# Patient Record
Sex: FEMALE | Race: BLACK OR AFRICAN AMERICAN | Employment: UNEMPLOYED | URBAN - METROPOLITAN AREA
[De-identification: names, ages, dates, MRNs, and addresses within clinical notes are randomized per-mention and may not be internally consistent; named-entity substitution may affect disease eponyms.]

---

## 2017-03-23 ENCOUNTER — OP HISTORICAL/CONVERTED ENCOUNTER (OUTPATIENT)
Dept: OTHER | Age: 67
End: 2017-03-23

## 2017-04-17 ENCOUNTER — OP HISTORICAL/CONVERTED ENCOUNTER (OUTPATIENT)
Dept: OTHER | Age: 67
End: 2017-04-17

## 2017-09-15 ENCOUNTER — ED HISTORICAL/CONVERTED ENCOUNTER (OUTPATIENT)
Dept: OTHER | Age: 67
End: 2017-09-15

## 2017-10-13 ENCOUNTER — OP HISTORICAL/CONVERTED ENCOUNTER (OUTPATIENT)
Dept: OTHER | Age: 67
End: 2017-10-13

## 2017-11-02 ENCOUNTER — OP HISTORICAL/CONVERTED ENCOUNTER (OUTPATIENT)
Dept: OTHER | Age: 67
End: 2017-11-02

## 2018-11-22 ENCOUNTER — ED HISTORICAL/CONVERTED ENCOUNTER (OUTPATIENT)
Dept: OTHER | Age: 68
End: 2018-11-22

## 2019-02-23 ENCOUNTER — ED HISTORICAL/CONVERTED ENCOUNTER (OUTPATIENT)
Dept: OTHER | Age: 69
End: 2019-02-23

## 2019-04-08 ENCOUNTER — OP HISTORICAL/CONVERTED ENCOUNTER (OUTPATIENT)
Dept: OTHER | Age: 69
End: 2019-04-08

## 2019-04-19 ENCOUNTER — OP HISTORICAL/CONVERTED ENCOUNTER (OUTPATIENT)
Dept: OTHER | Age: 69
End: 2019-04-19

## 2019-07-12 ENCOUNTER — ED HISTORICAL/CONVERTED ENCOUNTER (OUTPATIENT)
Dept: OTHER | Age: 69
End: 2019-07-12

## 2019-08-06 ENCOUNTER — OP HISTORICAL/CONVERTED ENCOUNTER (OUTPATIENT)
Dept: OTHER | Age: 69
End: 2019-08-06

## 2019-09-30 ENCOUNTER — ED HISTORICAL/CONVERTED ENCOUNTER (OUTPATIENT)
Dept: OTHER | Age: 69
End: 2019-09-30

## 2019-10-11 ENCOUNTER — IP HISTORICAL/CONVERTED ENCOUNTER (OUTPATIENT)
Dept: OTHER | Age: 69
End: 2019-10-11

## 2019-10-31 ENCOUNTER — OP HISTORICAL/CONVERTED ENCOUNTER (OUTPATIENT)
Dept: OTHER | Age: 69
End: 2019-10-31

## 2019-11-15 ENCOUNTER — OP HISTORICAL/CONVERTED ENCOUNTER (OUTPATIENT)
Dept: OTHER | Age: 69
End: 2019-11-15

## 2020-01-23 ENCOUNTER — OP HISTORICAL/CONVERTED ENCOUNTER (OUTPATIENT)
Dept: OTHER | Age: 70
End: 2020-01-23

## 2020-02-05 ENCOUNTER — OP HISTORICAL/CONVERTED ENCOUNTER (OUTPATIENT)
Dept: OTHER | Age: 70
End: 2020-02-05

## 2020-02-27 ENCOUNTER — OP HISTORICAL/CONVERTED ENCOUNTER (OUTPATIENT)
Dept: OTHER | Age: 70
End: 2020-02-27

## 2020-03-11 ENCOUNTER — OP HISTORICAL/CONVERTED ENCOUNTER (OUTPATIENT)
Dept: OTHER | Age: 70
End: 2020-03-11

## 2020-07-07 VITALS
HEIGHT: 66 IN | BODY MASS INDEX: 46.45 KG/M2 | DIASTOLIC BLOOD PRESSURE: 69 MMHG | TEMPERATURE: 98.2 F | WEIGHT: 289 LBS | SYSTOLIC BLOOD PRESSURE: 121 MMHG

## 2020-07-07 PROBLEM — M19.90 ARTHRITIS: Status: ACTIVE | Noted: 2020-07-07

## 2020-07-07 PROBLEM — R31.9 HEMATURIA: Status: ACTIVE | Noted: 2020-07-07

## 2020-07-07 PROBLEM — Z78.0 POSTMENOPAUSAL STATE: Status: ACTIVE | Noted: 2020-07-07

## 2020-07-07 PROBLEM — R32 URINARY INCONTINENCE: Status: ACTIVE | Noted: 2020-07-07

## 2020-07-07 PROBLEM — E66.9 OBESITY: Status: ACTIVE | Noted: 2020-07-07

## 2020-07-07 PROBLEM — I10 HYPERTENSION: Status: ACTIVE | Noted: 2020-07-07

## 2020-07-07 PROBLEM — Z78.0 MENOPAUSE PRESENT: Status: ACTIVE | Noted: 2020-07-07

## 2020-07-07 PROBLEM — R52 ACUTE PAIN: Status: ACTIVE | Noted: 2020-07-07

## 2020-07-07 PROBLEM — J45.909 ASTHMA: Status: ACTIVE | Noted: 2020-07-07

## 2020-07-07 PROBLEM — N20.0 KIDNEY STONE: Status: ACTIVE | Noted: 2020-07-07

## 2020-07-07 PROBLEM — Z91.81 AT RISK FOR FALLS: Status: ACTIVE | Noted: 2020-07-07

## 2020-07-07 PROBLEM — R60.9 EDEMA: Status: ACTIVE | Noted: 2020-07-07

## 2020-07-07 RX ORDER — OXYBUTYNIN CHLORIDE 5 MG/1
5 TABLET ORAL 2 TIMES DAILY
COMMUNITY

## 2020-07-07 RX ORDER — ONDANSETRON 8 MG/1
8 TABLET, ORALLY DISINTEGRATING ORAL
COMMUNITY
End: 2022-03-10

## 2020-07-07 RX ORDER — ALBUTEROL SULFATE 90 UG/1
2 AEROSOL, METERED RESPIRATORY (INHALATION)
COMMUNITY

## 2020-07-07 RX ORDER — MELOXICAM 15 MG/1
15 TABLET ORAL DAILY
COMMUNITY
End: 2022-03-10

## 2020-07-07 RX ORDER — METFORMIN HYDROCHLORIDE 500 MG/5ML
SOLUTION ORAL
Status: ON HOLD | COMMUNITY
End: 2022-04-03 | Stop reason: DRUGHIGH

## 2020-07-07 RX ORDER — TAMSULOSIN HYDROCHLORIDE 0.4 MG/1
0.4 CAPSULE ORAL DAILY
COMMUNITY

## 2020-07-07 RX ORDER — NAPROXEN 375 MG/1
375 TABLET ORAL 2 TIMES DAILY WITH MEALS
COMMUNITY
End: 2022-03-10

## 2020-07-07 RX ORDER — CARVEDILOL 6.25 MG/1
TABLET ORAL 2 TIMES DAILY WITH MEALS
Status: ON HOLD | COMMUNITY
End: 2022-03-08 | Stop reason: SDUPTHER

## 2020-07-07 RX ORDER — DIAZEPAM 5 MG/1
5 TABLET ORAL 2 TIMES DAILY
COMMUNITY
End: 2022-03-10

## 2020-07-07 RX ORDER — LANOLIN ALCOHOL/MO/W.PET/CERES
CREAM (GRAM) TOPICAL
COMMUNITY
End: 2022-03-10

## 2020-09-02 DIAGNOSIS — N20.0 CALCULUS OF KIDNEY: Primary | ICD-10-CM

## 2020-09-04 ENCOUNTER — OFFICE VISIT (OUTPATIENT)
Dept: UROLOGY | Age: 70
End: 2020-09-04
Payer: MEDICARE

## 2020-09-04 VITALS
WEIGHT: 278 LBS | TEMPERATURE: 98.8 F | BODY MASS INDEX: 44.68 KG/M2 | HEIGHT: 66 IN | SYSTOLIC BLOOD PRESSURE: 123 MMHG | DIASTOLIC BLOOD PRESSURE: 68 MMHG

## 2020-09-04 DIAGNOSIS — E66.01 OBESITY, MORBID (HCC): ICD-10-CM

## 2020-09-04 PROCEDURE — G8427 DOCREV CUR MEDS BY ELIG CLIN: HCPCS | Performed by: UROLOGY

## 2020-09-04 PROCEDURE — 99213 OFFICE O/P EST LOW 20 MIN: CPT | Performed by: UROLOGY

## 2020-09-04 RX ORDER — CARVEDILOL 25 MG/1
25 TABLET ORAL 2 TIMES DAILY WITH MEALS
COMMUNITY
End: 2022-03-10

## 2020-09-04 RX ORDER — ALISKIREN 300 MG/1
TABLET, FILM COATED ORAL
COMMUNITY
Start: 2020-09-01 | End: 2022-03-10

## 2020-09-04 RX ORDER — GABAPENTIN 300 MG/1
300 CAPSULE ORAL 2 TIMES DAILY
COMMUNITY
Start: 2020-06-30

## 2020-09-04 RX ORDER — ATORVASTATIN CALCIUM 10 MG/1
10 TABLET, FILM COATED ORAL DAILY
COMMUNITY
Start: 2020-08-27

## 2020-09-04 NOTE — LETTER
9/4/20 Patient: Mike Giles YOB: 1950 Date of Visit: 9/4/2020 Ochoa Cardoza MD 
[address] [address] [place] [address] VIA Facsimile: [phone] Dear Ochoa Cardoza MD, Thank you for referring Ms. [address] to [address] [place] [address] [place] for evaluation. My notes for this consultation are attached. If you have questions, please do not hesitate to call me. I look forward to following your patient along with you. Sincerely, Hemant Clemens MD

## 2020-09-04 NOTE — PROGRESS NOTES
HPI ROS PE NOTE          History of Present Illness   Chief complaint: Bilateral kidney stones  John Pearson is a 81 y.o. female who presents with patient whom Dr. Priti Millard is treated in the past for kidney stones returns for follow-up visit today. He previously treated an upper left renal calculus for corporal shockwave lithotripsy by 10 stone left upper pole calyx that did not appear to change significantly after an. She has a number of calyceal stones that are currently apparently not symptomatic. She has however had a previous obstructing ureteral calculus underwent utero lithotripsy and stent placement by Dr. Priti Millard severall years earlier. Follow-up [facility] in March of this year persistence of the treated stone and stability of the other stones in place. It was elected to continue observation and not treat this more aggressively  Past Medical History:   Diagnosis Date    Arthritis 7/7/2020    Asthma 7/7/2020    Hypertension 7/7/2020      Past Surgical History:   Procedure Laterality Date    HX UROLOGICAL      Lithotripsy     Family History   Family history unknown: Yes      Social History     Tobacco Use    Smoking status: Never Smoker    Smokeless tobacco: Never Used   Substance Use Topics    Alcohol use: Never     Frequency: Never       Prior to Admission medications    Medication Sig Start Date End Date Taking? Authorizing Provider   aliskiren (TEKTURNA) 300 mg tablet  9/1/20  Yes Provider, Historical   atorvastatin (LIPITOR) 10 mg tablet  8/27/20  Yes Provider, Historical   gabapentin (NEURONTIN) 300 mg capsule TK 1 C PO HS 6/30/20  Yes Provider, Historical   carvediloL (COREG) 25 mg tablet Take 25 mg by mouth two (2) times daily (with meals). Yes Provider, Historical   ferrous sulfate 325 mg (65 mg iron) tablet Take  by mouth Daily (before breakfast). Yes Provider, Historical   albuterol (PROVENTIL HFA, VENTOLIN HFA, PROAIR HFA) 90 mcg/actuation inhaler Take  by inhalation.     Provider, Historical   carvediloL (COREG) 6.25 mg tablet Take  by mouth two (2) times daily (with meals). Provider, Historical   diazePAM (VALIUM) 5 mg tablet Take 5 mg by mouth two (2) times a day. Provider, Historical   meloxicam (MOBIC) 15 mg tablet Take 15 mg by mouth daily. Provider, Historical   metFORMIN 500 mg/5 mL soln Take  by mouth. Provider, Historical   naproxen (NAPROSYN) 375 mg tablet Take 375 mg by mouth two (2) times daily (with meals). Provider, Historical   ondansetron (ZOFRAN ODT) 8 mg disintegrating tablet Take 8 mg by mouth every eight (8) hours as needed for Nausea or Vomiting. Provider, Historical   oxybutynin (DITROPAN) 5 mg tablet Take 5 mg by mouth two (2) times a day. Provider, Historical   tamsulosin (FLOMAX) 0.4 mg capsule Take 0.4 mg by mouth daily. Provider, Historical     Allergies   Allergen Reactions    Latex Unknown (comments)    Aspirin Unknown (comments)    Losartan Unknown (comments)    Penicillins Unknown (comments)        Review of Systems:  Constitutional: positive for Morbid obesity  Respiratory: negative  Cardiovascular: negative  Gastrointestinal: positive for constipation  Genitourinary:positive for frequency and nocturia  Musculoskeletal:negative     Physical Exam     Physical Exam:   Visit Vitals  /68 (BP 1 Location: Left arm, BP Patient Position: Sitting)   Temp 98.8 °F (37.1 °C) (Oral)   Ht 5' 6\" (1.676 m)   Wt 278 lb (126.1 kg)   BMI 44.87 kg/m²     General appearance: alert, cooperative, no distress, appears stated age, morbidly obese  Head: Normocephalic, without obvious abnormality, atraumatic  Lungs: clear to auscultation bilaterally  Heart: regular rate and rhythm, S1, S2 normal, no murmur, click, rub or gallop  Abdomen: soft, non-tender.  Bowel sounds normal. No masses,  no organomegaly  Extremities: extremities normal, atraumatic, no cyanosis or edema  Neurologic: Grossly normal    Data Review:   No results found for this or any previous visit (from the past 48 hour(s)). No results for input(s): 48 in the last 72 hours. Assessment and Plan:   Bilateral renal calculi, calyceal and nonobstructive in location, previous history of ureteral calculus no symptoms of this kind presently, recommend follow-up next month or sooner as needed  Morbid obesity  No problem-specific Assessment & Plan notes found for this encounter. Ms. Laura Medley has a reminder for a \"due or due soon\" health maintenance. I have asked that she contact her primary care provider for follow-up on this health maintenance. Vaughn Christina M.D.  9/4/2020

## 2020-09-11 DIAGNOSIS — R32 URINARY INCONTINENCE, UNSPECIFIED TYPE: Primary | ICD-10-CM

## 2020-09-16 ENCOUNTER — TELEPHONE (OUTPATIENT)
Dept: UROLOGY | Age: 70
End: 2020-09-16

## 2020-09-16 DIAGNOSIS — N39.0 URINARY TRACT INFECTION WITHOUT HEMATURIA, SITE UNSPECIFIED: Primary | ICD-10-CM

## 2020-09-16 LAB — BACTERIA UR CULT: ABNORMAL

## 2020-09-16 RX ORDER — SULFAMETHOXAZOLE AND TRIMETHOPRIM 800; 160 MG/1; MG/1
1 TABLET ORAL 2 TIMES DAILY
Qty: 20 TAB | Refills: 0 | Status: SHIPPED | OUTPATIENT
Start: 2020-09-16 | End: 2020-09-26

## 2022-02-26 ENCOUNTER — ANESTHESIA (OUTPATIENT)
Dept: SURGERY | Age: 72
DRG: 854 | End: 2022-02-26
Payer: MEDICARE

## 2022-02-26 ENCOUNTER — HOSPITAL ENCOUNTER (INPATIENT)
Age: 72
LOS: 12 days | Discharge: HOME | DRG: 854 | End: 2022-03-10
Attending: EMERGENCY MEDICINE | Admitting: INTERNAL MEDICINE
Payer: MEDICARE

## 2022-02-26 ENCOUNTER — APPOINTMENT (OUTPATIENT)
Dept: CT IMAGING | Age: 72
End: 2022-02-26
Attending: STUDENT IN AN ORGANIZED HEALTH CARE EDUCATION/TRAINING PROGRAM
Payer: MEDICARE

## 2022-02-26 ENCOUNTER — APPOINTMENT (OUTPATIENT)
Dept: GENERAL RADIOLOGY | Age: 72
End: 2022-02-26
Attending: STUDENT IN AN ORGANIZED HEALTH CARE EDUCATION/TRAINING PROGRAM
Payer: MEDICARE

## 2022-02-26 ENCOUNTER — HOSPITAL ENCOUNTER (EMERGENCY)
Age: 72
Discharge: ANOTHER HOSPITAL | End: 2022-02-26
Attending: STUDENT IN AN ORGANIZED HEALTH CARE EDUCATION/TRAINING PROGRAM
Payer: MEDICARE

## 2022-02-26 ENCOUNTER — ANESTHESIA EVENT (OUTPATIENT)
Dept: SURGERY | Age: 72
DRG: 854 | End: 2022-02-26
Payer: MEDICARE

## 2022-02-26 ENCOUNTER — APPOINTMENT (OUTPATIENT)
Dept: GENERAL RADIOLOGY | Age: 72
DRG: 854 | End: 2022-02-26
Attending: INTERNAL MEDICINE
Payer: MEDICARE

## 2022-02-26 ENCOUNTER — APPOINTMENT (OUTPATIENT)
Dept: ULTRASOUND IMAGING | Age: 72
End: 2022-02-26
Attending: STUDENT IN AN ORGANIZED HEALTH CARE EDUCATION/TRAINING PROGRAM
Payer: MEDICARE

## 2022-02-26 VITALS
RESPIRATION RATE: 30 BRPM | SYSTOLIC BLOOD PRESSURE: 108 MMHG | DIASTOLIC BLOOD PRESSURE: 63 MMHG | TEMPERATURE: 98.7 F | HEART RATE: 112 BPM | BODY MASS INDEX: 44.41 KG/M2 | HEIGHT: 68 IN | WEIGHT: 293 LBS | OXYGEN SATURATION: 99 %

## 2022-02-26 DIAGNOSIS — A41.9 SEPSIS, DUE TO UNSPECIFIED ORGANISM, UNSPECIFIED WHETHER ACUTE ORGAN DYSFUNCTION PRESENT (HCC): ICD-10-CM

## 2022-02-26 DIAGNOSIS — R65.21 SEPSIS WITH ACUTE RENAL FAILURE AND SEPTIC SHOCK, DUE TO UNSPECIFIED ORGANISM, UNSPECIFIED ACUTE RENAL FAILURE TYPE (HCC): Primary | ICD-10-CM

## 2022-02-26 DIAGNOSIS — N39.0 URINARY TRACT INFECTION WITHOUT HEMATURIA, SITE UNSPECIFIED: ICD-10-CM

## 2022-02-26 DIAGNOSIS — N17.9 SEPSIS WITH ACUTE RENAL FAILURE AND SEPTIC SHOCK, DUE TO UNSPECIFIED ORGANISM, UNSPECIFIED ACUTE RENAL FAILURE TYPE (HCC): Primary | ICD-10-CM

## 2022-02-26 DIAGNOSIS — R77.8 ELEVATED TROPONIN: ICD-10-CM

## 2022-02-26 DIAGNOSIS — A41.9 SEPSIS WITH ACUTE RENAL FAILURE AND SEPTIC SHOCK, DUE TO UNSPECIFIED ORGANISM, UNSPECIFIED ACUTE RENAL FAILURE TYPE (HCC): Primary | ICD-10-CM

## 2022-02-26 DIAGNOSIS — N17.9 ACUTE RENAL INJURY (HCC): ICD-10-CM

## 2022-02-26 DIAGNOSIS — R07.9 CHEST PAIN, UNSPECIFIED TYPE: ICD-10-CM

## 2022-02-26 DIAGNOSIS — E86.0 DEHYDRATION: Primary | ICD-10-CM

## 2022-02-26 DIAGNOSIS — N20.1 URETEROLITHIASIS: ICD-10-CM

## 2022-02-26 LAB
ALBUMIN SERPL-MCNC: 2.4 G/DL (ref 3.5–5)
ALBUMIN/GLOB SERPL: 0.5 {RATIO} (ref 1.1–2.2)
ALP SERPL-CCNC: 606 U/L (ref 45–117)
ALT SERPL-CCNC: 18 U/L (ref 12–78)
ANION GAP SERPL CALC-SCNC: 11 MMOL/L (ref 5–15)
APPEARANCE UR: ABNORMAL
AST SERPL W P-5'-P-CCNC: 36 U/L (ref 15–37)
BACTERIA URNS QL MICRO: ABNORMAL /HPF
BASOPHILS # BLD: 0 K/UL (ref 0–0.1)
BASOPHILS NFR BLD: 0 % (ref 0–1)
BILIRUB SERPL-MCNC: 0.9 MG/DL (ref 0.2–1)
BILIRUB UR QL: NEGATIVE
BNP SERPL-MCNC: ABNORMAL PG/ML
BUN SERPL-MCNC: 53 MG/DL (ref 6–20)
BUN/CREAT SERPL: 15 (ref 12–20)
CA-I BLD-MCNC: 8 MG/DL (ref 8.5–10.1)
CHLORIDE SERPL-SCNC: 106 MMOL/L (ref 97–108)
CO2 SERPL-SCNC: 22 MMOL/L (ref 21–32)
COLOR UR: ABNORMAL
COVID-19 RAPID TEST, COVR: NOT DETECTED
CREAT SERPL-MCNC: 3.55 MG/DL (ref 0.55–1.02)
DIFFERENTIAL METHOD BLD: ABNORMAL
EOSINOPHIL # BLD: 0 K/UL (ref 0–0.4)
EOSINOPHIL NFR BLD: 0 % (ref 0–7)
ERYTHROCYTE [DISTWIDTH] IN BLOOD BY AUTOMATED COUNT: 16.5 % (ref 11.5–14.5)
GLOBULIN SER CALC-MCNC: 4.7 G/DL (ref 2–4)
GLUCOSE BLD STRIP.AUTO-MCNC: 78 MG/DL (ref 65–117)
GLUCOSE SERPL-MCNC: 99 MG/DL (ref 65–100)
GLUCOSE UR STRIP.AUTO-MCNC: NEGATIVE MG/DL
HCT VFR BLD AUTO: 38 % (ref 35–47)
HGB BLD-MCNC: 12.1 G/DL (ref 11.5–16)
HGB UR QL STRIP: ABNORMAL
IMM GRANULOCYTES # BLD AUTO: 0 K/UL
IMM GRANULOCYTES NFR BLD AUTO: 0 %
KETONES UR QL STRIP.AUTO: NEGATIVE MG/DL
LACTATE SERPL-SCNC: 1.9 MMOL/L (ref 0.4–2)
LACTATE SERPL-SCNC: 3.1 MMOL/L (ref 0.4–2)
LEUKOCYTE ESTERASE UR QL STRIP.AUTO: ABNORMAL
LIPASE SERPL-CCNC: 21 U/L (ref 73–393)
LYMPHOCYTES # BLD: 0.3 K/UL (ref 0.8–3.5)
LYMPHOCYTES NFR BLD: 9 % (ref 12–49)
MCH RBC QN AUTO: 30 PG (ref 26–34)
MCHC RBC AUTO-ENTMCNC: 31.8 G/DL (ref 30–36.5)
MCV RBC AUTO: 94.3 FL (ref 80–99)
MONOCYTES # BLD: 0.2 K/UL (ref 0–1)
MONOCYTES NFR BLD: 6 % (ref 5–13)
MUCOUS THREADS URNS QL MICRO: ABNORMAL /LPF
MYELOCYTES NFR BLD MANUAL: 8 %
NEUTS BAND NFR BLD MANUAL: 1 % (ref 0–6)
NEUTS SEG # BLD: 2.2 K/UL (ref 1.8–8)
NEUTS SEG NFR BLD: 76 % (ref 32–75)
NITRITE UR QL STRIP.AUTO: NEGATIVE
NRBC # BLD: 0 K/UL (ref 0–0.01)
NRBC BLD-RTO: 0 PER 100 WBC
PH UR STRIP: 5 [PH] (ref 5–8)
PLATELET # BLD AUTO: 81 K/UL (ref 150–400)
PLATELET COMMENTS,PCOM: ABNORMAL
POTASSIUM SERPL-SCNC: 4 MMOL/L (ref 3.5–5.1)
PROT SERPL-MCNC: 7.1 G/DL (ref 6.4–8.2)
PROT UR STRIP-MCNC: 100 MG/DL
RBC # BLD AUTO: 4.03 M/UL (ref 3.8–5.2)
RBC #/AREA URNS HPF: ABNORMAL /HPF (ref 0–5)
RBC MORPH BLD: ABNORMAL
SARS-COV-2, COV2: NORMAL
SERVICE CMNT-IMP: NORMAL
SODIUM SERPL-SCNC: 139 MMOL/L (ref 136–145)
SP GR UR REFRACTOMETRY: 1.01 (ref 1–1.03)
TROPONIN-HIGH SENSITIVITY: 263 NG/L (ref 0–51)
TROPONIN-HIGH SENSITIVITY: 268 NG/L (ref 0–51)
TROPONIN-HIGH SENSITIVITY: 345 NG/L (ref 0–51)
TROPONIN-HIGH SENSITIVITY: 368 NG/L (ref 0–51)
UROBILINOGEN UR QL STRIP.AUTO: 0.1 EU/DL (ref 0.1–1)
VANCOMYCIN SERPL-MCNC: 10.6 UG/ML
WBC # BLD AUTO: 2.9 K/UL (ref 3.6–11)
WBC URNS QL MICRO: >100 /HPF (ref 0–4)

## 2022-02-26 PROCEDURE — 36415 COLL VENOUS BLD VENIPUNCTURE: CPT

## 2022-02-26 PROCEDURE — 99285 EMERGENCY DEPT VISIT HI MDM: CPT

## 2022-02-26 PROCEDURE — 74011000258 HC RX REV CODE- 258: Performed by: STUDENT IN AN ORGANIZED HEALTH CARE EDUCATION/TRAINING PROGRAM

## 2022-02-26 PROCEDURE — C2617 STENT, NON-COR, TEM W/O DEL: HCPCS | Performed by: UROLOGY

## 2022-02-26 PROCEDURE — 80202 ASSAY OF VANCOMYCIN: CPT

## 2022-02-26 PROCEDURE — 96361 HYDRATE IV INFUSION ADD-ON: CPT

## 2022-02-26 PROCEDURE — 74011000250 HC RX REV CODE- 250: Performed by: INTERNAL MEDICINE

## 2022-02-26 PROCEDURE — 93005 ELECTROCARDIOGRAM TRACING: CPT

## 2022-02-26 PROCEDURE — 74011250636 HC RX REV CODE- 250/636: Performed by: STUDENT IN AN ORGANIZED HEALTH CARE EDUCATION/TRAINING PROGRAM

## 2022-02-26 PROCEDURE — 83605 ASSAY OF LACTIC ACID: CPT

## 2022-02-26 PROCEDURE — 71045 X-RAY EXAM CHEST 1 VIEW: CPT

## 2022-02-26 PROCEDURE — 85025 COMPLETE CBC W/AUTO DIFF WBC: CPT

## 2022-02-26 PROCEDURE — 87077 CULTURE AEROBIC IDENTIFY: CPT

## 2022-02-26 PROCEDURE — 96366 THER/PROPH/DIAG IV INF ADDON: CPT

## 2022-02-26 PROCEDURE — 84484 ASSAY OF TROPONIN QUANT: CPT

## 2022-02-26 PROCEDURE — 87040 BLOOD CULTURE FOR BACTERIA: CPT

## 2022-02-26 PROCEDURE — 74018 RADEX ABDOMEN 1 VIEW: CPT

## 2022-02-26 PROCEDURE — 76705 ECHO EXAM OF ABDOMEN: CPT

## 2022-02-26 PROCEDURE — 87186 SC STD MICRODIL/AGAR DIL: CPT

## 2022-02-26 PROCEDURE — 87635 SARS-COV-2 COVID-19 AMP PRB: CPT

## 2022-02-26 PROCEDURE — 74176 CT ABD & PELVIS W/O CONTRAST: CPT

## 2022-02-26 PROCEDURE — 83880 ASSAY OF NATRIURETIC PEPTIDE: CPT

## 2022-02-26 PROCEDURE — 81001 URINALYSIS AUTO W/SCOPE: CPT

## 2022-02-26 PROCEDURE — C1769 GUIDE WIRE: HCPCS | Performed by: UROLOGY

## 2022-02-26 PROCEDURE — 65660000001 HC RM ICU INTERMED STEPDOWN

## 2022-02-26 PROCEDURE — 80053 COMPREHEN METABOLIC PANEL: CPT

## 2022-02-26 PROCEDURE — 96365 THER/PROPH/DIAG IV INF INIT: CPT

## 2022-02-26 PROCEDURE — 74011250637 HC RX REV CODE- 250/637: Performed by: INTERNAL MEDICINE

## 2022-02-26 PROCEDURE — 74011250636 HC RX REV CODE- 250/636: Performed by: INTERNAL MEDICINE

## 2022-02-26 PROCEDURE — 96368 THER/DIAG CONCURRENT INF: CPT

## 2022-02-26 PROCEDURE — 83690 ASSAY OF LIPASE: CPT

## 2022-02-26 PROCEDURE — 82962 GLUCOSE BLOOD TEST: CPT

## 2022-02-26 PROCEDURE — 0T768DZ DILATION OF RIGHT URETER WITH INTRALUMINAL DEVICE, VIA NATURAL OR ARTIFICIAL OPENING ENDOSCOPIC: ICD-10-PCS | Performed by: UROLOGY

## 2022-02-26 RX ORDER — IPRATROPIUM BROMIDE AND ALBUTEROL SULFATE 2.5; .5 MG/3ML; MG/3ML
3 SOLUTION RESPIRATORY (INHALATION)
Status: DISCONTINUED | OUTPATIENT
Start: 2022-02-26 | End: 2022-03-10 | Stop reason: HOSPADM

## 2022-02-26 RX ORDER — LANOLIN ALCOHOL/MO/W.PET/CERES
1 CREAM (GRAM) TOPICAL
Status: DISCONTINUED | OUTPATIENT
Start: 2022-02-27 | End: 2022-03-10 | Stop reason: HOSPADM

## 2022-02-26 RX ORDER — SODIUM CHLORIDE 0.9 % (FLUSH) 0.9 %
5-40 SYRINGE (ML) INJECTION EVERY 8 HOURS
Status: DISCONTINUED | OUTPATIENT
Start: 2022-02-26 | End: 2022-03-10 | Stop reason: HOSPADM

## 2022-02-26 RX ORDER — PREDNISONE 20 MG/1
40 TABLET ORAL
Status: COMPLETED | OUTPATIENT
Start: 2022-02-27 | End: 2022-03-03

## 2022-02-26 RX ORDER — ONDANSETRON 2 MG/ML
4 INJECTION INTRAMUSCULAR; INTRAVENOUS
Status: DISCONTINUED | OUTPATIENT
Start: 2022-02-26 | End: 2022-03-10 | Stop reason: HOSPADM

## 2022-02-26 RX ORDER — ONDANSETRON 4 MG/1
4 TABLET, ORALLY DISINTEGRATING ORAL
Status: DISCONTINUED | OUTPATIENT
Start: 2022-02-26 | End: 2022-03-10 | Stop reason: HOSPADM

## 2022-02-26 RX ORDER — ACETAMINOPHEN 325 MG/1
650 TABLET ORAL
Status: DISCONTINUED | OUTPATIENT
Start: 2022-02-26 | End: 2022-03-10 | Stop reason: HOSPADM

## 2022-02-26 RX ORDER — ACETAMINOPHEN 650 MG/1
650 SUPPOSITORY RECTAL
Status: DISCONTINUED | OUTPATIENT
Start: 2022-02-26 | End: 2022-03-10 | Stop reason: HOSPADM

## 2022-02-26 RX ORDER — INSULIN LISPRO 100 [IU]/ML
INJECTION, SOLUTION INTRAVENOUS; SUBCUTANEOUS
Status: DISCONTINUED | OUTPATIENT
Start: 2022-02-26 | End: 2022-03-10 | Stop reason: HOSPADM

## 2022-02-26 RX ORDER — SODIUM CHLORIDE 0.9 % (FLUSH) 0.9 %
5-40 SYRINGE (ML) INJECTION AS NEEDED
Status: DISCONTINUED | OUTPATIENT
Start: 2022-02-26 | End: 2022-03-10 | Stop reason: HOSPADM

## 2022-02-26 RX ORDER — TAMSULOSIN HYDROCHLORIDE 0.4 MG/1
0.4 CAPSULE ORAL DAILY
Status: DISCONTINUED | OUTPATIENT
Start: 2022-02-27 | End: 2022-03-10 | Stop reason: HOSPADM

## 2022-02-26 RX ORDER — DIAZEPAM 5 MG/1
5 TABLET ORAL 2 TIMES DAILY
Status: DISCONTINUED | OUTPATIENT
Start: 2022-02-26 | End: 2022-02-27

## 2022-02-26 RX ORDER — VANCOMYCIN 2 GRAM/500 ML IN 0.9 % SODIUM CHLORIDE INTRAVENOUS
2000 ONCE
Status: COMPLETED | OUTPATIENT
Start: 2022-02-26 | End: 2022-02-27

## 2022-02-26 RX ORDER — POLYETHYLENE GLYCOL 3350 17 G/17G
17 POWDER, FOR SOLUTION ORAL DAILY PRN
Status: DISCONTINUED | OUTPATIENT
Start: 2022-02-26 | End: 2022-03-10 | Stop reason: HOSPADM

## 2022-02-26 RX ORDER — GABAPENTIN 100 MG/1
300 CAPSULE ORAL
Status: DISCONTINUED | OUTPATIENT
Start: 2022-02-26 | End: 2022-02-28 | Stop reason: DRUGHIGH

## 2022-02-26 RX ORDER — HYDROMORPHONE HYDROCHLORIDE 1 MG/ML
1 INJECTION, SOLUTION INTRAMUSCULAR; INTRAVENOUS; SUBCUTANEOUS
Status: DISCONTINUED | OUTPATIENT
Start: 2022-02-26 | End: 2022-02-28

## 2022-02-26 RX ORDER — MAGNESIUM SULFATE 100 %
4 CRYSTALS MISCELLANEOUS AS NEEDED
Status: DISCONTINUED | OUTPATIENT
Start: 2022-02-26 | End: 2022-03-10 | Stop reason: HOSPADM

## 2022-02-26 RX ORDER — OXYBUTYNIN CHLORIDE 5 MG/1
5 TABLET ORAL 2 TIMES DAILY
Status: DISCONTINUED | OUTPATIENT
Start: 2022-02-26 | End: 2022-03-10 | Stop reason: HOSPADM

## 2022-02-26 RX ORDER — ATORVASTATIN CALCIUM 10 MG/1
10 TABLET, FILM COATED ORAL
Status: DISCONTINUED | OUTPATIENT
Start: 2022-02-26 | End: 2022-03-10 | Stop reason: HOSPADM

## 2022-02-26 RX ADMIN — OXYBUTYNIN CHLORIDE 5 MG: 5 TABLET ORAL at 22:29

## 2022-02-26 RX ADMIN — VANCOMYCIN HYDROCHLORIDE 1000 MG: 1 INJECTION, POWDER, LYOPHILIZED, FOR SOLUTION INTRAVENOUS at 07:45

## 2022-02-26 RX ADMIN — SODIUM CHLORIDE 1000 ML: 9 INJECTION, SOLUTION INTRAVENOUS at 07:51

## 2022-02-26 RX ADMIN — SODIUM CHLORIDE 1000 ML: 9 INJECTION, SOLUTION INTRAVENOUS at 09:18

## 2022-02-26 RX ADMIN — PIPERACILLIN AND TAZOBACTAM 3.38 G: 3; .375 INJECTION, POWDER, LYOPHILIZED, FOR SOLUTION INTRAVENOUS at 07:47

## 2022-02-26 RX ADMIN — Medication 10 ML: at 22:00

## 2022-02-26 RX ADMIN — SODIUM CHLORIDE 1000 ML: 9 INJECTION, SOLUTION INTRAVENOUS at 11:00

## 2022-02-26 RX ADMIN — SODIUM CHLORIDE 1000 ML: 9 INJECTION, SOLUTION INTRAVENOUS at 06:04

## 2022-02-26 RX ADMIN — CEFEPIME 2 G: 2 INJECTION, POWDER, FOR SOLUTION INTRAVENOUS at 19:22

## 2022-02-26 RX ADMIN — ATORVASTATIN CALCIUM 10 MG: 10 TABLET, FILM COATED ORAL at 22:40

## 2022-02-26 RX ADMIN — HYDROMORPHONE HYDROCHLORIDE 1 MG: 1 INJECTION, SOLUTION INTRAMUSCULAR; INTRAVENOUS; SUBCUTANEOUS at 16:10

## 2022-02-26 RX ADMIN — VANCOMYCIN HYDROCHLORIDE 2000 MG: 10 INJECTION, POWDER, LYOPHILIZED, FOR SOLUTION INTRAVENOUS at 22:29

## 2022-02-26 NOTE — ED PROVIDER NOTES
EMERGENCY DEPARTMENT HISTORY AND PHYSICAL EXAM      Date: 2/26/2022  Patient Name: Yonatan Morales    History of Presenting Illness     Chief Complaint   Patient presents with    Shortness of Breath    Diarrhea    Generalized Body Aches       History Provided By: Patient    HPI: Yonatan Morales, 73 y.o. female with a past medical history significant hypertension and asthma presents to the ED with cc of weakness diarrhea shortness of breath. Patient states that over the last week she has had worsening generalized weakness, multiple episodes of loose stool daily, last night started having some worsening shortness of breath. Patient denies any chest pains denies any fevers, has complained of some mild chills, no nausea vomiting. There are no other complaints, changes, or physical findings at this time.     PCP: Ernei Ha MD    Facility-Administered Medications Ordered in Other Encounters   Medication Dose Route Frequency Provider Last Rate Last Admin    HYDROmorphone (DILAUDID) injection 1 mg  1 mg IntraVENous Q4H PRN Corine Adam MD   1 mg at 02/26/22 1610    albuterol-ipratropium (DUO-NEB) 2.5 MG-0.5 MG/3 ML  3 mL Nebulization Q4H PRN Corine Adam MD        atorvastatin (LIPITOR) tablet 10 mg  10 mg Oral QHS Corine Adam MD        diazePAM (VALIUM) tablet 5 mg  5 mg Oral BID Corine Adam MD        [START ON 2/27/2022] ferrous sulfate tablet 325 mg  1 Tablet Oral ACB Corine Adam MD        gabapentin (NEURONTIN) capsule 300 mg  300 mg Oral QHS Corine Adam MD        oxybutynin (DITROPAN) tablet 5 mg  5 mg Oral BID Corine Adam MD        [START ON 2/27/2022] tamsulosin (FLOMAX) capsule 0.4 mg  0.4 mg Oral DAILY Corine Adam MD        sodium chloride (NS) flush 5-40 mL  5-40 mL IntraVENous Q8H Corine Adam MD        sodium chloride (NS) flush 5-40 mL  5-40 mL IntraVENous PRN Corine Adam MD        acetaminophen (TYLENOL) tablet 650 mg  650 mg Oral Q6H PRN Bree Lucas MD        Or    acetaminophen (TYLENOL) suppository 650 mg  650 mg Rectal Q6H PRN Corine Adam MD        polyethylene glycol (MIRALAX) packet 17 g  17 g Oral DAILY PRN Corine Adam MD        ondansetron (ZOFRAN ODT) tablet 4 mg  4 mg Oral Q8H PRN Corine Adam MD        Or    ondansetron (ZOFRAN) injection 4 mg  4 mg IntraVENous Q6H PRN Corine Adam MD        [START ON 2/27/2022] L.acidophilus-paracasei-S.thermophil-bifidobacter (RISAQUAD) 8 billion cell capsule  1 Capsule Oral DAILY Corine Adam MD        [START ON 2/27/2022] predniSONE (DELTASONE) tablet 40 mg  40 mg Oral DAILY WITH BREAKFAST Corine Adam MD        insulin lispro (HUMALOG) injection   SubCUTAneous AC&HS Corine Adam MD        glucose chewable tablet 16 g  4 Tablet Oral PRN Corine Adam MD        glucagon (GLUCAGEN) injection 1 mg  1 mg IntraMUSCular PRN Corine Adam MD        dextrose 10 % infusion 0-250 mL  0-250 mL IntraVENous PRN Corine Adam MD        Vancomycin- pharmacy to dose   Other PRN Bree Lucas MD        cefepime (MAXIPIME) 2 g in sterile water (preservative free) 10 mL IV syringe  2 g IntraVENous Q24H Corine Adam  mL/hr at 02/26/22 1922 2 g at 02/26/22 1922    vancomycin (VANCOCIN) 2000 mg in  ml infusion  2,000 mg IntraVENous ONCE Bree Lucas MD           Past History     Past Medical History:  Past Medical History:   Diagnosis Date    Arthritis 7/7/2020    Asthma 7/7/2020    Hypertension 7/7/2020       Past Surgical History:  Past Surgical History:   Procedure Laterality Date    HX UROLOGICAL      Lithotripsy       Family History:  Family History   Family history unknown: Yes       Social History:  Social History     Tobacco Use    Smoking status: Never Smoker    Smokeless tobacco: Never Used   Substance Use Topics    Alcohol use: Never    Drug use: Never       Allergies:   Allergies   Allergen Reactions    Latex Unknown (comments)    Aspirin Unknown (comments)    Losartan Unknown (comments)    Penicillins Unknown (comments)         Review of Systems     Review of Systems   Constitutional: Negative for activity change, chills and fever. HENT: Negative for congestion and sore throat. Eyes: Negative for photophobia and visual disturbance. Respiratory: Positive for shortness of breath. Negative for apnea and chest tightness. Cardiovascular: Negative for chest pain, palpitations and leg swelling. Gastrointestinal: Positive for diarrhea. Negative for abdominal pain, blood in stool, nausea and vomiting. Genitourinary: Negative for dysuria. Musculoskeletal: Negative for arthralgias and back pain. Skin: Negative for color change. Neurological: Positive for light-headedness. Negative for dizziness, weakness, numbness and headaches. Physical Exam     Physical Exam  Vitals and nursing note reviewed. Constitutional:       General: She is not in acute distress. Appearance: Normal appearance. She is normal weight. She is not ill-appearing. HENT:      Head: Normocephalic and atraumatic. Nose: Nose normal.      Mouth/Throat:      Mouth: Mucous membranes are moist.   Eyes:      Extraocular Movements: Extraocular movements intact. Pupils: Pupils are equal, round, and reactive to light. Cardiovascular:      Rate and Rhythm: Normal rate and regular rhythm. Pulses: Normal pulses. Pulmonary:      Effort: Pulmonary effort is normal.      Breath sounds: Normal breath sounds. Abdominal:      General: Abdomen is flat. Bowel sounds are normal.      Palpations: Abdomen is soft. Tenderness: There is abdominal tenderness in the right upper quadrant. There is no guarding or rebound. Musculoskeletal:         General: No tenderness. Normal range of motion. Cervical back: Normal range of motion and neck supple. No muscular tenderness.    Skin:     General: Skin is warm and dry. Neurological:      General: No focal deficit present. Mental Status: She is alert and oriented to person, place, and time. Sensory: No sensory deficit. Motor: No weakness. Diagnostic Study Results     Labs -     Recent Results (from the past 12 hour(s))   LACTIC ACID    Collection Time: 02/26/22  2:03 PM   Result Value Ref Range    Lactic acid 1.9 0.4 - 2.0 MMOL/L   EKG, 12 LEAD, INITIAL    Collection Time: 02/26/22  3:53 PM   Result Value Ref Range    Ventricular Rate 118 BPM    Atrial Rate 118 BPM    P-R Interval 170 ms    QRS Duration 82 ms    Q-T Interval 332 ms    QTC Calculation (Bezet) 465 ms    Calculated P Axis 53 degrees    Calculated R Axis 2 degrees    Calculated T Axis 37 degrees    Diagnosis       Sinus tachycardia  Otherwise normal ECG  No previous ECGs available     TROPONIN-HIGH SENSITIVITY    Collection Time: 02/26/22  4:08 PM   Result Value Ref Range    Troponin-High Sensitivity 268 (HH) 0 - 51 ng/L   VANCOMYCIN, RANDOM    Collection Time: 02/26/22  6:51 PM   Result Value Ref Range    Vancomycin, random 10.6 UG/ML   TROPONIN-HIGH SENSITIVITY    Collection Time: 02/26/22  9:07 PM   Result Value Ref Range    Troponin-High Sensitivity 263 (HH) 0 - 51 ng/L   NT-PRO BNP    Collection Time: 02/26/22  9:07 PM   Result Value Ref Range    NT pro-BNP 27,989 (H) <125 PG/ML       Radiologic Studies -   [unfilled]  CT Results  (Last 48 hours)               02/26/22 0934  CT ABD PELV WO CONT Final result    Impression:  Comparison 11/15/2019. New right urinary tract obstruction as above. Narrative:  CT abdomen and pelvis without contrast:       Comparison 11/15/2019.        Dose reduction: All CT scans at this facility are performed using dose reduction   optimization techniques as appropriate to a performed exam including the   following: Automated exposure control, adjustments of the mA and/or kV according   to patient size, or use of iterative reconstruction technique. An emergency noncontrast axial study was performed with coronal and sagittal   reconstructions. The heart is enlarged. There is dependent atelectasis in both lower lobes. A left hepatic lobe cyst is redemonstrated with dimensions 11.0 cm transverse by   8.4 cm AP by 10.0 cm craniocaudal. There is no acute abnormality in the spleen. The pancreas is atrophic. There are no calcified gallstones. A 2 cm left adrenal nodule is redemonstrated. The right adrenal gland is intact. There are bilateral renal calculi, right greater than left. There has been   interval development of moderate obstruction of the right kidney and right   ureter secondary to an 8 mm stone positioned approximately 2 cm proximal to the   right ureterovesical junction. The left kidney is not obstructed. There is a 17   mm left midpole renal cyst.       The aorta is not aneurysmal.       There is no abdominal/pelvic lymphadenopathy. There is no free air or free fluid in the abdomen or pelvis. There is no gastric or small bowel distention. There is a small quantity of   retained colonic stool. The appendix is normal.       There are no acute findings in the urinary bladder or uterus. There is multilevel thoracic/lumbar spine degenerative disc disease/facet joint   arthrosis. There is moderate bilateral sacroiliac and hip joint arthrosis. CXR Results  (Last 48 hours)               02/26/22 0510  XR CHEST PORT Final result    Impression:  Chronic findings as above. Narrative:  Chest one view. Comparison 10/13/2019. AP erect portable at 0508 dated 2/26/2022. The heart remains enlarged with tortuosity in the aorta. The pulmonary blood   flow is normal. The lungs are clear. There is no pleural fluid. There is no acute skeletal abnormality. Medical Decision Making and ED Course   I am the first provider for this patient.     I reviewed the vital signs, available nursing notes, past medical history, past surgical history, family history and social history. Vital Signs-Reviewed the patient's vital signs. Patient Vitals for the past 12 hrs:   Temp Pulse Resp BP SpO2   02/26/22 1145 98.7 °F (37.1 °C) (!) 112 30 108/63 99 %   02/26/22 1030  (!) 113 28 110/65 96 %       EKG interpretation: (Preliminary)  Sinus tach 126, no ST elevations, normal axis    Records Reviewed: Nursing Notes and Old Medical Records    The patient presents with generalized weakness, shortness of breath with a differential diagnosis of pneumonia, pleural effusion, COVID-19, ACS, dehydration      Provider Notes (Medical Decision Making):     MDM   68-year-old female history of hypertension, presents emergency department for 1 week of loose stool, diarrhea, generalized weakness and shortness of breath. Physical exam shows well-appearing female no distress, mild tachycardia, afebrile saturations 95% on 2 L nasal cannula. Abdomen soft with significant right upper quadrant tenderness to palpation no rebound or guarding. EKG shows sinus tach no signs of acute ischemic change, will continue O2 support for comfort, draw basic lab work including cardiac enzymes, chest x-ray, COVID-19 swab, continue to monitor patient. ED Course:   Initial assessment performed. The patients presenting problems have been discussed, and they are in agreement with the care plan formulated and outlined with them. I have encouraged them to ask questions as they arise throughout their visit. ED Course as of 02/26/22 2218   Sat Feb 26, 2022   0633 Troponin-High Sensitivity(!!): 345 [PZ]   7607 Creatinine(!): 3.55 [PZ]   0633 BUN(!): 53  Patient's lab work resulting, troponin elevated at 345, additionally metabolic panel shows acute renal insufficiency BUN 53 creatinine 3.55. Patient has elevated alk phos 606 however normal bilirubin.   Given significant right upper quadrant tenderness will obtain CT of the pelvis noncontrast additionally right upper quadrant ultrasound ordered. 1 L normal saline ordered, CBC still pending. Patient has aspirin allergy, most likely is a type II MI [PZ]   0633 Alk. phosphatase(!): 606 [PZ]   [id number] Patient signed out to Dr. Maggie Isaac, to follow up abdomen CT to evaluate for acute intraabdominal process, anticipate admission to hospital for elevated troponin acute renal insufficiency. [PZ]   [id number] Received on signout. SIRS x3 (leukopenia, tachypnea, tachycardia), atbx initiated. Fluids for hypotension. CT/US pending. [SS]   0841 Lactate 3.1, fluids given. [SS]   0911 UTI noted. [SS]   [id number] Trop slowly increasing. [SS]   1046 Septic stone with ELLYN and NSTEMI. Will transfer.  [SS]      ED Course User Index  [PZ] Magdalene Guzman MD  [SS] Louie Pedraza MD         Procedures       Eduar Garcia MD  Procedures               Disposition           Diagnosis     Clinical Impression:   1. Dehydration    2. Elevated troponin    3. Acute renal injury ([address]    4. Sepsis, due to unspecified organism, unspecified whether acute organ dysfunction present ([address]    5. Ureterolithiasis    6. Urinary tract infection without hematuria, site unspecified        Attestations:    Eduar Garcia MD    Please note that this dictation was completed with Tello, the computer voice recognition software. Quite often unanticipated grammatical, syntax, homophones, and other interpretive errors are inadvertently transcribed by the computer software. Please disregard these errors. Please excuse any errors that have escaped final proofreading. Thank you.

## 2022-02-26 NOTE — ANESTHESIA PREPROCEDURE EVALUATION
Relevant Problems   No relevant active problems       Anesthetic History   No history of anesthetic complications            Review of Systems / Medical History  Patient summary reviewed, nursing notes reviewed and pertinent labs reviewed    Pulmonary            Asthma        Neuro/Psych   Within defined limits           Cardiovascular    Hypertension                   GI/Hepatic/Renal         Renal disease: CRI       Endo/Other        Morbid obesity and arthritis     Other Findings   Comments: Elevated troponin 368           Physical Exam    Airway  Mallampati: II  TM Distance: > 6 cm  Neck ROM: normal range of motion   Mouth opening: Normal     Cardiovascular  Regular rate and rhythm,  S1 and S2 normal,  no murmur, click, rub, or gallop             Dental  No notable dental hx       Pulmonary  Breath sounds clear to auscultation               Abdominal  GI exam deferred       Other Findings            Anesthetic Plan    ASA: 3  Anesthesia type: general          Induction: Intravenous  Anesthetic plan and risks discussed with: Patient

## 2022-02-26 NOTE — PROGRESS NOTES
Pharmacist Note - Vancomycin Dosing    Consult provided for this 73 y.o. female for indication of sepsis  -septic renal stone, UTI and obstructing stone on the right noted on outside imaging    Patient received Vanc 1 gram at 07:45 prior to transfer      Recent Labs     22  0459   WBC 2.9*   CREA 3.55*   BUN 53*     Height: 173 cm  Weight: 136.1 kg  Est CrCl: 22 ml/min  Temp (24hrs), Av.7 °F (37.1 °C), Min:98.3 °F (36.8 °C), Max:98.8 °F (37.1 °C)    The plan below is expected to result in a target range of Trough 10-15 mcg/mL    Will dose by levels due to ELLYN- level ordered now due to incomplete loading dose   Pharmacy to follow patient daily and order levels / make dose adjustments as appropriate.

## 2022-02-26 NOTE — ED NOTES
ED Course as of 02/26/22 1054   Sat Feb 26, 2022   0633 Troponin-High Sensitivity(!!): 345 [PZ]   1118 Creatinine(!): 3.55 [PZ]   0633 BUN(!): 53  Patient's lab work resulting, troponin elevated at 345, additionally metabolic panel shows acute renal insufficiency BUN 53 creatinine 3.55. Patient has elevated alk phos 606 however normal bilirubin. Given significant right upper quadrant tenderness will obtain CT of the pelvis noncontrast additionally right upper quadrant ultrasound ordered. 1 L normal saline ordered, CBC still pending. Patient has aspirin allergy, most likely is a type II MI [PZ]   0633 Alk. phosphatase(!): 606 [PZ]   [id number] Patient signed out to Dr. Rasta Martinez, to follow up abdomen CT to evaluate for acute intraabdominal process, anticipate admission to hospital for elevated troponin acute renal insufficiency. [PZ]   [id number] Received on signout. SIRS x3 (leukopenia, tachypnea, tachycardia), atbx initiated. Fluids for hypotension. CT/US pending. [SS]   0841 Lactate 3.1, fluids given. [SS]   0911 UTI noted. [SS]   [id number] Trop slowly increasing. [SS]   1046 Septic stone with ELLYN and NSTEMI.  Will transfer.  [SS]      ED Course User Index  [PZ] Tammi Lott MD  [SS] Christiana Christensen MD

## 2022-02-26 NOTE — ED TRIAGE NOTES
Pt reports being sick for 4 days with SOB, body aches and diarrhea, getting worse tonight. Pt also reports getting a \"pain shot\" (dosen't know what the shot is) yesterday and having increased SOB, anxiety and hallucinations since, has has the shot before and never had this experience.

## 2022-02-26 NOTE — Clinical Note
Right radial artery. Accessed successfully. Radial access needle used. Using ultrasound guidance. Number of attempts =  2.

## 2022-02-26 NOTE — CONSULTS
Urology consult    Patient: Diana Latham MRN: [id number]  SSN: [id number]    YOB: 1950  Age: 73 y.o. Sex: female    Requesting Physician: No att. providers found PCP: Karen Chavira MD     Date of Consultation:  February 26, 2022           HPI: She is a 73 y.o. female with h/o stones. Has had days of lethargy and went to ER in [place]. CT showed bilateral renal stones (large R upper pole stone) and distal R ureteral stone with stranding, UA c/w UTI. Severity moderate. Timing constant. Duration days. Location ureter. Context CT. Also with NSTEMI. Patient lethargic on exam, tachy, SOB and trouble breathing. CT images reviewed    ROS - negative aside from HPI     Assessment/Plan:   · Anesthesiologist unable to sedate patient 2/2 ongoing cardiac issues and NSTEMI  · Emergent bedside ureteral stent performed, see note for details  · Cont broad spec abx, parham  · Will follow     Past Medical History: (Complete 2+/3 areas)  Allergies   Allergen Reactions    Latex Unknown (comments)    Aspirin Unknown (comments)    Losartan Unknown (comments)    Penicillins Unknown (comments)      Prior to Admission medications    Medication Sig Start Date End Date Taking? Authorizing Provider   aliskiren (TEKTURNA) 300 mg tablet  9/1/20   Provider, Historical   atorvastatin (LIPITOR) 10 mg tablet  8/27/20   Provider, Historical   gabapentin (NEURONTIN) 300 mg capsule TK 1 C PO HS 6/30/20   Provider, Historical   carvediloL (COREG) 25 mg tablet Take 25 mg by mouth two (2) times daily (with meals). Provider, Historical   albuterol (PROVENTIL HFA, VENTOLIN HFA, PROAIR HFA) 90 mcg/actuation inhaler Take  by inhalation. Provider, Historical   carvediloL (COREG) 6.25 mg tablet Take  by mouth two (2) times daily (with meals). Provider, Historical   diazePAM (VALIUM) 5 mg tablet Take 5 mg by mouth two (2) times a day.     Provider, Historical   ferrous sulfate 325 mg (65 mg iron) tablet Take by mouth Daily (before breakfast). Provider, Historical   meloxicam (MOBIC) 15 mg tablet Take 15 mg by mouth daily. Provider, Historical   metFORMIN 500 mg/5 mL soln Take  by mouth. Provider, Historical   naproxen (NAPROSYN) 375 mg tablet Take 375 mg by mouth two (2) times daily (with meals). Provider, Historical   ondansetron (ZOFRAN ODT) 8 mg disintegrating tablet Take 8 mg by mouth every eight (8) hours as needed for Nausea or Vomiting. Provider, Historical   oxybutynin (DITROPAN) 5 mg tablet Take 5 mg by mouth two (2) times a day. Provider, Historical   tamsulosin (FLOMAX) 0.4 mg capsule Take 0.4 mg by mouth daily. Provider, Historical      PMHx:  has a past medical history of Arthritis (2020), Asthma (2020), and Hypertension (2020). PSurgHx:  has a past surgical history that includes hx urological.  PSocHx:  reports that she has never smoked. She has never used smokeless tobacco. She reports that she does not drink alcohol and does not use drugs. ROS:  (Complete - 10 systems)     POSITIVES: None    NEGATIVES: Weight loss (Constitutional), Dry mouth (ENMT), Chest pain (CV), SOB (Respiratory), Constipation (GI), Weakness (MS), Pallor (Skin), TIA Sx (Neuro), Confusion (Psych), Easy bruising (Heme)    Physical Exam: (Comprehesive - 8+ 1995 Systems)  (1) Constitutional:  Vitals: Temp (24hrs), Av.7 °F (37.1 °C), Min:98.3 °F (36.8 °C), Max:98.8 °F (37.1 °C)   Blood pressure 108/63, pulse (!) 112, temperature 98.7 °F (37.1 °C), resp. rate 30, height 5' 8\" (1.727 m), weight 136.1 kg (300 lb), SpO2 99 %.   I&O's: 701 -  190  In: 2100 [I.V.:2100]  Out: -    (2) ENMT:   moist mucous membranes   (3) Respiratory:  breathing easily   (4) GI:  no abdominal masses, tenderness, no hepatosplenomegaly, no ventral hernia, stool for occult blood not indicated as urologist.   (5) :   normal   (6) Lymphatic:  no adenopathy   (7) Muscloskeletal:  no gross deformity   (8) Skin:  no rash   (9) Neuro:  no focal deficits      Lab Results   Component Value Date/Time    WBC 2.9 (L) 02/26/2022 04:59 AM    HCT 38.0 02/26/2022 04:59 AM    PLATELET 81 (L) 67/81/6657 04:59 AM    Sodium 139 02/26/2022 04:59 AM    Potassium 4.0 02/26/2022 04:59 AM    Chloride 106 02/26/2022 04:59 AM    CO2 22 02/26/2022 04:59 AM    BUN 53 (H) 02/26/2022 04:59 AM    Creatinine 3.55 (H) 02/26/2022 04:59 AM    Glucose 99 02/26/2022 04:59 AM    Calcium 8.0 (L) 02/26/2022 04:59 AM       UA:   Lab Results   Component Value Date/Time    Color Dark Yellow 02/26/2022 08:39 AM    Appearance Turbid (A) 02/26/2022 08:39 AM    Specific gravity 1.015 02/26/2022 08:39 AM    pH (UA) 5.0 02/26/2022 08:39 AM    Protein 100 (A) 02/26/2022 08:39 AM    Glucose Negative 02/26/2022 08:39 AM    Ketone Negative 02/26/2022 08:39 AM    Bilirubin Negative 02/26/2022 08:39 AM    Urobilinogen 0.1 02/26/2022 08:39 AM    Nitrites Negative 02/26/2022 08:39 AM    Leukocyte Esterase Large (A) 02/26/2022 08:39 AM    Bacteria 4+ (A) 02/26/2022 08:39 AM    WBC >100 (H) 02/26/2022 08:39 AM    RBC 20-50 02/26/2022 08:39 AM       Signed By: Albertina Xiong MD  - February 26, 2022

## 2022-02-26 NOTE — ED PROVIDER NOTES
Patient is a 47-year-old female with past medical history significant for hypertension, asthma and arthritis as well as prior renal stones requiring lithotripsy who presents emergency department as a transfer from [facility] for evaluation of apparent septic stone. Patient reports that she has felt unwell since about Thursday and is slept pretty much the entire time. She presented to [facility] today and found to have signs of an obstructing right-sided renal stone along with infection and renal failure along with NSTEMI. She states she has had some shortness of breath but denies any chest pain. Per report patient got Zosyn and bank at that facility and 3 L of fluid. She denies history of congestive heart failure. Past Medical History:   Diagnosis Date    Arthritis 7/7/2020    Asthma 7/7/2020    Hypertension 7/7/2020       Past Surgical History:   Procedure Laterality Date    HX UROLOGICAL      Lithotripsy         Family History:   Family history unknown: Yes       Social History     Socioeconomic History    Marital status:      Spouse name: Not on file    Number of children: Not on file    Years of education: Not on file    Highest education level: Not on file   Occupational History    Not on file   Tobacco Use    Smoking status: Never Smoker    Smokeless tobacco: Never Used   Substance and Sexual Activity    Alcohol use: Never    Drug use: Never    Sexual activity: Not on file   Other Topics Concern    Not on file   Social History Narrative    Not on file     Social Determinants of Health     Financial Resource Strain:     Difficulty of Paying Living Expenses: Not on file   Food Insecurity:     Worried About [address] in the Last Year: Not on file    [address] in the Last Year: Not on file   Transportation Needs:     Lack of Transportation (Medical): Not on file    Lack of Transportation (Non-Medical):  Not on file   Physical Activity:     Days of Exercise per Week: Not on file    Minutes of Exercise per Session: Not on file   Stress:     Feeling of Stress : Not on file   Social Connections:     Frequency of Communication with Friends and Family: Not on file    Frequency of Social Gatherings with Friends and Family: Not on file    Attends [employer] Services: Not on file    Active Member of [address] [place] [address] or Organizations: Not on file    Attends Club or Organization Meetings: Not on file    Marital Status: Not on file   Intimate Partner Violence:     Fear of Current or Ex-Partner: Not on file    Emotionally Abused: Not on file    Physically Abused: Not on file    Sexually Abused: Not on file   Housing Stability:     Unable to Pay for Housing in the Last Year: Not on file    Number of [place] in the Last Year: Not on file    Unstable Housing in the Last Year: Not on file         ALLERGIES: Latex, Aspirin, Losartan, and Penicillins    Review of Systems   Constitutional: Positive for chills, fatigue and fever. HENT: Negative for drooling. Respiratory: Positive for shortness of breath. Negative for stridor. Cardiovascular: Negative for chest pain. Gastrointestinal: Positive for nausea. Genitourinary: Positive for flank pain. Neurological: Negative for seizures and syncope. Hematological: Does not bruise/bleed easily. Psychiatric/Behavioral: Positive for sleep disturbance. There were no vitals filed for this visit. Physical Exam  Vitals and nursing note reviewed. Constitutional:       Appearance: She is obese. She is ill-appearing. HENT:      Head: Normocephalic and atraumatic. Right Ear: External ear normal.      Left Ear: External ear normal.   Eyes:      General: No scleral icterus. Cardiovascular:      Rate and Rhythm: Tachycardia present. Pulses: Normal pulses. Pulmonary:      Effort: Respiratory distress (Minimal) present. Breath sounds: No stridor. No wheezing or rales. Abdominal:      Palpations: Abdomen is soft. Musculoskeletal:         General: No deformity. Cervical back: Neck supple. Skin:     General: Skin is warm and dry. Neurological:      Mental Status: She is alert and oriented to person, place, and time. Psychiatric:         Mood and Affect: Mood normal.         Behavior: Behavior normal.         Thought Content:  Thought content normal.         Judgment: Judgment normal.          MDM  Number of Diagnoses or Management Options  Sepsis with acute renal failure and septic shock, due to unspecified organism, unspecified acute renal failure type [facility]): new and requires workup     Amount and/or Complexity of Data Reviewed  Clinical lab tests: reviewed  Tests in the radiology section of CPT®: reviewed  Discuss the patient with other providers: yes    Risk of Complications, Morbidity, and/or Mortality  Presenting problems: high  Diagnostic procedures: high  Management options: high    Patient Progress  Patient progress: improved         Critical Care  Performed by: Chato Ray MD  Authorized by: Chato Ray MD     Critical care provider statement:     Critical care time (minutes):  35    Critical care time was exclusive of:  Separately billable procedures and treating other patients    Critical care was necessary to treat or prevent imminent or life-threatening deterioration of the following conditions:  Sepsis and renal failure    Critical care was time spent personally by me on the following activities:  Ordering and performing treatments and interventions, ordering and review of laboratory studies, ordering and review of radiographic studies, re-evaluation of patient's condition, review of old charts, development of treatment plan with patient or surrogate, discussions with consultants, evaluation of patient's response to treatment, examination of patient, obtaining history from patient or surrogate and pulse oximetry    I assumed direction of critical care for this patient from another provider in my specialty: no          Perfect Serve Consult for Admission  2:04 PM    ED Room Number: ER25/25  Patient Name and age:  Aleksey 73 y.o.  female  Working Diagnosis:   1. Sepsis with acute renal failure and septic shock, due to unspecified organism, unspecified acute renal failure type ([facility] [address]        COVID-19 Suspicion:  no  Sepsis present:  yes  Reassessment needed: no  Code Status:  Full Code  Readmission: no  Isolation Requirements:  no  Recommended Level of Care:  step down  Department:[facility] Adult ED - 21   Other: Patient is a 68-year-old female who presents as a transfer from [facility] for septic renal stone. Patient with urinary tract infection and obstructing stone on the right noted on outside imaging. Also with renal failure and troponin elevation to 386. Initial lactate 3.1 and are waiting repeat lab. Patient received antibiotics and 3 L of fluid at outside facility and has had blood pressure improvement at this time. Is awake alert oriented.   Urology consulted and Dr. Kyleigh Sorenson to follow

## 2022-02-27 ENCOUNTER — APPOINTMENT (OUTPATIENT)
Dept: VASCULAR SURGERY | Age: 72
DRG: 854 | End: 2022-02-27
Attending: INTERNAL MEDICINE
Payer: MEDICARE

## 2022-02-27 LAB
ALBUMIN SERPL-MCNC: 2 G/DL (ref 3.5–5)
ALBUMIN/GLOB SERPL: 0.6 {RATIO} (ref 1.1–2.2)
ALP SERPL-CCNC: 240 U/L (ref 45–117)
ALT SERPL-CCNC: 21 U/L (ref 12–78)
ANION GAP SERPL CALC-SCNC: 9 MMOL/L (ref 5–15)
AST SERPL-CCNC: 42 U/L (ref 15–37)
ATRIAL RATE: 118 BPM
ATRIAL RATE: 126 BPM
BASOPHILS # BLD: 0 K/UL (ref 0–0.1)
BASOPHILS NFR BLD: 0 % (ref 0–1)
BILIRUB SERPL-MCNC: 1.8 MG/DL (ref 0.2–1)
BUN SERPL-MCNC: 69 MG/DL (ref 6–20)
BUN/CREAT SERPL: 21 (ref 12–20)
CALCIUM SERPL-MCNC: 6.9 MG/DL (ref 8.5–10.1)
CALCULATED P AXIS, ECG09: 53 DEGREES
CALCULATED P AXIS, ECG09: 68 DEGREES
CALCULATED R AXIS, ECG10: 10 DEGREES
CALCULATED R AXIS, ECG10: 2 DEGREES
CALCULATED T AXIS, ECG11: 37 DEGREES
CALCULATED T AXIS, ECG11: 62 DEGREES
CHLORIDE SERPL-SCNC: 114 MMOL/L (ref 97–108)
CO2 SERPL-SCNC: 18 MMOL/L (ref 21–32)
CREAT SERPL-MCNC: 3.21 MG/DL (ref 0.55–1.02)
DIAGNOSIS, 93000: NORMAL
DIAGNOSIS, 93000: NORMAL
DIFFERENTIAL METHOD BLD: ABNORMAL
EOSINOPHIL # BLD: 0 K/UL (ref 0–0.4)
EOSINOPHIL NFR BLD: 0 % (ref 0–7)
ERYTHROCYTE [DISTWIDTH] IN BLOOD BY AUTOMATED COUNT: 17.2 % (ref 11.5–14.5)
EST. AVERAGE GLUCOSE BLD GHB EST-MCNC: 126 MG/DL
GLOBULIN SER CALC-MCNC: 3.4 G/DL (ref 2–4)
GLUCOSE BLD STRIP.AUTO-MCNC: 146 MG/DL (ref 65–117)
GLUCOSE BLD STRIP.AUTO-MCNC: 162 MG/DL (ref 65–117)
GLUCOSE BLD STRIP.AUTO-MCNC: 173 MG/DL (ref 65–117)
GLUCOSE BLD STRIP.AUTO-MCNC: 176 MG/DL (ref 65–117)
GLUCOSE BLD STRIP.AUTO-MCNC: 98 MG/DL (ref 65–117)
GLUCOSE SERPL-MCNC: 99 MG/DL (ref 65–100)
HBA1C MFR BLD: 6 % (ref 4–5.6)
HCT VFR BLD AUTO: 36 % (ref 35–47)
HGB BLD-MCNC: 10.8 G/DL (ref 11.5–16)
IMM GRANULOCYTES # BLD AUTO: 0 K/UL
IMM GRANULOCYTES NFR BLD AUTO: 0 %
LYMPHOCYTES # BLD: 1.1 K/UL (ref 0.8–3.5)
LYMPHOCYTES NFR BLD: 7 % (ref 12–49)
MAGNESIUM SERPL-MCNC: 2.1 MG/DL (ref 1.6–2.4)
MCH RBC QN AUTO: 29.9 PG (ref 26–34)
MCHC RBC AUTO-ENTMCNC: 30 G/DL (ref 30–36.5)
MCV RBC AUTO: 99.7 FL (ref 80–99)
METAMYELOCYTES NFR BLD MANUAL: 2 %
MONOCYTES # BLD: 0.8 K/UL (ref 0–1)
MONOCYTES NFR BLD: 5 % (ref 5–13)
MYELOCYTES NFR BLD MANUAL: 1 %
NEUTS BAND NFR BLD MANUAL: 10 % (ref 0–6)
NEUTS SEG # BLD: 13.2 K/UL (ref 1.8–8)
NEUTS SEG NFR BLD: 75 % (ref 32–75)
NRBC # BLD: 0.03 K/UL (ref 0–0.01)
NRBC BLD-RTO: 0.2 PER 100 WBC
P-R INTERVAL, ECG05: 162 MS
P-R INTERVAL, ECG05: 170 MS
PHOSPHATE SERPL-MCNC: 4.9 MG/DL (ref 2.6–4.7)
PLATELET # BLD AUTO: 32 K/UL (ref 150–400)
POTASSIUM SERPL-SCNC: 4.2 MMOL/L (ref 3.5–5.1)
PROT SERPL-MCNC: 5.4 G/DL (ref 6.4–8.2)
Q-T INTERVAL, ECG07: 332 MS
Q-T INTERVAL, ECG07: 398 MS
QRS DURATION, ECG06: 78 MS
QRS DURATION, ECG06: 82 MS
QTC CALCULATION (BEZET), ECG08: 465 MS
QTC CALCULATION (BEZET), ECG08: 576 MS
RBC # BLD AUTO: 3.61 M/UL (ref 3.8–5.2)
RBC MORPH BLD: ABNORMAL
SERVICE CMNT-IMP: ABNORMAL
SERVICE CMNT-IMP: NORMAL
SODIUM SERPL-SCNC: 141 MMOL/L (ref 136–145)
TROPONIN-HIGH SENSITIVITY: 196 NG/L (ref 0–51)
TROPONIN-HIGH SENSITIVITY: 206 NG/L (ref 0–51)
TSH SERPL DL<=0.05 MIU/L-ACNC: 0.33 UIU/ML (ref 0.36–3.74)
VENTRICULAR RATE, ECG03: 118 BPM
VENTRICULAR RATE, ECG03: 126 BPM
WBC # BLD AUTO: 15.5 K/UL (ref 3.6–11)

## 2022-02-27 PROCEDURE — 93970 EXTREMITY STUDY: CPT

## 2022-02-27 PROCEDURE — 77010033678 HC OXYGEN DAILY

## 2022-02-27 PROCEDURE — 36600 WITHDRAWAL OF ARTERIAL BLOOD: CPT

## 2022-02-27 PROCEDURE — 74011250637 HC RX REV CODE- 250/637: Performed by: INTERNAL MEDICINE

## 2022-02-27 PROCEDURE — 94762 N-INVAS EAR/PLS OXIMTRY CONT: CPT

## 2022-02-27 PROCEDURE — 84100 ASSAY OF PHOSPHORUS: CPT

## 2022-02-27 PROCEDURE — 84443 ASSAY THYROID STIM HORMONE: CPT

## 2022-02-27 PROCEDURE — 65660000001 HC RM ICU INTERMED STEPDOWN

## 2022-02-27 PROCEDURE — 83036 HEMOGLOBIN GLYCOSYLATED A1C: CPT

## 2022-02-27 PROCEDURE — 74011636637 HC RX REV CODE- 636/637: Performed by: INTERNAL MEDICINE

## 2022-02-27 PROCEDURE — 83735 ASSAY OF MAGNESIUM: CPT

## 2022-02-27 PROCEDURE — 74011000250 HC RX REV CODE- 250: Performed by: INTERNAL MEDICINE

## 2022-02-27 PROCEDURE — 80053 COMPREHEN METABOLIC PANEL: CPT

## 2022-02-27 PROCEDURE — 74011250636 HC RX REV CODE- 250/636: Performed by: INTERNAL MEDICINE

## 2022-02-27 PROCEDURE — 82962 GLUCOSE BLOOD TEST: CPT

## 2022-02-27 PROCEDURE — 94760 N-INVAS EAR/PLS OXIMETRY 1: CPT

## 2022-02-27 PROCEDURE — 74011250636 HC RX REV CODE- 250/636: Performed by: HOSPITALIST

## 2022-02-27 PROCEDURE — 85025 COMPLETE CBC W/AUTO DIFF WBC: CPT

## 2022-02-27 PROCEDURE — 36415 COLL VENOUS BLD VENIPUNCTURE: CPT

## 2022-02-27 RX ORDER — MORPHINE SULFATE 2 MG/ML
2 INJECTION, SOLUTION INTRAMUSCULAR; INTRAVENOUS
Status: DISCONTINUED | OUTPATIENT
Start: 2022-02-27 | End: 2022-03-10 | Stop reason: HOSPADM

## 2022-02-27 RX ORDER — SODIUM CHLORIDE 9 MG/ML
75 INJECTION, SOLUTION INTRAVENOUS CONTINUOUS
Status: DISCONTINUED | OUTPATIENT
Start: 2022-02-27 | End: 2022-03-02

## 2022-02-27 RX ADMIN — MORPHINE SULFATE 2 MG: 2 INJECTION, SOLUTION INTRAMUSCULAR; INTRAVENOUS at 10:23

## 2022-02-27 RX ADMIN — OXYBUTYNIN CHLORIDE 5 MG: 5 TABLET ORAL at 18:07

## 2022-02-27 RX ADMIN — SODIUM CHLORIDE 100 ML/HR: 9 INJECTION, SOLUTION INTRAVENOUS at 10:18

## 2022-02-27 RX ADMIN — PREDNISONE 40 MG: 20 TABLET ORAL at 09:16

## 2022-02-27 RX ADMIN — CEFEPIME 2 G: 2 INJECTION, POWDER, FOR SOLUTION INTRAVENOUS at 18:51

## 2022-02-27 RX ADMIN — TAMSULOSIN HYDROCHLORIDE 0.4 MG: 0.4 CAPSULE ORAL at 09:00

## 2022-02-27 RX ADMIN — Medication 10 ML: at 06:00

## 2022-02-27 RX ADMIN — Medication 2 UNITS: at 18:07

## 2022-02-27 RX ADMIN — Medication 1 CAPSULE: at 09:16

## 2022-02-27 RX ADMIN — MORPHINE SULFATE 2 MG: 2 INJECTION, SOLUTION INTRAMUSCULAR; INTRAVENOUS at 16:06

## 2022-02-27 RX ADMIN — ATORVASTATIN CALCIUM 10 MG: 10 TABLET, FILM COATED ORAL at 23:49

## 2022-02-27 RX ADMIN — OXYBUTYNIN CHLORIDE 5 MG: 5 TABLET ORAL at 09:16

## 2022-02-27 RX ADMIN — Medication 5 ML: at 23:51

## 2022-02-27 RX ADMIN — Medication 2 UNITS: at 13:00

## 2022-02-27 RX ADMIN — FERROUS SULFATE TAB 325 MG (65 MG ELEMENTAL FE) 325 MG: 325 (65 FE) TAB at 09:16

## 2022-02-27 RX ADMIN — SODIUM CHLORIDE 100 ML/HR: 9 INJECTION, SOLUTION INTRAVENOUS at 18:51

## 2022-02-27 NOTE — H&P
[address]  HISTORY AND PHYSICAL    Name:  Rico Milner  MR#:  [id number]  :  1950  ACCOUNT #:  [de-identified]  ADMIT DATE:  2022      The patient was seen, evaluated, and admitted by me on 2022. PRIMARY CARE PHYSICIAN:  Bharati Burger MD    SOURCE OF INFORMATION:  The patient and review of ED electronic medical records. CHIEF COMPLAINT:  Weakness and shortness of breath. HISTORY OF PRESENT ILLNESS:  This is a 79-year-old woman with a past medical history significant for hypertension, dyslipidemia, morbid obesity, asthma, kidney stone status post lithotripsy, type 2 diabetes, who was in her usual state of health until about a week ago when the patient developed weakness. The weakness was described as generalized weakness. The patient also complained of shortness of breath. The shortness of breath is worse with activity such as walking. The patient also complained of abdominal pain. The pain is located at the right lower quadrant with radiation to the groin. The pain is constant, sharp with no known aggravating or relieving factors associated with diarrhea. The patient has history of asthma, not on oxygen at home. The patient was taken to [facility] for further evaluation of her symptoms. When the patient arrived at the emergency room, the patient was found to have significant finding on abdominal exam.  Because of that, CT scan of the abdomen and pelvis was obtained. The CT scan shows obstructing right renal stone. The patient was also found to have elevated troponin level as well as acute kidney injury. Her clinical presentation triggered code sepsis. The patient received fluid as per sepsis protocol. The patient also received broad-spectrum antibiotics. The patient required higher level of care, which cannot be provided at [facility].   The patient was then transferred to [facility] Emergency Room, so that the patient can be evaluated and admitted to [facility]. When the patient arrived at the emergency room, her blood pressure that was low at [facility] has stabilized. The emergency room physician consulted urologist, because of the obstructing right renal stone and the patient was subsequently referred to the hospitalist service for evaluation for admission. No record of prior admission to this hospital.    PAST MEDICAL HISTORY:  Hypertension, dyslipidemia, type 2 diabetes, asthma, kidney stone status post lithotripsy. ALLERGIES:  THE PATIENT IS ALLERGIC TO LATEX, ASPIRIN, LOSARTAN, AND PENICILLIN. MEDICATIONS:  1. Tekturna 300 mg daily. 2.  Lipitor 10 mg daily. 3.  Neurontin 300 mg 3 times daily. 4.  Coreg 25 mg twice daily. 5.  Albuterol 90 mcg, dosage as directed. 6.  Valium 5 mg twice daily. 7.  Ferrous sulfate 325 mg daily. 8.  Mobic 15 mg daily. 9.  Glucophage 500 mg daily. 10.  Naprosyn 375 mg twice daily. 11.  Zofran 8 mg every 8 hours as needed for nausea and vomiting. 12.  Ditropan 5 mg twice a day. 13.  Flomax 0.4 mg daily. FAMILY HISTORY:  This was reviewed. Her mother had hypertension. PAST SURGICAL HISTORY:  This is significant for lithotripsy. SOCIAL HISTORY:  No history of alcohol or tobacco abuse. REVIEW OF SYSTEMS:  HEAD, EYES, EARS, NOSE, AND THROAT:  No headache, no dizziness, no blurring of vision, no photophobia. RESPIRATORY SYSTEM:  This is positive for shortness of breath. No cough, no hemoptysis. CARDIOVASCULAR SYSTEM:  No chest pain, no orthopnea, no palpitation. GASTROINTESTINAL SYSTEM:  This is positive for abdominal pain and diarrhea. No nausea or vomiting. No constipation. GENITOURINARY SYSTEM:  No dysuria, no urgency, and no frequency. All other systems are reviewed and they are negative. PHYSICAL EXAMINATION:  GENERAL APPEARANCE:  The patient appeared ill, in moderate distress.   VITAL SIGNS:  On arrival at the emergency room, temperature 98.8, pulse 126, respiratory rate 32, blood pressure 111/43, oxygen saturation 99%. HEENT:  Head:  Normocephalic, atraumatic. Eyes:  Normal eye movement. No redness, no drainage, no discharge. Ears:  Normal external ears with no evidence of drainage. Nose:  No deformity and no drainage. Mouth and Throat:  No visible oral lesion. Dry oral mucosa. NECK:  Neck is supple. No JVD, no thyromegaly. CHEST:  Diffuse expiratory wheezing and few bilateral basilar crackles. HEART:  Normal S1 and S2, regular. No clinically appreciable murmur. ABDOMEN:  Soft, obese, right costophrenic angle tenderness. No rebound tenderness. No guarding. Normal bowel sounds. CNS:  Alert, oriented x3. No gross focal neurological deficit. EXTREMITIES:  Edema 2+. Pulses 2+ bilaterally. MUSCULOSKELETAL SYSTEM:  No evidence of joint deformity or swelling. SKIN:  No active skin lesions seen in the exposed part of the body. PSYCHIATRY:  Normal mood and affect. LYMPHATIC SYSTEM:  No cervical lymphadenopathy. DIAGNOSTIC DATA:  EKG shows sinus tachycardia. No significant ST and T-waves abnormalities. Chest x-ray shows chronic findings. Right upper quadrant ultrasound also shows chronic findings. CT scan of the abdomen and pelvis without contrast shows new right urinary tract obstruction. LABORATORY DATA:  Chemistry:  Sodium 139, potassium 4.0, chloride 106, CO2 22, glucose 99, BUN 53, creatinine 3.55, calcium 8.0, total bilirubin 0.9, AST 36, ALT 18, alkaline phosphatase 606, total protein at 7.1, albumin level 2.4, globulin 4.7. Cardiac Profile:  Troponin high-sensitivity 345. Lipase level 21. Initial lactic acid level 3.1. Hematology:  WBC 2.9, hemoglobin at 12.1, hematocrit 38.0, platelets 81. Urinalysis: This is significant for moderate blood, negative nitrites, large leukocyte esterase, 4+ bacteria. Second set of troponin high-sensitivity 368.   Repeat lactic acid level 1.9. ASSESSMENT:  1.  Sepsis. 2.  Acute kidney injury. 3.  Suspected non-ST elevation myocardial infarction. 4.  Hypertension. 5.  Dyslipidemia. 6.  Type 2 diabetes. 7.  Morbid obesity. 8.  Thrombocytopenia. 9.  Right renal stone. 10.  Acute pyelonephritis. 11.  Bilateral leg swelling. 12. Moderate persistent asthma with acute exacerbation. PLAN:  1. Sepsis. We will admit the patient for further evaluation and treatment. We will continue with vancomycin and cefepime instead of Zosyn, because of the significant acute kidney injury. We will await blood culture result. We will also await urine culture results. Sepsis is most likely coming from the infected right renal stone. It could also be coming from the right acute pyelonephritis. The patient will be closely monitored. 2.  Acute kidney injury. This is most likely as a result of the right renal stone, part of it could also be due to volume depletion. We will carry out fluid therapy and continue to monitor the patient's renal function. Nephrology consult will be requested to assist in further evaluation and treatment. 3.  Suspected non-ST elevation myocardial infarction. We will continue to trend troponin level. The patient was not started on aspirin, because the patient is allergic to aspirin. Also the patient was not started on full-dose heparin, because of the patient's significant thrombocytopenia and potential surgery for right renal stone. We will obtain echocardiogram and Cardiology consult will be requested to assist in further evaluation and treatment. 4.  Hypertension. The patient was suddenly became hypotensive in the emergency room, responded to fluid challenge. We will hold antihypertensive medication and monitor the patient's blood pressure closely. 5.  Dyslipidemia. We will resume preadmission medication and check lipid profile. 6.  Diabetes. The patient is on metformin, but denies history of diabetes.   The patient will be placed on sliding scale with insulin coverage and we will check hemoglobin A1c level. 7.  Morbid obesity. Diet and exercise advised. Consider inpatient dietary consult if indicated. 8.  Thrombocytopenia. The patient presented with significant thrombocytopenia. The thrombocytopenia could be due to sepsis. We will monitor the patient closely. We will avoid heparin products. 9.  Right renal stone. Urology consult has been requested. The patient is now awaiting stent placement. This is a potential source of the patient's sepsis. 10.  Acute pyelonephritis. The patient will be started on antibiotics as stated above. We will await urine culture. 11.  Bilateral leg swelling. We will check ultrasound of the lower extremity for DVT. 12.  Moderate persistent asthma with acute exacerbation. We will start the patient on short course of prednisone. We will check BNP level to determine whether the shortness of breath is cardiac related. 13.  Other Issues:  Code Status: The patient is a full code. We will request SCD for DVT prophylaxis. FUNCTIONAL STATUS PRIOR TO ADMISSION:  The patient came from home. The patient is ambulatory with assistive device. COVID PRECAUTION:  The patient was wearing a face mask. I was wearing a face mask and gloves for this patient's encounter.       MD DAMON [email or web address]  D:  02/26/2022 16:15  T:  02/26/2022 19:23  JOB #:  [id number]  CC:  Rui Licona MD

## 2022-02-27 NOTE — PROGRESS NOTES
Progress Note    Patient: Tali Rodriguez MRN: [id number]  SSN: [id number]    YOB: 1950  Age: 73 y.o. Sex: female            ADMITTED:  2022 to Kinza Vivar MD by Andreea Quintero MD for Sepsis [facility]) [A41.9]   POD #  1 Day Post-OpProcedure(s):  Anca Johnson 32 Day: 2 Admission Diagnoses:  1. Sepsis with acute renal failure and septic shock, due to unspecified organism, unspecified acute renal failure type ([facility] [address]        Feeling better today     Assessment/Plan:   · Cont parham for today, can remove tomorrow  · Culture directed antibiotics  · Will need outpatient f/u to discuss definitive management of stones once stable  · Call with questions         No data found. Cultures:   N/A   Imaging:  N/A   Exam:    ROS: Denies: Chest pain (CV), SOB (Respiratory)      Vitals:  Temp (24hrs), Av.9 °F (36.6 °C), Min:97.5 °F (36.4 °C), Max:98.7 °F (37.1 °C)  Blood pressure 121/62, pulse 93, temperature 97.5 °F (36.4 °C), resp. rate 15, weight 137.2 kg (302 lb 7.5 oz), SpO2 97 %.    I&O's:    Date 22 - 22 0659 22 07 - 22 0659   Shift 3199-7846 3575-5611 24 Hour Total 3101-1333 1201-9349 24 Hour Total   INTAKE   Shift Total(mL/kg)         OUTPUT   Urine    850  850     Urine Output (mL) (Urinary Catheter 22 Parham)    850  850   Shift Total(mL/kg)    850(6.2)  850(6.2)   NET    -850  -850   Weight (kg)  137.2 137.2 137.2 137.2 137.2       Meds:     Current Facility-Administered Medications:     0.9% sodium chloride infusion, 100 mL/hr, IntraVENous, CONTINUOUS, Olivia Vargas MD, Last Rate: 100 mL/hr at 22 1018, 100 mL/hr at 22 1018    morphine injection 2 mg, 2 mg, IntraVENous, Q4H PRN, Olivia Vargas MD, 2 mg at 22 1023    Vancomycin - pharmacy dosing, , Other, Rx Dosing/Monitoring, Olivia Vargas MD    HYDROmorphone (DILAUDID) injection 1 mg, 1 mg, IntraVENous, Q4H PRN, Corine Adam MD, 1 mg at 02/26/22 1610    albuterol-ipratropium (DUO-NEB) 2.5 MG-0.5 MG/3 ML, 3 mL, Nebulization, Q4H PRN, Corine Adam MD    atorvastatin (LIPITOR) tablet 10 mg, 10 mg, Oral, QHS, Corine Adam MD, 10 mg at 02/26/22 2240    diazePAM (VALIUM) tablet 5 mg, 5 mg, Oral, BID, Corine Adam MD    ferrous sulfate tablet 325 mg, 1 Tablet, Oral, ACB, Corine Adam MD, 325 mg at 02/27/22 0916    gabapentin (NEURONTIN) capsule 300 mg, 300 mg, Oral, QHS, Corine Adam MD    oxybutynin (DITROPAN) tablet 5 mg, 5 mg, Oral, BID, Corine Adam MD, 5 mg at 02/27/22 0916    tamsulosin (FLOMAX) capsule 0.4 mg, 0.4 mg, Oral, DAILY, Corine Adam MD, 0.4 mg at 02/27/22 0900    sodium chloride (NS) flush 5-40 mL, 5-40 mL, IntraVENous, Q8H, Corine Adam MD, 10 mL at 02/27/22 0600    sodium chloride (NS) flush 5-40 mL, 5-40 mL, IntraVENous, PRN, Corine Adam MD    acetaminophen (TYLENOL) tablet 650 mg, 650 mg, Oral, Q6H PRN **OR** acetaminophen (TYLENOL) suppository 650 mg, 650 mg, Rectal, Q6H PRN, Corine Adam MD    polyethylene glycol (MIRALAX) packet 17 g, 17 g, Oral, DAILY PRN, Corine Adam MD    ondansetron (ZOFRAN ODT) tablet 4 mg, 4 mg, Oral, Q8H PRN **OR** ondansetron (ZOFRAN) injection 4 mg, 4 mg, IntraVENous, Q6H PRN, Corine Adam MD    L.acidophilus-paracasei-S.thermophil-bifidobacter (RISAQUAD) 8 billion cell capsule, 1 Capsule, Oral, DAILY, Corine Adam MD, 1 Capsule at 02/27/22 0916    predniSONE (DELTASONE) tablet 40 mg, 40 mg, Oral, DAILY WITH BREAKFAST, Corine Adam MD, 40 mg at 02/27/22 0916    insulin lispro (HUMALOG) injection, , SubCUTAneous, AC&HS, Corine Adam MD    glucose chewable tablet 16 g, 4 Tablet, Oral, PRN, Corine Adam MD    glucagon (GLUCAGEN) injection 1 mg, 1 mg, IntraMUSCular, PRN, Corine Adam MD    dextrose 10 % infusion 0-250 mL, 0-250 mL, IntraVENous, PRN, Lalla Osgood, MD    cefepime (MAXIPIME) 2 g in sterile water (preservative free) 10 mL IV syringe, 2 g, IntraVENous, Q24H, Corine Adam MD, Last Rate: 200 mL/hr at 02/26/22 1922, 2 g at 02/26/22 [address]     02/27/22  0455 02/26/22 0459   WBC 15.5* 2.9*   HGB 10.8* 12.1   HCT 36.0 38.0   PLT 32* 81*     Recent Labs     02/27/22  0455 02/26/22 0459    139   K 4.2 4.0   * 106   CO2 18* 22   GLU 99 99   BUN 69* 53*   CREA 3.21* 3.55*   CA 6.9* 8.0*   MG 2.1  --    PHOS 4.9*  --           Signed By: Abby Farrell MD - February 27, 2022

## 2022-02-27 NOTE — PROGRESS NOTES
Day #2 of Vancomycin  Indication:  sepsis  -septic renal stone, UTI and obstructing stone on the right noted on outside imaging  Current regimen:  dosing by levels  Abx regimen:  vanc + cefepime  ID Following ?: NO  Concomitant nephrotoxic drugs (requires more frequent monitoring): None  Frequency of BMP?: daily through     Recent Labs     22  0455 22  0459   WBC 15.5* 2.9*   CREA 3.21* 3.55*   BUN 69* 53*     Est CrCl: 24 ml/min; UO: -- ml/kg/hr  Temp (24hrs), Av.1 °F (36.7 °C), Min:97.5 °F (36.4 °C), Max:98.7 °F (37.1 °C)    Cultures:    blood - pending    MRSA Swab ordered (if applicable)? N/A    Goal target range AUC/RENETTA 400-600    Recent level history:  Date/Time Dose & Interval Measured Level (mcg/mL) Dose Adjustment     @ 1851 Dosing by levels 10.6 Additional 2000 mg LD given       Plan: Continue to dose by levels for now given ELLYN. Given received 3000 mg yesterday, will not order a follow-up level yet. Will reassess based on renal function tomorrow.      Oren Rodriguez, PharmD, BCPP, [facility] Specialist, [facility]

## 2022-02-27 NOTE — PROGRESS NOTES
Hospitalist Progress Note              Efraín Live MD.                                                             Cell: [phone]                               NAME:  Louise Mandujano  :  1950  MRN:  [id number]  Date of Service:  2022    Summary: 73 y.o. female with past medical history of HTN, dyslipidemia, morbid obesity, kidney stone who presented on 2022 with generalized body weakness and SOB. CT scan of the abdomen performed showed new right urinary tract obstruction       Assessment/Plan:  Right ureteral calculus  Urinary tract infection  - S/p cystourethroscopy with right ureteral stent placement POD 1  - Continue Cefepime   - F/u on blood cultures    Sepsis: Likely due to UTI  Continue empiric abx- cefepime  F/u on blood cultures  Start IVF NS   Sepsis criteria resolving    Elevated troponin: Possibly due to MI type 2 in the setting of ELLYN  EKG showed no acute ischemic changes  Cardiology consulted  Allergic to aspirin    ELLYN:   Due to post obstructive uropathy  Scr on admission 3.21  Start IVF isotonic hydration  Monitor renal indices  Consult nephrology    Leucocytosis  Likely due to UTI  Continue I.V antibiotics    Thrombocytopenia  Etiology not certain  Monitor for now    Obesity: BMI 45.61     Code status: Full  DVT prophylaxsis: SCD  Dispo: tbd          Interval History/Subjective:  F/u for right kidney stones and sepsis  S/p cystourethroscopy and stent palcement yesterday  No acute overnight event  States right flank pain is resolving      Review of Systems:  A comprehensive review of systems was negative except for that written in the HPI. Objective:     VITALS:   Last 24hrs VS reviewed since prior progress note.  Most recent are:  Visit Vitals  /68   Pulse 94   Temp 97.5 °F (36.4 °C)   Resp 22   SpO2 98%     No intake or output data in the 24 hours ending 22 0932     PHYSICAL EXAM:  General: No acute distress, cooperative, pleasant. Obese. Ill looking  EENT: EOMI. Anicteric sclerae. Oral mucous moist, oropharynx benign  Resp: CTA bilaterally. No wheezing/rhonchi/rales. No accessory muscle use  CV: Regular rhythm, normal rate, no murmurs, gallops, rubs  GI: Soft, non distended, LUQ/gen tenderness. + guarding. normoactive bowel sounds, no hepatosplenomegaly   Extremities: No edema, warm, 2+ pulses throughout  Neurologic: Moves all extremities. AAOx3,   Psych: Good insight. Not anxious nor agitated. Skin: Good Turgor, no rashes or ulcers    Lab Data Personally Reviewed: (see below)     Medications list Personally Reviewed:  x YES  NO     _______________________________________________________________________  Care Plan discussed with:  Patient/Family and Nurse    Total NON critical care TIME:  30 minutes    Oliver Zuleta MD     Procedures: see electronic medical records for all procedures/Xrays and details which were not copied into this note but were reviewed prior to creation of Plan. LABS:  Recent Labs     02/27/22 0455 02/26/22 0459   WBC 15.5* 2.9*   HGB 10.8* 12.1   HCT 36.0 38.0   PLT 32* 81*     Recent Labs     02/27/22 0455 02/26/22 0459    139   K 4.2 4.0   * 106   CO2 18* 22   BUN 69* 53*   CREA 3.21* 3.55*   GLU 99 99   CA 6.9* 8.0*   MG 2.1  --    PHOS 4.9*  --      Recent Labs     02/27/22 0455 02/26/22 0459   ALT 21 18   * 606*   TBILI 1.8* 0.9   TP 5.4* 7.1   ALB 2.0* 2.4*   GLOB 3.4 4.7*   LPSE  --  21*     No results for input(s): INR, PTP, APTT, INREXT in the last 72 hours. No results for input(s): FE, TIBC, PSAT, FERR in the last 72 hours. No results found for: FOL, RBCF   No results for input(s): PH, PCO2, PO2 in the last 72 hours. No results for input(s): CPK, CKNDX, TROIQ in the last 72 hours.     No lab exists for component: CPKMB  No results found for: CHOL, CHOLX, CHLST, CHOLV, HDL, HDLP, LDL, LDLC, DLDLP, TGLX, TRIGL, TRIGP, CHHD, CHHDX  Lab Results Component Value Date/Time    Glucose (POC) 98 02/27/2022 08:49 AM    Glucose (POC) 78 02/26/2022 10:37 PM     Lab Results   Component Value Date/Time    Color Dark Yellow 02/26/2022 08:39 AM    Appearance Turbid (A) 02/26/2022 08:39 AM    Specific gravity 1.015 02/26/2022 08:39 AM    pH (UA) 5.0 02/26/2022 08:39 AM    Protein 100 (A) 02/26/2022 08:39 AM    Glucose Negative 02/26/2022 08:39 AM    Ketone Negative 02/26/2022 08:39 AM    Bilirubin Negative 02/26/2022 08:39 AM    Urobilinogen 0.1 02/26/2022 08:39 AM    Nitrites Negative 02/26/2022 08:39 AM    Leukocyte Esterase Large (A) 02/26/2022 08:39 AM    Bacteria 4+ (A) 02/26/2022 08:39 AM    WBC >100 (H) 02/26/2022 08:39 AM    RBC 20-50 02/26/2022 08:39 AM

## 2022-02-27 NOTE — OP NOTES
Puneet [facility] 81  OPERATIVE REPORT    Name:  Ethel Loera  MR#:  [id number]  :  1950  ACCOUNT #:  [de-identified]  DATE OF SERVICE:  2022    PREOPERATIVE DIAGNOSES:  1. Right ureteral calculus. 2.  Urinary tract infection. POSTOPERATIVE DIAGNOSES:  1. Right ureteral calculus. 2.  Urinary tract infection. PROCEDURE PERFORMED:  Cystourethroscopy with right ureteral stent placement. SURGEON:  Magda Morales MD    ASSISTANT:  none    ANESTHESIA:  Local.    COMPLICATIONS:  None. SPECIMENS REMOVED:  None. IMPLANTS:  none. ESTIMATED BLOOD LOSS:  None. ANTIBIOTICS:  Scheduled. TUBES AND DRAINS:  1. Right-sided 5 x 26 stent. 2.  16-Albanian Oliveros catheter. FLUIDS:  None. URINE OUTPUT:  None. DISPOSITION:  Patient will be transferred to the floor or ICU for further management and we will follow along. She should continue on antibiotic therapy until cultures returned. PROCEDURE FINDINGS:  Debris and edema in the bladder. Unable to fully visualize mucosal surface. Appropriate placement of distal coil of right ureteral stent in the bladder on cystoscopy. INDICATION FOR PROCEDURE:  A 77-year-old female who has been having a few days of lethargy and discomfort and was seen at the emergency room at [facility] where she was diagnosed with a NSTEMI and also had a CT performed showing a distal right ureteral calculus and superimposed UTI, thought to be the source for her NSTEMI. Also, a large bilateral stone burden in the kidneys. She was transferred to [facility] and was lethargic and distressed on exam with difficulty breathing and remained tachycardic. These were all ongoing cardiac status. Anesthesia was not comfortable putting her to sleep for the procedure and so a decision was made for an emergent bedside stent placement to decompress the system.     PROCEDURE:  After informed consent was obtained, the patient was prepped and draped in the supine position. We began with a 17-Urdu flexible scope which was advanced into the bladder with the aforementioned findings. A sensor wire was passed into the right ureteral orifice under direct vision. We were able to advance this up into the kidney without appreciable resistance. We then passed a 5-Urdu x 26 cm double-J stent without any resistance and there was a good distal coil noted in the bladder visually. I then placed a Oliveros catheter for maximum decompression of the upper tract and set to gravity drainage.       Rolando Nair MD      [id number]  D:  02/26/2022 17:18  T:  02/26/2022 22:30  JOB #:  [id number]

## 2022-02-27 NOTE — PROGRESS NOTES
Bedside shift change report given to JUAN Willett (oncoming nurse) by Jennifer Troncoso RN (offgoing nurse). Report included the following information SBAR, Kardex, ED Summary, Intake/Output, MAR, Accordion, Recent Results, Med Rec Status, Cardiac Rhythm SR/ST and Alarm Parameters .

## 2022-02-27 NOTE — PROGRESS NOTES
TRANSFER - IN REPORT:    Verbal report received from [facility] on Aleksey  being received from ED for routine progression of care      Report consisted of patients Situation, Background, Assessment and   Recommendations(SBAR). Information from the following report(s) SBAR, Kardex, ED Summary, Intake/Output, MAR, Accordion, Recent Results, Med Rec Status, Cardiac Rhythm ST and Alarm Parameters  was reviewed with the receiving nurse. Opportunity for questions and clarification was provided. Assessment completed upon patients arrival to unit and care assumed.

## 2022-02-27 NOTE — PROGRESS NOTES
Pharmacist Note - Vancomycin Dosing    Consult provided for this 73 y.o. female for indication of sepsis  -septic renal stone, UTI and obstructing stone on the right noted on outside imaging    Patient received Vanc 1 gram at 07:45 prior to transfer  (incomplete loading dose)    Recent Labs     22  0459   WBC 2.9*   CREA 3.55*   BUN 53*     Height: 173 cm  Weight: 136.1 kg  Est CrCl: 22 ml/min  Temp (24hrs), Av.7 °F (37.1 °C), Min:98.3 °F (36.8 °C), Max:98.8 °F (37.1 °C)    Random level @18:51 = 10.6 mcg/ml  Vancomycin 2 grams x1 dose now  Will dose by levels due to ELLYN  Pharmacy to follow patient daily and order levels / make dose adjustments as appropriate.

## 2022-02-28 ENCOUNTER — APPOINTMENT (OUTPATIENT)
Dept: NON INVASIVE DIAGNOSTICS | Age: 72
DRG: 854 | End: 2022-02-28
Attending: INTERNAL MEDICINE
Payer: MEDICARE

## 2022-02-28 ENCOUNTER — APPOINTMENT (OUTPATIENT)
Dept: NUCLEAR MEDICINE | Age: 72
DRG: 854 | End: 2022-02-28
Attending: INTERNAL MEDICINE
Payer: MEDICARE

## 2022-02-28 LAB
ANION GAP SERPL CALC-SCNC: 5 MMOL/L (ref 5–15)
BUN SERPL-MCNC: 78 MG/DL (ref 6–20)
BUN/CREAT SERPL: 31 (ref 12–20)
CALCIUM SERPL-MCNC: 7.7 MG/DL (ref 8.5–10.1)
CHLORIDE SERPL-SCNC: 117 MMOL/L (ref 97–108)
CO2 SERPL-SCNC: 20 MMOL/L (ref 21–32)
CREAT SERPL-MCNC: 2.53 MG/DL (ref 0.55–1.02)
ECHO AO ROOT DIAM: 2.8 CM
ECHO AO ROOT INDEX: 1.15 CM/M2
ECHO AV PEAK GRADIENT: 4 MMHG
ECHO AV PEAK VELOCITY: 1 M/S
ECHO AV VELOCITY RATIO: 0.7
ECHO LA DIAMETER INDEX: 1.84 CM/M2
ECHO LA DIAMETER: 4.5 CM
ECHO LA TO AORTIC ROOT RATIO: 1.61
ECHO LV E' LATERAL VELOCITY: 5 CM/S
ECHO LV E' SEPTAL VELOCITY: 3 CM/S
ECHO LV FRACTIONAL SHORTENING: 26 % (ref 28–44)
ECHO LV INTERNAL DIMENSION DIASTOLE INDEX: 1.72 CM/M2
ECHO LV INTERNAL DIMENSION DIASTOLIC: 4.2 CM (ref 3.9–5.3)
ECHO LV INTERNAL DIMENSION SYSTOLIC INDEX: 1.27 CM/M2
ECHO LV INTERNAL DIMENSION SYSTOLIC: 3.1 CM
ECHO LV IVSD: 1.3 CM (ref 0.6–0.9)
ECHO LV MASS 2D: 200.6 G (ref 67–162)
ECHO LV MASS INDEX 2D: 82.2 G/M2 (ref 43–95)
ECHO LV POSTERIOR WALL DIASTOLIC: 1.3 CM (ref 0.6–0.9)
ECHO LV RELATIVE WALL THICKNESS RATIO: 0.62
ECHO LVOT PEAK GRADIENT: 2 MMHG
ECHO LVOT PEAK VELOCITY: 0.7 M/S
ECHO MV A VELOCITY: 0.7 M/S
ECHO MV AREA PHT: 5.1 CM2
ECHO MV E DECELERATION TIME (DT): 149.4 MS
ECHO MV E VELOCITY: 0.39 M/S
ECHO MV E/A RATIO: 0.56
ECHO MV E/E' LATERAL: 7.8
ECHO MV E/E' RATIO (AVERAGED): 10.4
ECHO MV E/E' SEPTAL: 13
ECHO MV PRESSURE HALF TIME (PHT): 43.3 MS
ECHO PV MAX VELOCITY: 0.8 M/S
ECHO PV PEAK GRADIENT: 2 MMHG
ECHO RV TAPSE: 2.4 CM (ref 1.5–2)
ECHO TV REGURGITANT MAX VELOCITY: 2.66 M/S
ECHO TV REGURGITANT PEAK GRADIENT: 28 MMHG
GLUCOSE BLD STRIP.AUTO-MCNC: 106 MG/DL (ref 65–117)
GLUCOSE BLD STRIP.AUTO-MCNC: 112 MG/DL (ref 65–117)
GLUCOSE BLD STRIP.AUTO-MCNC: 134 MG/DL (ref 65–117)
GLUCOSE BLD STRIP.AUTO-MCNC: 154 MG/DL (ref 65–117)
GLUCOSE SERPL-MCNC: 134 MG/DL (ref 65–100)
POTASSIUM SERPL-SCNC: 4.2 MMOL/L (ref 3.5–5.1)
SERVICE CMNT-IMP: ABNORMAL
SERVICE CMNT-IMP: ABNORMAL
SERVICE CMNT-IMP: NORMAL
SERVICE CMNT-IMP: NORMAL
SODIUM SERPL-SCNC: 142 MMOL/L (ref 136–145)

## 2022-02-28 PROCEDURE — 36415 COLL VENOUS BLD VENIPUNCTURE: CPT

## 2022-02-28 PROCEDURE — 74011250636 HC RX REV CODE- 250/636: Performed by: FAMILY MEDICINE

## 2022-02-28 PROCEDURE — 65660000001 HC RM ICU INTERMED STEPDOWN

## 2022-02-28 PROCEDURE — 74011250637 HC RX REV CODE- 250/637: Performed by: INTERNAL MEDICINE

## 2022-02-28 PROCEDURE — 74011250636 HC RX REV CODE- 250/636: Performed by: INTERNAL MEDICINE

## 2022-02-28 PROCEDURE — A9500 TC99M SESTAMIBI: HCPCS

## 2022-02-28 PROCEDURE — 74011250637 HC RX REV CODE- 250/637: Performed by: FAMILY MEDICINE

## 2022-02-28 PROCEDURE — 78452 HT MUSCLE IMAGE SPECT MULT: CPT

## 2022-02-28 PROCEDURE — 80048 BASIC METABOLIC PNL TOTAL CA: CPT

## 2022-02-28 PROCEDURE — 74011250636 HC RX REV CODE- 250/636: Performed by: HOSPITALIST

## 2022-02-28 PROCEDURE — 74011000250 HC RX REV CODE- 250: Performed by: FAMILY MEDICINE

## 2022-02-28 PROCEDURE — 94760 N-INVAS EAR/PLS OXIMETRY 1: CPT

## 2022-02-28 PROCEDURE — 74011000250 HC RX REV CODE- 250: Performed by: INTERNAL MEDICINE

## 2022-02-28 PROCEDURE — 82962 GLUCOSE BLOOD TEST: CPT

## 2022-02-28 PROCEDURE — 74011636637 HC RX REV CODE- 636/637: Performed by: INTERNAL MEDICINE

## 2022-02-28 PROCEDURE — 77010033678 HC OXYGEN DAILY

## 2022-02-28 PROCEDURE — C8929 TTE W OR WO FOL WCON,DOPPLER: HCPCS

## 2022-02-28 RX ORDER — GABAPENTIN 300 MG/1
300 CAPSULE ORAL 2 TIMES DAILY
Status: DISCONTINUED | OUTPATIENT
Start: 2022-02-28 | End: 2022-03-10 | Stop reason: HOSPADM

## 2022-02-28 RX ORDER — SODIUM CHLORIDE 0.9 % (FLUSH) 0.9 %
20 SYRINGE (ML) INJECTION
Status: COMPLETED | OUTPATIENT
Start: 2022-02-28 | End: 2022-02-28

## 2022-02-28 RX ORDER — TETRAKIS(2-METHOXYISOBUTYLISOCYANIDE)COPPER(I) TETRAFLUOROBORATE 1 MG/ML
30.6 INJECTION, POWDER, LYOPHILIZED, FOR SOLUTION INTRAVENOUS
Status: COMPLETED | OUTPATIENT
Start: 2022-02-28 | End: 2022-02-28

## 2022-02-28 RX ADMIN — Medication 10 ML: at 22:53

## 2022-02-28 RX ADMIN — SODIUM CHLORIDE 100 ML/HR: 9 INJECTION, SOLUTION INTRAVENOUS at 06:41

## 2022-02-28 RX ADMIN — Medication 20 ML: at 13:05

## 2022-02-28 RX ADMIN — OXYBUTYNIN CHLORIDE 5 MG: 5 TABLET ORAL at 08:31

## 2022-02-28 RX ADMIN — TAMSULOSIN HYDROCHLORIDE 0.4 MG: 0.4 CAPSULE ORAL at 08:31

## 2022-02-28 RX ADMIN — WATER 2 G: 1 INJECTION INTRAMUSCULAR; INTRAVENOUS; SUBCUTANEOUS at 19:18

## 2022-02-28 RX ADMIN — FERROUS SULFATE TAB 325 MG (65 MG ELEMENTAL FE) 325 MG: 325 (65 FE) TAB at 06:37

## 2022-02-28 RX ADMIN — PERFLUTREN 1.5 ML: 6.52 INJECTION, SUSPENSION INTRAVENOUS at 14:00

## 2022-02-28 RX ADMIN — REGADENOSON 0.4 MG: 0.08 INJECTION, SOLUTION INTRAVENOUS at 13:04

## 2022-02-28 RX ADMIN — Medication 5 ML: at 06:00

## 2022-02-28 RX ADMIN — PREDNISONE 40 MG: 20 TABLET ORAL at 08:31

## 2022-02-28 RX ADMIN — Medication 1 CAPSULE: at 08:31

## 2022-02-28 RX ADMIN — GABAPENTIN 300 MG: 300 CAPSULE ORAL at 22:43

## 2022-02-28 RX ADMIN — ATORVASTATIN CALCIUM 10 MG: 10 TABLET, FILM COATED ORAL at 22:43

## 2022-02-28 RX ADMIN — TETRAKIS(2-METHOXYISOBUTYLISOCYANIDE)COPPER(I) TETRAFLUOROBORATE 30.6 MILLICURIE: 1 INJECTION, POWDER, LYOPHILIZED, FOR SOLUTION INTRAVENOUS at 13:10

## 2022-02-28 NOTE — PROGRESS NOTES
Reason for Admission:                    RUR Score:           PCP: First and Last name:  Torrey Edwards MD     Name of Practice:   Are you a current patient: Yes/No:   Approximate date of last visit:    Can you do a virtual visit with your PCP:              Resources/supports as identified by patient/family:                   Top Challenges facing patient (as identified by patient/family and CM): Finances/Medication cost?                    Transportation? Support system or lack thereof? Living arrangements? Self-care/ADLs/Cognition?             Current Advanced Directive/Advance Care Plan:  Full Code      Healthcare Decision Maker:   Click here to complete HealthCare Decision Makers including selection of the Healthcare Decision Maker Relationship (ie \"Primary\")      Primary Decision MakerCashlye Duarter - [phone]    Payor Source Payor: Noble Rouse / Plan: [address] [place] [address] HMO / Product Type: Managed Care Medicare /                             Plan for utilizing home health:                     Transition of Care Plan:

## 2022-02-28 NOTE — PROGRESS NOTES
Progress Note    Patient: Angelica Guerin MRN: [id number]  SSN: [id number]    YOB: 1950  Age: 73 y.o. Sex: female        ADMITTED:  2022 to Mitra Fung MD  for Sepsis [facility]) [A41.9]         Angelica Guerin is 2 Days Post-Op Procedure(s):  CYSTOSCOPY INSERTION  RIGHT URETERAL STENT. She is doing well. Feels better. Admits mild right sided flank and abd pain. Denies fevers, chills, dysuria, hematuria, or stent colic. Parham draining clear yellow UA. She is afebrile. BP stable. Difficult stick, labs pending. UA +large leuks, >100 WBC, 4+ bacteria. BCx +GVR  bottles. +Vanc, Maxipime. Vitals:  Temp (24hrs), Av.6 °F (36.4 °C), Min:97.4 °F (36.3 °C), Max:97.8 °F (36.6 °C)     Blood pressure 113/66, pulse 79, temperature 97.8 °F (36.6 °C), resp. rate 21, weight 137.2 kg (302 lb 7.5 oz), SpO2 98 %. I&O's:   1901 -  0700  In: -   Out: 1400 [Urine:1400]   No intake/output data recorded. Exam:   Physical Exam  General: NAD, pleasant  Respiratory: no distress, breathing easily   Abdomen: soft, no distention; mild RLQ tenderness to palpation  : mild right CVA tenderness, parham, clear yellow UA  Neuro: Appropriate, no focal neurological deficits  Skin: warm, dry  Extremities: moves all, full ROM     Labs:   Recent Labs     22   WBC 15.5* 2.9*   HGB 10.8* 12.1   HCT 36.0 38.0   PLT 32* 81*     Recent Labs     225 22    139   K 4.2 4.0   * 106   CO2 18* 22   GLU 99 99   BUN 69* 53*   CREA 3.21* 3.55*   CA 6.9* 8.0*        Cultures:      Imaging:       Assessment:     - 2 Days Post-Op Procedure(s):  CYSTOSCOPY INSERTION  RIGHT URETERAL STENT    Principal Problem:    Sepsis ([address] (2022)    Right 8 mm distal stone    ELLYN    B/L renal stones     Plan:     - Await labs. Continue empiric antibiotics and follow cultures.  DC parham with Voiding trial. Discussed the importance of follow up for definitive stone treatment once her infection resolves. She understands. Will arrange office visit. Urology will sign off. Please call with questions. Supervising MD, Dr. Justice Gaston.    Signed By: Fabio Reece NP - February 28, 2022

## 2022-02-28 NOTE — CONSULTS
[address]    Name:  Anna Russell  MR#:  [id number]  :  1950  ACCOUNT #:  [de-identified]  DATE OF SERVICE:  2022    REFERRING PHYSICIAN:  Day Appiah MD    HISTORY OF PRESENT ILLNESS:  This is a 72-year-old woman brought to the hospital in extremis, with sepsis shock, acute renal failure from an obstructing stone. We were asked to see her because of her elevated troponins. She had successful relief of her obstruction without general anesthesia and is still having some abdominal pain but feels much better. It appears that her renal failure is recovering. On questioning, the patient has no cardiac history. She had a remote stress test, but no specific diagnosis of myocardial infarction, heart failure is however entertained. She does have hypertension. She is a nonsmoker. REVIEW OF SYSTEMS:  A 12-point review of systems was otherwise unremarkable. PHYSICAL EXAMINATION:  GENERAL:  On exam, this is a well-developed, pleasant lady. VITAL SIGNS:  As follows; blood pressure is 115/71, heart rate is 90, sats 97% on 2 L. HEENT:  Unremarkable. NECK:  Supple. No adenopathy. CHEST WALL:  Nontender. LUNGS:  Anteriorly are clear. HEART:  Regular moderate rhythm. No S3 gallop or friction rub. No thrills, lifts, or heaves. No significant murmurs. ABDOMEN:  Obese, rather moderately tender. No bruits. No significant murmurs. IMAGING:  Chest x-ray demonstrates cardiomegaly, clear lung fields. An EKG demonstrates sinus rhythm, moderate nondiagnostic ST-T changes. LABORATORY DATA:  Include an elevated high-sensitivity troponin, on presentation was 345, is now down to 206. Her BUN and creatinine are slowly improving. IMPRESSION:  1. This patient has multiple risk factors for coronary artery disease. 2.  Her elevated troponins were more likely related to obstructive uropathy, acute renal failure, and acute coronary syndrome.     RECOMMENDATIONS: She is here recovering from her surgery. We will recommend an echocardiogram, which has already been ordered, and a nuclear stress test as a baseline. At this point, nothing more to add to her case. We will order these tests and follow up sometime during this admission.     Thank you for this referral.      Bhavin Truong MD      [id number]  D:  02/27/2022 19:14  T:  02/27/2022 20:26  JOB #:  [id number]

## 2022-02-28 NOTE — PROGRESS NOTES
Bedside shift change report given to [facility] (oncoming nurse) by Rivas Boogie RN (offgoing nurse). Report included the following information SBAR, Kardex, ED Summary, Intake/Output, MAR, Recent Results and Cardiac Rhythm NSR.

## 2022-02-28 NOTE — CONSULTS
Assessment:  ELLYN on CKD?: +Obstructive nephrolithiasis/Right hydronephrosis-> serum Cr 3.5mg/dl-> 3.2-> 2.5mg/dl. Baseline Cr? NSAIDS noted on outpatient MAR. Right hydronephrosis: s/p stent placement by Urology on 2/26/22    E. Coli UTI    Metabolic acidosis: NAGMA    HTN    Plan/Recommendations:  Decrease IVF rate to 75cc/hr  Ct holding Metformin and Tekturna  IV Abx  Urology following  Renally dose new meds  Strict I/Os  Am labs    Discussed with patient    Thanks for the consultation. Renal service will follow patient with you. Please contact me with any questions or concerns. Initial Consult note         Patient name: Yonatan Morales  MR no: [id number]  Date of admission: 2/26/2022  Date of consultation: 2/28/2022  Requested by: Dr. Leonid Gutierrez  Reason for consult: ELLYN    Patient seen and examined. History obtained from patient and chart review. Relevant labs, data and notes reviewed. HPI: Yonatan Morales is a 73 y.o. female with PMH significant for Nephrolithiasis, HTN, Obesity, HL admitted on 2/26/22 with generalized weakness. Transferred from [facility] to [facility] after noting right sided hydronephrosis. Serum Cr 3.5mg/dl. Underwent right stent placement by Urology. Serum Cr improving to 2.5mg/dl today. Nephrology consulted to evaluate and manage ELLYN on CKD? Robbin Leija Patient is a limited historian-> unclear if she has CKD. RIght flank pain improving. PMH:  Past Medical History:   Diagnosis Date    Arthritis 7/7/2020    Asthma 7/7/2020    Hypertension 7/7/2020     PSH:  Past Surgical History:   Procedure Laterality Date    HX UROLOGICAL      Lithotripsy       Social history:   Social History     Tobacco Use    Smoking status: Never Smoker    Smokeless tobacco: Never Used   Substance Use Topics    Alcohol use: Never    Drug use: Never       Family history:  No history of CKD or ESRD in the family.      Allergies   Allergen Reactions    Latex Unknown (comments)  Aspirin Unknown (comments)    Losartan Unknown (comments)    Penicillins Unknown (comments)       Current Facility-Administered Medications   Medication Dose Route Frequency Last Admin    regadenoson (LEXISCAN) injection 0.4 mg  0.4 mg IntraVENous RAD ONCE      saline peripheral flush soln 20 mL  20 mL InterCATHeter RAD ONCE      0.9% sodium chloride infusion  125 mL/hr IntraVENous CONTINUOUS 125 mL/hr at 02/28/22 0806    morphine injection 2 mg  2 mg IntraVENous Q4H PRN 2 mg at 02/27/22 1606    Vancomycin - pharmacy dosing   Other Rx Dosing/Monitoring      HYDROmorphone (DILAUDID) injection 1 mg  1 mg IntraVENous Q4H PRN 1 mg at 02/26/22 1610    albuterol-ipratropium (DUO-NEB) 2.5 MG-0.5 MG/3 ML  3 mL Nebulization Q4H PRN      atorvastatin (LIPITOR) tablet 10 mg  10 mg Oral QHS 10 mg at 02/27/22 2349    ferrous sulfate tablet 325 mg  1 Tablet Oral  mg at 02/28/22 9575    gabapentin (NEURONTIN) capsule 300 mg  300 mg Oral QHS      oxybutynin (DITROPAN) tablet 5 mg  5 mg Oral BID 5 mg at 02/28/22 0831    tamsulosin (FLOMAX) capsule 0.4 mg  0.4 mg Oral DAILY 0.4 mg at 02/28/22 0831    sodium chloride (NS) flush 5-40 mL  5-40 mL IntraVENous Q8H 5 mL at 02/28/22 0600    sodium chloride (NS) flush 5-40 mL  5-40 mL IntraVENous PRN      acetaminophen (TYLENOL) tablet 650 mg  650 mg Oral Q6H PRN      Or    acetaminophen (TYLENOL) suppository 650 mg  650 mg Rectal Q6H PRN      polyethylene glycol (MIRALAX) packet 17 g  17 g Oral DAILY PRN      ondansetron (ZOFRAN ODT) tablet 4 mg  4 mg Oral Q8H PRN      Or    ondansetron (ZOFRAN) injection 4 mg  4 mg IntraVENous Q6H PRN      L.acidophilus-paracasei-S.thermophil-bifidobacter (RISAQUAD) 8 billion cell capsule  1 Capsule Oral DAILY 1 Capsule at 02/28/22 0831    predniSONE (DELTASONE) tablet 40 mg  40 mg Oral DAILY WITH BREAKFAST 40 mg at 02/28/22 0831    insulin lispro (HUMALOG) injection   SubCUTAneous AC&HS 2 Units at 02/27/22 1806    glucose chewable tablet 16 g  4 Tablet Oral PRN      glucagon (GLUCAGEN) injection 1 mg  1 mg IntraMUSCular PRN      dextrose 10 % infusion 0-250 mL  0-250 mL IntraVENous PRN      cefepime (MAXIPIME) 2 g in sterile water (preservative free) 10 mL IV syringe  2 g IntraVENous Q24H 2 g at 02/27/22 1851       ROS (besides HPI):    General: No fever. No weight changes  ENT: No hearing loss or visual changes  Cardiovascular: No Chest pain  Pulmonary: No SOB  GI: No abdominal pain. No Nausea/Vomiting/Diarrhea. No blood in stool  : No blood in urine. No foamy or cloudy urine  Musculoskeletal: No joint swelling or redness. No morning stiffness  Endocrine: no cold or heat intolerance  Psych: denies anxiety or depression  Neuro: No light headedness or dizziness    Objective   Visit Vitals  /64 (BP 1 Location: Left upper arm, BP Patient Position: At rest)   Pulse 87   Temp 98.5 °F (36.9 °C)   Resp 14   Wt 137.2 kg (302 lb 7.5 oz)   SpO2 97%   BMI 45.99 kg/m²       Physical Exam:    Gen: NAD/Obese    HEENT: AT/NC, EOMI, moist mucous membrane, no scleral icterus    Neck: no JVD, no cervical lymphadenopathy, no carotid bruit    Lungs/Chest wall: Breath sounds normal. Symmetrical chest wall expansion. No accessory muscle use. Clear to auscultation    Cardiovascular: Normal S1/S2, normal rate, regular rhythm. Abdomen: soft, NT, ND, BS+, no HSM    Ext: no clubbing or cyanosis. Trace edema    Skin: warm and dry. No rashes    : no CVA tenderness    CNS: alert awake. Answers appropriately.      Labs/Data:    Lab Results   Component Value Date/Time    Sodium 142 02/28/2022 08:34 AM    Potassium 4.2 02/28/2022 08:34 AM    Chloride 117 (H) 02/28/2022 08:34 AM    CO2 20 (L) 02/28/2022 08:34 AM    Anion gap 5 02/28/2022 08:34 AM    Glucose 134 (H) 02/28/2022 08:34 AM    BUN 78 (H) 02/28/2022 08:34 AM    Creatinine 2.53 (H) 02/28/2022 08:34 AM    BUN/Creatinine ratio 31 (H) 02/28/2022 08:34 AM    GFR est AA 23 (L) 02/28/2022 08:34 AM    GFR est non-AA 19 (L) 02/28/2022 08:34 AM    Calcium 7.7 (L) 02/28/2022 08:34 AM       Lab Results   Component Value Date/Time    WBC 15.5 (H) 02/27/2022 04:55 AM    HGB 10.8 (L) 02/27/2022 04:55 AM    HCT 36.0 02/27/2022 04:55 AM    PLATELET 32 (LL) 49/13/8126 04:55 AM    MCV 99.7 (H) 02/27/2022 04:55 AM       Urine analysis: No results found for this or any previous visit.         No components found for: SPEP, UPEP  No results found for: PUQ, PROTU2, PROTU1, BJP1, CPE1, IMEL1, MET2  No results found for: MCACR, MCA1, MCA2, MCA3, MCAU, MCAU2, MCALPOCT      Intake/Output Summary (Last 24 hours) at 2/28/2022 1139  Last data filed at 2/27/2022 1813  Gross per 24 hour   Intake    Output 550 ml   Net -550 ml       Wt Readings from Last 3 Encounters:   02/26/22 137.2 kg (302 lb 7.5 oz)   02/26/22 136.1 kg (300 lb)   09/04/20 126.1 kg (278 lb)       Signed by:  Zo Bowers MD  Nephrology and Hypertension  [facility]

## 2022-02-28 NOTE — CONSULTS
Consult dictated # [id number]  Imp:  Obstructive uropathy with acute renal failure and sepsis  Elevated Hs troponin levels 2/2 above  On exam the patient is stable with no signs of ACS of HF  Her EKG shows nondiagnostic ST T changes  Recs:  Echo as ordered   MPI as a baseline  Thank you for this referral.  Alli Stevens MD  Interventional Cardiology  S

## 2022-02-28 NOTE — PROGRESS NOTES
Renal Dosing/Monitoring  Medication: Cefepime   Current regimen:  2 grams IV every 24 hr  Recent Labs     02/28/22  0834 02/27/22  0455 02/26/22  0459   CREA 2.53* 3.21* 3.55*   BUN 78* 69* 53*     Estimated CrCl:  30.8 ml/min  Plan: Change to 2 grams IV every 12 hours per [address] [place] [address] P&T Committee Protocol with respect to renal function. Pharmacy will continue to monitor patient daily and will make dosage adjustments based upon changing renal function.     Cailin Reyes, PharmD, BCPS

## 2022-03-01 ENCOUNTER — APPOINTMENT (OUTPATIENT)
Dept: NUCLEAR MEDICINE | Age: 72
DRG: 854 | End: 2022-03-01
Attending: INTERNAL MEDICINE
Payer: MEDICARE

## 2022-03-01 LAB
ALBUMIN SERPL-MCNC: 2 G/DL (ref 3.5–5)
ALBUMIN/GLOB SERPL: 0.6 {RATIO} (ref 1.1–2.2)
ALP SERPL-CCNC: 213 U/L (ref 45–117)
ALT SERPL-CCNC: 25 U/L (ref 12–78)
ANION GAP SERPL CALC-SCNC: 8 MMOL/L (ref 5–15)
AST SERPL-CCNC: 28 U/L (ref 15–37)
BILIRUB SERPL-MCNC: 0.4 MG/DL (ref 0.2–1)
BUN SERPL-MCNC: 68 MG/DL (ref 6–20)
BUN/CREAT SERPL: 44 (ref 12–20)
CALCIUM SERPL-MCNC: 8 MG/DL (ref 8.5–10.1)
CHLORIDE SERPL-SCNC: 117 MMOL/L (ref 97–108)
CO2 SERPL-SCNC: 19 MMOL/L (ref 21–32)
CREAT SERPL-MCNC: 1.55 MG/DL (ref 0.55–1.02)
ERYTHROCYTE [DISTWIDTH] IN BLOOD BY AUTOMATED COUNT: 17.3 % (ref 11.5–14.5)
GLOBULIN SER CALC-MCNC: 3.6 G/DL (ref 2–4)
GLUCOSE BLD STRIP.AUTO-MCNC: 118 MG/DL (ref 65–117)
GLUCOSE BLD STRIP.AUTO-MCNC: 129 MG/DL (ref 65–117)
GLUCOSE BLD STRIP.AUTO-MCNC: 144 MG/DL (ref 65–117)
GLUCOSE BLD STRIP.AUTO-MCNC: 83 MG/DL (ref 65–117)
GLUCOSE SERPL-MCNC: 119 MG/DL (ref 65–100)
HCT VFR BLD AUTO: 38.7 % (ref 35–47)
HGB BLD-MCNC: 12.3 G/DL (ref 11.5–16)
MAGNESIUM SERPL-MCNC: 2.7 MG/DL (ref 1.6–2.4)
MCH RBC QN AUTO: 29.8 PG (ref 26–34)
MCHC RBC AUTO-ENTMCNC: 31.8 G/DL (ref 30–36.5)
MCV RBC AUTO: 93.7 FL (ref 80–99)
NRBC # BLD: 0.02 K/UL (ref 0–0.01)
NRBC BLD-RTO: 0.1 PER 100 WBC
PHOSPHATE SERPL-MCNC: 2.3 MG/DL (ref 2.6–4.7)
PLATELET # BLD AUTO: 30 K/UL (ref 150–400)
POTASSIUM SERPL-SCNC: 4 MMOL/L (ref 3.5–5.1)
PROT SERPL-MCNC: 5.6 G/DL (ref 6.4–8.2)
RBC # BLD AUTO: 4.13 M/UL (ref 3.8–5.2)
SERVICE CMNT-IMP: ABNORMAL
SERVICE CMNT-IMP: NORMAL
SODIUM SERPL-SCNC: 144 MMOL/L (ref 136–145)
STRESS BASELINE HR: 88 BPM
STRESS BASELINE ST DEPRESSION: 0 MM
STRESS ESTIMATED WORKLOAD: 1 METS
STRESS EXERCISE DUR MIN: 3 MIN
STRESS EXERCISE DUR SEC: 0 SEC
STRESS PEAK DIAS BP: 118 MMHG
STRESS PEAK SYS BP: 160 MMHG
STRESS PERCENT HR ACHIEVED: 69 %
STRESS POST PEAK HR: 104 BPM
STRESS RATE PRESSURE PRODUCT: NORMAL BPM*MMHG
STRESS ST DEPRESSION: 0 MM
STRESS STAGE 1 BP: NORMAL MMHG
STRESS STAGE 1 HR: 101 BPM
STRESS STAGE RECOVERY 1 BP: NORMAL MMHG
STRESS STAGE RECOVERY 1 HR: 101 BPM
STRESS STAGE RECOVERY 2 BP: NORMAL MMHG
STRESS STAGE RECOVERY 2 HR: 100 BPM
STRESS TARGET HR: 151 BPM
WBC # BLD AUTO: 20.4 K/UL (ref 3.6–11)

## 2022-03-01 PROCEDURE — 74011250636 HC RX REV CODE- 250/636: Performed by: FAMILY MEDICINE

## 2022-03-01 PROCEDURE — 74011250636 HC RX REV CODE- 250/636: Performed by: INTERNAL MEDICINE

## 2022-03-01 PROCEDURE — 74011250637 HC RX REV CODE- 250/637: Performed by: INTERNAL MEDICINE

## 2022-03-01 PROCEDURE — 80053 COMPREHEN METABOLIC PANEL: CPT

## 2022-03-01 PROCEDURE — 74011636637 HC RX REV CODE- 636/637: Performed by: INTERNAL MEDICINE

## 2022-03-01 PROCEDURE — 65660000001 HC RM ICU INTERMED STEPDOWN

## 2022-03-01 PROCEDURE — 74011000250 HC RX REV CODE- 250: Performed by: INTERNAL MEDICINE

## 2022-03-01 PROCEDURE — 83735 ASSAY OF MAGNESIUM: CPT

## 2022-03-01 PROCEDURE — 36415 COLL VENOUS BLD VENIPUNCTURE: CPT

## 2022-03-01 PROCEDURE — 82962 GLUCOSE BLOOD TEST: CPT

## 2022-03-01 PROCEDURE — 85027 COMPLETE CBC AUTOMATED: CPT

## 2022-03-01 PROCEDURE — 74011250637 HC RX REV CODE- 250/637: Performed by: FAMILY MEDICINE

## 2022-03-01 PROCEDURE — 74011000250 HC RX REV CODE- 250: Performed by: FAMILY MEDICINE

## 2022-03-01 PROCEDURE — 74011250636 HC RX REV CODE- 250/636: Performed by: HOSPITALIST

## 2022-03-01 PROCEDURE — 84100 ASSAY OF PHOSPHORUS: CPT

## 2022-03-01 RX ORDER — TETRAKIS(2-METHOXYISOBUTYLISOCYANIDE)COPPER(I) TETRAFLUOROBORATE 1 MG/ML
32 INJECTION, POWDER, LYOPHILIZED, FOR SOLUTION INTRAVENOUS
Status: COMPLETED | OUTPATIENT
Start: 2022-03-01 | End: 2022-03-01

## 2022-03-01 RX ORDER — AMLODIPINE BESYLATE 5 MG/1
5 TABLET ORAL DAILY
Status: DISCONTINUED | OUTPATIENT
Start: 2022-03-02 | End: 2022-03-02

## 2022-03-01 RX ORDER — METOPROLOL TARTRATE 50 MG/1
50 TABLET ORAL 2 TIMES DAILY
Status: DISCONTINUED | OUTPATIENT
Start: 2022-03-01 | End: 2022-03-02

## 2022-03-01 RX ADMIN — Medication 10 ML: at 05:13

## 2022-03-01 RX ADMIN — WATER 2 G: 1 INJECTION INTRAMUSCULAR; INTRAVENOUS; SUBCUTANEOUS at 05:13

## 2022-03-01 RX ADMIN — PREDNISONE 40 MG: 20 TABLET ORAL at 10:24

## 2022-03-01 RX ADMIN — MORPHINE SULFATE 2 MG: 2 INJECTION, SOLUTION INTRAMUSCULAR; INTRAVENOUS at 08:53

## 2022-03-01 RX ADMIN — METOPROLOL TARTRATE 50 MG: 50 TABLET, FILM COATED ORAL at 18:23

## 2022-03-01 RX ADMIN — TETRAKIS(2-METHOXYISOBUTYLISOCYANIDE)COPPER(I) TETRAFLUOROBORATE 32 MILLICURIE: 1 INJECTION, POWDER, LYOPHILIZED, FOR SOLUTION INTRAVENOUS at 07:45

## 2022-03-01 RX ADMIN — GABAPENTIN 300 MG: 300 CAPSULE ORAL at 10:24

## 2022-03-01 RX ADMIN — Medication 1 CAPSULE: at 10:24

## 2022-03-01 RX ADMIN — Medication 10 ML: at 21:36

## 2022-03-01 RX ADMIN — Medication 10 ML: at 16:07

## 2022-03-01 RX ADMIN — SODIUM CHLORIDE, PRESERVATIVE FREE 10 ML: 5 INJECTION INTRAVENOUS at 08:54

## 2022-03-01 RX ADMIN — MEROPENEM 500 MG: 500 INJECTION, POWDER, FOR SOLUTION INTRAVENOUS at 18:23

## 2022-03-01 RX ADMIN — TAMSULOSIN HYDROCHLORIDE 0.4 MG: 0.4 CAPSULE ORAL at 10:24

## 2022-03-01 RX ADMIN — ATORVASTATIN CALCIUM 10 MG: 10 TABLET, FILM COATED ORAL at 21:36

## 2022-03-01 RX ADMIN — SODIUM CHLORIDE 75 ML/HR: 9 INJECTION, SOLUTION INTRAVENOUS at 20:54

## 2022-03-01 RX ADMIN — FERROUS SULFATE TAB 325 MG (65 MG ELEMENTAL FE) 325 MG: 325 (65 FE) TAB at 09:00

## 2022-03-01 RX ADMIN — GABAPENTIN 300 MG: 300 CAPSULE ORAL at 21:35

## 2022-03-01 RX ADMIN — OXYBUTYNIN CHLORIDE 5 MG: 5 TABLET ORAL at 10:24

## 2022-03-01 RX ADMIN — OXYBUTYNIN CHLORIDE 5 MG: 5 TABLET ORAL at 18:23

## 2022-03-01 RX ADMIN — Medication 2 UNITS: at 18:24

## 2022-03-01 NOTE — PROGRESS NOTES
Day #1 of meropenem  Indication:  kleb pneumo bacteremia - presumed urinary source  Current regimen:  500 mg IV Q 12 hours    Recent Labs     22  0834 22  0455   WBC  --  15.5*   CREA 2.53* 3.21*   BUN 78* 69*     Est CrCl: 30.8 ml/min  Temp (24hrs), Av.4 °F (36.3 °C), Min:97.4 °F (36.3 °C), Max:97.4 °F (36.3 °C)      Plan: Change to 500 mg IV Q 8 hours  per [facility] P&T Committee Protocol with respect to renal function. Pharmacy will continue to monitor patient daily and will make dosage adjustments based upon changing renal function.     Nyasia Joseph, PharmD, BCPS

## 2022-03-01 NOTE — CONSULTS
Infectious Disease Consult    Today's Date: 3/1/2022   Admit Date: 2/26/2022    Impression:   Right ureteral calculus  Presumed Complicated UTI (+ U/A, no urine cx done prior to procedure)  Bacteremia  S/p cystoscopy insertion right ureteral stent (2/26)  - afebrile, WBC 15.5     blood cx (2/26) klebsiella pneumoniae    CT of abd/pel (2/26) bilateral renal calculi, right greater than left, obstruction of the right kidney and right ureter secondary to an 8 mm stone positioned approximately 2 cm proximal to the  right ureterovesical junction. Plan:   · Continue with IV merrem for now  · source of bacteremia most likely secondary to complicated UTI. However, there was no urine cx done prior to cystoscopy and right ureteral stent placement. Pt has hx of ESBL E-coli in the past.  · Will adjust abx after review upcoming blood work results  · Fever work up if temp >= 100.4    Above plan of care discussed and agreed with Dr. Pool Speaker:   · IV cefepime 2/26-2/27  · IV merrem 3/1-    Subjective:   Date of Consultation:  March 1, 2022  Referring Physician: Dr. Evin Snyder    Patient is a 73 y.o. female with medical hx of hypertension, dyslipidemia, morbid obesity, asthma, kidney stone status post lithotripsy, type 2 diabetes who was transferred from [facility] on 2/26 with   sepsis shock, acute renal failure, and concerns of elevated troponin. CT of abd/pel on 2/26 revealed right urinary tract obstruction. Pt underwent for cystoscopy with right ureteral stent placement. U/A/urine cx were not obtained prior to the procedure. Pt has hx of ESBL E-coli UTI in 2020. Pt was started on IV cefepime then changed to IV Merrem. Pt was evaluated by cardiologist for her elevated troponin level. Dr. Lisa Mallory, cardiologist indicated that pt's elevated troponin were secondary to obstructive uropathy and acute coronary syndrome.  Nuclear stress test from 3/1 revealed concerning for inferoapical ischemia, no apparent infarct, LVEF 57%. TTE revealed consistent with concentric remodeling with normal wall motion and LVEF. During visit, pt voices feeling overall improvement. No fever, chills, nausea, vomiting, sob, yoo, chest pain, dysuria, abdominal pain, or lower extremity pain. Allergic to PCN which causes hives. ID team was consulted for ABX management   Patient Active Problem List   Diagnosis Code    Acute pain R52    Arthritis M19.90    Asthma J45.909    At risk for falls Z91.81    Hematuria R31.9    Kidney stone N20.0    Edema R60.9    Hypertension I10    Menopause present Z78.0    Obesity E66.9    Postmenopausal state Z78.0    Urinary incontinence R32    Obesity, morbid ([address] E66.01    Sepsis ([facility] [address] A41.9     Past Medical History:   Diagnosis Date    Arthritis 7/7/2020    Asthma 7/7/2020    Hypertension 7/7/2020      Family History   Family history unknown: Yes      Social History     Tobacco Use    Smoking status: Never Smoker    Smokeless tobacco: Never Used   Substance Use Topics    Alcohol use: Never     Past Surgical History:   Procedure Laterality Date    HX UROLOGICAL      Lithotripsy      Prior to Admission medications    Medication Sig Start Date End Date Taking? Authorizing Provider   carvediloL (COREG) 25 mg tablet Take 25 mg by mouth two (2) times daily (with meals). Yes Provider, Historical   aliskiren (TEKTURNA) 300 mg tablet  9/1/20   Provider, Historical   atorvastatin (LIPITOR) 10 mg tablet  8/27/20   Provider, Historical   gabapentin (NEURONTIN) 300 mg capsule TK 1 C PO HS 6/30/20   Provider, Historical   albuterol (PROVENTIL HFA, VENTOLIN HFA, PROAIR HFA) 90 mcg/actuation inhaler Take  by inhalation. Provider, Historical   carvediloL (COREG) 6.25 mg tablet Take  by mouth two (2) times daily (with meals). Provider, Historical   diazePAM (VALIUM) 5 mg tablet Take 5 mg by mouth two (2) times a day.     Provider, Historical   ferrous sulfate 325 mg (65 mg iron) tablet Take  by mouth Daily (before breakfast). Provider, Historical   meloxicam (MOBIC) 15 mg tablet Take 15 mg by mouth daily. Provider, Historical   metFORMIN 500 mg/5 mL soln Take  by mouth. Provider, Historical   naproxen (NAPROSYN) 375 mg tablet Take 375 mg by mouth two (2) times daily (with meals). Provider, Historical   ondansetron (ZOFRAN ODT) 8 mg disintegrating tablet Take 8 mg by mouth every eight (8) hours as needed for Nausea or Vomiting. Provider, Historical   oxybutynin (DITROPAN) 5 mg tablet Take 5 mg by mouth two (2) times a day. Provider, Historical   tamsulosin (FLOMAX) 0.4 mg capsule Take 0.4 mg by mouth daily. Provider, Historical     a  Allergies   Allergen Reactions    Latex Unknown (comments)    Aspirin Unknown (comments)    Losartan Unknown (comments)    Penicillins Unknown (comments)        REVIEW OF SYSTEMS:     Total of 12 systems reviewed as follows:   I am not able to complete the review of systems because:    The patient is intubated and sedated    The patient has altered mental status due to his acute medical problems    The patient has baseline aphasia from prior stroke(s)    The patient has baseline dementia and is not reliable historian            Denies any discomfort other than discomfort from blood work draw     POSITIVE= underlined text  Negative = text not underlined  General:  fever, chills, sweats, generalized weakness, weight loss/gain,      loss of appetite   Eyes:    blurred vision, eye pain, loss of vision, double vision  ENT:    rhinorrhea, pharyngitis   Respiratory:   cough, sputum production, SOB, wheezing, PETERSON, pleuritic pain   Cardiology:   chest pain, palpitations, orthopnea, PND, edema, syncope   Gastrointestinal:  abdominal pain , N/V, dysphagia, diarrhea, constipation, bleeding   Genitourinary:  frequency, urgency, dysuria, hematuria, incontinence   Muskuloskeletal :  arthralgia, myalgia   Hematology:  easy bruising, nose or gum bleeding, lymphadenopathy Dermatological: rash, ulceration, pruritis   Endocrine:   hot flashes or polydipsia   Neurological:  headache, dizziness, confusion, focal weakness, paresthesia,     Speech difficulties, memory loss, gait disturbance  Psychological: Feelings of anxiety, depression, agitation    Objective:     Visit Vitals  BP (!) 171/89 (BP 1 Location: Right arm, BP Patient Position: At rest)   Pulse 90   Temp 97.3 °F (36.3 °C)   Resp 17   Ht 5' 8\" (1.727 m)   Wt 137 kg (302 lb 0.5 oz)   SpO2 97%   BMI 45.92 kg/m²     Temp (24hrs), Av.4 °F (36.3 °C), Min:97.3 °F (36.3 °C), Max:97.4 °F (36.3 °C)       Lines: peripheral line    PHYSICAL EXAM:   General:    Morbidly obese, Alert, cooperative, no distress, appears stated age. HEENT: Atraumatic, anicteric sclerae, pink conjunctivae     No oral ulcers, mucosa moist, throat clear  Neck:  Supple  Lungs:   Clear in apex with decreased breath sounds at bases. No Wheezing or Rhonchi. No rales. Chest wall:  No tenderness  No Accessory muscle use. Heart:   Regular  rhythm,  No  murmur   No edema  Abdomen:   Soft, non-tender. Distended/obese. Bowel sounds normal  Extremities: No cyanosis. No clubbing  Skin:     Not pale. Not Jaundiced  No rashes   Psych:  Good insight. Not depressed. Not anxious or agitated. Neurologic: EOMs intact. No facial asymmetry. No aphasia or slurred speech. Alert and oriented X 4.        Data Review:     CBC:  Recent Labs     22   WBC 15.5*   GRANS 75   MONOS 5   EOS 0   ANEU 13.2*   ABL 1.1   HGB 10.8*   HCT 36.0   PLT 32*       BMP:  Recent Labs     22  0834 22   CREA 2.53* 3.21*   BUN 78* 69*    141   K 4.2 4.2   * 114*   CO2 20* 18*   AGAP 5 9   * 99       LFTS:  Recent Labs     22   TBILI 1.8*   ALT 21   *   TP 5.4*   ALB 2.0*       Microbiology:     Results     Procedure Component Value Units Date/Time    CULTURE, BLOOD [[id number]]     Order Status: Sent Specimen: Blood CULTURE, BLOOD, PAIRED [[id number]]     Order Status: Sent Specimen: Blood     CULTURE, BLOOD, PAIRED [[id number]]  (Abnormal)  (Susceptibility) Collected: 02/26/22 0736    Order Status: Completed Specimen: Blood Updated: 03/01/22 1351     Special Requests: No Special Requests        Culture result:       Klebsiella pneumoniae growing in all 4 bottles drawn                  checking for possible 2nd gram negative rianna            (NOTE) GVR GROWING IN 4 OF 4 BOTTLES CALLED TO AND READ BACK BY Lesli Sung MLT AT 1250 ON 02/27/22 BY MD.    Susceptibility      Klebsiella pneumoniae     RENETTA (Preliminary)     Amikacin ($) Susceptible     Ampicillin ($) Resistant     Ampicillin/sulbactam ($) Susceptible     Cefazolin ($) Susceptible     Cefepime ($$) Susceptible     Cefoxitin Susceptible     Ceftazidime ($) Susceptible     Ceftriaxone ($) Susceptible     Ciprofloxacin ($) Susceptible     Gentamicin ($) Susceptible     Levofloxacin ($) Susceptible     Meropenem ($$) Susceptible     Piperacillin/Tazobac ($) Susceptible     Tobramycin ($) Susceptible     Trimeth/Sulfa Susceptible                  Linear View                   COVID-19 RAPID TEST [[id number]] Collected: 02/26/22 0606    Order Status: Completed Specimen: Nasopharyngeal Updated: 02/26/22 0703     COVID-19 rapid test Not Detected        Comment: Rapid [employer] ID Now   Rapid NAAT:  The specimen is NEGATIVE for SARS-CoV-2, the novel coronavirus associated with COVID-19. Negative results should be treated as presumptive and, if inconsistent with clinical signs and symptoms or necessary for patient management, should be tested with an alternative molecular assay. Negative results do not preclude SARS-CoV-2 infection and should not be used as the sole basis for patient management decisions. This test has been authorized by the FDA under   an Emergency Use Authorization (EUA) for use by authorized laboratories.  Fact sheet for Healthcare Providers: ConventionUpdate.co.nz Fact sheet for Patients: ConventionUpdate.co.nz   Methodology: Isothermal Nucleic Acid Amplification               Signed By: Lamonte Smith NP     March 1, 2022

## 2022-03-01 NOTE — PROGRESS NOTES
Bedside shift change report given to Elisa RN (oncoming nurse) by Candido Hurtado RN (offgoing nurse). Report included the following information SBAR, Kardex, MAR and Cardiac Rhythm NSR.

## 2022-03-01 NOTE — PROGRESS NOTES
Hospitalist Progress Note              Leidy Nam MD                              NAME:  Greg Ortiz  :  1950  MRN:  [id number]  Date of Service:  2022    Summary: 73 y.o. female with past medical history of HTN, dyslipidemia, morbid obesity, kidney stone who presented on 2022 with generalized body weakness and SOB. CT scan of the abdomen performed showed new right urinary tract obstruction       Assessment/Plan:  Right ureteral calculus with obstruction and pyelonephritis  - S/p cystourethroscopy with right ureteral stent placement POD 2  Urology consulted and following    Sepsis due to UTI/ gram neg bacteremia: Likely due to UTI and ureteral obstruction  Repeat blood cultures; cont IVF NS ; Stop vanc- cont cefepime  Consult ID in am tomorrow    Elevated troponin: Possibly due to MI type 2 in the setting of ELLYN  EKG showed no acute ischemic changes  Cardiology consulted  Allergic to aspirin    ELLYN:   Due to post obstructive uropathy  Scr on admission 3.21  cont IVF isotonic hydration   nephrology consulted and following    Leucocytosis  Likely due to UTI  Continue I.V antibiotics    Thrombocytopenia  Due to bacteremia, septicemia- - Monitor for now    Hypotension- improved with treatment of bacteremia and IVFluids    Overweight: BMI 45.61     Code status: Full  DVT prophylaxsis: SCD  Dispo: tbd          Interval History/Subjective:  F/u for right kidney stones and sepsis  S/p cystourethroscopy and stent palcement yesterday  No acute overnight event  States right flank pain is resolving      Review of Systems:  A comprehensive review of systems was negative except for that written in the HPI. Objective:     VITALS:   Last 24hrs VS reviewed since prior progress note.  Most recent are:  Visit Vitals  /82 (BP 1 Location: Right upper arm, BP Patient Position: At rest)   Pulse 89   Temp 98.4 °F (36.9 °C)   Resp 21   Ht 5' 8\" (1.727 m)   Wt 137 kg (302 lb 0.5 oz)   SpO2 96%   BMI 45.92 kg/m²     Patient Vitals for the past 24 hrs:   Temp Pulse Resp BP SpO2   02/28/22 2200  89      02/28/22 2000  92      02/28/22 1800  93      02/28/22 1505 98.4 °F (36.9 °C) 93 21 135/82 96 %   02/28/22 1345    126/81    02/28/22 1325  (!) 101  126/81    02/28/22 1200  87      02/28/22 1104 98.5 °F (36.9 °C) 87 14 123/64 97 %   02/28/22 1000  86      02/28/22 0700  83 16 (!) 74/64 100 %   02/28/22 0600  80 12 111/63 97 %   02/28/22 0551  79      02/28/22 0500  84 14 (!) 114/96 97 %   02/28/22 0400 97.3 °F (36.3 °C) 84 18 (!) 115/58 98 %   02/28/22 0353  82      02/28/22 0300  85 13 116/61 98 %   02/28/22 0200  86 18 114/65 98 %   02/28/22 0159  82      02/28/22 0000 97.8 °F (36.6 °C) 80 21 113/66 98 %   02/27/22 2300  86 11 102/64 99 %       Intake/Output Summary (Last 24 hours) at 2/28/2022 2245  Last data filed at 2/28/2022 2043  Gross per 24 hour   Intake    Output 1150 ml   Net -1150 ml        PHYSICAL EXAM:  General: No acute distress, cooperative, pleasant. EENT: EOMI. Anicteric sclerae. Oral mucous moist,  Resp: CTA bilaterally. No wheezing/rhonchi/rales. No accessory muscle use  CV: Regular rhythm, normal rate, no murmurs, gallops, rubs  GI: Soft, non distended, LUQ/gen tenderness. + guarding. normoactive bowel sounds, no hepatosplenomegaly   Extremities: No edema, warm, 2+ pulses throughout  Neurologic: Moves all extremities. AAOx3,   Psych: Good insight. Not anxious nor agitated.   Skin: Good Turgor, no rashes or ulcers    Lab Data Personally Reviewed: (see below)     Medications list Personally Reviewed:  x YES  NO     _______________________________________________________________________  Care Plan discussed with:  Patient/Family and Nurse    Total NON critical care TIME:  30 minutes    King Saravanan MD     Procedures: see electronic medical records for all procedures/Xrays and details which were not copied into this note but were reviewed prior to creation of Plan. LABS:  Recent Labs     02/27/22 0455 02/26/22 0459   WBC 15.5* 2.9*   HGB 10.8* 12.1   HCT 36.0 38.0   PLT 32* 81*     Recent Labs     02/28/22  0834 02/27/22 0455 02/26/22 0459    141 139   K 4.2 4.2 4.0   * 114* 106   CO2 20* 18* 22   BUN 78* 69* 53*   CREA 2.53* 3.21* 3.55*   * 99 99   CA 7.7* 6.9* 8.0*   MG  --  2.1  --    PHOS  --  4.9*  --      Recent Labs     02/27/22 0455 02/26/22 0459   ALT 21 18   * 606*   TBILI 1.8* 0.9   TP 5.4* 7.1   ALB 2.0* 2.4*   GLOB 3.4 4.7*   LPSE  --  21*     No results for input(s): INR, PTP, APTT, INREXT, INREXT in the last 72 hours. No results for input(s): FE, TIBC, PSAT, FERR in the last 72 hours. No results found for: FOL, RBCF   No results for input(s): PH, PCO2, PO2 in the last 72 hours. No results for input(s): CPK, CKNDX, TROIQ in the last 72 hours.     No lab exists for component: CPKMB  No results found for: CHOL, CHOLX, CHLST, CHOLV, HDL, HDLP, LDL, LDLC, DLDLP, TGLX, TRIGL, TRIGP, CHHD, CHHDX  Lab Results   Component Value Date/Time    Glucose (POC) 106 02/28/2022 05:52 PM    Glucose (POC) 134 (H) 02/28/2022 11:28 AM    Glucose (POC) 112 02/28/2022 06:35 AM    Glucose (POC) 162 (H) 02/27/2022 11:29 PM    Glucose (POC) 173 (H) 02/27/2022 09:30 PM     Lab Results   Component Value Date/Time    Color Dark Yellow 02/26/2022 08:39 AM    Appearance Turbid (A) 02/26/2022 08:39 AM    Specific gravity 1.015 02/26/2022 08:39 AM    pH (UA) 5.0 02/26/2022 08:39 AM    Protein 100 (A) 02/26/2022 08:39 AM    Glucose Negative 02/26/2022 08:39 AM    Ketone Negative 02/26/2022 08:39 AM    Bilirubin Negative 02/26/2022 08:39 AM    Urobilinogen 0.1 02/26/2022 08:39 AM    Nitrites Negative 02/26/2022 08:39 AM    Leukocyte Esterase Large (A) 02/26/2022 08:39 AM    Bacteria 4+ (A) 02/26/2022 08:39 AM    WBC >100 (H) 02/26/2022 08:39 AM    RBC 20-50 02/26/2022 08:39 AM     Results Procedure Component Value Units Date/Time    CULTURE, BLOOD, PAIRED [[id number]]     Order Status: Sent Specimen: Blood     CULTURE, BLOOD, PAIRED [[id number]]  (Abnormal) Collected: 02/26/22 0736    Order Status: Completed Specimen: Blood Updated: 02/28/22 1345     Special Requests: No Special Requests        Culture result:       Gram Negative Rods growing in all 4 bottles drawn No Site Indicated            (NOTE) GVR GROWING IN 4 OF 4 BOTTLES CALLED TO AND READ BACK BY REKHA Alexander MLT AT 1250 ON 02/27/22 BY MD.    COVID-19 RAPID TEST [[id number]] Collected: 02/26/22 0606    Order Status: Completed Specimen: Nasopharyngeal Updated: 02/26/22 0703     COVID-19 rapid test Not Detected        Comment: Rapid Abbott ID Now   Rapid NAAT:  The specimen is NEGATIVE for SARS-CoV-2, the novel coronavirus associated with COVID-19. Negative results should be treated as presumptive and, if inconsistent with clinical signs and symptoms or necessary for patient management, should be tested with an alternative molecular assay. Negative results do not preclude SARS-CoV-2 infection and should not be used as the sole basis for patient management decisions. This test has been authorized by the FDA under   an Emergency Use Authorization (EUA) for use by authorized laboratories. Fact sheet for Healthcare Providers: ConventionUpdate.co.nz Fact sheet for Patients: ConventionUpdate.co.nz   Methodology: Isothermal Nucleic Acid Amplification             .   Current Facility-Administered Medications:     gabapentin (NEURONTIN) capsule 300 mg, 300 mg, Oral, BID, Marcio Markham MD, 300 mg at 02/28/22 2243    cefepime (MAXIPIME) 2 g in sterile water (preservative free) 10 mL IV syringe, 2 g, IntraVENous, Q12H, Marcio Markham MD, Last Rate: 200 mL/hr at 02/28/22 1918, 2 g at 02/28/22 1918    0.9% sodium chloride infusion, 75 mL/hr, IntraVENous, CONTINUOUS, Ajay Cotton MD, Last Rate: 75 mL/hr at 02/28/22 1151, 75 mL/hr at 02/28/22 1151    morphine injection 2 mg, 2 mg, IntraVENous, Q4H PRN, Zhen Vargas MD, 2 mg at 02/27/22 1606    albuterol-ipratropium (DUO-NEB) 2.5 MG-0.5 MG/3 ML, 3 mL, Nebulization, Q4H PRN, Corine Adam MD    atorvastatin (LIPITOR) tablet 10 mg, 10 mg, Oral, QHS, Corine Adam MD, 10 mg at 02/28/22 2243    ferrous sulfate tablet 325 mg, 1 Tablet, Oral, ACB, Corine Adam MD, 325 mg at 02/28/22 5084    oxybutynin (DITROPAN) tablet 5 mg, 5 mg, Oral, BID, Corine Adam MD, 5 mg at 02/28/22 0831    tamsulosin (FLOMAX) capsule 0.4 mg, 0.4 mg, Oral, DAILY, Corine Adam MD, 0.4 mg at 02/28/22 0831    sodium chloride (NS) flush 5-40 mL, 5-40 mL, IntraVENous, Q8H, Corine Adam MD, 10 mL at 02/28/22 2253    sodium chloride (NS) flush 5-40 mL, 5-40 mL, IntraVENous, PRN, Corine Adam MD    acetaminophen (TYLENOL) tablet 650 mg, 650 mg, Oral, Q6H PRN **OR** acetaminophen (TYLENOL) suppository 650 mg, 650 mg, Rectal, Q6H PRN, Corine Adam MD    polyethylene glycol (MIRALAX) packet 17 g, 17 g, Oral, DAILY PRN, Corine Adam MD    ondansetron (ZOFRAN ODT) tablet 4 mg, 4 mg, Oral, Q8H PRN **OR** ondansetron (ZOFRAN) injection 4 mg, 4 mg, IntraVENous, Q6H PRN, Corine Adam MD    L.acidophilus-paracasei-S.thermophil-bifidobacter (RISAQUAD) 8 billion cell capsule, 1 Capsule, Oral, DAILY, Corine Adam MD, 1 Capsule at 02/28/22 0831    predniSONE (DELTASONE) tablet 40 mg, 40 mg, Oral, DAILY WITH BREAKFAST, Corine Adam MD, 40 mg at 02/28/22 0831    insulin lispro (HUMALOG) injection, , SubCUTAneous, AC&HS, Corine Adam MD, 2 Units at 02/27/22 1807    glucose chewable tablet 16 g, 4 Tablet, Oral, PRN, Corine Adam MD    glucagon (GLUCAGEN) injection 1 mg, 1 mg, IntraMUSCular, PRN, Corine Adam MD    dextrose 10 % infusion 0-250 mL, 0-250 mL, IntraVENous, PRN, Tete Reeder MD

## 2022-03-01 NOTE — PROGRESS NOTES
Cardiology Progress Note  3/1/2022     Admit Date: 2022  Admit Diagnosis: Sepsis ([facility] [address] [A41.9]  CC: none currently    Assessment/Plan:   Abnormal MPI showing inferoapical ischemia. Normal EF. I discussed the implications of the findings with her. She is asymptomatic and denies any CP, dyspnea or orthopnea at this time. Her renal function is slowly recovering from the acute insult. Her blood cultures are growing Klebsiella pneumoniae. History of ESBL E Coli in the past.  Clinically she is stable. I recommended that we continue medical therapy for now. Explaining to her that contrast exposure at this juncture would be detrimental to her recovering renal function. She agrees. For other plans, see orders.   Subjective: Mariaelena Garcia reports   Chest Pain:  [x]  none;  consistent with []  non-cardiac  []  atypical  []  angina             [x]  none now    []  on-going  Dyspnea: [x]  none    []  at rest    []  with exertion   []  improved    []  unchanged    []  worsening  PND:       [x]  none      []  overnight      Orthopnea:   [x]  none        []  improved         []  unchanged        []  worsening  Presyncope: [x]  none        []  improved         []  unchanged        []  worsening  Ambulated in hallway without symptoms  []  Yes  Ambulated in room without symptoms  []  Yes    Objective:    Physical Exam:  Overall VSSAF;    Visit Vitals  BP (!) 171/89 (BP 1 Location: Right arm, BP Patient Position: At rest)   Pulse 90   Temp 97.3 °F (36.3 °C)   Resp 17   Ht 5' 8\" (1.727 m)   Wt 137 kg (302 lb 0.5 oz)   SpO2 97%   BMI 45.92 kg/m²     Temp (24hrs), Av.4 °F (36.3 °C), Min:97.3 °F (36.3 °C), Max:97.4 °F (36.3 °C)    Patient Vitals for the past 8 hrs:   Pulse   22 1601 90   22 1400 85   22 1200 85    Patient Vitals for the past 8 hrs:   Resp   22 1601 17    Patient Vitals for the past 8 hrs:   BP   22 1601 (!) 171/89          Intake/Output Summary (Last 24 hours) at 3/1/2022 1701  Last data filed at 3/1/2022 0916  Gross per 24 hour   Intake    Output 1800 ml   Net -1800 ml       General Appearance: Well developed, well nourished, no acute distress. Ears/Nose/Mouth/Throat:   Normal MM; anicteric. JVP: WNL   Resp:   Lungs clear to auscultation bilaterally. Nl resp effort. Cardiovascular:  RRR, S1, S2 normal, no new murmur. No gallop or rub. Abdomen:   Soft, non-tender, bowel sounds are present. Extremities: No edema bilaterally. Skin:  Neuro: Warm and dry. A/O x3, grossly nonfocal    []  cath site intact w/o hematoma or bruit; distal pulse unchanged. Data Review:     Telemetry independently reviewed : []  sinus      []  chronic afib     []  par afib    []  NSVT    ECG independently reviewed:  []  NSR         []  no significant changes  [] no new ECG provided for review  Lab results reviewed as noted below. Current medications reviewed as noted below. No results for input(s): PH, PCO2, PO2 in the last 72 hours. No results for input(s): CPK, CKMB, TROIQ in the last 72 hours. Recent Labs     02/28/22  0834 02/27/22  0455    141   K 4.2 4.2   * 114*   CO2 20* 18*   BUN 78* 69*   CREA 2.53* 3.21*   * 99   PHOS  --  4.9*   CA 7.7* 6.9*   ALB  --  2.0*   WBC  --  15.5*   HGB  --  10.8*   HCT  --  36.0   PLT  --  32*     Recent Labs     02/27/22  0455   ALT 21   *   TBILI 1.8*   TP 5.4*   ALB 2.0*   GLOB 3.4     No results for input(s): INR, PTP, APTT, INREXT in the last 72 hours. No results for input(s): FE, TIBC, PSAT, FERR in the last 72 hours.    Lab Results   Component Value Date/Time    Glucose (POC) 144 (H) 03/01/2022 04:55 PM    Glucose (POC) 118 (H) 03/01/2022 11:48 AM    Glucose (POC) 83 03/01/2022 08:39 AM    Glucose (POC) 154 (H) 02/28/2022 10:51 PM    Glucose (POC) 106 02/28/2022 05:52 PM       Current Facility-Administered Medications   Medication Dose Route Frequency    meropenem (MERREM) 500 mg in sterile water (preservative free) 10 mL IV syringe  0.5 g IntraVENous Q8H    gabapentin (NEURONTIN) capsule 300 mg  300 mg Oral BID    0.9% sodium chloride infusion  75 mL/hr IntraVENous CONTINUOUS    morphine injection 2 mg  2 mg IntraVENous Q4H PRN    albuterol-ipratropium (DUO-NEB) 2.5 MG-0.5 MG/3 ML  3 mL Nebulization Q4H PRN    atorvastatin (LIPITOR) tablet 10 mg  10 mg Oral QHS    ferrous sulfate tablet 325 mg  1 Tablet Oral ACB    oxybutynin (DITROPAN) tablet 5 mg  5 mg Oral BID    tamsulosin (FLOMAX) capsule 0.4 mg  0.4 mg Oral DAILY    sodium chloride (NS) flush 5-40 mL  5-40 mL IntraVENous Q8H    sodium chloride (NS) flush 5-40 mL  5-40 mL IntraVENous PRN    acetaminophen (TYLENOL) tablet 650 mg  650 mg Oral Q6H PRN    Or    acetaminophen (TYLENOL) suppository 650 mg  650 mg Rectal Q6H PRN    polyethylene glycol (MIRALAX) packet 17 g  17 g Oral DAILY PRN    ondansetron (ZOFRAN ODT) tablet 4 mg  4 mg Oral Q8H PRN    Or    ondansetron (ZOFRAN) injection 4 mg  4 mg IntraVENous Q6H PRN    L.acidophilus-paracasei-S.thermophil-bifidobacter (RISAQUAD) 8 billion cell capsule  1 Capsule Oral DAILY    predniSONE (DELTASONE) tablet 40 mg  40 mg Oral DAILY WITH BREAKFAST    insulin lispro (HUMALOG) injection   SubCUTAneous AC&HS    glucose chewable tablet 16 g  4 Tablet Oral PRN    glucagon (GLUCAGEN) injection 1 mg  1 mg IntraMUSCular PRN    dextrose 10 % infusion 0-250 mL  0-250 mL IntraVENous PRN        Joe Goldberg, MD

## 2022-03-01 NOTE — PROGRESS NOTES
Patient name: Cailin Gutierrez  MRN: [id number]    Nephrology Progress note:    Assessment:  ELLYN on CKD?: +Obstructive nephrolithiasis/Right hydronephrosis-> serum Cr 3.5mg/dl-> 3.2-> 2.5mg/dl. Baseline Cr? NSAIDS noted on outpatient MAR.      Right hydronephrosis: s/p stent placement by Urology on 2/26/22     E. Coli UTI     Metabolic acidosis: NAGMA     HTN    Plan/Recommendations:  No new labs today  Ct IVF  Ct holding Metformin and Tekturna  IV Abx  Urology following  Renally dose new meds  Strict I/Os  Am labs        Subjective:  Sleeping comfortably. Good UOP    ROS:   Deferred    Exam:  Visit Vitals  BP (!) 155/89 (BP 1 Location: Right upper arm, BP Patient Position: At rest)   Pulse 85   Temp 97.4 °F (36.3 °C)   Resp 12   Ht 5' 8\" (1.727 m)   Wt 137 kg (302 lb 0.5 oz)   SpO2 96%   BMI 45.92 kg/m²     Wt Readings from Last 3 Encounters:   02/28/22 137 kg (302 lb 0.5 oz)   02/26/22 136.1 kg (300 lb)   09/04/20 126.1 kg (278 lb)       Intake/Output Summary (Last 24 hours) at 3/1/2022 1253  Last data filed at 3/1/2022 0916  Gross per 24 hour   Intake    Output 2150 ml   Net -2150 ml       Gen: NAD/Resting/Obese  HEENT: No icterus, mmm, no oral exudate, AT/NC  Lungs/Chest wall: Clear. No accessory muscle use. Cardiovascular: Regular rate, normal rhythm. Abdomen/: Soft, NT, ND, BS+.  [place]  Ext: No peripheral edema      Current Facility-Administered Medications   Medication Dose Route Frequency Last Admin    gabapentin (NEURONTIN) capsule 300 mg  300 mg Oral  mg at 03/01/22 1024    cefepime (MAXIPIME) 2 g in sterile water (preservative free) 10 mL IV syringe  2 g IntraVENous Q12H 2 g at 03/01/22 0513    0.9% sodium chloride infusion  75 mL/hr IntraVENous CONTINUOUS 75 mL/hr at 02/28/22 1151    morphine injection 2 mg  2 mg IntraVENous Q4H PRN 2 mg at 03/01/22 0853    albuterol-ipratropium (DUO-NEB) 2.5 MG-0.5 MG/3 ML  3 mL Nebulization Q4H PRN      atorvastatin (LIPITOR) tablet 10 mg  10 mg Oral QHS 10 mg at 02/28/22 2243    ferrous sulfate tablet 325 mg  1 Tablet Oral  mg at 03/01/22 0900    oxybutynin (DITROPAN) tablet 5 mg  5 mg Oral BID 5 mg at 03/01/22 1024    tamsulosin (FLOMAX) capsule 0.4 mg  0.4 mg Oral DAILY 0.4 mg at 03/01/22 1024    sodium chloride (NS) flush 5-40 mL  5-40 mL IntraVENous Q8H 10 mL at 03/01/22 0513    sodium chloride (NS) flush 5-40 mL  5-40 mL IntraVENous PRN 10 mL at 03/01/22 0854    acetaminophen (TYLENOL) tablet 650 mg  650 mg Oral Q6H PRN      Or    acetaminophen (TYLENOL) suppository 650 mg  650 mg Rectal Q6H PRN      polyethylene glycol (MIRALAX) packet 17 g  17 g Oral DAILY PRN      ondansetron (ZOFRAN ODT) tablet 4 mg  4 mg Oral Q8H PRN      Or    ondansetron (ZOFRAN) injection 4 mg  4 mg IntraVENous Q6H PRN      L.acidophilus-paracasei-S.thermophil-bifidobacter (RISAQUAD) 8 billion cell capsule  1 Capsule Oral DAILY 1 Capsule at 03/01/22 1024    predniSONE (DELTASONE) tablet 40 mg  40 mg Oral DAILY WITH BREAKFAST 40 mg at 03/01/22 1024    insulin lispro (HUMALOG) injection   SubCUTAneous AC&HS 2 Units at 02/27/22 1807    glucose chewable tablet 16 g  4 Tablet Oral PRN      glucagon (GLUCAGEN) injection 1 mg  1 mg IntraMUSCular PRN      dextrose 10 % infusion 0-250 mL  0-250 mL IntraVENous PRN         Labs/Data:    Lab Results   Component Value Date/Time    WBC 15.5 (H) 02/27/2022 04:55 AM    HGB 10.8 (L) 02/27/2022 04:55 AM    HCT 36.0 02/27/2022 04:55 AM    PLATELET 32 (LL) 28/91/9715 04:55 AM    MCV 99.7 (H) 02/27/2022 04:55 AM       Lab Results   Component Value Date/Time    Sodium 142 02/28/2022 08:34 AM    Potassium 4.2 02/28/2022 08:34 AM    Chloride 117 (H) 02/28/2022 08:34 AM    CO2 20 (L) 02/28/2022 08:34 AM    Anion gap 5 02/28/2022 08:34 AM    Glucose 134 (H) 02/28/2022 08:34 AM    BUN 78 (H) 02/28/2022 08:34 AM    Creatinine 2.53 (H) 02/28/2022 08:34 AM    BUN/Creatinine ratio 31 (H) 02/28/2022 08:34 AM    GFR est AA 23 (L) 02/28/2022 08:34 AM    GFR est non-AA 19 (L) 02/28/2022 08:34 AM    Calcium 7.7 (L) 02/28/2022 08:34 AM       Patient seen and examined. Chart reviewed. Labs, data and other pertinent notes reviewed in last 24 hrs.     Signed by:  Daysi Toribio MD  [id number]

## 2022-03-02 ENCOUNTER — APPOINTMENT (OUTPATIENT)
Dept: ULTRASOUND IMAGING | Age: 72
DRG: 854 | End: 2022-03-02
Attending: NURSE PRACTITIONER
Payer: MEDICARE

## 2022-03-02 LAB
ANION GAP SERPL CALC-SCNC: 3 MMOL/L (ref 5–15)
BACTERIA SPEC CULT: ABNORMAL
BACTERIA SPEC CULT: ABNORMAL
BUN SERPL-MCNC: 54 MG/DL (ref 6–20)
BUN/CREAT SERPL: 46 (ref 12–20)
CALCIUM SERPL-MCNC: 8 MG/DL (ref 8.5–10.1)
CHLORIDE SERPL-SCNC: 118 MMOL/L (ref 97–108)
CO2 SERPL-SCNC: 22 MMOL/L (ref 21–32)
CREAT SERPL-MCNC: 1.18 MG/DL (ref 0.55–1.02)
ERYTHROCYTE [DISTWIDTH] IN BLOOD BY AUTOMATED COUNT: 17 % (ref 11.5–14.5)
GLUCOSE BLD STRIP.AUTO-MCNC: 147 MG/DL (ref 65–117)
GLUCOSE BLD STRIP.AUTO-MCNC: 164 MG/DL (ref 65–117)
GLUCOSE BLD STRIP.AUTO-MCNC: 70 MG/DL (ref 65–117)
GLUCOSE BLD STRIP.AUTO-MCNC: 76 MG/DL (ref 65–117)
GLUCOSE SERPL-MCNC: 108 MG/DL (ref 65–100)
HCT VFR BLD AUTO: 36.3 % (ref 35–47)
HGB BLD-MCNC: 11.6 G/DL (ref 11.5–16)
MAGNESIUM SERPL-MCNC: 2.6 MG/DL (ref 1.6–2.4)
MCH RBC QN AUTO: 29.9 PG (ref 26–34)
MCHC RBC AUTO-ENTMCNC: 32 G/DL (ref 30–36.5)
MCV RBC AUTO: 93.6 FL (ref 80–99)
NRBC # BLD: 0 K/UL (ref 0–0.01)
NRBC BLD-RTO: 0 PER 100 WBC
PHOSPHATE SERPL-MCNC: 1.9 MG/DL (ref 2.6–4.7)
PLATELET # BLD AUTO: 38 K/UL (ref 150–400)
POTASSIUM SERPL-SCNC: 3.8 MMOL/L (ref 3.5–5.1)
RBC # BLD AUTO: 3.88 M/UL (ref 3.8–5.2)
SERVICE CMNT-IMP: ABNORMAL
SERVICE CMNT-IMP: ABNORMAL
SERVICE CMNT-IMP: NORMAL
SERVICE CMNT-IMP: NORMAL
SODIUM SERPL-SCNC: 143 MMOL/L (ref 136–145)
SPECIAL REQUESTS,SREQ: ABNORMAL
WBC # BLD AUTO: 22.2 K/UL (ref 3.6–11)

## 2022-03-02 PROCEDURE — 74011250636 HC RX REV CODE- 250/636: Performed by: FAMILY MEDICINE

## 2022-03-02 PROCEDURE — 97161 PT EVAL LOW COMPLEX 20 MIN: CPT

## 2022-03-02 PROCEDURE — 83735 ASSAY OF MAGNESIUM: CPT

## 2022-03-02 PROCEDURE — 74011636637 HC RX REV CODE- 636/637: Performed by: INTERNAL MEDICINE

## 2022-03-02 PROCEDURE — 36415 COLL VENOUS BLD VENIPUNCTURE: CPT

## 2022-03-02 PROCEDURE — 74011000250 HC RX REV CODE- 250: Performed by: FAMILY MEDICINE

## 2022-03-02 PROCEDURE — 85027 COMPLETE CBC AUTOMATED: CPT

## 2022-03-02 PROCEDURE — 74011250637 HC RX REV CODE- 250/637: Performed by: INTERNAL MEDICINE

## 2022-03-02 PROCEDURE — 80048 BASIC METABOLIC PNL TOTAL CA: CPT

## 2022-03-02 PROCEDURE — 74011250636 HC RX REV CODE- 250/636: Performed by: HOSPITALIST

## 2022-03-02 PROCEDURE — 74011250637 HC RX REV CODE- 250/637: Performed by: FAMILY MEDICINE

## 2022-03-02 PROCEDURE — 65660000000 HC RM CCU STEPDOWN

## 2022-03-02 PROCEDURE — 74011000250 HC RX REV CODE- 250: Performed by: INTERNAL MEDICINE

## 2022-03-02 PROCEDURE — 84100 ASSAY OF PHOSPHORUS: CPT

## 2022-03-02 PROCEDURE — 97535 SELF CARE MNGMENT TRAINING: CPT

## 2022-03-02 PROCEDURE — 82962 GLUCOSE BLOOD TEST: CPT

## 2022-03-02 PROCEDURE — 97530 THERAPEUTIC ACTIVITIES: CPT

## 2022-03-02 PROCEDURE — 97166 OT EVAL MOD COMPLEX 45 MIN: CPT

## 2022-03-02 PROCEDURE — 87040 BLOOD CULTURE FOR BACTERIA: CPT

## 2022-03-02 PROCEDURE — 36600 WITHDRAWAL OF ARTERIAL BLOOD: CPT

## 2022-03-02 PROCEDURE — 76770 US EXAM ABDO BACK WALL COMP: CPT

## 2022-03-02 RX ORDER — SODIUM CHLORIDE 450 MG/100ML
50 INJECTION, SOLUTION INTRAVENOUS CONTINUOUS
Status: DISPENSED | OUTPATIENT
Start: 2022-03-02 | End: 2022-03-03

## 2022-03-02 RX ORDER — SODIUM CHLORIDE 450 MG/100ML
75 INJECTION, SOLUTION INTRAVENOUS CONTINUOUS
Status: DISCONTINUED | OUTPATIENT
Start: 2022-03-02 | End: 2022-03-02

## 2022-03-02 RX ORDER — AMLODIPINE BESYLATE 5 MG/1
10 TABLET ORAL DAILY
Status: DISCONTINUED | OUTPATIENT
Start: 2022-03-03 | End: 2022-03-10 | Stop reason: HOSPADM

## 2022-03-02 RX ORDER — METOPROLOL TARTRATE 50 MG/1
100 TABLET ORAL 2 TIMES DAILY
Status: DISCONTINUED | OUTPATIENT
Start: 2022-03-02 | End: 2022-03-10 | Stop reason: HOSPADM

## 2022-03-02 RX ORDER — SODIUM,POTASSIUM PHOSPHATES 280-250MG
2 POWDER IN PACKET (EA) ORAL 2 TIMES DAILY
Status: COMPLETED | OUTPATIENT
Start: 2022-03-02 | End: 2022-03-02

## 2022-03-02 RX ADMIN — ACETAMINOPHEN 650 MG: 325 TABLET ORAL at 13:59

## 2022-03-02 RX ADMIN — ATORVASTATIN CALCIUM 10 MG: 10 TABLET, FILM COATED ORAL at 21:21

## 2022-03-02 RX ADMIN — POTASSIUM & SODIUM PHOSPHATES POWDER PACK 280-160-250 MG 2 PACKET: 280-160-250 PACK at 19:37

## 2022-03-02 RX ADMIN — Medication 1 CAPSULE: at 11:03

## 2022-03-02 RX ADMIN — MORPHINE SULFATE 2 MG: 2 INJECTION, SOLUTION INTRAMUSCULAR; INTRAVENOUS at 19:37

## 2022-03-02 RX ADMIN — FERROUS SULFATE TAB 325 MG (65 MG ELEMENTAL FE) 325 MG: 325 (65 FE) TAB at 07:35

## 2022-03-02 RX ADMIN — Medication 10 ML: at 07:34

## 2022-03-02 RX ADMIN — POTASSIUM & SODIUM PHOSPHATES POWDER PACK 280-160-250 MG 2 PACKET: 280-160-250 PACK at 11:04

## 2022-03-02 RX ADMIN — GABAPENTIN 300 MG: 300 CAPSULE ORAL at 21:30

## 2022-03-02 RX ADMIN — GABAPENTIN 300 MG: 300 CAPSULE ORAL at 11:03

## 2022-03-02 RX ADMIN — MEROPENEM 500 MG: 500 INJECTION, POWDER, FOR SOLUTION INTRAVENOUS at 19:37

## 2022-03-02 RX ADMIN — MEROPENEM 500 MG: 500 INJECTION, POWDER, FOR SOLUTION INTRAVENOUS at 00:27

## 2022-03-02 RX ADMIN — AMLODIPINE BESYLATE 5 MG: 5 TABLET ORAL at 11:03

## 2022-03-02 RX ADMIN — TAMSULOSIN HYDROCHLORIDE 0.4 MG: 0.4 CAPSULE ORAL at 11:03

## 2022-03-02 RX ADMIN — MEROPENEM 500 MG: 500 INJECTION, POWDER, FOR SOLUTION INTRAVENOUS at 11:02

## 2022-03-02 RX ADMIN — Medication 2 UNITS: at 19:39

## 2022-03-02 RX ADMIN — PREDNISONE 40 MG: 20 TABLET ORAL at 11:04

## 2022-03-02 RX ADMIN — OXYBUTYNIN CHLORIDE 5 MG: 5 TABLET ORAL at 11:03

## 2022-03-02 RX ADMIN — POLYETHYLENE GLYCOL 3350 17 G: 17 POWDER, FOR SOLUTION ORAL at 10:58

## 2022-03-02 RX ADMIN — OXYBUTYNIN CHLORIDE 5 MG: 5 TABLET ORAL at 19:38

## 2022-03-02 RX ADMIN — SODIUM CHLORIDE 50 ML/HR: 4.5 INJECTION, SOLUTION INTRAVENOUS at 10:59

## 2022-03-02 RX ADMIN — Medication 10 ML: at 19:38

## 2022-03-02 RX ADMIN — METOPROLOL TARTRATE 100 MG: 50 TABLET, FILM COATED ORAL at 19:37

## 2022-03-02 NOTE — PROGRESS NOTES
Problem: Mobility Impaired (Adult and Pediatric)  Goal: *Acute Goals and Plan of Care (Insert Text)  Description: FUNCTIONAL STATUS PRIOR TO ADMISSION: Patient was modified independent using a single point cane for functional mobility. Patient reports being able to manage ambulation in a grocery store with a combination of cane and scooter. HOME SUPPORT PRIOR TO ADMISSION: The patient had a roommate who lived with her. The patient did most cooking and cleaning. Her roommate assisted with LE dressing PRN. Physical Therapy Goals  Initiated 3/2/2022  1. Patient will move from supine to sit and sit to supine  in bed with moderate assistance  within 7 day(s). 2.  Patient will transfer from bed to chair and chair to bed with moderate assistance  using the least restrictive device within 7 day(s). 3.  Patient will perform sit to stand with minimal assistance/contact guard assist within 7 day(s). 4.  Patient will ambulate with minimal assistance/contact guard assist for 75 feet with the least restrictive device within 7 day(s). Outcome: Progressing Towards Goal   PHYSICAL THERAPY EVALUATION  Patient: Louise Mandujano (43 y.o. female)  Date: 3/2/2022  Primary Diagnosis: Sepsis ([facility] [address] [A41.9]  Procedure(s) (LRB):  CYSTOSCOPY INSERTION  RIGHT URETERAL STENT (Right) 4 Days Post-Op   Precautions:   Fall    ASSESSMENT  Based on the objective data described below, the patient presents with post-op pain, impaired strength, standing balance, and endurance following admission for sepsis and insertion of a ureteral stent. During evaluation, she required maximum assist x2 for supine to sit transfers and was challenged with trunk clearance to sit EOB. She c/o dizziness in sitting but vitals remained stable. Completed 3 attempts to stand from EOB and was unsuccessful with maximum assist x2 on her first attempt.  She required an elevated the bed height and rocking for her 2nd attempt to stand with moderate assist x2 to jumana JIMENEZ. Initial standing balance was poor d/t posteriorly shifted COG and she fatigued quickly returning to sitting with little warning. This writer deferred gait d/t concern for endurance and facilitated side stepping to [facility] before returning supine. The patient appears far below her reported baseline and requires high levels of physical assistance. Recommend discharge to a SNF rehab setting if discharged at  this time. Current Level of Function Impacting Discharge (mobility/balance): sit to stand required bed modifications and mod assist x2; supine to sit with max x2       Patient will benefit from skilled therapy intervention to address the above noted impairments. PLAN :  Recommendations and Planned Interventions: bed mobility training, transfer training, gait training, therapeutic exercises, and therapeutic activities      Frequency/Duration: Patient will be followed by physical therapy:  5 times a week to address goals. Recommendation for discharge: (in order for the patient to meet his/her long term goals)  Therapy up to 5 days/week in SNF setting    This discharge recommendation:  Has been made in collaboration with the attending provider and/or case management    IF patient discharges home will need the following DME: to be determined (TBD)         SUBJECTIVE:   Patient stated I'm dizzy when I sit up.     OBJECTIVE DATA SUMMARY:   HISTORY:    Past Medical History:   Diagnosis Date    Arthritis 7/7/2020    Asthma 7/7/2020    Hypertension 7/7/2020     Past Surgical History:   Procedure Laterality Date    HX UROLOGICAL      Lithotripsy       Personal factors and/or comorbidities impacting plan of care:     Home Situation  Home Environment: Apartment  # Steps to Enter: 0  One/Two Story Residence: One story  Living Alone: No  Support Systems: Other (Comment) (ex brother in law)  Patient Expects to be Discharged to[de-identified] Skilled nursing facility  Current DME Used/Available at Home: nora Cano,Juliana chair,Walker, rolling,Commode, bedside  Tub or Shower Type: Tub/Shower combination    EXAMINATION/PRESENTATION/DECISION MAKING:   Critical Behavior:  Neurologic State: Alert  Orientation Level: Oriented X4  Cognition: Appropriate decision making     Hearing:    Range Of Motion:  AROM: Generally decreased, functional                       Strength:    Strength: Generally decreased, functional                    Tone & Sensation:   Tone: Normal              Sensation: Intact               Coordination:  Coordination: Within functional limits  Vision:      Functional Mobility:  Bed Mobility:     Supine to Sit: Maximum assistance;Assist x2; Additional time  Sit to Supine: Maximum assistance;Assist x2; Additional time  Scooting: Maximum assistance  Transfers:  Sit to Stand: Moderate assistance;Assist x2; Additional time  Stand to Sit: Minimum assistance;Assist x2                       Balance:   Sitting: Intact  Sitting - Static: Good (unsupported)  Sitting - Dynamic: Good (unsupported)  Standing: Impaired  Standing - Static: Poor  Standing - Dynamic : Poor  Ambulation/Gait Training:  Distance (ft):  (4 side steps to [facility])  Assistive Device: Gait belt;Walker, rolling  Ambulation - Level of Assistance: Minimal assistance;Assist x2     Gait Description (WDL): Exceptions to WDL  Gait Abnormalities: Decreased step clearance (side stepping to [facility])         Physical Therapy Evaluation Charge Determination   History Examination Presentation Decision-Making   MEDIUM  Complexity : 1-2 comorbidities / personal factors will impact the outcome/ POC  MEDIUM Complexity : 3 Standardized tests and measures addressing body structure, function, activity limitation and / or participation in recreation  LOW Complexity : Stable, uncomplicated  LOW Complexity : FOTO score of       Based on the above components, the patient evaluation is determined to be of the following complexity level: LOW     Pain Rating:      Activity Tolerance:   Fair; HR  to 112 BPM during standing activity    After treatment patient left in no apparent distress:   Supine in bed and Call bell within reach    COMMUNICATION/EDUCATION:   The patients plan of care was discussed with: Registered nurse. Fall prevention education was provided and the patient/caregiver indicated understanding., Patient/family have participated as able in goal setting and plan of care. , and Patient/family agree to work toward stated goals and plan of care.     Thank you for this referral.  Frannie Hinojosa, PT, DPT   Time Calculation: 29 mins

## 2022-03-02 NOTE — PROGRESS NOTES
Problem: Self Care Deficits Care Plan (Adult)  Goal: *Acute Goals and Plan of Care (Insert Text)  Description: FUNCTIONAL STATUS PRIOR TO ADMISSION: Patient was modified independent using a walker and single point cane for functional mobility. Patient required minimal assistance for basic and instrumental ADLs, primarily for lower extremity ADLs such as dressing. Patient reports completing IADLs independently such as cooking and cleaning and grocery shopping. Reports independent for community mobility with cane. HOME SUPPORT: The patient lived with her ex brother in law and required minimal assistance/contact guard assist for lower extremity ADLs such as dressing. Occupational Therapy Goals  Initiated 3/2/2022  1. Patient will perform lower body dressing with moderate assistance  within 7 day(s). 2.  Patient will perform grooming at sink/in standing with minimal assistance/contact guard assist within 7 day(s). 3.  Patient will perform anterior bathing from neck to thighs with supervision/set-up within 7 day(s). 4.  Patient will perform toilet transfers with minimal assistance within 7 day(s). 5.  Patient will perform all aspects of toileting with moderate assistance  within 7 day(s). 6.  Patient will participate in upper extremity therapeutic exercise/activities with independence for 10 minutes within 7 day(s).     Outcome: Not Met   OCCUPATIONAL THERAPY EVALUATION  Patient: Noemi Castañeda (82 y.o. female)  Date: 3/2/2022  Primary Diagnosis: Sepsis ([facility] [address] [A41.9]  Procedure(s) (LRB):  CYSTOSCOPY INSERTION  RIGHT URETERAL STENT (Right) 4 Days Post-Op   Precautions: Fall    ASSESSMENT  Based on the objective data described below, the patient presents with impaired standing balance, decreased activity tolerance, decreased generalized strength, limited insight into deficits/need for assistance, abdominal pain related to procedure, and requiring increased assist for self care and functional mobility/transfers. Patient semi supine in bed upon OT/PT arrival and agreeable to working with therapy. Patient performed sup <> sit transfers and stood twice at edge of bed, for first stand needed several trials to reach standing and sat back on bed after ~15 seconds with no warning, reports weakness in legs. Upon second stand patient able to take several side steps to [facility] with noted lean towards right side in stepping. Patient reports she is near baseline for functional mobility/transfers, yet also reports that she was independent for community distance ambulation with a cane and has no history of falls. Patient requiring total assist for lower extremity dressing at edge of bed, reports that she requires assist at baseline from ex brother in law to complete, may benefit from lower extremity AD education. Overall patient did fair but is far below functional baseline of mod independent to min A. Patient would benefit from skilled OT services during admission to improve independence with self care and functional mobility/transfers. Recommend discharge to SNF at this time. Current Level of Function Impacting Discharge (ADLs/self-care): mod to max A x2, total A lower extremity activities of daily living, setup A upper extremity activities of daily living     Functional Outcome Measure: The patient scored Total: 45/100 on the Barthel Index outcome measure which is indicative of being partially dependent in basic self-care. Other factors to consider for discharge: time since onset, prior level of function, limited social supports (lives with ex brother in law, no other reported friends/family locally)     Patient will benefit from skilled therapy intervention to address the above noted impairments.        PLAN :  Recommendations and Planned Interventions: self care training, functional mobility training, therapeutic exercise, balance training, therapeutic activities, endurance activities, patient education, home safety training and family training/education    Frequency/Duration: Patient will be followed by occupational therapy 5 times a week to address goals. Recommendation for discharge: (in order for the patient to meet his/her long term goals)  Therapy up to 5 days/week in SNF setting    This discharge recommendation:  Has not yet been discussed the attending provider and/or case management    IF patient discharges home will need the following DME: TBD pending progress       SUBJECTIVE:   Patient stated I haven't had any falls, I usually move around fine.     OBJECTIVE DATA SUMMARY:   HISTORY:   Past Medical History:   Diagnosis Date    Arthritis 7/7/2020    Asthma 7/7/2020    Hypertension 7/7/2020     Past Surgical History:   Procedure Laterality Date    HX UROLOGICAL      Lithotripsy       Expanded or extensive additional review of patient history:     Home Situation  Home Environment: Apartment  # Steps to Enter: 0  One/Two Story Residence: One story  Living Alone: No  Support Systems: Other (Comment) (ex brother in law)  Patient Expects to be Discharged to[de-identified] Skilled nursing facility  Current DME Used/Available at Home: Cane, straight,Shower chair,Walker, rolling,Commode, bedside  Tub or Shower Type: Tub/Shower combination    EXAMINATION OF PERFORMANCE DEFICITS:  Cognitive/Behavioral Status:  Neurologic State: Alert  Orientation Level: Oriented X4  Cognition: Follows commands        Safety/Judgement: Fall prevention    Skin: intact where visible    Edema: bilateral lower extremities swelling    Hearing:       Vision/Perceptual:                                     Range of Motion:  AROM: Generally decreased, functional                         Strength:  Strength: Generally decreased, functional                Coordination:  Coordination: Within functional limits            Tone & Sensation:  Tone: Normal  Sensation: Intact                      Balance:  Sitting: Intact  Sitting - Static: Good (unsupported)  Sitting - Dynamic: Good (unsupported)  Standing: Impaired  Standing - Static: Poor  Standing - Dynamic : Poor    Functional Mobility and Transfers for ADLs:  Bed Mobility:  Supine to Sit: Maximum assistance;Assist x2; Additional time  Sit to Supine: Maximum assistance;Assist x2; Additional time  Scooting: Maximum assistance    Transfers:  Sit to Stand: Moderate assistance;Assist x2; Additional time  Stand to Sit: Minimum assistance;Assist x2  Toilet Transfer : Moderate assistance;Maximum assistance;Assist x2 (infer BSC)  Assistive Device : Gait Belt;Walker, rolling    ADL Assessment:  Feeding: Setup (infer from functional reach)    Oral Facial Hygiene/Grooming: Setup (infer from functional reach)    Bathing: Moderate assistance (infer from functional reach and limited LE access)    Upper Body Dressing: Setup (infer from functional reach)    Lower Body Dressing: Total assistance    Toileting: Maximum assistance (infer from functional reach and body habitus)                ADL Intervention and task modifications:                           Lower Body Dressing Assistance  Socks: Total assistance (dependent)  Leg Crossed Method Used: No  Position Performed: Seated edge of bed  Cues: Physical assistance; Don         Cognitive Retraining  Safety/Judgement: Fall prevention     Functional Measure:    Barthel Index:  Bathin  Bladder: 10  Bowels: 10  Groomin  Dressin  Feedin  Mobility: 0  Stairs: 0  Toilet Use: 5  Transfer (Bed to Chair and Back): 5  Total: 45/100      The Barthel ADL Index: Guidelines  1. The index should be used as a record of what a patient does, not as a record of what a patient could do. 2. The main aim is to establish degree of independence from any help, physical or verbal, however minor and for whatever reason. 3. The need for supervision renders the patient not independent. 4. A patient's performance should be established using the best available evidence.  Asking the patient, friends/relatives and nurses are the usual sources, but direct observation and common sense are also important. However direct testing is not needed. 5. Usually the patient's performance over the preceding 24-48 hours is important, but occasionally longer periods will be relevant. 6. Middle categories imply that the patient supplies over 50 per cent of the effort. 7. Use of aids to be independent is allowed. Score Interpretation (from [address] [facility])    Independent   60-79 Minimally independent   40-59 Partially dependent   20-39 Very dependent   <20 Totally dependent     -Rogelio Posadas., Barthel, D.W. (1965). Functional evaluation: the Barthel Index. [address] ([address] (1997). The Barthel activities of daily living index: self-reporting versus actual performance in the old (> or = 75 years). Journal of [address] [place] [address] 45(7), [facility], KAVIN, Oneil Prince, Denise Reeder. (1999). Measuring the change in disability after inpatient rehabilitation; comparison of the responsiveness of the Barthel Index and Functional Stoddard Measure. Journal of Neurology, Neurosurgery, and Psychiatry, 66(4), 194-215. Norma Rico, N.J.A, Nasreen Armendariz,  W.J.NIKO, & Ana Cristina Camp MArianaA. (2004) Assessment of post-stroke quality of life in cost-effectiveness studies: The usefulness of the Barthel Index and the EuroQoL-5D. Quality of Life Research, 15, 329-82     Occupational Therapy Evaluation Charge Determination   History Examination Decision-Making   MEDIUM Complexity : Expanded review of history including physical, cognitive and psychosocial  history  MEDIUM Complexity : 3-5 performance deficits relating to physical, cognitive , or psychosocial skils that result in activity limitations and / or participation restrictions MEDIUM Complexity : Patient may present with comorbidities that affect occupational performnce.  Miniml to moderate modification of tasks or assistance (eg, physical or verbal ) with assesment(s) is necessary to enable patient to complete evaluation       Based on the above components, the patient evaluation is determined to be of the following complexity level: MEDIUM  Pain Rating:  Pain in abdomen/BLE from prior procedure    Activity Tolerance:   Fair and requires rest breaks    After treatment patient left in no apparent distress:    Supine in bed, Call bell within reach and Side rails x 3    COMMUNICATION/EDUCATION:   The patients plan of care was discussed with: Physical therapist and Registered nurse. Patient/family have participated as able in goal setting and plan of care. and Patient/family agree to work toward stated goals and plan of care. This patients plan of care is appropriate for delegation to [facility].     Thank you for this referral.  Joselyn Gill OTR/L  Time Calculation: 27 mins

## 2022-03-02 NOTE — PROGRESS NOTES
Patient name: Ester Bernal  MRN: [id number]    Nephrology Progress note:    Assessment:  ELLYN on CKD?: +Obstructive nephrolithiasis/Right hydronephrosis-> serum Cr 3.5mg/dl-> 3.2-> 2.5->>1.18mg/dl. Baseline Cr? NSAIDS noted on outpatient MAR.      Right hydronephrosis: s/p stent placement by Urology on 2/26/22     E. Coli UTI/ Kleb Pneum Bacteremia     Metabolic acidosis: NAGMA-> resolved     HTN: fair/elevated: prednisone maybe contributing    Hypophosphatemia    Plan/Recommendations:  Emil Hankins IVF to 1/2NS at 50cc/hr  Neutra-Phos 2pck x2 doses  Can resume Metformin   Ct holding Tekturna   IV Abx  Urology and ID following  Renally dose new meds  Strict I/Os  Am labs    Will sign off. Call us back if needed      Subjective:  Feels well. In good spirits. UOP 2.8L yesterday  Afebrile    ROS:   No n/v  No SOB    Exam:  Visit Vitals  BP (!) 162/90 (BP 1 Location: Right arm, BP Patient Position: At rest)   Pulse 82   Temp 97.8 °F (36.6 °C)   Resp 20   Ht 5' 8\" (1.727 m)   Wt 137 kg (302 lb 0.5 oz)   SpO2 97%   BMI 45.92 kg/m²     Wt Readings from Last 3 Encounters:   02/28/22 137 kg (302 lb 0.5 oz)   02/26/22 136.1 kg (300 lb)   09/04/20 126.1 kg (278 lb)       Intake/Output Summary (Last 24 hours) at 3/2/2022 0954  Last data filed at 3/2/2022 0400  Gross per 24 hour   Intake    Output 1825 ml   Net -1825 ml       Gen: NAD/Obese  HEENT: AT/NC  Lungs/Chest wall: Clear. No accessory muscle use. Cardiovascular: Regular rate, normal rhythm. Abdomen/: Soft, NT, ND, BS+.  Purewick  Ext: No peripheral edema      Current Facility-Administered Medications   Medication Dose Route Frequency Last Admin    potassium, sodium phosphates (NEUTRA-PHOS) packet 2 Packet  2 Packet Oral BID      0.45% sodium chloride infusion  50 mL/hr IntraVENous CONTINUOUS      meropenem (MERREM) 500 mg in sterile water (preservative free) 10 mL IV syringe  0.5 g IntraVENous Q8H 500 mg at 03/02/22 0027    amLODIPine (NORVASC) tablet 5 mg  5 mg Oral DAILY      metoprolol tartrate (LOPRESSOR) tablet 50 mg  50 mg Oral BID 50 mg at 03/01/22 1823    gabapentin (NEURONTIN) capsule 300 mg  300 mg Oral  mg at 03/01/22 2135    morphine injection 2 mg  2 mg IntraVENous Q4H PRN 2 mg at 03/01/22 0853    albuterol-ipratropium (DUO-NEB) 2.5 MG-0.5 MG/3 ML  3 mL Nebulization Q4H PRN      atorvastatin (LIPITOR) tablet 10 mg  10 mg Oral QHS 10 mg at 03/01/22 2136    ferrous sulfate tablet 325 mg  1 Tablet Oral  mg at 03/02/22 0735    oxybutynin (DITROPAN) tablet 5 mg  5 mg Oral BID 5 mg at 03/01/22 1823    tamsulosin (FLOMAX) capsule 0.4 mg  0.4 mg Oral DAILY 0.4 mg at 03/01/22 1024    sodium chloride (NS) flush 5-40 mL  5-40 mL IntraVENous Q8H 10 mL at 03/02/22 0734    sodium chloride (NS) flush 5-40 mL  5-40 mL IntraVENous PRN 10 mL at 03/01/22 0854    acetaminophen (TYLENOL) tablet 650 mg  650 mg Oral Q6H PRN      Or    acetaminophen (TYLENOL) suppository 650 mg  650 mg Rectal Q6H PRN      polyethylene glycol (MIRALAX) packet 17 g  17 g Oral DAILY PRN      ondansetron (ZOFRAN ODT) tablet 4 mg  4 mg Oral Q8H PRN      Or    ondansetron (ZOFRAN) injection 4 mg  4 mg IntraVENous Q6H PRN      L.acidophilus-paracasei-S.thermophil-bifidobacter (RISAQUAD) 8 billion cell capsule  1 Capsule Oral DAILY 1 Capsule at 03/01/22 1024    predniSONE (DELTASONE) tablet 40 mg  40 mg Oral DAILY WITH BREAKFAST 40 mg at 03/01/22 1024    insulin lispro (HUMALOG) injection   SubCUTAneous AC&HS 2 Units at 03/01/22 1824    glucose chewable tablet 16 g  4 Tablet Oral PRN      glucagon (GLUCAGEN) injection 1 mg  1 mg IntraMUSCular PRN      dextrose 10 % infusion 0-250 mL  0-250 mL IntraVENous PRN         Labs/Data:    Lab Results   Component Value Date/Time    WBC 22.2 (H) 03/02/2022 05:07 AM    HGB 11.6 03/02/2022 05:07 AM    HCT 36.3 03/02/2022 05:07 AM    PLATELET 38 (LL) 84/91/6853 05:07 AM    MCV 93.6 03/02/2022 05:07 AM       Lab Results   Component Value Date/Time    Sodium 143 03/02/2022 05:07 AM    Potassium 3.8 03/02/2022 05:07 AM    Chloride 118 (H) 03/02/2022 05:07 AM    CO2 22 03/02/2022 05:07 AM    Anion gap 3 (L) 03/02/2022 05:07 AM    Glucose 108 (H) 03/02/2022 05:07 AM    BUN 54 (H) 03/02/2022 05:07 AM    Creatinine 1.18 (H) 03/02/2022 05:07 AM    BUN/Creatinine ratio 46 (H) 03/02/2022 05:07 AM    GFR est AA 55 (L) 03/02/2022 05:07 AM    GFR est non-AA 45 (L) 03/02/2022 05:07 AM    Calcium 8.0 (L) 03/02/2022 05:07 AM       Patient seen and examined. Chart reviewed. Labs, data and other pertinent notes reviewed in last 24 hrs.     Signed by:  Janice Butt MD  [id number]

## 2022-03-02 NOTE — PROGRESS NOTES
Hospitalist Progress Note              Chanel Hayes MD                              NAME:  Baljit Corcoran  :  1950  MRN:  [id number]  Date of Service:  3/1/2022    Summary: 73 y.o. female with past medical history of HTN, dyslipidemia, morbid obesity, kidney stone who presented on 2022 with generalized body weakness and SOB. CT scan of the abdomen performed showed new right urinary tract obstruction       Assessment/Plan:  Right ureteral calculus with obstruction and pyelonephritis  - S/p cystourethroscopy with right ureteral stent placement POD 3  Urology consulted and following    Sepsis due to UTI/ Klebsiella bacteremia: Likely due to UTI and ureteral obstruction  Repeat blood cultures; cont IVF NS ; change abx to meropenem  Consult ID     Elevated troponin:   Cardiology workup, stress test today  EKG showed no acute ischemic changes  Allergic to aspirin    ELLYN:   Due to post obstructive uropathy  Scr on admission 3.21, creatinine yesterday improved  Labs from this am pending; cont IVF   nephrology consulted and following    Leucocytosis  Likely due to UTI  Continue I.V antibiotics    Thrombocytopenia  Due to bacteremia, septicemia- - Monitor for now    Hypotension- improved with treatment of bacteremia and IVFluids  Patient not hypertensive    Overweight: BMI 45.61     Code status: Full  DVT prophylaxsis: SCD  Dispo: tbd          Interval History/Subjective:  F/u for right kidney stones and sepsis  S/p cystourethroscopy and stent palcement yesterday  No acute overnight event  States right flank pain is resolving      Review of Systems:  A comprehensive review of systems was negative except for that written in the HPI. Objective:     VITALS:   Last 24hrs VS reviewed since prior progress note.  Most recent are:  Visit Vitals  BP (!) 171/89 (BP 1 Location: Right arm, BP Patient Position: At rest)   Pulse 81   Temp 97.3 °F (36.3 °C)   Resp 17   Ht 5' 8\" (1.727 m)   Wt 137 kg (302 lb 0.5 oz)   SpO2 97%   BMI 45.92 kg/m²     Patient Vitals for the past 24 hrs:   Temp Pulse Resp BP SpO2   03/01/22 2150  81      03/01/22 1959  79      03/01/22 1800  89      03/01/22 1601 97.3 °F (36.3 °C) 90 17 (!) 171/89 97 %   03/01/22 1400  85      03/01/22 1200  85      03/01/22 0800 97.4 °F (36.3 °C) 86 19 (!) 160/89 98 %   03/01/22 0557  90      03/01/22 0351  83      03/01/22 0300 97.4 °F (36.3 °C) 83 12 (!) 155/89 96 %   03/01/22 0150  82      03/01/22 0000  87 14 (!) 154/88 97 %       Intake/Output Summary (Last 24 hours) at 3/1/2022 2226  Last data filed at 3/1/2022 2052  Gross per 24 hour   Intake    Output 1975 ml   Net -1975 ml        PHYSICAL EXAM:  General: No acute distress, cooperative, pleasant. EENT: EOMI. Anicteric sclerae. Oral mucous moist,  Resp: CTA bilaterally. No wheezing/rhonchi/rales. No accessory muscle use  CV: Regular rhythm, normal rate, no murmurs, gallops, rubs  GI: Soft, non distended, LUQ/gen tenderness. + guarding. normoactive bowel sounds, no hepatosplenomegaly   Extremities: No edema, warm, 2+ pulses throughout  Neurologic: Moves all extremities. AAOx3,   Psych: Good insight. Not anxious nor agitated. Skin: Good Turgor, no rashes or ulcers    Lab Data Personally Reviewed: (see below)     Medications list Personally Reviewed:  x YES  NO     _______________________________________________________________________  Care Plan discussed with:  Patient/Family and Nurse    Total NON critical care TIME:  30 minutes    Blanca Chirinos MD     Procedures: see electronic medical records for all procedures/Xrays and details which were not copied into this note but were reviewed prior to creation of Plan.       LABS:  Recent Labs     03/01/22  1500 02/27/22  0455   WBC 20.4* 15.5*   HGB 12.3 10.8*   HCT 38.7 36.0   PLT 30* 32*     Recent Labs     03/01/22  1500 02/28/22  0834 02/27/22  0455    142 141   K 4.0 4.2 4.2 * 117* 114*   CO2 19* 20* 18*   BUN 68* 78* 69*   CREA 1.55* 2.53* 3.21*   * 134* 99   CA 8.0* 7.7* 6.9*   MG 2.7*  --  2.1   PHOS 2.3*  --  4.9*     Recent Labs     03/01/22  1500 02/27/22  0455   ALT 25 21   * 240*   TBILI 0.4 1.8*   TP 5.6* 5.4*   ALB 2.0* 2.0*   GLOB 3.6 3.4     No results for input(s): INR, PTP, APTT, INREXT, INREXT in the last 72 hours. No results for input(s): FE, TIBC, PSAT, FERR in the last 72 hours. No results found for: FOL, RBCF   No results for input(s): PH, PCO2, PO2 in the last 72 hours. No results for input(s): CPK, CKNDX, TROIQ in the last 72 hours.     No lab exists for component: CPKMB  No results found for: CHOL, CHOLX, CHLST, CHOLV, HDL, HDLP, LDL, LDLC, DLDLP, TGLX, TRIGL, TRIGP, CHHD, CHHDX  Lab Results   Component Value Date/Time    Glucose (POC) 129 (H) 03/01/2022 09:36 PM    Glucose (POC) 144 (H) 03/01/2022 04:55 PM    Glucose (POC) 118 (H) 03/01/2022 11:48 AM    Glucose (POC) 83 03/01/2022 08:39 AM    Glucose (POC) 154 (H) 02/28/2022 10:51 PM     Lab Results   Component Value Date/Time    Color Dark Yellow 02/26/2022 08:39 AM    Appearance Turbid (A) 02/26/2022 08:39 AM    Specific gravity 1.015 02/26/2022 08:39 AM    pH (UA) 5.0 02/26/2022 08:39 AM    Protein 100 (A) 02/26/2022 08:39 AM    Glucose Negative 02/26/2022 08:39 AM    Ketone Negative 02/26/2022 08:39 AM    Bilirubin Negative 02/26/2022 08:39 AM    Urobilinogen 0.1 02/26/2022 08:39 AM    Nitrites Negative 02/26/2022 08:39 AM    Leukocyte Esterase Large (A) 02/26/2022 08:39 AM    Bacteria 4+ (A) 02/26/2022 08:39 AM    WBC >100 (H) 02/26/2022 08:39 AM    RBC 20-50 02/26/2022 08:39 AM     Results     Procedure Component Value Units Date/Time    CULTURE, BLOOD [[id number]]     Order Status: Sent Specimen: Blood     CULTURE, BLOOD, PAIRED [[id number]]     Order Status: Canceled Specimen: Blood     CULTURE, BLOOD, PAIRED [[id number]]  (Abnormal)  (Susceptibility) Collected: 02/26/22 0736    Order Status: Completed Specimen: Blood Updated: 03/01/22 1351     Special Requests: No Special Requests        Culture result:       Klebsiella pneumoniae growing in all 4 bottles drawn                  checking for possible 2nd gram negative rianna            (NOTE) GVR GROWING IN 4 OF 4 BOTTLES CALLED TO AND READ BACK BY Carlo Oconnell MLT AT 1250 ON 02/27/22 BY MD.    Susceptibility      Klebsiella pneumoniae     RENETTA (Preliminary)     Amikacin ($) Susceptible     Ampicillin ($) Resistant     Ampicillin/sulbactam ($) Susceptible     Cefazolin ($) Susceptible     Cefepime ($$) Susceptible     Cefoxitin Susceptible     Ceftazidime ($) Susceptible     Ceftriaxone ($) Susceptible     Ciprofloxacin ($) Susceptible     Gentamicin ($) Susceptible     Levofloxacin ($) Susceptible     Meropenem ($$) Susceptible     Piperacillin/Tazobac ($) Susceptible     Tobramycin ($) Susceptible     Trimeth/Sulfa Susceptible                  Linear View                   COVID-19 RAPID TEST [[id number]] Collected: 02/26/22 0606    Order Status: Completed Specimen: Nasopharyngeal Updated: 02/26/22 0703     COVID-19 rapid test Not Detected        Comment: Rapid Abbott ID Now   Rapid NAAT:  The specimen is NEGATIVE for SARS-CoV-2, the novel coronavirus associated with COVID-19. Negative results should be treated as presumptive and, if inconsistent with clinical signs and symptoms or necessary for patient management, should be tested with an alternative molecular assay. Negative results do not preclude SARS-CoV-2 infection and should not be used as the sole basis for patient management decisions. This test has been authorized by the FDA under   an Emergency Use Authorization (EUA) for use by authorized laboratories. Fact sheet for Healthcare Providers: ConventionUpdate.co.nz Fact sheet for Patients: ConventionUpdate.co.nz   Methodology: Isothermal Nucleic Acid Amplification             .   Current Facility-Administered Medications:     meropenem (MERREM) 500 mg in sterile water (preservative free) 10 mL IV syringe, 0.5 g, IntraVENous, Q8H, Shorty Mendoza MD, 500 mg at 03/01/22 1823    [START ON 3/2/2022] amLODIPine (NORVASC) tablet 5 mg, 5 mg, Oral, DAILY, Carolyn Richards MD    metoprolol tartrate (LOPRESSOR) tablet 50 mg, 50 mg, Oral, BID, Carolyn Richards MD, 50 mg at 03/01/22 1823    gabapentin (NEURONTIN) capsule 300 mg, 300 mg, Oral, BID, Shorty Mendoza MD, 300 mg at 03/01/22 2135    0.9% sodium chloride infusion, 75 mL/hr, IntraVENous, CONTINUOUS, Ajay Cotton MD, Last Rate: 75 mL/hr at 03/01/22 2054, 75 mL/hr at 03/01/22 2054    morphine injection 2 mg, 2 mg, IntraVENous, Q4H PRN, Sriram Thompson MD, 2 mg at 03/01/22 0853    albuterol-ipratropium (DUO-NEB) 2.5 MG-0.5 MG/3 ML, 3 mL, Nebulization, Q4H PRN, Corine Adam MD    atorvastatin (LIPITOR) tablet 10 mg, 10 mg, Oral, QHS, Corine Adam MD, 10 mg at 03/01/22 2136    ferrous sulfate tablet 325 mg, 1 Tablet, Oral, ACB, Corine Adam MD, 325 mg at 03/01/22 0900    oxybutynin (DITROPAN) tablet 5 mg, 5 mg, Oral, BID, Corine Adam MD, 5 mg at 03/01/22 1823    tamsulosin (FLOMAX) capsule 0.4 mg, 0.4 mg, Oral, DAILY, Corine Adam MD, 0.4 mg at 03/01/22 1024    sodium chloride (NS) flush 5-40 mL, 5-40 mL, IntraVENous, Q8H, Corine Adam MD, 10 mL at 03/01/22 2136    sodium chloride (NS) flush 5-40 mL, 5-40 mL, IntraVENous, PRN, Corine Adam MD, 10 mL at 03/01/22 0854    acetaminophen (TYLENOL) tablet 650 mg, 650 mg, Oral, Q6H PRN **OR** acetaminophen (TYLENOL) suppository 650 mg, 650 mg, Rectal, Q6H PRN, Corine Adam MD    polyethylene glycol (MIRALAX) packet 17 g, 17 g, Oral, DAILY PRN, Corine Adam MD    ondansetron (ZOFRAN ODT) tablet 4 mg, 4 mg, Oral, Q8H PRN **OR** ondansetron (ZOFRAN) injection 4 mg, 4 mg, IntraVENous, Q6H PRN, Cat Mckeon MD   L.acidophilus-paracasei-S.thermophil-bifidobacter (RISAQUAD) 8 billion cell capsule, 1 Capsule, Oral, DAILY, Corine Adam MD, 1 Capsule at 03/01/22 1024    predniSONE (DELTASONE) tablet 40 mg, 40 mg, Oral, DAILY WITH BREAKFAST, Corine Adam MD, 40 mg at 03/01/22 1024    insulin lispro (HUMALOG) injection, , SubCUTAneous, AC&HS, Corine Adam MD, 2 Units at 03/01/22 1824    glucose chewable tablet 16 g, 4 Tablet, Oral, PRN, Corine Adam MD    glucagon (GLUCAGEN) injection 1 mg, 1 mg, IntraMUSCular, PRN, Corine Adam MD    dextrose 10 % infusion 0-250 mL, 0-250 mL, IntraVENous, PRN, Ashley Olivia MD

## 2022-03-02 NOTE — PROGRESS NOTES
ID Progress Note  3/2/2022    Subjective:     Pt expresses feeling ok, but not like her usual self  Review of Systems:            Symptom Y/N Comments   Symptom Y/N Comments   Fever/Chills n      Chest Pain  n      Poor Appetite       Edema        Cough       Abdominal Pain  n      Sputum       Joint Pain        SOB/PETERSON  n     Pruritis/Rash        Nausea/vomit n      Tolerating PT/OT        Diarrhea  n     Tolerating Diet        Constipation  n     Other           Could NOT obtain due to:       Objective:     Vitals:   Visit Vitals  BP (!) 162/90 (BP 1 Location: Right arm, BP Patient Position: At rest)   Pulse 82   Temp 97.8 °F (36.6 °C)   Resp 20   Ht 5' 8\" (1.727 m)   Wt 137 kg (302 lb 0.5 oz)   SpO2 97%   BMI 45.92 kg/m²        Tmax:  Temp (24hrs), Av.6 °F (36.4 °C), Min:97.3 °F (36.3 °C), Max:97.8 °F (36.6 °C)      PHYSICAL EXAM:  General: Morbidly obese, WD, WN. Alert, cooperative, no acute distress    EENT:  EOMI. Anicteric sclerae. MMM  Resp:  Clear in apex with decreased breath sounds at bases. no wheezing or rales. No accessory muscle use  CV:  Regular  rhythm,  No edema  GI:  Soft, Non distended, Non tender. +Bowel sounds  Neurologic:  Alert and oriented X 3, normal speech,   Psych:   Good insight. Not anxious nor agitated  Skin:  No rashes.   No jaundice    Labs:   Lab Results   Component Value Date/Time    WBC 22.2 (H) 2022 05:07 AM    HGB 11.6 2022 05:07 AM    HCT 36.3 2022 05:07 AM    PLATELET 38 (LL)  05:07 AM    MCV 93.6 2022 05:07 AM     Lab Results   Component Value Date/Time    Sodium 143 2022 05:07 AM    Potassium 3.8 2022 05:07 AM    Chloride 118 (H) 2022 05:07 AM    CO2 22 2022 05:07 AM    Anion gap 3 (L) 2022 05:07 AM    Glucose 108 (H) 2022 05:07 AM    BUN 54 (H) 2022 05:07 AM    Creatinine 1.18 (H) 2022 05:07 AM    BUN/Creatinine ratio 46 (H) 2022 05:07 AM    GFR est AA 55 (L) 2022 05:07 AM GFR est non-AA 45 (L) 03/02/2022 05:07 AM    Calcium 8.0 (L) 03/02/2022 05:07 AM    Bilirubin, total 0.4 03/01/2022 03:00 PM    Alk. phosphatase 213 (H) 03/01/2022 03:00 PM    Protein, total 5.6 (L) 03/01/2022 03:00 PM    Albumin 2.0 (L) 03/01/2022 03:00 PM    Globulin 3.6 03/01/2022 03:00 PM    A-G Ratio 0.6 (L) 03/01/2022 03:00 PM    ALT (SGPT) 25 03/01/2022 03:00 PM     Results     Procedure Component Value Units Date/Time    CULTURE, BLOOD [[id number]] Collected: 03/02/22 0507    Order Status: Completed Specimen: Blood Updated: 03/02/22 0554    CULTURE, BLOOD, PAIRED [[id number]]     Order Status: Canceled Specimen: Blood     CULTURE, BLOOD, PAIRED [[id number]]  (Abnormal)  (Susceptibility) Collected: 02/26/22 0736    Order Status: Completed Specimen: Blood Updated: 03/02/22 0547     Special Requests: No Special Requests        Culture result:       Klebsiella pneumoniae growing in all 4 bottles drawn            (NOTE) GVR GROWING IN 4 OF 4 BOTTLES CALLED TO AND READ BACK BY Melody Cooper MLT AT 1250 ON 02/27/22 BY MD.    Susceptibility      Klebsiella pneumoniae     RENETTA     Amikacin ($) Susceptible     Ampicillin ($) Resistant     Ampicillin/sulbactam ($) Susceptible     Cefazolin ($) Susceptible     Cefepime ($$) Susceptible     Cefoxitin Susceptible     Ceftazidime ($) Susceptible     Ceftriaxone ($) Susceptible     Ciprofloxacin ($) Susceptible     Gentamicin ($) Susceptible     Levofloxacin ($) Susceptible     Meropenem ($$) Susceptible     Piperacillin/Tazobac ($) Susceptible     Tobramycin ($) Susceptible     Trimeth/Sulfa Susceptible                  Linear View                   COVID-19 RAPID TEST [[id number]] Collected: 02/26/22 0606    Order Status: Completed Specimen: Nasopharyngeal Updated: 02/26/22 0703     COVID-19 rapid test Not Detected        Comment: Rapid Abbott ID Now   Rapid NAAT:  The specimen is NEGATIVE for SARS-CoV-2, the novel coronavirus associated with COVID-19.    Negative results should be treated as presumptive and, if inconsistent with clinical signs and symptoms or necessary for patient management, should be tested with an alternative molecular assay. Negative results do not preclude SARS-CoV-2 infection and should not be used as the sole basis for patient management decisions. This test has been authorized by the FDA under   an Emergency Use Authorization (EUA) for use by authorized laboratories. Fact sheet for Healthcare Providers: ConventionUpdate.co.nz Fact sheet for Patients: ConventionTriplifydate.co.nz   Methodology: Isothermal Nucleic Acid Amplification               Assessment and Plan   Right ureteral calculus  Presumed Complicated UTI (urine cx not done prior to procedure)  Bacteremia  S/p cystoscopy insertion right ureteral stent (2/26)  - afebrile, WBC 22.2    U/A (2/26) >100 WBC with 4+ bacteria, urine cx was not processed    Check repeat U/A    blood cx (2/26) klebsiella pneumoniae    Blood cx (3/2) pending    CT of abd/pel (2/26) bilateral renal calculi, right greater than left, obstruction of the right kidney and right ureter secondary to an 8 mm stone positioned approximately 2 cm proximal to the  right ureterovesical junction. Repeat renal US to be done      Continue with IV merrem     source of bacteremia most likely secondary to complicated UTI. However, there was no urine cx done prior to cystoscopy and right ureteral stent placement.  Pt has hx of ESBL E-coli in the past.    Adjust abx prn      Fever work up if temp >= 100.4      Above plan of care discussed and agreed with Dr. Lucia Moya, NP

## 2022-03-02 NOTE — PROGRESS NOTES
Cardiology Progress Note  3/2/2022     Admit Date: 2022  Admit Diagnosis: Sepsis ([facility] [address] [A41.9]  CC: none currently    Assessment/Plan:   CAD: Inferoapical ischemia on yesterday's MPI  Renal function has completely recovered  The patient will need a definitive operation in several weeks for her ureteral stones  I will speak with the patient and if she agrees, will proceed with cath tomorrow afternoon  Discussed with Dr Shah Patient    For other plans, see orders.   Subjective: Altafford Andgayle reports   Chest Pain:  [x]  none;  consistent with []  non-cardiac  []  atypical  []  angina             [x]  none now    []  on-going  Dyspnea: [x]  none    []  at rest    []  with exertion   []  improved    []  unchanged    []  worsening  PND:       [x]  none      []  overnight      Orthopnea:   [x]  none        []  improved         []  unchanged        []  worsening  Presyncope: [x]  none        []  improved         []  unchanged        []  worsening  Ambulated in hallway without symptoms  []  Yes  Ambulated in room without symptoms  []  Yes    Objective:    Physical Exam:  Overall VSSAF;    Visit Vitals  BP (!) 157/87 (BP 1 Location: Right upper arm, BP Patient Position: At rest)   Pulse 89   Temp 98.5 °F (36.9 °C)   Resp 17   Ht 5' 8\" (1.727 m)   Wt 137 kg (302 lb 0.5 oz)   SpO2 97%   BMI 45.92 kg/m²     Temp (24hrs), Av.8 °F (36.6 °C), Min:97.5 °F (36.4 °C), Max:98.5 °F (36.9 °C)    Patient Vitals for the past 8 hrs:   Pulse   22 1525 89   22 1500 89   22 1200 84   22 1157 85   22 1000 85    Patient Vitals for the past 8 hrs:   Resp   22 1525 17   22 1157 14    Patient Vitals for the past 8 hrs:   BP   22 1525 (!) 157/87   22 1500 (!) 168/83   22 1157 (!) 174/91          Intake/Output Summary (Last 24 hours) at 3/2/2022 1737  Last data filed at 3/2/2022 0400  Gross per 24 hour   Intake    Output 1825 ml   Net -1825 ml       General Appearance: Well developed, well nourished, no acute distress. Ears/Nose/Mouth/Throat:   Normal MM; anicteric. JVP: WNL   Resp:   Lungs clear to auscultation bilaterally. Nl resp effort. Cardiovascular:  RRR, S1, S2 normal, no new murmur. No gallop or rub. Abdomen:   Soft, non-tender, bowel sounds are present. Extremities: No edema bilaterally. Skin:  Neuro: Warm and dry. A/O x3, grossly nonfocal    []  cath site intact w/o hematoma or bruit; distal pulse unchanged. Data Review:     Telemetry independently reviewed : []  sinus      []  chronic afib     []  par afib    []  NSVT    ECG independently reviewed:  []  NSR         []  no significant changes  [] no new ECG provided for review  Lab results reviewed as noted below. Current medications reviewed as noted below. No results for input(s): PH, PCO2, PO2 in the last 72 hours. No results for input(s): CPK, CKMB, TROIQ in the last 72 hours. Recent Labs     03/02/22  0507 03/01/22  1500 02/28/22  0834    144 142   K 3.8 4.0 4.2   * 117* 117*   CO2 22 19* 20*   BUN 54* 68* 78*   CREA 1.18* 1.55* 2.53*   * 119* 134*   PHOS 1.9* 2.3*  --    CA 8.0* 8.0* 7.7*   ALB  --  2.0*  --    WBC 22.2* 20.4*  --    HGB 11.6 12.3  --    HCT 36.3 38.7  --    PLT 38* 30*  --      Recent Labs     03/01/22  1500   ALT 25   *   TBILI 0.4   TP 5.6*   ALB 2.0*   GLOB 3.6     No results for input(s): INR, PTP, APTT, INREXT in the last 72 hours. No results for input(s): FE, TIBC, PSAT, FERR in the last 72 hours.    Lab Results   Component Value Date/Time    Glucose (POC) 76 03/02/2022 11:56 AM    Glucose (POC) 70 03/02/2022 09:02 AM    Glucose (POC) 129 (H) 03/01/2022 09:36 PM    Glucose (POC) 144 (H) 03/01/2022 04:55 PM    Glucose (POC) 118 (H) 03/01/2022 11:48 AM       Current Facility-Administered Medications   Medication Dose Route Frequency    potassium, sodium phosphates (NEUTRA-PHOS) packet 2 Packet  2 Packet Oral BID    0.45% sodium chloride infusion 50 mL/hr IntraVENous CONTINUOUS    metoprolol tartrate (LOPRESSOR) tablet 100 mg  100 mg Oral BID    meropenem (MERREM) 500 mg in sterile water (preservative free) 10 mL IV syringe  0.5 g IntraVENous Q6H    [START ON 3/3/2022] amLODIPine (NORVASC) tablet 10 mg  10 mg Oral DAILY    gabapentin (NEURONTIN) capsule 300 mg  300 mg Oral BID    morphine injection 2 mg  2 mg IntraVENous Q4H PRN    albuterol-ipratropium (DUO-NEB) 2.5 MG-0.5 MG/3 ML  3 mL Nebulization Q4H PRN    atorvastatin (LIPITOR) tablet 10 mg  10 mg Oral QHS    ferrous sulfate tablet 325 mg  1 Tablet Oral ACB    oxybutynin (DITROPAN) tablet 5 mg  5 mg Oral BID    tamsulosin (FLOMAX) capsule 0.4 mg  0.4 mg Oral DAILY    sodium chloride (NS) flush 5-40 mL  5-40 mL IntraVENous Q8H    sodium chloride (NS) flush 5-40 mL  5-40 mL IntraVENous PRN    acetaminophen (TYLENOL) tablet 650 mg  650 mg Oral Q6H PRN    Or    acetaminophen (TYLENOL) suppository 650 mg  650 mg Rectal Q6H PRN    polyethylene glycol (MIRALAX) packet 17 g  17 g Oral DAILY PRN    ondansetron (ZOFRAN ODT) tablet 4 mg  4 mg Oral Q8H PRN    Or    ondansetron (ZOFRAN) injection 4 mg  4 mg IntraVENous Q6H PRN    L.acidophilus-paracasei-S.thermophil-bifidobacter (RISAQUAD) 8 billion cell capsule  1 Capsule Oral DAILY    predniSONE (DELTASONE) tablet 40 mg  40 mg Oral DAILY WITH BREAKFAST    insulin lispro (HUMALOG) injection   SubCUTAneous AC&HS    glucose chewable tablet 16 g  4 Tablet Oral PRN    glucagon (GLUCAGEN) injection 1 mg  1 mg IntraMUSCular PRN    dextrose 10 % infusion 0-250 mL  0-250 mL IntraVENous PRN        Joe Goldberg, MD

## 2022-03-02 NOTE — PROGRESS NOTES
Renal Dosing/Monitoring  Medication: Meropenem   Current regimen:  500 mg IV every 8 hr  Recent Labs     03/02/22  0507 03/01/22  1500 02/28/22  0834   CREA 1.18* 1.55* 2.53*   BUN 54* 68* 78*     Estimated CrCl:  66.1 ml/min  Plan: Change to 500 mg IV Q 6 hours per [facility] P&T Committee Protocol with respect to renal function. Pharmacy will continue to monitor patient daily and will make dosage adjustments based upon changing renal function.     Zaina Lambert, PharmD, BCPS

## 2022-03-03 LAB
ANION GAP SERPL CALC-SCNC: 3 MMOL/L (ref 5–15)
APPEARANCE UR: CLEAR
BACTERIA URNS QL MICRO: NEGATIVE /HPF
BILIRUB UR QL: NEGATIVE
BUN SERPL-MCNC: 40 MG/DL (ref 6–20)
BUN/CREAT SERPL: 44 (ref 12–20)
CALCIUM SERPL-MCNC: 8 MG/DL (ref 8.5–10.1)
CHLORIDE SERPL-SCNC: 115 MMOL/L (ref 97–108)
CO2 SERPL-SCNC: 25 MMOL/L (ref 21–32)
COLOR UR: ABNORMAL
CREAT SERPL-MCNC: 0.91 MG/DL (ref 0.55–1.02)
EPITH CASTS URNS QL MICRO: ABNORMAL /LPF
ERYTHROCYTE [DISTWIDTH] IN BLOOD BY AUTOMATED COUNT: 16.7 % (ref 11.5–14.5)
GLUCOSE BLD STRIP.AUTO-MCNC: 106 MG/DL (ref 65–117)
GLUCOSE BLD STRIP.AUTO-MCNC: 129 MG/DL (ref 65–117)
GLUCOSE BLD STRIP.AUTO-MCNC: 175 MG/DL (ref 65–117)
GLUCOSE BLD STRIP.AUTO-MCNC: 84 MG/DL (ref 65–117)
GLUCOSE SERPL-MCNC: 116 MG/DL (ref 65–100)
GLUCOSE UR STRIP.AUTO-MCNC: NEGATIVE MG/DL
HCT VFR BLD AUTO: 34.6 % (ref 35–47)
HGB BLD-MCNC: 10.9 G/DL (ref 11.5–16)
HGB UR QL STRIP: ABNORMAL
HYALINE CASTS URNS QL MICRO: ABNORMAL /LPF (ref 0–5)
KETONES UR QL STRIP.AUTO: NEGATIVE MG/DL
LEUKOCYTE ESTERASE UR QL STRIP.AUTO: ABNORMAL
MCH RBC QN AUTO: 29.5 PG (ref 26–34)
MCHC RBC AUTO-ENTMCNC: 31.5 G/DL (ref 30–36.5)
MCV RBC AUTO: 93.5 FL (ref 80–99)
NITRITE UR QL STRIP.AUTO: NEGATIVE
NRBC # BLD: 0 K/UL (ref 0–0.01)
NRBC BLD-RTO: 0 PER 100 WBC
PH UR STRIP: 6 [PH] (ref 5–8)
PLATELET # BLD AUTO: 55 K/UL (ref 150–400)
PMV BLD AUTO: ABNORMAL FL (ref 8.9–12.9)
POTASSIUM SERPL-SCNC: 4.2 MMOL/L (ref 3.5–5.1)
PROT UR STRIP-MCNC: 30 MG/DL
RBC # BLD AUTO: 3.7 M/UL (ref 3.8–5.2)
RBC #/AREA URNS HPF: >100 /HPF (ref 0–5)
SERVICE CMNT-IMP: ABNORMAL
SERVICE CMNT-IMP: ABNORMAL
SERVICE CMNT-IMP: NORMAL
SERVICE CMNT-IMP: NORMAL
SODIUM SERPL-SCNC: 143 MMOL/L (ref 136–145)
SP GR UR REFRACTOMETRY: 1.01 (ref 1–1.03)
UA: UC IF INDICATED,UAUC: ABNORMAL
UROBILINOGEN UR QL STRIP.AUTO: 0.2 EU/DL (ref 0.2–1)
WBC # BLD AUTO: 22.3 K/UL (ref 3.6–11)
WBC URNS QL MICRO: ABNORMAL /HPF (ref 0–4)

## 2022-03-03 PROCEDURE — 74011000250 HC RX REV CODE- 250: Performed by: FAMILY MEDICINE

## 2022-03-03 PROCEDURE — C1887 CATHETER, GUIDING: HCPCS | Performed by: INTERNAL MEDICINE

## 2022-03-03 PROCEDURE — 74011250636 HC RX REV CODE- 250/636: Performed by: FAMILY MEDICINE

## 2022-03-03 PROCEDURE — 74011000636 HC RX REV CODE- 636: Performed by: INTERNAL MEDICINE

## 2022-03-03 PROCEDURE — 74011250637 HC RX REV CODE- 250/637: Performed by: FAMILY MEDICINE

## 2022-03-03 PROCEDURE — 36415 COLL VENOUS BLD VENIPUNCTURE: CPT

## 2022-03-03 PROCEDURE — 82962 GLUCOSE BLOOD TEST: CPT

## 2022-03-03 PROCEDURE — 85027 COMPLETE CBC AUTOMATED: CPT

## 2022-03-03 PROCEDURE — 99152 MOD SED SAME PHYS/QHP 5/>YRS: CPT | Performed by: INTERNAL MEDICINE

## 2022-03-03 PROCEDURE — 4A023N7 MEASUREMENT OF CARDIAC SAMPLING AND PRESSURE, LEFT HEART, PERCUTANEOUS APPROACH: ICD-10-PCS | Performed by: INTERNAL MEDICINE

## 2022-03-03 PROCEDURE — 77030040934 HC CATH DIAG DXTERITY MEDT -A: Performed by: INTERNAL MEDICINE

## 2022-03-03 PROCEDURE — 74011000250 HC RX REV CODE- 250: Performed by: INTERNAL MEDICINE

## 2022-03-03 PROCEDURE — 87086 URINE CULTURE/COLONY COUNT: CPT

## 2022-03-03 PROCEDURE — 74011250637 HC RX REV CODE- 250/637: Performed by: INTERNAL MEDICINE

## 2022-03-03 PROCEDURE — 74011250636 HC RX REV CODE- 250/636: Performed by: INTERNAL MEDICINE

## 2022-03-03 PROCEDURE — 36600 WITHDRAWAL OF ARTERIAL BLOOD: CPT

## 2022-03-03 PROCEDURE — 93458 L HRT ARTERY/VENTRICLE ANGIO: CPT | Performed by: INTERNAL MEDICINE

## 2022-03-03 PROCEDURE — C1769 GUIDE WIRE: HCPCS | Performed by: INTERNAL MEDICINE

## 2022-03-03 PROCEDURE — 94760 N-INVAS EAR/PLS OXIMETRY 1: CPT

## 2022-03-03 PROCEDURE — 77030004532 HC CATH ANGI DX IMP BSC -A: Performed by: INTERNAL MEDICINE

## 2022-03-03 PROCEDURE — 65660000000 HC RM CCU STEPDOWN

## 2022-03-03 PROCEDURE — 2709999900 HC NON-CHARGEABLE SUPPLY: Performed by: INTERNAL MEDICINE

## 2022-03-03 PROCEDURE — 74011636637 HC RX REV CODE- 636/637: Performed by: INTERNAL MEDICINE

## 2022-03-03 PROCEDURE — 80048 BASIC METABOLIC PNL TOTAL CA: CPT

## 2022-03-03 PROCEDURE — 77030013744: Performed by: INTERNAL MEDICINE

## 2022-03-03 PROCEDURE — 81001 URINALYSIS AUTO W/SCOPE: CPT

## 2022-03-03 PROCEDURE — C1894 INTRO/SHEATH, NON-LASER: HCPCS | Performed by: INTERNAL MEDICINE

## 2022-03-03 PROCEDURE — B2111ZZ FLUOROSCOPY OF MULTIPLE CORONARY ARTERIES USING LOW OSMOLAR CONTRAST: ICD-10-PCS | Performed by: INTERNAL MEDICINE

## 2022-03-03 PROCEDURE — 65270000032 HC RM SEMIPRIVATE

## 2022-03-03 PROCEDURE — 77030029997 HC DEV COM RDL R BND TELE -B: Performed by: INTERNAL MEDICINE

## 2022-03-03 PROCEDURE — 99153 MOD SED SAME PHYS/QHP EA: CPT | Performed by: INTERNAL MEDICINE

## 2022-03-03 PROCEDURE — 76937 US GUIDE VASCULAR ACCESS: CPT

## 2022-03-03 RX ORDER — HEPARIN SODIUM 1000 [USP'U]/ML
INJECTION, SOLUTION INTRAVENOUS; SUBCUTANEOUS AS NEEDED
Status: DISCONTINUED | OUTPATIENT
Start: 2022-03-03 | End: 2022-03-03 | Stop reason: HOSPADM

## 2022-03-03 RX ORDER — HEPARIN SODIUM 200 [USP'U]/100ML
INJECTION, SOLUTION INTRAVENOUS
Status: COMPLETED | OUTPATIENT
Start: 2022-03-03 | End: 2022-03-03

## 2022-03-03 RX ORDER — MIDAZOLAM HYDROCHLORIDE 1 MG/ML
INJECTION, SOLUTION INTRAMUSCULAR; INTRAVENOUS AS NEEDED
Status: DISCONTINUED | OUTPATIENT
Start: 2022-03-03 | End: 2022-03-03 | Stop reason: HOSPADM

## 2022-03-03 RX ORDER — VERAPAMIL HYDROCHLORIDE 2.5 MG/ML
INJECTION, SOLUTION INTRAVENOUS AS NEEDED
Status: DISCONTINUED | OUTPATIENT
Start: 2022-03-03 | End: 2022-03-03 | Stop reason: HOSPADM

## 2022-03-03 RX ORDER — LIDOCAINE HYDROCHLORIDE 10 MG/ML
INJECTION INFILTRATION; PERINEURAL AS NEEDED
Status: DISCONTINUED | OUTPATIENT
Start: 2022-03-03 | End: 2022-03-03 | Stop reason: HOSPADM

## 2022-03-03 RX ORDER — FENTANYL CITRATE 50 UG/ML
INJECTION, SOLUTION INTRAMUSCULAR; INTRAVENOUS AS NEEDED
Status: DISCONTINUED | OUTPATIENT
Start: 2022-03-03 | End: 2022-03-03 | Stop reason: HOSPADM

## 2022-03-03 RX ADMIN — GABAPENTIN 300 MG: 300 CAPSULE ORAL at 21:13

## 2022-03-03 RX ADMIN — OXYBUTYNIN CHLORIDE 5 MG: 5 TABLET ORAL at 10:53

## 2022-03-03 RX ADMIN — GABAPENTIN 300 MG: 300 CAPSULE ORAL at 10:53

## 2022-03-03 RX ADMIN — ATORVASTATIN CALCIUM 10 MG: 10 TABLET, FILM COATED ORAL at 21:13

## 2022-03-03 RX ADMIN — MEROPENEM 500 MG: 500 INJECTION, POWDER, FOR SOLUTION INTRAVENOUS at 06:41

## 2022-03-03 RX ADMIN — AMLODIPINE BESYLATE 10 MG: 5 TABLET ORAL at 10:53

## 2022-03-03 RX ADMIN — Medication 1 CAPSULE: at 10:59

## 2022-03-03 RX ADMIN — METOPROLOL TARTRATE 100 MG: 50 TABLET, FILM COATED ORAL at 18:34

## 2022-03-03 RX ADMIN — PREDNISONE 40 MG: 20 TABLET ORAL at 10:59

## 2022-03-03 RX ADMIN — FERROUS SULFATE TAB 325 MG (65 MG ELEMENTAL FE) 325 MG: 325 (65 FE) TAB at 06:42

## 2022-03-03 RX ADMIN — SODIUM CHLORIDE, PRESERVATIVE FREE 10 ML: 5 INJECTION INTRAVENOUS at 11:02

## 2022-03-03 RX ADMIN — METOPROLOL TARTRATE 100 MG: 50 TABLET, FILM COATED ORAL at 10:52

## 2022-03-03 RX ADMIN — MEROPENEM 500 MG: 500 INJECTION, POWDER, FOR SOLUTION INTRAVENOUS at 00:43

## 2022-03-03 RX ADMIN — MEROPENEM 500 MG: 500 INJECTION, POWDER, FOR SOLUTION INTRAVENOUS at 11:00

## 2022-03-03 RX ADMIN — MEROPENEM 500 MG: 500 INJECTION, POWDER, FOR SOLUTION INTRAVENOUS at 18:34

## 2022-03-03 RX ADMIN — TAMSULOSIN HYDROCHLORIDE 0.4 MG: 0.4 CAPSULE ORAL at 10:52

## 2022-03-03 RX ADMIN — OXYBUTYNIN CHLORIDE 5 MG: 5 TABLET ORAL at 18:00

## 2022-03-03 NOTE — PROGRESS NOTES
Pt is off the unit at [facility]. PT will defer, follow and see as able and appropriate.  Thank you, Rodney Huertas, PT

## 2022-03-03 NOTE — PROGRESS NOTES
Hospitalist Progress Note              Rahel Hernadez MD                              NAME:  Angelica Guerin  :  1950  MRN:  [id number]  Date of Service:  3/2/2022    Summary: 73 y.o. female with past medical history of HTN, dyslipidemia, morbid obesity, kidney stone who presented on 2022 with generalized body weakness and SOB. CT scan of the abdomen performed showed new right urinary tract obstruction       Assessment/Plan:  Right ureteral calculus with obstruction and pyelonephritis  - S/p cystourethroscopy with right ureteral stent placement POD 4  Urology consulted and recs noted    Sepsis due to UTI/ Klebsiella bacteremia: Likely due to UTI and ureteral obstruction  Repeat blood cultures ordered; cont IVF NS ;  abx management per ID    Elevated troponin:   Cardiology workup, abnormal stress test showing Inferoapical ischemia  EKG showed no acute ischemic changes  Allergic to aspirin  Plans for heart cath tomorrow    ELLYN- resolved:   Due to post obstructive uropathy  Scr on admission 3.21, creatinine today normal   nephrology consulted and managing    Leukocytosis- persists  Likely due to UTI, bacteremia  Continue I.V antibiotics    Thrombocytopenia  Due to bacteremia, septicemia- - Monitor for now    Hypotension- resolved with treatment of bacteremia and IVFluids  Patient now hypertensive    Overweight: BMI 45.61     Code status: Full  DVT prophylaxsis: SCD  Dispo: tbd          Interval History/Subjective:  F/u for right kidney stones and sepsis  Discussed stress test with patient  No acute overnight event  Creatinine normal    Review of Systems:  A comprehensive review of systems was negative except for that written in the HPI. Objective:     VITALS:   Last 24hrs VS reviewed since prior progress note.  Most recent are:  Visit Vitals  BP (!) 168/87 (BP 1 Location: Right upper arm, BP Patient Position: Lying)   Pulse 88   Temp 98.5 °F (36.9 °C) Resp 20   Ht 5' 8\" (1.727 m)   Wt 137 kg (302 lb 0.5 oz)   SpO2 99%   BMI 45.92 kg/m²     Patient Vitals for the past 24 hrs:   Temp Pulse Resp BP SpO2   03/02/22 1922 98.5 °F (36.9 °C) 88 20 (!) 168/87 99 %   03/02/22 1800  96      03/02/22 1525 98.5 °F (36.9 °C) 89 17 (!) 157/87 97 %   03/02/22 1500  89  (!) 168/83    03/02/22 1200  84      03/02/22 1157 97.6 °F (36.4 °C) 85 14 (!) 174/91 96 %   03/02/22 1000  85      03/02/22 0600  82 20 (!) 162/90 97 %   03/02/22 0557  82      03/02/22 0354  78      03/02/22 0323 97.8 °F (36.6 °C) 84 13 (!) 163/89 99 %   03/02/22 0200  78      03/02/22 0000 97.5 °F (36.4 °C) 82 16 (!) 168/79 98 %       Intake/Output Summary (Last 24 hours) at 3/2/2022 2245  Last data filed at 3/2/2022 0400  Gross per 24 hour   Intake    Output 850 ml   Net -850 ml        PHYSICAL EXAM:  General: No acute distress, cooperative, pleasant. EENT: EOMI. Anicteric sclerae. Oral mucous moist,  Resp: CTA bilaterally. No wheezing/rhonchi/rales. No accessory muscle use  CV: Regular rhythm, normal rate, no murmurs, gallops, rubs  GI: Soft, non distended, LUQ/gen tenderness. + guarding. normoactive bowel sounds, no hepatosplenomegaly   Extremities: No edema, warm, 2+ pulses throughout  Neurologic: Moves all extremities. AAOx3,   Psych: Good insight. Not anxious nor agitated. Skin: Good Turgor, no rashes or ulcers    Lab Data Personally Reviewed: (see below)     Medications list Personally Reviewed:  x YES  NO     _______________________________________________________________________  Care Plan discussed with:  Patient/Family and Nurse    Total NON critical care TIME:  30 minutes    Mckenzie Kovacs MD     Procedures: see electronic medical records for all procedures/Xrays and details which were not copied into this note but were reviewed prior to creation of Plan.       LABS:  Recent Labs     03/02/22  0507 03/01/22  1500   WBC 22.2* 20.4*   HGB 11.6 12.3   HCT 36.3 38.7 PLT 38* 30*     Recent Labs     03/02/22  0507 03/01/22  1500 02/28/22  0834    144 142   K 3.8 4.0 4.2   * 117* 117*   CO2 22 19* 20*   BUN 54* 68* 78*   CREA 1.18* 1.55* 2.53*   * 119* 134*   CA 8.0* 8.0* 7.7*   MG 2.6* 2.7*  --    PHOS 1.9* 2.3*  --      Recent Labs     03/01/22  1500   ALT 25   *   TBILI 0.4   TP 5.6*   ALB 2.0*   GLOB 3.6     No results for input(s): INR, PTP, APTT, INREXT, INREXT in the last 72 hours. No results for input(s): FE, TIBC, PSAT, FERR in the last 72 hours. No results found for: FOL, RBCF   No results for input(s): PH, PCO2, PO2 in the last 72 hours. No results for input(s): CPK, CKNDX, TROIQ in the last 72 hours.     No lab exists for component: CPKMB  No results found for: CHOL, CHOLX, CHLST, CHOLV, HDL, HDLP, LDL, LDLC, DLDLP, TGLX, TRIGL, TRIGP, CHHD, CHHDX  Lab Results   Component Value Date/Time    Glucose (POC) 147 (H) 03/02/2022 09:20 PM    Glucose (POC) 164 (H) 03/02/2022 05:45 PM    Glucose (POC) 76 03/02/2022 11:56 AM    Glucose (POC) 70 03/02/2022 09:02 AM    Glucose (POC) 129 (H) 03/01/2022 09:36 PM     Lab Results   Component Value Date/Time    Color Dark Yellow 02/26/2022 08:39 AM    Appearance Turbid (A) 02/26/2022 08:39 AM    Specific gravity 1.015 02/26/2022 08:39 AM    pH (UA) 5.0 02/26/2022 08:39 AM    Protein 100 (A) 02/26/2022 08:39 AM    Glucose Negative 02/26/2022 08:39 AM    Ketone Negative 02/26/2022 08:39 AM    Bilirubin Negative 02/26/2022 08:39 AM    Urobilinogen 0.1 02/26/2022 08:39 AM    Nitrites Negative 02/26/2022 08:39 AM    Leukocyte Esterase Large (A) 02/26/2022 08:39 AM    Bacteria 4+ (A) 02/26/2022 08:39 AM    WBC >100 (H) 02/26/2022 08:39 AM    RBC 20-50 02/26/2022 08:39 AM     Results     Procedure Component Value Units Date/Time    CULTURE, BLOOD [[id number]] Collected: 03/02/22 0507    Order Status: Completed Specimen: Blood Updated: 03/02/22 0905     Special Requests: NO SPECIAL REQUESTS        Culture result: NO GROWTH AFTER 3 HOURS       CULTURE, BLOOD, PAIRED [[id number]]     Order Status: Canceled Specimen: Blood     CULTURE, BLOOD, PAIRED [[id number]]  (Abnormal)  (Susceptibility) Collected: 02/26/22 0736    Order Status: Completed Specimen: Blood Updated: 03/02/22 0547     Special Requests: No Special Requests        Culture result:       Klebsiella pneumoniae growing in all 4 bottles drawn            (NOTE) GVR GROWING IN 4 OF 4 BOTTLES CALLED TO AND READ BACK BY Fantasma Cobos MLT AT 1250 ON 02/27/22 BY MD.    Susceptibility      Klebsiella pneumoniae     RENETTA     Amikacin ($) Susceptible     Ampicillin ($) Resistant     Ampicillin/sulbactam ($) Susceptible     Cefazolin ($) Susceptible     Cefepime ($$) Susceptible     Cefoxitin Susceptible     Ceftazidime ($) Susceptible     Ceftriaxone ($) Susceptible     Ciprofloxacin ($) Susceptible     Gentamicin ($) Susceptible     Levofloxacin ($) Susceptible     Meropenem ($$) Susceptible     Piperacillin/Tazobac ($) Susceptible     Tobramycin ($) Susceptible     Trimeth/Sulfa Susceptible                  Linear View                   COVID-19 RAPID TEST [[id number]] Collected: 02/26/22 0606    Order Status: Completed Specimen: Nasopharyngeal Updated: 02/26/22 0703     COVID-19 rapid test Not Detected        Comment: Rapid Abbott ID Now   Rapid NAAT:  The specimen is NEGATIVE for SARS-CoV-2, the novel coronavirus associated with COVID-19. Negative results should be treated as presumptive and, if inconsistent with clinical signs and symptoms or necessary for patient management, should be tested with an alternative molecular assay. Negative results do not preclude SARS-CoV-2 infection and should not be used as the sole basis for patient management decisions. This test has been authorized by the FDA under   an Emergency Use Authorization (EUA) for use by authorized laboratories.  Fact sheet for Healthcare Providers: ConventionUpdate.co.nz Fact sheet for Patients: [facility].   Methodology: Isothermal Nucleic Acid Amplification             .   Current Facility-Administered Medications:     0.45% sodium chloride infusion, 50 mL/hr, IntraVENous, CONTINUOUS, Ajay Cotton MD, Last Rate: 50 mL/hr at 03/02/22 1059, 50 mL/hr at 03/02/22 1059    metoprolol tartrate (LOPRESSOR) tablet 100 mg, 100 mg, Oral, BID, Yung Bowers MD, 100 mg at 03/02/22 1937    meropenem (MERREM) 500 mg in sterile water (preservative free) 10 mL IV syringe, 0.5 g, IntraVENous, Q6H, Lexy Dolan MD, 500 mg at 03/02/22 1937    [START ON 3/3/2022] amLODIPine (NORVASC) tablet 10 mg, 10 mg, Oral, DAILY, Yung Bowers MD    gabapentin (NEURONTIN) capsule 300 mg, 300 mg, Oral, BID, Lexy Dolan MD, 300 mg at 03/02/22 2130    morphine injection 2 mg, 2 mg, IntraVENous, Q4H PRN, Yo Stoner MD, 2 mg at 03/02/22 1937    albuterol-ipratropium (DUO-NEB) 2.5 MG-0.5 MG/3 ML, 3 mL, Nebulization, Q4H PRN, Corine Adam MD    atorvastatin (LIPITOR) tablet 10 mg, 10 mg, Oral, QHS, Corine Adam MD, 10 mg at 03/02/22 2121    ferrous sulfate tablet 325 mg, 1 Tablet, Oral, ACB, Corine Adam MD, 325 mg at 03/02/22 0735    oxybutynin (DITROPAN) tablet 5 mg, 5 mg, Oral, BID, Corine Adam MD, 5 mg at 03/02/22 1938    tamsulosin (FLOMAX) capsule 0.4 mg, 0.4 mg, Oral, DAILY, Corine Adam MD, 0.4 mg at 03/02/22 1103    sodium chloride (NS) flush 5-40 mL, 5-40 mL, IntraVENous, Q8H, Corine Adam MD, 10 mL at 03/02/22 1938    sodium chloride (NS) flush 5-40 mL, 5-40 mL, IntraVENous, PRN, Corine Adam MD, 10 mL at 03/01/22 0854    acetaminophen (TYLENOL) tablet 650 mg, 650 mg, Oral, Q6H PRN, 650 mg at 03/02/22 1359 **OR** acetaminophen (TYLENOL) suppository 650 mg, 650 mg, Rectal, Q6H PRN, Corine Adam MD    polyethylene glycol (MIRALAX) packet 17 g, 17 g, Oral, DAILY PRN, Corine Adam MD, 17 g at 03/02/22 1058   ondansetron (ZOFRAN ODT) tablet 4 mg, 4 mg, Oral, Q8H PRN **OR** ondansetron (ZOFRAN) injection 4 mg, 4 mg, IntraVENous, Q6H PRN, Corine Adam MD    L.acidophilus-paracasei-S.thermophil-bifidobacter (RISAQUAD) 8 billion cell capsule, 1 Capsule, Oral, DAILY, Corine Adam MD, 1 Capsule at 03/02/22 1103    predniSONE (DELTASONE) tablet 40 mg, 40 mg, Oral, DAILY WITH BREAKFAST, Corine Adam MD, 40 mg at 03/02/22 1104    insulin lispro (HUMALOG) injection, , SubCUTAneous, AC&HS, Corine Adam MD, 2 Units at 03/02/22 1939    glucose chewable tablet 16 g, 4 Tablet, Oral, PRN, Corine Adam MD    glucagon (GLUCAGEN) injection 1 mg, 1 mg, IntraMUSCular, PRN, Corine Adam MD    dextrose 10 % infusion 0-250 mL, 0-250 mL, IntraVENous, PRN, Diane Winters MD

## 2022-03-03 NOTE — PROGRESS NOTES
ID Progress Note  3/3/2022    Subjective:     No new discomfort  Overall weakness  Review of Systems:            Symptom Y/N Comments   Symptom Y/N Comments   Fever/Chills n      Chest Pain  n      Poor Appetite       Edema        Cough       Abdominal Pain  n      Sputum       Joint Pain        SOB/PETERSON  n     Pruritis/Rash        Nausea/vomit n      Tolerating PT/OT        Diarrhea  n     Tolerating Diet        Constipation  n     Other           Could NOT obtain due to:       Objective:     Vitals:   Visit Vitals  BP (!) 159/86 (BP 1 Location: Right lower arm)   Pulse 71   Temp 99.2 °F (37.3 °C)   Resp 14   Ht 5' 8\" (1.727 m)   Wt 137 kg (302 lb 0.5 oz)   SpO2 100%   BMI 45.92 kg/m²        Tmax:  Temp (24hrs), Av.5 °F (36.9 °C), Min:97.6 °F (36.4 °C), Max:99.2 °F (37.3 °C)      PHYSICAL EXAM:  General: Morbidly obese, WD, WN. Alert, cooperative, no acute distress    EENT:  EOMI. Anicteric sclerae. MMM  Resp:  Clear in apex with decreased breath sounds at bases. no wheezing or rales. No accessory muscle use  CV:  Regular  rhythm,  No edema  GI:  Soft, Non distended, Non tender. +Bowel sounds  Neurologic:  Alert and oriented X 3, normal speech,   Psych:   Good insight. Not anxious nor agitated  Skin:  No rashes.   No jaundice    Labs:   Lab Results   Component Value Date/Time    WBC 22.3 (H) 2022 04:59 AM    HGB 10.9 (L) 2022 04:59 AM    HCT 34.6 (L) 2022 04:59 AM    PLATELET 55 (L)  04:59 AM    MCV 93.5 2022 04:59 AM     Lab Results   Component Value Date/Time    Sodium 143 2022 04:59 AM    Potassium 4.2 2022 04:59 AM    Chloride 115 (H) 2022 04:59 AM    CO2 25 2022 04:59 AM    Anion gap 3 (L) 2022 04:59 AM    Glucose 116 (H) 2022 04:59 AM    BUN 40 (H) 2022 04:59 AM    Creatinine 0.91 2022 04:59 AM    BUN/Creatinine ratio 44 (H) 2022 04:59 AM    GFR est AA >60 2022 04:59 AM    GFR est non-AA >60 2022 04:59 AM Calcium 8.0 (L) 03/03/2022 04:59 AM    Bilirubin, total 0.4 03/01/2022 03:00 PM    Alk.  phosphatase 213 (H) 03/01/2022 03:00 PM    Protein, total 5.6 (L) 03/01/2022 03:00 PM    Albumin 2.0 (L) 03/01/2022 03:00 PM    Globulin 3.6 03/01/2022 03:00 PM    A-G Ratio 0.6 (L) 03/01/2022 03:00 PM    ALT (SGPT) 25 03/01/2022 03:00 PM     Results     Procedure Component Value Units Date/Time    CULTURE, URINE [[id number]] Collected: 03/03/22 0340    Order Status: Completed Updated: 03/03/22 0520    CULTURE, BLOOD [[id number]] Collected: 03/02/22 0507    Order Status: Completed Specimen: Blood Updated: 03/03/22 0629     Special Requests: NO SPECIAL REQUESTS        Culture result: NO GROWTH 1 DAY       CULTURE, BLOOD, PAIRED [[id number]]     Order Status: Canceled Specimen: Blood     CULTURE, BLOOD, PAIRED [[id number]]  (Abnormal)  (Susceptibility) Collected: 02/26/22 0736    Order Status: Completed Specimen: Blood Updated: 03/02/22 0547     Special Requests: No Special Requests        Culture result:       Klebsiella pneumoniae growing in all 4 bottles drawn            (NOTE) GVR GROWING IN 4 OF 4 BOTTLES CALLED TO AND READ BACK BY Gia Powell MLT AT 1250 ON 02/27/22 BY MD.    Susceptibility      Klebsiella pneumoniae     RENETTA     Amikacin ($) Susceptible     Ampicillin ($) Resistant     Ampicillin/sulbactam ($) Susceptible     Cefazolin ($) Susceptible     Cefepime ($$) Susceptible     Cefoxitin Susceptible     Ceftazidime ($) Susceptible     Ceftriaxone ($) Susceptible     Ciprofloxacin ($) Susceptible     Gentamicin ($) Susceptible     Levofloxacin ($) Susceptible     Meropenem ($$) Susceptible     Piperacillin/Tazobac ($) Susceptible     Tobramycin ($) Susceptible     Trimeth/Sulfa Susceptible                  Linear View                   COVID-19 RAPID TEST [[id number]] Collected: 02/26/22 0606    Order Status: Completed Specimen: Nasopharyngeal Updated: 02/26/22 0703     COVID-19 rapid test Not Detected Comment: Rapid Abbott ID Now   Rapid NAAT:  The specimen is NEGATIVE for SARS-CoV-2, the novel coronavirus associated with COVID-19. Negative results should be treated as presumptive and, if inconsistent with clinical signs and symptoms or necessary for patient management, should be tested with an alternative molecular assay. Negative results do not preclude SARS-CoV-2 infection and should not be used as the sole basis for patient management decisions. This test has been authorized by the FDA under   an Emergency Use Authorization (EUA) for use by authorized laboratories. Fact sheet for Healthcare Providers: [employer].co.nz Fact sheet for Patients: [employer]nz   Methodology: Isothermal Nucleic Acid Amplification               Assessment and Plan   Right ureteral calculus  Presumed Complicated UTI (urine cx not done prior to procedure)  Bacteremia  S/p cystoscopy insertion right ureteral stent (2/26)  - afebrile, WBC 22.3    U/A (2/26) >100 WBC with 4+ bacteria, urine cx was not processed    U/A (3/3) 20-50 WBC, cx pending    blood cx (2/26) klebsiella pneumoniae    Blood cx (3/2) no growth so far    CT of abd/pel (2/26) bilateral renal calculi, right greater than left, obstruction of the right kidney and right ureter secondary to an 8 mm stone positioned approximately 2 cm proximal to the  right ureterovesical junction. Renal US (3/2) Bilateral nonobstructive nephrolithiasis. Right nephroureteral stent in place. No hydronephrosis. Continue with IV merrem     source of bacteremia most likely secondary to complicated UTI. However, there was no urine cx done prior to cystoscopy and right ureteral stent placement.  Pt has hx of ESBL E-coli in the past.    Repeat UA on 3/3 revealed 20-50 WBC while on IV ABX, urine cx pending      Adjust abx prn    Fever work up if temp >= 100.4      Above plan of care discussed and agreed with Dr. Reece Rodriguez Sukhdeep Garcia NP

## 2022-03-03 NOTE — PROGRESS NOTES
Occupational Therapy  03/03/22     Patient currently on OT caseload. OT tx attempted at 3:17 PM however patient off the floor in cardiac cath lab at this time. Will continue to follow patient and attempt OT at a later time.      Thank you,  Feliberto Santoyo, OTR/L

## 2022-03-03 NOTE — PROGRESS NOTES
Transfer to [address] [place] [address] from Procedure Area    Verbal report given to Jasper Reeder on [place] being transferred to Cardiac Cath Lab  for routine progression of care   Patient is post [facility] procedure. Patient stable upon transfer to . Report consisted of patients Situation, Background, Assessment and   Recommendations(SBAR). Information from the following report(s) Procedure Summary, MAR and Recent Results was reviewed with the receiving nurse. Opportunity for questions and clarification was provided. Patient medicated during procedure with orders obtained and verified by Dr. Annette Robbins. Refer to patient PROCEDURE REPORT for vital signs, assessment, status, and response during procedure. I will STOP taking the medications listed below when I get home from the hospital:    senna oral tablet  -- 2 tab(s) by mouth once a day (at bedtime)

## 2022-03-03 NOTE — PROGRESS NOTES
Hospitalist Progress Note                                     NAME:  Johny Mccauley  :  1950  MRN:  [id number]  Date of Service:  3/3/2022    Summary: 73 y.o. female with past medical history of HTN, dyslipidemia, morbid obesity, kidney stone who presented on 2022 with generalized body weakness and SOB. CT scan of the abdomen performed showed new right urinary tract obstruction       Assessment/Plan:  Right ureteral calculus with obstruction and pyelonephritis  - S/p cystourethroscopy with right ureteral stent placement  Urology consulted and recs noted    Sepsis due to UTI/ Klebsiella bacteremia: Likely due to UTI and ureteral obstruction  Repeat blood cultures ordered; cont IVF NS ;  abx management per ID    Elevated troponin:   Cardiology workup, abnormal stress test showing Inferoapical ischemia  EKG showed no acute ischemic changes  Allergic to aspirin  Plans for heart cath today    ELLYN- resolved:   Due to post obstructive uropathy  Scr on admission 3.21, resolved   nephrology consulted and managing    Leukocytosis- persists  Likely due to UTI, bacteremia  Continue I.V antibiotics    Thrombocytopenia  Due to bacteremia, septicemia- - Monitor for now    Hypotension- resolved with treatment of bacteremia and IVFluids    Overweight: BMI 45.61     Code status: Full  DVT prophylaxsis: SCD  Dispo: tbd          Interval History/Subjective:  No acute events overnight. Reports some RLQ abd pain when she moves. No n/v/d, no dyspnea. Review of Systems:  A comprehensive review of systems was negative except for that written in the HPI. Objective:     VITALS:   Last 24hrs VS reviewed since prior progress note.  Most recent are:  Visit Vitals  BP (!) 164/76   Pulse 77   Temp 99.2 °F (37.3 °C)   Resp 14   Ht 5' 8\" (1.727 m)   Wt 137 kg (302 lb 0.5 oz)   SpO2 100%   BMI 45.92 kg/m²     Patient Vitals for the past 24 hrs:   Temp Pulse Resp BP SpO2   22 1052  77  (!) 164/76    03/03/22 1000  80      03/03/22 0335 99.2 °F (37.3 °C) 71 14 (!) 159/86    03/02/22 2317 98.9 °F (37.2 °C) 79 16 (!) 156/77 100 %   03/02/22 1922 98.5 °F (36.9 °C) 88 20 (!) 168/87 99 %   03/02/22 1800  96      03/02/22 1525 98.5 °F (36.9 °C) 89 17 (!) 157/87 97 %   03/02/22 1500  89  (!) 168/83    03/02/22 1200  84      03/02/22 1157 97.6 °F (36.4 °C) 85 14 (!) 174/91 96 %       Intake/Output Summary (Last 24 hours) at 3/3/2022 1154  Last data filed at 3/3/2022 0335  Gross per 24 hour   Intake    Output 600 ml   Net -600 ml        PHYSICAL EXAM:  General: No acute distress, obese  EENT: EOMI. Anicteric sclerae. Oral mucousa moist,  Resp: CTA bilaterally. No wheezing/rhonchi/rales. No accessory muscle use  CV: Regular rhythm, normal rate, no murmurs, gallops, rubs  GI: Soft, non distended, mild RLQ tenderness, BS+  Extremities: No edema, warm, 2+ pulses throughout  Neurologic: Moves all extremities. AAOx3,   Psych: Good insight. Not anxious nor agitated. Skin: dry    Lab Data Personally Reviewed: (see below)     Medications list Personally Reviewed:  x YES  NO     _______________________________________________________________________  Care Plan discussed with:  Patient/Family, Nurse and       Irais Adair MD     Procedures: see electronic medical records for all procedures/Xrays and details which were not copied into this note but were reviewed prior to creation of Plan.       LABS:  Recent Labs     03/03/22  0459 03/02/22  0507   WBC 22.3* 22.2*   HGB 10.9* 11.6   HCT 34.6* 36.3   PLT 55* 38*     Recent Labs     03/03/22  0459 03/02/22  0507 03/01/22  1500    143 144   K 4.2 3.8 4.0   * 118* 117*   CO2 25 22 19*   BUN 40* 54* 68*   CREA 0.91 1.18* 1.55*   * 108* 119*   CA 8.0* 8.0* 8.0*   MG  --  2.6* 2.7*   PHOS  --  1.9* 2.3*     Recent Labs     03/01/22  1500   ALT 25   *   TBILI 0.4   TP 5.6*   ALB 2.0*   GLOB 3.6     No results for input(s): INR, PTP, APTT, INREXT, INREXT in the last 72 hours. No results for input(s): FE, TIBC, PSAT, FERR in the last 72 hours. No results found for: FOL, RBCF   No results for input(s): PH, PCO2, PO2 in the last 72 hours. No results for input(s): CPK, CKNDX, TROIQ in the last 72 hours.     No lab exists for component: CPKMB  No results found for: CHOL, CHOLX, CHLST, CHOLV, HDL, HDLP, LDL, LDLC, DLDLP, TGLX, TRIGL, TRIGP, CHHD, CHHDX  Lab Results   Component Value Date/Time    Glucose (POC) 129 (H) 03/03/2022 07:52 AM    Glucose (POC) 147 (H) 03/02/2022 09:20 PM    Glucose (POC) 164 (H) 03/02/2022 05:45 PM    Glucose (POC) 76 03/02/2022 11:56 AM    Glucose (POC) 70 03/02/2022 09:02 AM     Lab Results   Component Value Date/Time    Color YELLOW/STRAW 03/03/2022 03:40 AM    Appearance CLEAR 03/03/2022 03:40 AM    Specific gravity 1.015 03/03/2022 03:40 AM    Specific gravity 1.015 02/26/2022 08:39 AM    pH (UA) 6.0 03/03/2022 03:40 AM    Protein 30 (A) 03/03/2022 03:40 AM    Glucose Negative 03/03/2022 03:40 AM    Ketone Negative 03/03/2022 03:40 AM    Bilirubin Negative 03/03/2022 03:40 AM    Urobilinogen 0.2 03/03/2022 03:40 AM    Nitrites Negative 03/03/2022 03:40 AM    Leukocyte Esterase SMALL (A) 03/03/2022 03:40 AM    Epithelial cells FEW 03/03/2022 03:40 AM    Bacteria Negative 03/03/2022 03:40 AM    WBC 20-50 03/03/2022 03:40 AM    RBC >100 (H) 03/03/2022 03:40 AM     Results     Procedure Component Value Units Date/Time    CULTURE, URINE [[id number]] Collected: 03/03/22 0340    Order Status: Completed Updated: 03/03/22 0520    CULTURE, BLOOD [[id number]] Collected: 03/02/22 0507    Order Status: Completed Specimen: Blood Updated: 03/03/22 0629     Special Requests: NO SPECIAL REQUESTS        Culture result: NO GROWTH 1 DAY       CULTURE, BLOOD, PAIRED [[id number]]     Order Status: Canceled Specimen: Blood     CULTURE, BLOOD, PAIRED [[id number]]  (Abnormal)  (Susceptibility) Collected: 02/26/22 7218    Order Status: Completed Specimen: Blood Updated: 03/02/22 0547     Special Requests: No Special Requests        Culture result:       Klebsiella pneumoniae growing in all 4 bottles drawn            (NOTE) GVR GROWING IN 4 OF 4 BOTTLES CALLED TO AND READ BACK BY Dominic Rubio MLT AT 1250 ON 02/27/22 BY MD.    Susceptibility      Klebsiella pneumoniae     RENETTA     Amikacin ($) Susceptible     Ampicillin ($) Resistant     Ampicillin/sulbactam ($) Susceptible     Cefazolin ($) Susceptible     Cefepime ($$) Susceptible     Cefoxitin Susceptible     Ceftazidime ($) Susceptible     Ceftriaxone ($) Susceptible     Ciprofloxacin ($) Susceptible     Gentamicin ($) Susceptible     Levofloxacin ($) Susceptible     Meropenem ($$) Susceptible     Piperacillin/Tazobac ($) Susceptible     Tobramycin ($) Susceptible     Trimeth/Sulfa Susceptible                  Linear View                   COVID-19 RAPID TEST [[id number]] Collected: 02/26/22 0606    Order Status: Completed Specimen: Nasopharyngeal Updated: 02/26/22 0703     COVID-19 rapid test Not Detected        Comment: Rapid Abbott ID Now   Rapid NAAT:  The specimen is NEGATIVE for SARS-CoV-2, the novel coronavirus associated with COVID-19. Negative results should be treated as presumptive and, if inconsistent with clinical signs and symptoms or necessary for patient management, should be tested with an alternative molecular assay. Negative results do not preclude SARS-CoV-2 infection and should not be used as the sole basis for patient management decisions. This test has been authorized by the FDA under   an Emergency Use Authorization (EUA) for use by authorized laboratories. Fact sheet for Healthcare Providers: ConventionUpdate.co.nz Fact sheet for Patients: ConventionUpdate.co.nz   Methodology: Isothermal Nucleic Acid Amplification             .   Current Facility-Administered Medications:     metoprolol tartrate (LOPRESSOR) tablet 100 mg, 100 mg, Oral, BID, Henok Wilks MD, 100 mg at 03/03/22 1052    meropenem (MERREM) 500 mg in sterile water (preservative free) 10 mL IV syringe, 0.5 g, IntraVENous, Q6H, Trey Schmid MD, 500 mg at 03/03/22 1100    amLODIPine (NORVASC) tablet 10 mg, 10 mg, Oral, DAILY, Henok Wilks MD, 10 mg at 03/03/22 1053    gabapentin (NEURONTIN) capsule 300 mg, 300 mg, Oral, BID, Trey Schmid MD, 300 mg at 03/03/22 1053    morphine injection 2 mg, 2 mg, IntraVENous, Q4H PRN, Kristie Nj MD, 2 mg at 03/02/22 1937    albuterol-ipratropium (DUO-NEB) 2.5 MG-0.5 MG/3 ML, 3 mL, Nebulization, Q4H PRN, Corine Adam MD    atorvastatin (LIPITOR) tablet 10 mg, 10 mg, Oral, QHS, Corine Adam MD, 10 mg at 03/02/22 2121    ferrous sulfate tablet 325 mg, 1 Tablet, Oral, ACB, Corine Adam MD, 325 mg at 03/03/22 0642    oxybutynin (DITROPAN) tablet 5 mg, 5 mg, Oral, BID, Corine Adam MD, 5 mg at 03/03/22 1053    tamsulosin (FLOMAX) capsule 0.4 mg, 0.4 mg, Oral, DAILY, Corine Adam MD, 0.4 mg at 03/03/22 1052    sodium chloride (NS) flush 5-40 mL, 5-40 mL, IntraVENous, Q8H, Corine Adam MD, 10 mL at 03/02/22 1938    sodium chloride (NS) flush 5-40 mL, 5-40 mL, IntraVENous, PRN, Corine Adam MD, 10 mL at 03/03/22 1102    acetaminophen (TYLENOL) tablet 650 mg, 650 mg, Oral, Q6H PRN, 650 mg at 03/02/22 1359 **OR** acetaminophen (TYLENOL) suppository 650 mg, 650 mg, Rectal, Q6H PRN, Corine Adam MD    polyethylene glycol (MIRALAX) packet 17 g, 17 g, Oral, DAILY PRN, Corine Adam MD, 17 g at 03/02/22 1058    ondansetron (ZOFRAN ODT) tablet 4 mg, 4 mg, Oral, Q8H PRN **OR** ondansetron (ZOFRAN) injection 4 mg, 4 mg, IntraVENous, Q6H PRN, Corine Adam MD    L.acidophilus-paracasei-S.thermophil-bifidobacter (RISAQUAD) 8 billion cell capsule, 1 Capsule, Oral, DAILY, Corine Adam MD, 1 Capsule at 03/03/22 1059    insulin lispro (HUMALOG) injection, , SubCUTAneous, AC&HS, Corine Adam MD, 2 Units at 03/02/22 1939    glucose chewable tablet 16 g, 4 Tablet, Oral, PRN, Corine Adam MD    glucagon (GLUCAGEN) injection 1 mg, 1 mg, IntraMUSCular, PRN, Corine Adam MD    dextrose 10 % infusion 0-250 mL, 0-250 mL, IntraVENous, PRN, Thierry Dutta MD

## 2022-03-03 NOTE — PROGRESS NOTES
TRANSFER - IN REPORT:    Verbal report received from Wilmer on [place]  being received from Cath lab procedure for routine progression of care. Report consisted of patients Situation, Background, Assessment and Recommendations(SBAR). Information from the following report(s) SBAR, Procedure Summary and MAR was reviewed with the receiving clinician. Opportunity for questions and clarification was provided. Assessment completed upon patients arrival to [address] [place] [address] and care assumed. Cardiac Cath Lab Recovery Arrival Note:    [place] arrived to [facility]. Patient procedure= LHC. Patient on cardiac monitor, non-invasive blood pressure, SPO2 monitor. On room air. IV  of Normal saline on pump at 75 ml/hr. Patient status doing well without problems. Patient is A&Ox 4. Patient reports no pain. PROCEDURE SITE CHECK:    Procedure site:without any bleeding and no hematoma, no pain/discomfort reported at procedure site. No change in patient status. Continue to monitor patient and status. 1742- TRANSFER - OUT REPORT:    Verbal report given to JUAN Rayo (name) on [place]  being transferred to [facility]) for routine progression of care       Report consisted of patients Situation, Background, Assessment and   Recommendations(SBAR). Information from the following report(s) SBAR, Procedure Summary and MAR was reviewed with the receiving nurse. Lines:   Peripheral IV 03/02/22 Left Antecubital (Active)   Site Assessment Clean, dry, & intact 03/03/22 0335   Phlebitis Assessment 0 03/03/22 0335   Infiltration Assessment 0 03/03/22 0335   Dressing Status Clean, dry, & intact 03/03/22 0335   Dressing Type Tape;Transparent 03/03/22 0335   Hub Color/Line Status Pink; Infusing 03/03/22 0335   Action Taken Open ports on tubing capped 03/03/22 0335       Peripheral IV 03/03/22 Right Antecubital (Active)        Opportunity for questions and clarification was provided.       Patient transported with:   Monitor

## 2022-03-03 NOTE — PROGRESS NOTES
Bedside and Verbal shift change report given to Lazara Sue (oncoming nurse) by Jaleel Mccormick (offgoing nurse).  Report included the following information Kardex, ED Summary, Intake/Output, MAR, Recent Results, Med Rec Status and Cardiac Rhythm SR.

## 2022-03-03 NOTE — PROGRESS NOTES
Patient is very hard stick, two Rn's  attempts to draw morning labs unsuccessful, will notify  Provider.     0400:R stick for morning  labs

## 2022-03-03 NOTE — PROGRESS NOTES
Cardiac Cath Lab Procedure Area Arrival Note:    Khanh López arrived to Cardiac Cath Lab, Procedure Area. Patient identifiers verified with NAME and DATE OF BIRTH. Procedure verified with patient. Consent forms verified. Allergies verified. Patient informed of procedure and plan of care. Questions answered with review. Patient voiced understanding of procedure and plan of care. Patient on cardiac monitor, non-invasive blood pressure, SPO2 monitor. On RA . Patient status doing well without problems. Patient is A&Ox 4. Patient reports no chest pain currently; experiences chest pain with activity. Patient medicated during procedure with orders obtained and verified by Dr. Len Lemus. Refer to patients Cardiac Cath Lab PROCEDURE REPORT for vital signs, assessment, status, and response during procedure, printed at end of case. Printed report on chart or scanned into chart.

## 2022-03-04 ENCOUNTER — APPOINTMENT (OUTPATIENT)
Dept: CT IMAGING | Age: 72
DRG: 854 | End: 2022-03-04
Attending: HOSPITALIST
Payer: MEDICARE

## 2022-03-04 LAB
ANION GAP SERPL CALC-SCNC: 2 MMOL/L (ref 5–15)
ATRIAL RATE: 75 BPM
BACTERIA SPEC CULT: NORMAL
BUN SERPL-MCNC: 28 MG/DL (ref 6–20)
BUN/CREAT SERPL: 55 (ref 12–20)
CALCIUM SERPL-MCNC: 6.6 MG/DL (ref 8.5–10.1)
CALCULATED P AXIS, ECG09: 56 DEGREES
CALCULATED R AXIS, ECG10: -6 DEGREES
CALCULATED T AXIS, ECG11: 16 DEGREES
CHLORIDE SERPL-SCNC: 122 MMOL/L (ref 97–108)
CO2 SERPL-SCNC: 21 MMOL/L (ref 21–32)
CREAT SERPL-MCNC: 0.51 MG/DL (ref 0.55–1.02)
DIAGNOSIS, 93000: NORMAL
ERYTHROCYTE [DISTWIDTH] IN BLOOD BY AUTOMATED COUNT: 16.8 % (ref 11.5–14.5)
GLUCOSE BLD STRIP.AUTO-MCNC: 172 MG/DL (ref 65–117)
GLUCOSE BLD STRIP.AUTO-MCNC: 85 MG/DL (ref 65–117)
GLUCOSE BLD STRIP.AUTO-MCNC: 86 MG/DL (ref 65–117)
GLUCOSE BLD STRIP.AUTO-MCNC: 88 MG/DL (ref 65–117)
GLUCOSE SERPL-MCNC: 96 MG/DL (ref 65–100)
HCT VFR BLD AUTO: 37.7 % (ref 35–47)
HGB BLD-MCNC: 11.5 G/DL (ref 11.5–16)
MCH RBC QN AUTO: 29.6 PG (ref 26–34)
MCHC RBC AUTO-ENTMCNC: 30.5 G/DL (ref 30–36.5)
MCV RBC AUTO: 96.9 FL (ref 80–99)
NRBC # BLD: 0.02 K/UL (ref 0–0.01)
NRBC BLD-RTO: 0.1 PER 100 WBC
P-R INTERVAL, ECG05: 186 MS
PLATELET # BLD AUTO: 91 K/UL (ref 150–400)
POTASSIUM SERPL-SCNC: 4.1 MMOL/L (ref 3.5–5.1)
Q-T INTERVAL, ECG07: 382 MS
QRS DURATION, ECG06: 82 MS
QTC CALCULATION (BEZET), ECG08: 426 MS
RBC # BLD AUTO: 3.89 M/UL (ref 3.8–5.2)
SERVICE CMNT-IMP: ABNORMAL
SERVICE CMNT-IMP: NORMAL
SODIUM SERPL-SCNC: 145 MMOL/L (ref 136–145)
VENTRICULAR RATE, ECG03: 75 BPM
WBC # BLD AUTO: 22.8 K/UL (ref 3.6–11)

## 2022-03-04 PROCEDURE — 74011250637 HC RX REV CODE- 250/637: Performed by: INTERNAL MEDICINE

## 2022-03-04 PROCEDURE — 93005 ELECTROCARDIOGRAM TRACING: CPT

## 2022-03-04 PROCEDURE — 85027 COMPLETE CBC AUTOMATED: CPT

## 2022-03-04 PROCEDURE — 74011250636 HC RX REV CODE- 250/636: Performed by: HOSPITALIST

## 2022-03-04 PROCEDURE — 97116 GAIT TRAINING THERAPY: CPT

## 2022-03-04 PROCEDURE — 97530 THERAPEUTIC ACTIVITIES: CPT

## 2022-03-04 PROCEDURE — 94760 N-INVAS EAR/PLS OXIMETRY 1: CPT

## 2022-03-04 PROCEDURE — 36415 COLL VENOUS BLD VENIPUNCTURE: CPT

## 2022-03-04 PROCEDURE — 74011000250 HC RX REV CODE- 250: Performed by: INTERNAL MEDICINE

## 2022-03-04 PROCEDURE — 65660000000 HC RM CCU STEPDOWN

## 2022-03-04 PROCEDURE — 80048 BASIC METABOLIC PNL TOTAL CA: CPT

## 2022-03-04 PROCEDURE — 74177 CT ABD & PELVIS W/CONTRAST: CPT

## 2022-03-04 PROCEDURE — 74011000250 HC RX REV CODE- 250: Performed by: FAMILY MEDICINE

## 2022-03-04 PROCEDURE — 74011000636 HC RX REV CODE- 636: Performed by: RADIOLOGY

## 2022-03-04 PROCEDURE — 97535 SELF CARE MNGMENT TRAINING: CPT

## 2022-03-04 PROCEDURE — 82962 GLUCOSE BLOOD TEST: CPT

## 2022-03-04 PROCEDURE — 74011250636 HC RX REV CODE- 250/636: Performed by: FAMILY MEDICINE

## 2022-03-04 PROCEDURE — 74011250637 HC RX REV CODE- 250/637: Performed by: FAMILY MEDICINE

## 2022-03-04 RX ORDER — SODIUM CHLORIDE, SODIUM LACTATE, POTASSIUM CHLORIDE, CALCIUM CHLORIDE 600; 310; 30; 20 MG/100ML; MG/100ML; MG/100ML; MG/100ML
75 INJECTION, SOLUTION INTRAVENOUS CONTINUOUS
Status: DISPENSED | OUTPATIENT
Start: 2022-03-04 | End: 2022-03-05

## 2022-03-04 RX ORDER — LEVOFLOXACIN 750 MG/1
750 TABLET ORAL EVERY 24 HOURS
Status: DISCONTINUED | OUTPATIENT
Start: 2022-03-04 | End: 2022-03-10 | Stop reason: HOSPADM

## 2022-03-04 RX ADMIN — METOPROLOL TARTRATE 100 MG: 50 TABLET, FILM COATED ORAL at 18:32

## 2022-03-04 RX ADMIN — OXYBUTYNIN CHLORIDE 5 MG: 5 TABLET ORAL at 09:20

## 2022-03-04 RX ADMIN — Medication 10 ML: at 18:33

## 2022-03-04 RX ADMIN — TAMSULOSIN HYDROCHLORIDE 0.4 MG: 0.4 CAPSULE ORAL at 09:20

## 2022-03-04 RX ADMIN — MEROPENEM 500 MG: 500 INJECTION, POWDER, FOR SOLUTION INTRAVENOUS at 01:13

## 2022-03-04 RX ADMIN — LEVOFLOXACIN 750 MG: 750 TABLET, FILM COATED ORAL at 18:32

## 2022-03-04 RX ADMIN — AMLODIPINE BESYLATE 10 MG: 5 TABLET ORAL at 09:20

## 2022-03-04 RX ADMIN — METOPROLOL TARTRATE 100 MG: 50 TABLET, FILM COATED ORAL at 09:20

## 2022-03-04 RX ADMIN — SODIUM CHLORIDE, POTASSIUM CHLORIDE, SODIUM LACTATE AND CALCIUM CHLORIDE 75 ML/HR: 600; 310; 30; 20 INJECTION, SOLUTION INTRAVENOUS at 21:37

## 2022-03-04 RX ADMIN — FERROUS SULFATE TAB 325 MG (65 MG ELEMENTAL FE) 325 MG: 325 (65 FE) TAB at 06:36

## 2022-03-04 RX ADMIN — MEROPENEM 500 MG: 500 INJECTION, POWDER, FOR SOLUTION INTRAVENOUS at 06:35

## 2022-03-04 RX ADMIN — Medication 1 CAPSULE: at 09:20

## 2022-03-04 RX ADMIN — Medication 10 ML: at 21:25

## 2022-03-04 RX ADMIN — OXYBUTYNIN CHLORIDE 5 MG: 5 TABLET ORAL at 18:32

## 2022-03-04 RX ADMIN — IOPAMIDOL 100 ML: 755 INJECTION, SOLUTION INTRAVENOUS at 17:02

## 2022-03-04 RX ADMIN — ATORVASTATIN CALCIUM 10 MG: 10 TABLET, FILM COATED ORAL at 21:25

## 2022-03-04 RX ADMIN — GABAPENTIN 300 MG: 300 CAPSULE ORAL at 21:25

## 2022-03-04 RX ADMIN — GABAPENTIN 300 MG: 300 CAPSULE ORAL at 09:20

## 2022-03-04 RX ADMIN — Medication 10 ML: at 06:36

## 2022-03-04 NOTE — PROGRESS NOTES
ID Progress Note  3/4/2022    Subjective:     Up in chair, moving around more and more    Objective:     Antibiotics:  1. Merrem      Vitals:   Visit Vitals  /73 (BP 1 Location: Left upper arm, BP Patient Position: At rest)   Pulse 77   Temp 97.6 °F (36.4 °C)   Resp 18   Ht 5' 8\" (1.727 m)   Wt 137 kg (302 lb 0.5 oz)   SpO2 96%   BMI 45.92 kg/m²        Tmax:  Temp (24hrs), Av °F (36.7 °C), Min:97.4 °F (36.3 °C), Max:99 °F (37.2 °C)      Exam:  Lungs:  clear to auscultation bilaterally  Heart:  regular rate and rhythm  Abdomen:  soft, non-tender. Bowel sounds normal. No masses,  no organomegaly    Labs:      Recent Labs     22  0629 22  0459 22  0507   WBC 22.8* 22.3* 22.2*   HGB 11.5 10.9* 11.6   PLT 91* 55* 38*   BUN 28* 40* 54*   CREA 0.51* 0.91 1.18*       Cultures:     No results found for: SDES  Lab Results   Component Value Date/Time    Culture result: No growth (<1,000 CFU/ML) 2022 03:40 AM    Culture result: NO GROWTH 2 DAYS 2022 05:07 AM    Culture result: (A) 2022 07:36 AM     Klebsiella pneumoniae growing in all 4 bottles drawn    Culture result:  2022 07:36 AM     (NOTE) GVR GROWING IN 4 OF 4 BOTTLES CALLED TO AND READ BACK BY Dominic Rubio T AT 1250 ON 22 BY MD.       Radiology:     Line/Insert Date:           Assessment:     1. UTI with bacteremia  2. Complicated UTI--Klebsiella from culture  3. Renal stones    Objective:     1. Change to levofloxacin  2.  She can complete 2 weeks of therapy upon discharge with oral levofloxacin 750 mg daily--QTc 426    Adriana Schultz MD

## 2022-03-04 NOTE — PROGRESS NOTES
Problem: Mobility Impaired (Adult and Pediatric)  Goal: *Acute Goals and Plan of Care (Insert Text)  Description: FUNCTIONAL STATUS PRIOR TO ADMISSION: Patient was modified independent using a single point cane for functional mobility. Patient reports being able to manage ambulation in a grocery store with a combination of cane and scooter. HOME SUPPORT PRIOR TO ADMISSION: The patient had a roommate who lived with her. The patient did most cooking and cleaning. Her roommate assisted with LE dressing PRN. Physical Therapy Goals  Initiated 3/2/2022  1. Patient will move from supine to sit and sit to supine  in bed with moderate assistance  within 7 day(s). 2.  Patient will transfer from bed to chair and chair to bed with moderate assistance  using the least restrictive device within 7 day(s). 3.  Patient will perform sit to stand with minimal assistance/contact guard assist within 7 day(s). 4.  Patient will ambulate with minimal assistance/contact guard assist for 75 feet with the least restrictive device within 7 day(s). Outcome: Progressing Towards Goal   PHYSICAL THERAPY TREATMENT  Patient: Rashmi Chavez (19 y.o. female)  Date: 3/4/2022  Diagnosis: Sepsis ([facility] [address] [A41.9] Sepsis ([address]  Procedure(s) (LRB):  LEFT HEART CATH / CORONARY ANGIOGRAPHY (N/A) 1 Day Post-Op  Precautions: Fall  Chart, physical therapy assessment, plan of care and goals were reviewed. ASSESSMENT  Patient continues with skilled PT services and is slowly progressing towards goals. Pt received in bed and agreeable to participating with therapy. Pt noted to be soiled with urine due to purewick use. Pt assisted with hygiene and removed purewick. Pt requiring minimal assistance for mobility and tolerated ambulation with a rolling walker for a short distance within the room. Pt reported lightheadedness initially upon sitting EOB with stable BP and resolved quickly. Pt remained up in chair at session end.  Encouraged pt to request assistance for using bathroom during the day and bedside commode only at night if needed and to avoid using purewick to increase her mobility. Also encouraged pt to be OOB for all meals but reminded her to have assistance for all mobility when OOB. For the weekend, recommend patient to complete as able in order to maintain strength, endurance and independence with nursing: OOB to chair 3x/day with one person assistance and ambulating with rolling walker within room. PT to follow-up with patient after the weekend. Current Level of Function Impacting Discharge (mobility/balance): bed mobility minimal assist x 2, sit to stand minimal assist x 2, ambulation with rolling walker x 14 feet with minimal assist x 1    Other factors to consider for discharge: fall risk, below her baseline, lives with a roommate         PLAN :  Patient continues to benefit from skilled intervention to address the above impairments. Continue treatment per established plan of care. to address goals. Recommendation for discharge: (in order for the patient to meet his/her long term goals)  Therapy up to 5 days/week in SNF setting    This discharge recommendation:  Has not yet been discussed the attending provider and/or case management    IF patient discharges home will need the following DME: patient owns DME required for discharge       SUBJECTIVE:   Patient without complaints and agreeable to fully participate with therapy. OBJECTIVE DATA SUMMARY:   Critical Behavior:  Neurologic State: Alert  Orientation Level: Oriented X4  Cognition: Appropriate decision making,Follows commands  Safety/Judgement: Awareness of environment,Home safety  Functional Mobility Training:  Bed Mobility:     Supine to Sit: Minimum assistance; Adaptive equipment; Additional time;Assist x2     Scooting: Minimum assistance        Transfers:  Sit to Stand: Minimum assistance;Assist x2  Stand to Sit: Contact guard assistance Balance:  Sitting: Intact  Standing: Impaired  Standing - Static: Constant support; Fair  Standing - Dynamic : Constant support; Fair  Ambulation/Gait Training:  Distance (ft): 14 Feet (ft)  Assistive Device: Gait belt;Walker, rolling  Ambulation - Level of Assistance: Minimal assistance;Assist x1        Gait Abnormalities: Decreased step clearance, mildly flexed posture, slow pace                                  Pain Rating:  None reported     Activity Tolerance:   Fair, fatigues quickly   Vitals:    03/04/22 1108 03/04/22 1117     BP: 135/77 (!) 143/76     BP 1 Location: Left upper arm Left upper arm     BP Patient Position: Sitting Standing     Pulse: 80 80     Temp:       Resp:       Height:       Weight:       SpO2:          After treatment patient left in no apparent distress:   Sitting in chair and Call bell within reach    COMMUNICATION/COLLABORATION:   The patients plan of care was discussed with: Registered nurse.      Elisa Ramirez   Time Calculation: 29 mins

## 2022-03-04 NOTE — PROGRESS NOTES
Cardiology Progress Note  3/4/2022     Admit Date: 2022  Admit Diagnosis: Sepsis ([facility] [address] [A41.9]  CC: none currently    Assessment/Plan:   No significant CAD on cath  Continue current cardiac meds  Signing off, please call with questions    Subjective: Dasha Davis reports   Chest Pain:  [x]  none;  consistent with []  non-cardiac  []  atypical  []  angina             [x]  none now    []  on-going  Dyspnea: [x]  none    []  at rest    []  with exertion   []  improved    []  unchanged    []  worsening  PND:       [x]  none      []  overnight      Orthopnea:   [x]  none        []  improved         []  unchanged        []  worsening  Presyncope: [x]  none        []  improved         []  unchanged        []  worsening  Ambulated in hallway without symptoms  []  Yes  Ambulated in room without symptoms  []  Yes    Objective:    Physical Exam:  Overall VSSAF;    Visit Vitals  /69 (BP 1 Location: Left upper arm, BP Patient Position: At rest)   Pulse 78   Temp 97.8 °F (36.6 °C)   Resp 20   Ht 5' 8\" (1.727 m)   Wt 137 kg (302 lb 0.5 oz)   SpO2 94%   BMI 45.92 kg/m²     Temp (24hrs), Av.1 °F (36.7 °C), Min:97.4 °F (36.3 °C), Max:99 °F (37.2 °C)    Patient Vitals for the past 8 hrs:   Pulse   22 1223 78   22 1117 80   22 1108 80   22 1100 80   22 0833 72    Patient Vitals for the past 8 hrs:   Resp   22 1223 20   22 0833 18    Patient Vitals for the past 8 hrs:   BP   22 1223 136/69   22 1117 (!) 143/76   22 1108 135/77   22 1100 (!) 148/82   22 0833 (!) 146/88          Intake/Output Summary (Last 24 hours) at 3/4/2022 1406  Last data filed at 3/4/2022 0915  Gross per 24 hour   Intake    Output 1800 ml   Net -1800 ml       General Appearance: Well developed, well nourished, no acute distress. Ears/Nose/Mouth/Throat:   Normal MM; anicteric. JVP: WNL   Resp:   Lungs clear to auscultation bilaterally. Nl resp effort. Cardiovascular:  RRR, S1, S2 normal, no new murmur. No gallop or rub. Abdomen:   Soft, non-tender, bowel sounds are present. Extremities: No edema bilaterally. Skin:  Neuro: Warm and dry. A/O x3, grossly nonfocal    []  cath site intact w/o hematoma or bruit; distal pulse unchanged. Data Review:     Telemetry independently reviewed : []  sinus      []  chronic afib     []  par afib    []  NSVT    ECG independently reviewed:  []  NSR         []  no significant changes  [] no new ECG provided for review  Lab results reviewed as noted below. Current medications reviewed as noted below. No results for input(s): PH, PCO2, PO2 in the last 72 hours. No results for input(s): CPK, CKMB, TROIQ in the last 72 hours. Recent Labs     03/04/22  0629 03/03/22  0459 03/02/22  0507 03/01/22  1500 03/01/22  1500    143 143   < > 144   K 4.1 4.2 3.8   < > 4.0   * 115* 118*   < > 117*   CO2 21 25 22   < > 19*   BUN 28* 40* 54*   < > 68*   CREA 0.51* 0.91 1.18*   < > 1.55*   GLU 96 116* 108*   < > 119*   PHOS  --   --  1.9*  --  2.3*   CA 6.6* 8.0* 8.0*   < > 8.0*   ALB  --   --   --   --  2.0*   WBC 22.8* 22.3* 22.2*   < > 20.4*   HGB 11.5 10.9* 11.6   < > 12.3   HCT 37.7 34.6* 36.3   < > 38.7   PLT 91* 55* 38*   < > 30*    < > = values in this interval not displayed. Recent Labs     03/01/22  1500   ALT 25   *   TBILI 0.4   TP 5.6*   ALB 2.0*   GLOB 3.6     No results for input(s): INR, PTP, APTT, INREXT, INREXT in the last 72 hours. No results for input(s): FE, TIBC, PSAT, FERR in the last 72 hours.    Lab Results   Component Value Date/Time    Glucose (POC) 88 03/04/2022 11:25 AM    Glucose (POC) 86 03/04/2022 06:34 AM    Glucose (POC) 175 (H) 03/03/2022 09:12 PM    Glucose (POC) 106 03/03/2022 05:56 PM    Glucose (POC) 84 03/03/2022 12:41 PM       Current Facility-Administered Medications   Medication Dose Route Frequency    lactated Ringers infusion  75 mL/hr IntraVENous CONTINUOUS    metoprolol tartrate (LOPRESSOR) tablet 100 mg  100 mg Oral BID    meropenem (MERREM) 500 mg in sterile water (preservative free) 10 mL IV syringe  0.5 g IntraVENous Q6H    amLODIPine (NORVASC) tablet 10 mg  10 mg Oral DAILY    gabapentin (NEURONTIN) capsule 300 mg  300 mg Oral BID    morphine injection 2 mg  2 mg IntraVENous Q4H PRN    albuterol-ipratropium (DUO-NEB) 2.5 MG-0.5 MG/3 ML  3 mL Nebulization Q4H PRN    atorvastatin (LIPITOR) tablet 10 mg  10 mg Oral QHS    ferrous sulfate tablet 325 mg  1 Tablet Oral ACB    oxybutynin (DITROPAN) tablet 5 mg  5 mg Oral BID    tamsulosin (FLOMAX) capsule 0.4 mg  0.4 mg Oral DAILY    sodium chloride (NS) flush 5-40 mL  5-40 mL IntraVENous Q8H    sodium chloride (NS) flush 5-40 mL  5-40 mL IntraVENous PRN    acetaminophen (TYLENOL) tablet 650 mg  650 mg Oral Q6H PRN    Or    acetaminophen (TYLENOL) suppository 650 mg  650 mg Rectal Q6H PRN    polyethylene glycol (MIRALAX) packet 17 g  17 g Oral DAILY PRN    ondansetron (ZOFRAN ODT) tablet 4 mg  4 mg Oral Q8H PRN    Or    ondansetron (ZOFRAN) injection 4 mg  4 mg IntraVENous Q6H PRN    L.acidophilus-paracasei-S.thermophil-bifidobacter (RISAQUAD) 8 billion cell capsule  1 Capsule Oral DAILY    insulin lispro (HUMALOG) injection   SubCUTAneous AC&HS    glucose chewable tablet 16 g  4 Tablet Oral PRN    glucagon (GLUCAGEN) injection 1 mg  1 mg IntraMUSCular PRN    dextrose 10 % infusion 0-250 mL  0-250 mL IntraVENous PRN        Shefali Herbert MD

## 2022-03-04 NOTE — PROGRESS NOTES
1745: Patient returned from cath with radial band and 12 cc remaining. 2 cc removed. VSS. Site clean and dry no evidence of bleeding or hematoma. No pain, pulses palpable. 1800: 2cc removed. Site clean and dry no evidence of bleeding or hematoma. No pain, pulses palpable. VSS    1815: 2cc removed. Site clean and dry no evidence of bleeding or hematoma. No pain, pulses palpable. VSS      1830: 2cc removed. Site clean and dry no evidence of bleeding or hematoma. No pain, pulses palpable. VSS    1845: 2cc removed. Site clean and dry no evidence of bleeding or hematoma. No pain, pulses palpable. VSS    1900: Band removed. Site clean and dry no evidence of bleeding or hematoma, placed small gauze and tegaderm to keep area clean. No pain, pulses palpable. VSS.

## 2022-03-04 NOTE — PROGRESS NOTES
TRANSFER - IN REPORT:    Verbal report received from JUAN Macias(name) on [address]  being received from [facility]) for routine progression of care      Report consisted of patients Situation, Background, Assessment and   Recommendations(SBAR). Information from the following report(s) SBAR and Kardex was reviewed with the receiving nurse. Opportunity for questions and clarification was provided. Assessment completed upon patients arrival to unit and care assumed.

## 2022-03-04 NOTE — PROGRESS NOTES
Problem: Self Care Deficits Care Plan (Adult)  Goal: *Acute Goals and Plan of Care (Insert Text)  Description: FUNCTIONAL STATUS PRIOR TO ADMISSION: Patient was modified independent using a walker and single point cane for functional mobility. Patient required minimal assistance for basic and instrumental ADLs, primarily for lower extremity ADLs such as dressing. Patient reports completing IADLs independently such as cooking and cleaning and grocery shopping. Reports independent for community mobility with cane. HOME SUPPORT: The patient lived with her ex brother in law and required minimal assistance/contact guard assist for lower extremity ADLs such as dressing. Occupational Therapy Goals  Initiated 3/2/2022  1. Patient will perform lower body dressing with moderate assistance  within 7 day(s). 2.  Patient will perform grooming at sink/in standing with minimal assistance/contact guard assist within 7 day(s). 3.  Patient will perform anterior bathing from neck to thighs with supervision/set-up within 7 day(s). 4.  Patient will perform toilet transfers with minimal assistance within 7 day(s). 5.  Patient will perform all aspects of toileting with moderate assistance  within 7 day(s). 6.  Patient will participate in upper extremity therapeutic exercise/activities with independence for 10 minutes within 7 day(s). Outcome: Progressing Towards Goal   OCCUPATIONAL THERAPY TREATMENT  Patient: Damian Meza (45 y.o. female)  Date: 3/4/2022  Diagnosis: Sepsis ([address] [A41.9] Sepsis ([facility] [address]  Procedure(s) (LRB):  LEFT HEART CATH / CORONARY ANGIOGRAPHY (N/A) 1 Day Post-Op  Precautions: Fall  Chart, occupational therapy assessment, plan of care, and goals were reviewed. ASSESSMENT  Patient continues with skilled OT services and is progressing towards goals. Patient received reclined in bed, amenable to session. Patient received with coviden saturated d/t poor purewick placement.  Completed pericare with Min A at bedlevel. Completed transfer to EOB with Min A, standing with Min Ax2 and progressed to [id number] using RW. Patient able to ambulate to bathroom, encouraged use of bathroom instead of bedpan with assistance from nursing staff, patient verbalized understanding. Returned to bedside chair, left with all needs in reach, NAD. Current Level of Function Impacting Discharge (ADLs): up to max A ADL    Other factors to consider for discharge: below baseline, s/p cardiac cath          PLAN :  Patient continues to benefit from skilled intervention to address the above impairments. Continue treatment per established plan of care to address goals. Recommend with staff: OOB 3x daily for meals, functional mobility to bathroom    Recommend next OT session: OOB ADL    Recommendation for discharge: (in order for the patient to meet his/her long term goals)  Therapy up to 5 days/week in SNF setting    This discharge recommendation:  Has been made in collaboration with the attending provider and/or case management    IF patient discharges home will need the following DME: TBD pending progress       SUBJECTIVE:   Patient stated Eun Earl don't feel like I'm normal yet.     OBJECTIVE DATA SUMMARY:   Cognitive/Behavioral Status:  Neurologic State: Alert  Orientation Level: Oriented X4  Cognition: Appropriate decision making; Follows commands  Perception: Appears intact  Perseveration: No perseveration noted  Safety/Judgement: Awareness of environment;Home safety    Functional Mobility and Transfers for ADLs:  Bed Mobility:  Supine to Sit: Minimum assistance    Transfers:  Sit to Stand: Minimum assistance;Assist x2     Bed to Chair: Contact guard assistance    Balance:  Sitting: Intact  Sitting - Static: Good (unsupported)  Sitting - Dynamic: Good (unsupported)  Standing: Impaired  Standing - Static: Constant support; Fair  Standing - Dynamic : Constant support; Fair    ADL Intervention:       Grooming  Grooming Assistance: Set-up; Supervision  Position Performed: Seated edge of bed  Washing Hands: Set-up; Supervision         Lower Body Bathing  Perineal  : Contact guard assistance    Upper Body [address]: Minimum  assistance         Toileting  Bladder Hygiene: Contact guard assistance  Clothing Management: Minimum assistance  Cues: Verbal cues provided    Cognitive Retraining  Safety/Judgement: Awareness of environment;Home safety      Pain:  None reported    Activity Tolerance:   Fair and requires rest breaks    After treatment patient left in no apparent distress:   Sitting in chair, Call bell within reach, and RN aware    COMMUNICATION/COLLABORATION:   The patients plan of care was discussed with: Physical therapist and Registered nurse.      Hannah Ho OT  Time Calculation: 29 mins

## 2022-03-04 NOTE — PROGRESS NOTES
Hospitalist Progress Note                                     NAME:  Mabel Couch  :  1950  MRN:  [id number]  Date of Service:  3/4/2022    Summary: 73 y.o. female with past medical history of HTN, dyslipidemia, morbid obesity, kidney stone who presented on 2022 with generalized body weakness and SOB. CT scan of the abdomen performed showed new right urinary tract obstruction       Assessment/Plan:  Right ureteral calculus with obstruction and pyelonephritis  - S/p cystourethroscopy with right ureteral stent placement  Urology consulted and recs noted    Sepsis due to UTI/ Klebsiella bacteremia: Likely due to UTI and ureteral obstruction  Repeat blood cultures ordered. NGTD. On IV meropenem, d/w ID can likely change to PO levofloxacin on discharge. Will obtain a CT of the abd/pelv after discussion w/ ID due to persistent leukocytosis. Gentle IVF after due to contrast administration. Elevated troponin:   Cardiology workup, abnormal stress test showing Inferoapical ischemia  EKG showed no acute ischemic changes  Allergic to aspirin  Heart cath on 3/3 with no obstructive lesions    ELLYN- resolved:   Due to post obstructive uropathy  Scr on admission 3.21, resolved   nephrology consulted and managing    Leukocytosis- persists  Likely due to UTI, bacteremia  Continue I.V antibiotics    Thrombocytopenia  Due to bacteremia, septicemia- improved    Hypotension- resolved with treatment of bacteremia and IVFluids    Overweight: BMI 45.61     Code status: Full  DVT prophylaxsis: SCD  Dispo: was stable for discharge however given persistent leukocytosis will repeat imaging         Interval History/Subjective:  No acute events overnight. RLQ abd pain resolved. No n/v/d, no dyspnea. Review of Systems:  Pertinent items are noted in HPI. Objective:     VITALS:   Last 24hrs VS reviewed since prior progress note.  Most recent are:  Visit Vitals  /69 (BP 1 Location: Left upper arm, BP Patient Position: At rest)   Pulse 78   Temp 97.8 °F (36.6 °C)   Resp 20   Ht 5' 8\" (1.727 m)   Wt 137 kg (302 lb 0.5 oz)   SpO2 94%   BMI 45.92 kg/m²     Patient Vitals for the past 24 hrs:   Temp Pulse Resp BP SpO2   03/04/22 1223 97.8 °F (36.6 °C) 78 20 136/69 94 %   03/04/22 1117  80  (!) 143/76    03/04/22 1108  80  135/77    03/04/22 1100  80  (!) 148/82    03/04/22 0833 97.5 °F (36.4 °C) 72 18 (!) 146/88 97 %   03/04/22 0600  76      03/04/22 0415 97.4 °F (36.3 °C) 69 20 (!) 165/81 97 %   03/04/22 0400  67      03/04/22 0200  73      03/04/22 0045 98.4 °F (36.9 °C) 75 18 (!) 160/72 95 %   03/03/22 2359  72      03/03/22 2250 98.6 °F (37 °C) 75 18 (!) 149/78 98 %   03/03/22 2000 97.6 °F (36.4 °C) 76 18 (!) 142/75 99 %   03/03/22 1830  78 18 (!) 127/103 100 %   03/03/22 1800 99 °F (37.2 °C) 73 15 (!) 149/71 99 %   03/03/22 1730  78 12 (!) 168/73 96 %   03/03/22 1715  75 11 (!) 162/74 95 %   03/03/22 1700  78 17 (!) 160/81 96 %   03/03/22 1645  79 17 (!) 161/80 95 %   03/03/22 1629  80 14 (!) 166/74 95 %   03/03/22 1519 98.4 °F (36.9 °C) 76 15 (!) 181/73 96 %   03/03/22 1400  73          Intake/Output Summary (Last 24 hours) at 3/4/2022 1359  Last data filed at 3/4/2022 0915  Gross per 24 hour   Intake    Output 1800 ml   Net -1800 ml        PHYSICAL EXAM:  General: No acute distress, obese  EENT: EOMI. Anicteric sclerae. Oral mucousa moist,  Resp: CTA bilaterally. No wheezing/rhonchi/rales. No accessory muscle use  CV: Regular rhythm, normal rate, no murmurs, gallops, rubs  GI: Soft, non distended, mild RLQ tenderness, BS+  Extremities: No edema  Neurologic: Moves all extremities. AAOx3,   Psych: Good insight. Not anxious nor agitated.   Skin: dry    Lab Data Personally Reviewed: (see below)     Medications list Personally Reviewed:  x YES  NO     _______________________________________________________________________  Care Plan discussed with: Patient/Family, Nurse and       Veronica Edwards MD     Procedures: see electronic medical records for all procedures/Xrays and details which were not copied into this note but were reviewed prior to creation of Plan. LABS:  Recent Labs     03/04/22  0629 03/03/22  0459   WBC 22.8* 22.3*   HGB 11.5 10.9*   HCT 37.7 34.6*   PLT 91* 55*     Recent Labs     03/04/22  0629 03/03/22  0459 03/02/22  0507 03/01/22  1500 03/01/22  1500    143 143   < > 144   K 4.1 4.2 3.8   < > 4.0   * 115* 118*   < > 117*   CO2 21 25 22   < > 19*   BUN 28* 40* 54*   < > 68*   CREA 0.51* 0.91 1.18*   < > 1.55*   GLU 96 116* 108*   < > 119*   CA 6.6* 8.0* 8.0*   < > 8.0*   MG  --   --  2.6*  --  2.7*   PHOS  --   --  1.9*  --  2.3*    < > = values in this interval not displayed. Recent Labs     03/01/22  1500   ALT 25   *   TBILI 0.4   TP 5.6*   ALB 2.0*   GLOB 3.6     No results for input(s): INR, PTP, APTT, INREXT, INREXT in the last 72 hours. No results for input(s): FE, TIBC, PSAT, FERR in the last 72 hours. No results found for: FOL, RBCF   No results for input(s): PH, PCO2, PO2 in the last 72 hours. No results for input(s): CPK, CKNDX, TROIQ in the last 72 hours.     No lab exists for component: CPKMB  No results found for: CHOL, CHOLX, CHLST, CHOLV, HDL, HDLP, LDL, LDLC, DLDLP, TGLX, TRIGL, TRIGP, CHHD, CHHDX  Lab Results   Component Value Date/Time    Glucose (POC) 88 03/04/2022 11:25 AM    Glucose (POC) 86 03/04/2022 06:34 AM    Glucose (POC) 175 (H) 03/03/2022 09:12 PM    Glucose (POC) 106 03/03/2022 05:56 PM    Glucose (POC) 84 03/03/2022 12:41 PM     Lab Results   Component Value Date/Time    Color YELLOW/STRAW 03/03/2022 03:40 AM    Appearance CLEAR 03/03/2022 03:40 AM    Specific gravity 1.015 03/03/2022 03:40 AM    Specific gravity 1.015 02/26/2022 08:39 AM    pH (UA) 6.0 03/03/2022 03:40 AM    Protein 30 (A) 03/03/2022 03:40 AM    Glucose Negative 03/03/2022 03:40 AM    Ketone Negative 03/03/2022 03:40 AM    Bilirubin Negative 03/03/2022 03:40 AM    Urobilinogen 0.2 03/03/2022 03:40 AM    Nitrites Negative 03/03/2022 03:40 AM    Leukocyte Esterase SMALL (A) 03/03/2022 03:40 AM    Epithelial cells FEW 03/03/2022 03:40 AM    Bacteria Negative 03/03/2022 03:40 AM    WBC 20-50 03/03/2022 03:40 AM    RBC >100 (H) 03/03/2022 03:40 AM     Results     Procedure Component Value Units Date/Time    CULTURE, URINE [[id number]] Collected: 03/03/22 0340    Order Status: Completed Specimen: Urine Updated: 03/04/22 0709     Special Requests: --        NO SPECIAL REQUESTS  Reflexed from [id number]       Culture result: No growth (<1,000 CFU/ML)       CULTURE, BLOOD [[id number]] Collected: 03/02/22 0507    Order Status: Completed Specimen: Blood Updated: 03/04/22 0714     Special Requests: NO SPECIAL REQUESTS        Culture result: NO GROWTH 2 DAYS       CULTURE, BLOOD, PAIRED [[id number]]     Order Status: Canceled Specimen: Blood     CULTURE, BLOOD, PAIRED [[id number]]  (Abnormal)  (Susceptibility) Collected: 02/26/22 0736    Order Status: Completed Specimen: Blood Updated: 03/02/22 0547     Special Requests: No Special Requests        Culture result:       Klebsiella pneumoniae growing in all 4 bottles drawn            (NOTE) GVR GROWING IN 4 OF 4 BOTTLES CALLED TO AND READ BACK BY Alex Carlos MLT AT 1250 ON 02/27/22 BY MD.    Susceptibility      Klebsiella pneumoniae     RENETTA     Amikacin ($) Susceptible     Ampicillin ($) Resistant     Ampicillin/sulbactam ($) Susceptible     Cefazolin ($) Susceptible     Cefepime ($$) Susceptible     Cefoxitin Susceptible     Ceftazidime ($) Susceptible     Ceftriaxone ($) Susceptible     Ciprofloxacin ($) Susceptible     Gentamicin ($) Susceptible     Levofloxacin ($) Susceptible     Meropenem ($$) Susceptible     Piperacillin/Tazobac ($) Susceptible     Tobramycin ($) Susceptible     Trimeth/Sulfa Susceptible                  Linear View                   COVID-19 RAPID TEST [[id number]] Collected: 02/26/22 0606    Order Status: Completed Specimen: Nasopharyngeal Updated: 02/26/22 0703     COVID-19 rapid test Not Detected        Comment: Rapid Abbott ID Now   Rapid NAAT:  The specimen is NEGATIVE for SARS-CoV-2, the novel coronavirus associated with COVID-19. Negative results should be treated as presumptive and, if inconsistent with clinical signs and symptoms or necessary for patient management, should be tested with an alternative molecular assay. Negative results do not preclude SARS-CoV-2 infection and should not be used as the sole basis for patient management decisions. This test has been authorized by the FDA under   an Emergency Use Authorization (EUA) for use by authorized laboratories. Fact sheet for Healthcare Providers: ConventionUpdate.co.nz Fact sheet for Patients: ConventionUpdate.co.nz   Methodology: Isothermal Nucleic Acid Amplification             .   Current Facility-Administered Medications:     lactated Ringers infusion, 75 mL/hr, IntraVENous, CONTINUOUS, Elan Goff MD    metoprolol tartrate (LOPRESSOR) tablet 100 mg, 100 mg, Oral, BID, Bessie Hardwick MD, 100 mg at 03/04/22 0920    meropenem (MERREM) 500 mg in sterile water (preservative free) 10 mL IV syringe, 0.5 g, IntraVENous, Q6H, Cortney Ortega MD, 500 mg at 03/04/22 2347    amLODIPine (NORVASC) tablet 10 mg, 10 mg, Oral, DAILY, Bessie Hardwick MD, 10 mg at 03/04/22 0920    gabapentin (NEURONTIN) capsule 300 mg, 300 mg, Oral, BID, Cortney Ortega MD, 300 mg at 03/04/22 0920    morphine injection 2 mg, 2 mg, IntraVENous, Q4H PRN, Juliane Adamson MD, 2 mg at 03/02/22 1937    albuterol-ipratropium (DUO-NEB) 2.5 MG-0.5 MG/3 ML, 3 mL, Nebulization, Q4H PRN, Corine Adam MD    atorvastatin (LIPITOR) tablet 10 mg, 10 mg, Oral, QHS, Corine Adam MD, 10 mg at 03/03/22 2113    ferrous sulfate tablet 325 mg, 1 Tablet, Oral, ACB, Nicki Gee MD, 325 mg at 03/04/22 0636    oxybutynin (DITROPAN) tablet 5 mg, 5 mg, Oral, BID, Corine Adam MD, 5 mg at 03/04/22 0920    tamsulosin (FLOMAX) capsule 0.4 mg, 0.4 mg, Oral, DAILY, Corine Adam MD, 0.4 mg at 03/04/22 0920    sodium chloride (NS) flush 5-40 mL, 5-40 mL, IntraVENous, Q8H, Corine Adam MD, 10 mL at 03/04/22 0636    sodium chloride (NS) flush 5-40 mL, 5-40 mL, IntraVENous, PRN, Corine Adam MD, 10 mL at 03/03/22 1102    acetaminophen (TYLENOL) tablet 650 mg, 650 mg, Oral, Q6H PRN, 650 mg at 03/02/22 1359 **OR** acetaminophen (TYLENOL) suppository 650 mg, 650 mg, Rectal, Q6H PRN, Corine Adam MD    polyethylene glycol (MIRALAX) packet 17 g, 17 g, Oral, DAILY PRN, Corine Adam MD, 17 g at 03/02/22 1058    ondansetron (ZOFRAN ODT) tablet 4 mg, 4 mg, Oral, Q8H PRN **OR** ondansetron (ZOFRAN) injection 4 mg, 4 mg, IntraVENous, Q6H PRN, Corine Adam MD    L.acidophilus-paracasei-S.thermophil-bifidobacter (RISAQUAD) 8 billion cell capsule, 1 Capsule, Oral, DAILY, Corine Adam MD, 1 Capsule at 03/04/22 0920    insulin lispro (HUMALOG) injection, , SubCUTAneous, AC&HS, Corine Adam MD, 2 Units at 03/02/22 1939    glucose chewable tablet 16 g, 4 Tablet, Oral, PRN, Corine Adam MD    glucagon (GLUCAGEN) injection 1 mg, 1 mg, IntraMUSCular, PRN, Corine Adam MD    dextrose 10 % infusion 0-250 mL, 0-250 mL, IntraVENous, PRN, Nicki Gee MD

## 2022-03-04 NOTE — PROGRESS NOTES
Problem: Falls - Risk of  Goal: *Absence of Falls  Description: Document [place] Fall Risk and appropriate interventions in the flowsheet. Outcome: Progressing Towards Goal  Note: Fall Risk Interventions:  Mobility Interventions: Patient to call before getting OOB         Medication Interventions: Patient to call before getting OOB,Teach patient to arise slowly    Elimination Interventions: Patient to call for help with toileting needs,Toileting schedule/hourly rounds,Call light in reach              Problem: Pressure Injury - Risk of  Goal: *Prevention of pressure injury  Description: Document Murali Scale and appropriate interventions in the flowsheet.   Outcome: Progressing Towards Goal  Note: Pressure Injury Interventions:       Moisture Interventions: Absorbent underpads,Apply protective barrier, creams and emollients    Activity Interventions: Pressure redistribution bed/mattress(bed type)    Mobility Interventions: Pressure redistribution bed/mattress (bed type),PT/OT evaluation    Nutrition Interventions: Document food/fluid/supplement intake    Friction and Shear Interventions: Apply protective barrier, creams and emollients,Minimize layers

## 2022-03-04 NOTE — PROGRESS NOTES
TRANSFER - OUT REPORT:    Verbal report given to [employer]) on Aleksey  being transferred to [facility](unit) for routine progression of care       Report consisted of patients Situation, Background, Assessment and   Recommendations(SBAR). Information from the following report(s) SBAR, Kardex, ED Summary, Intake/Output, Recent Results, Med Rec Status and Cardiac Rhythm SR was reviewed with the receiving nurse. Lines:   Peripheral IV 03/02/22 Left Antecubital (Active)   Site Assessment Clean, dry, & intact 03/03/22 2000   Phlebitis Assessment 0 03/03/22 2000   Infiltration Assessment 0 03/03/22 2000   Dressing Status Clean, dry, & intact 03/03/22 2000   Dressing Type Tape;Transparent 03/03/22 2000   Hub Color/Line Status Pink;Flushed;Capped 03/03/22 2000   Action Taken Open ports on tubing capped 03/03/22 2000   Alcohol Cap Used Yes 03/03/22 2000       Peripheral IV 03/03/22 Right Antecubital (Active)        Opportunity for questions and clarification was provided.       Patient transported with:   O2 @ 0 liters

## 2022-03-05 LAB
ANION GAP SERPL CALC-SCNC: 4 MMOL/L (ref 5–15)
BUN SERPL-MCNC: 24 MG/DL (ref 6–20)
BUN/CREAT SERPL: 34 (ref 12–20)
CALCIUM SERPL-MCNC: 8 MG/DL (ref 8.5–10.1)
CHLORIDE SERPL-SCNC: 114 MMOL/L (ref 97–108)
CO2 SERPL-SCNC: 26 MMOL/L (ref 21–32)
CREAT SERPL-MCNC: 0.71 MG/DL (ref 0.55–1.02)
ERYTHROCYTE [DISTWIDTH] IN BLOOD BY AUTOMATED COUNT: 16.4 % (ref 11.5–14.5)
GLUCOSE BLD STRIP.AUTO-MCNC: 109 MG/DL (ref 65–117)
GLUCOSE BLD STRIP.AUTO-MCNC: 91 MG/DL (ref 65–117)
GLUCOSE BLD STRIP.AUTO-MCNC: 93 MG/DL (ref 65–117)
GLUCOSE BLD STRIP.AUTO-MCNC: 95 MG/DL (ref 65–117)
GLUCOSE SERPL-MCNC: 99 MG/DL (ref 65–100)
HCT VFR BLD AUTO: 34.9 % (ref 35–47)
HGB BLD-MCNC: 11 G/DL (ref 11.5–16)
MCH RBC QN AUTO: 29.5 PG (ref 26–34)
MCHC RBC AUTO-ENTMCNC: 31.5 G/DL (ref 30–36.5)
MCV RBC AUTO: 93.6 FL (ref 80–99)
NRBC # BLD: 0 K/UL (ref 0–0.01)
NRBC BLD-RTO: 0 PER 100 WBC
PLATELET # BLD AUTO: 124 K/UL (ref 150–400)
PMV BLD AUTO: 12.9 FL (ref 8.9–12.9)
POTASSIUM SERPL-SCNC: 3.8 MMOL/L (ref 3.5–5.1)
RBC # BLD AUTO: 3.73 M/UL (ref 3.8–5.2)
SERVICE CMNT-IMP: NORMAL
SODIUM SERPL-SCNC: 144 MMOL/L (ref 136–145)
WBC # BLD AUTO: 18 K/UL (ref 3.6–11)

## 2022-03-05 PROCEDURE — 36415 COLL VENOUS BLD VENIPUNCTURE: CPT

## 2022-03-05 PROCEDURE — 85027 COMPLETE CBC AUTOMATED: CPT

## 2022-03-05 PROCEDURE — 74011000250 HC RX REV CODE- 250: Performed by: INTERNAL MEDICINE

## 2022-03-05 PROCEDURE — 74011250637 HC RX REV CODE- 250/637: Performed by: INTERNAL MEDICINE

## 2022-03-05 PROCEDURE — 80048 BASIC METABOLIC PNL TOTAL CA: CPT

## 2022-03-05 PROCEDURE — 74011250636 HC RX REV CODE- 250/636: Performed by: HOSPITALIST

## 2022-03-05 PROCEDURE — 74011250637 HC RX REV CODE- 250/637: Performed by: FAMILY MEDICINE

## 2022-03-05 PROCEDURE — 65660000000 HC RM CCU STEPDOWN

## 2022-03-05 PROCEDURE — 94760 N-INVAS EAR/PLS OXIMETRY 1: CPT

## 2022-03-05 PROCEDURE — 82962 GLUCOSE BLOOD TEST: CPT

## 2022-03-05 RX ADMIN — ATORVASTATIN CALCIUM 10 MG: 10 TABLET, FILM COATED ORAL at 21:28

## 2022-03-05 RX ADMIN — MORPHINE SULFATE 2 MG: 2 INJECTION, SOLUTION INTRAMUSCULAR; INTRAVENOUS at 00:48

## 2022-03-05 RX ADMIN — MORPHINE SULFATE 2 MG: 2 INJECTION, SOLUTION INTRAMUSCULAR; INTRAVENOUS at 08:53

## 2022-03-05 RX ADMIN — METOPROLOL TARTRATE 100 MG: 50 TABLET, FILM COATED ORAL at 17:31

## 2022-03-05 RX ADMIN — ACETAMINOPHEN 650 MG: 325 TABLET ORAL at 17:38

## 2022-03-05 RX ADMIN — AMLODIPINE BESYLATE 10 MG: 5 TABLET ORAL at 08:52

## 2022-03-05 RX ADMIN — GABAPENTIN 300 MG: 300 CAPSULE ORAL at 08:52

## 2022-03-05 RX ADMIN — FERROUS SULFATE TAB 325 MG (65 MG ELEMENTAL FE) 325 MG: 325 (65 FE) TAB at 07:12

## 2022-03-05 RX ADMIN — LEVOFLOXACIN 750 MG: 750 TABLET, FILM COATED ORAL at 17:31

## 2022-03-05 RX ADMIN — OXYBUTYNIN CHLORIDE 5 MG: 5 TABLET ORAL at 17:31

## 2022-03-05 RX ADMIN — GABAPENTIN 300 MG: 300 CAPSULE ORAL at 21:28

## 2022-03-05 RX ADMIN — Medication 10 ML: at 13:26

## 2022-03-05 RX ADMIN — OXYBUTYNIN CHLORIDE 5 MG: 5 TABLET ORAL at 08:52

## 2022-03-05 RX ADMIN — Medication 10 ML: at 21:28

## 2022-03-05 RX ADMIN — TAMSULOSIN HYDROCHLORIDE 0.4 MG: 0.4 CAPSULE ORAL at 08:52

## 2022-03-05 RX ADMIN — Medication 1 CAPSULE: at 08:52

## 2022-03-05 RX ADMIN — METOPROLOL TARTRATE 100 MG: 50 TABLET, FILM COATED ORAL at 08:52

## 2022-03-05 RX ADMIN — Medication 10 ML: at 07:12

## 2022-03-05 NOTE — PROGRESS NOTES
Transition of Care Plan   RUR- 14% Moderate Risk   DISPOSITION: The disposition plan is to transition to a SNF - CM to provide choice   F/U with PCP/Specialist     Transport: [facility]    Patient has been recommended for SNF. CM to provide choice and submit for review.     Dharmesh Bundy, NILESH, CRM, LMHP-e  Available in Perfect Serve

## 2022-03-05 NOTE — PROGRESS NOTES
Bedside and Verbal shift change report given to Odette Waldron (oncoming nurse) by Carroll Martinez (offgoing nurse). Report included the following information SBAR, Kardex, MAR and Recent Results.

## 2022-03-05 NOTE — PROGRESS NOTES
Hospitalist Progress Note                                     NAME:  Rickey Leach  :  1950  MRN:  [id number]  Date of Service:  3/5/2022    Summary: 73 y.o. female with past medical history of HTN, dyslipidemia, morbid obesity, kidney stone who presented on 2022 with generalized body weakness and SOB. CT scan of the abdomen performed showed new right urinary tract obstruction       Assessment/Plan:  Right ureteral calculus with obstruction and pyelonephritis  - S/p cystourethroscopy with right ureteral stent placement  Urology consulted and recs noted    Sepsis due to UTI/ Klebsiella bacteremia: Likely due to UTI and ureteral obstruction  Repeat blood cultures ordered. NGTD. On IV meropenem, d/w ID can likely change to PO levofloxacin on discharge. CT of the abd/pelv after discussion w/ ID due to persistent leukocytosis did not reveal any abscess. Elevated troponin:   Cardiology workup, abnormal stress test showing Inferoapical ischemia  EKG showed no acute ischemic changes  Allergic to aspirin  Heart cath on 3/3 with no obstructive lesions    ELLYN- resolved:   Due to post obstructive uropathy  Scr on admission 3.21, resolved   nephrology consulted and managing    Leukocytosis- persists  Likely due to UTI, bacteremia  Now improving    Thrombocytopenia  Due to bacteremia, septicemia- improved    Hypotension- resolved with treatment of bacteremia and IVFluids    Overweight: BMI 45.61     Code status: Full  DVT prophylaxsis: SCD  Dispo: SNF per PT recs, d/w the patient and she is agreeable         Interval History/Subjective:  No acute events overnight. RLQ abd pain returned overnight. No n/v/d, no dyspnea. Review of Systems:  Pertinent items are noted in HPI. Objective:     VITALS:   Last 24hrs VS reviewed since prior progress note.  Most recent are:  Visit Vitals  BP (!) 144/85 (BP 1 Location: Right upper arm, BP Patient Position: At rest) Pulse 74   Temp 98 °F (36.7 °C)   Resp 20   Ht 5' 8\" (1.727 m)   Wt 137 kg (302 lb 0.5 oz)   SpO2 95%   BMI 45.92 kg/m²     Patient Vitals for the past 24 hrs:   Temp Pulse Resp BP SpO2   03/05/22 1000  74      03/05/22 0930 98 °F (36.7 °C) 75 20 (!) 144/85 95 %   03/05/22 0600  71      03/05/22 0432 98.7 °F (37.1 °C) 69 18 (!) 150/73 97 %   03/05/22 0355  75      03/05/22 0200  69      03/05/22 0030 98.5 °F (36.9 °C) 70 18 (!) 154/64 97 %   03/04/22 2209  70      03/04/22 2024  81      03/04/22 2013 98.5 °F (36.9 °C) 79 18 138/81 97 %   03/04/22 1542 97.6 °F (36.4 °C) 77 18 113/73 96 %   03/04/22 1400  78      03/04/22 1223 97.8 °F (36.6 °C) 78 20 136/69 94 %   03/04/22 1200  75      03/04/22 1117  80  (!) 143/76    03/04/22 1108  80  135/77    03/04/22 1100  80  (!) 148/82        Intake/Output Summary (Last 24 hours) at 3/5/2022 1027  Last data filed at 3/5/2022 0432  Gross per 24 hour   Intake    Output 1500 ml   Net -1500 ml        PHYSICAL EXAM:  General: No acute distress, obese  EENT: EOMI. Anicteric sclerae. Oral mucousa moist,  Resp: CTA bilaterally. No wheezing/rhonchi/rales. No accessory muscle use  CV: Regular rhythm, normal rate, no murmurs, gallops, rubs  GI: Soft, non distended, mild RLQ tenderness, BS+  Extremities: 1+ b/l LE edema  Neurologic: Moves all extremities. AAOx3,   Psych: Good insight. Not anxious nor agitated. Skin: dry    Lab Data Personally Reviewed: (see below)     Medications list Personally Reviewed:  x YES  NO     _______________________________________________________________________  Care Plan discussed with:  Patient/Family, Nurse and       Vaibhav Marc MD     Procedures: see electronic medical records for all procedures/Xrays and details which were not copied into this note but were reviewed prior to creation of Plan.       LABS:  Recent Labs     03/05/22  0620 03/04/22  0629   WBC 18.0* 22.8*   HGB 11.0* 11.5   HCT 34.9* 37.7   * 91*     Recent Labs     03/05/22  0620 03/04/22  0629 03/03/22  0459    145 143   K 3.8 4.1 4.2   * 122* 115*   CO2 26 21 25   BUN 24* 28* 40*   CREA 0.71 0.51* 0.91   GLU 99 96 116*   CA 8.0* 6.6* 8.0*     No results for input(s): ALT, AP, TBIL, TBILI, TP, ALB, GLOB, GGT, AML, LPSE in the last 72 hours. No lab exists for component: SGOT, GPT, AMYP, HLPSE  No results for input(s): INR, PTP, APTT, INREXT, INREXT in the last 72 hours. No results for input(s): FE, TIBC, PSAT, FERR in the last 72 hours. No results found for: FOL, RBCF   No results for input(s): PH, PCO2, PO2 in the last 72 hours. No results for input(s): CPK, CKNDX, TROIQ in the last 72 hours.     No lab exists for component: CPKMB  No results found for: CHOL, CHOLX, CHLST, CHOLV, HDL, HDLP, LDL, LDLC, DLDLP, TGLX, TRIGL, TRIGP, CHHD, CHHDX  Lab Results   Component Value Date/Time    Glucose (POC) 91 03/05/2022 06:57 AM    Glucose (POC) 172 (H) 03/04/2022 09:17 PM    Glucose (POC) 85 03/04/2022 06:33 PM    Glucose (POC) 88 03/04/2022 11:25 AM    Glucose (POC) 86 03/04/2022 06:34 AM     Lab Results   Component Value Date/Time    Color YELLOW/STRAW 03/03/2022 03:40 AM    Appearance CLEAR 03/03/2022 03:40 AM    Specific gravity 1.015 03/03/2022 03:40 AM    Specific gravity 1.015 02/26/2022 08:39 AM    pH (UA) 6.0 03/03/2022 03:40 AM    Protein 30 (A) 03/03/2022 03:40 AM    Glucose Negative 03/03/2022 03:40 AM    Ketone Negative 03/03/2022 03:40 AM    Bilirubin Negative 03/03/2022 03:40 AM    Urobilinogen 0.2 03/03/2022 03:40 AM    Nitrites Negative 03/03/2022 03:40 AM    Leukocyte Esterase SMALL (A) 03/03/2022 03:40 AM    Epithelial cells FEW 03/03/2022 03:40 AM    Bacteria Negative 03/03/2022 03:40 AM    WBC 20-50 03/03/2022 03:40 AM    RBC >100 (H) 03/03/2022 03:40 AM     Results     Procedure Component Value Units Date/Time    CULTURE, URINE [[id number]] Collected: 03/03/22 0340    Order Status: Completed Specimen: Urine Updated: 03/04/22 0709     Special Requests: --        NO SPECIAL REQUESTS  Reflexed from [id number]       Culture result: No growth (<1,000 CFU/ML)       CULTURE, BLOOD [[id number]] Collected: 03/02/22 0507    Order Status: Completed Specimen: Blood Updated: 03/05/22 0511     Special Requests: NO SPECIAL REQUESTS        Culture result: NO GROWTH 3 DAYS       CULTURE, BLOOD, PAIRED [[id number]]     Order Status: Canceled Specimen: Blood     CULTURE, BLOOD, PAIRED [[id number]]  (Abnormal)  (Susceptibility) Collected: 02/26/22 0736    Order Status: Completed Specimen: Blood Updated: 03/02/22 0547     Special Requests: No Special Requests        Culture result:       Klebsiella pneumoniae growing in all 4 bottles drawn            (NOTE) GVR GROWING IN 4 OF 4 BOTTLES CALLED TO AND READ BACK BY Isaias June MLT AT 1250 ON 02/27/22 BY MD.    Susceptibility      Klebsiella pneumoniae     RENETTA     Amikacin ($) Susceptible     Ampicillin ($) Resistant     Ampicillin/sulbactam ($) Susceptible     Cefazolin ($) Susceptible     Cefepime ($$) Susceptible     Cefoxitin Susceptible     Ceftazidime ($) Susceptible     Ceftriaxone ($) Susceptible     Ciprofloxacin ($) Susceptible     Gentamicin ($) Susceptible     Levofloxacin ($) Susceptible     Meropenem ($$) Susceptible     Piperacillin/Tazobac ($) Susceptible     Tobramycin ($) Susceptible     Trimeth/Sulfa Susceptible                  Linear View                   COVID-19 RAPID TEST [[id number]] Collected: 02/26/22 0606    Order Status: Completed Specimen: Nasopharyngeal Updated: 02/26/22 0703     COVID-19 rapid test Not Detected        Comment: Rapid Abbott ID Now   Rapid NAAT:  The specimen is NEGATIVE for SARS-CoV-2, the novel coronavirus associated with COVID-19. Negative results should be treated as presumptive and, if inconsistent with clinical signs and symptoms or necessary for patient management, should be tested with an alternative molecular assay. Negative results do not preclude SARS-CoV-2 infection and should not be used as the sole basis for patient management decisions. This test has been authorized by the FDA under   an Emergency Use Authorization (EUA) for use by authorized laboratories. Fact sheet for Healthcare Providers: ConventionUpdate.co.nz Fact sheet for Patients: ConventionUpdate.co.nz   Methodology: Isothermal Nucleic Acid Amplification             .   Current Facility-Administered Medications:     levoFLOXacin (LEVAQUIN) tablet 750 mg, 750 mg, Oral, Q24H, Eh Cintron MD, 750 mg at 03/04/22 1832    metoprolol tartrate (LOPRESSOR) tablet 100 mg, 100 mg, Oral, BID, Valentino Guthrie, MD, 100 mg at 03/05/22 0852    amLODIPine (NORVASC) tablet 10 mg, 10 mg, Oral, DAILY, Valentino Guthrie, MD, 10 mg at 03/05/22 9071    gabapentin (NEURONTIN) capsule 300 mg, 300 mg, Oral, BID, Ry Petit MD, 300 mg at 03/05/22 0689    morphine injection 2 mg, 2 mg, IntraVENous, Q4H PRN, Mirza Leiva MD, 2 mg at 03/05/22 0853    albuterol-ipratropium (DUO-NEB) 2.5 MG-0.5 MG/3 ML, 3 mL, Nebulization, Q4H PRN, Corine Adam MD    atorvastatin (LIPITOR) tablet 10 mg, 10 mg, Oral, QHS, Corine Adam MD, 10 mg at 03/04/22 2125    ferrous sulfate tablet 325 mg, 1 Tablet, Oral, ACB, Corine Adam MD, 325 mg at 03/05/22 0712    oxybutynin (DITROPAN) tablet 5 mg, 5 mg, Oral, BID, Corine Adam MD, 5 mg at 03/05/22 0852    tamsulosin (FLOMAX) capsule 0.4 mg, 0.4 mg, Oral, DAILY, Corine Adam MD, 0.4 mg at 03/05/22 0852    sodium chloride (NS) flush 5-40 mL, 5-40 mL, IntraVENous, Q8H, Corine Adam MD, 10 mL at 03/05/22 0712    sodium chloride (NS) flush 5-40 mL, 5-40 mL, IntraVENous, PRN, Corine Adam MD, 10 mL at 03/03/22 1102    acetaminophen (TYLENOL) tablet 650 mg, 650 mg, Oral, Q6H PRN, 650 mg at 03/02/22 1359 **OR** acetaminophen (TYLENOL) suppository 650 mg, 650 mg, Rectal, Q6H PRN, Goldie Dsouza MD    polyethylene glycol (MIRALAX) packet 17 g, 17 g, Oral, DAILY PRN, Corine Adam MD, 17 g at 03/02/22 1058    ondansetron (ZOFRAN ODT) tablet 4 mg, 4 mg, Oral, Q8H PRN **OR** ondansetron (ZOFRAN) injection 4 mg, 4 mg, IntraVENous, Q6H PRN, Corine Adam MD    L.acidophilus-paracasei-S.thermophil-bifidobacter (RISAQUAD) 8 billion cell capsule, 1 Capsule, Oral, DAILY, Corine Adam MD, 1 Capsule at 03/05/22 0852    insulin lispro (HUMALOG) injection, , SubCUTAneous, AC&HS, Corine Adam MD, 2 Units at 03/02/22 1939    glucose chewable tablet 16 g, 4 Tablet, Oral, PRN, Corine Adam MD    glucagon (GLUCAGEN) injection 1 mg, 1 mg, IntraMUSCular, PRN, Corine Adam MD    dextrose 10 % infusion 0-250 mL, 0-250 mL, IntraVENous, PRN, Goldie Dsouza MD

## 2022-03-06 LAB
GLUCOSE BLD STRIP.AUTO-MCNC: 110 MG/DL (ref 65–117)
GLUCOSE BLD STRIP.AUTO-MCNC: 113 MG/DL (ref 65–117)
GLUCOSE BLD STRIP.AUTO-MCNC: 136 MG/DL (ref 65–117)
GLUCOSE BLD STRIP.AUTO-MCNC: 182 MG/DL (ref 65–117)
SERVICE CMNT-IMP: ABNORMAL
SERVICE CMNT-IMP: ABNORMAL
SERVICE CMNT-IMP: NORMAL
SERVICE CMNT-IMP: NORMAL

## 2022-03-06 PROCEDURE — 74011250637 HC RX REV CODE- 250/637: Performed by: INTERNAL MEDICINE

## 2022-03-06 PROCEDURE — 74011000250 HC RX REV CODE- 250: Performed by: INTERNAL MEDICINE

## 2022-03-06 PROCEDURE — 74011250636 HC RX REV CODE- 250/636: Performed by: HOSPITALIST

## 2022-03-06 PROCEDURE — 94760 N-INVAS EAR/PLS OXIMETRY 1: CPT

## 2022-03-06 PROCEDURE — 74011636637 HC RX REV CODE- 636/637: Performed by: INTERNAL MEDICINE

## 2022-03-06 PROCEDURE — 74011250637 HC RX REV CODE- 250/637: Performed by: FAMILY MEDICINE

## 2022-03-06 PROCEDURE — 82962 GLUCOSE BLOOD TEST: CPT

## 2022-03-06 PROCEDURE — 65660000000 HC RM CCU STEPDOWN

## 2022-03-06 RX ORDER — ENOXAPARIN SODIUM 100 MG/ML
40 INJECTION SUBCUTANEOUS EVERY 24 HOURS
Status: DISCONTINUED | OUTPATIENT
Start: 2022-03-06 | End: 2022-03-10 | Stop reason: HOSPADM

## 2022-03-06 RX ADMIN — AMLODIPINE BESYLATE 10 MG: 5 TABLET ORAL at 08:52

## 2022-03-06 RX ADMIN — GABAPENTIN 300 MG: 300 CAPSULE ORAL at 08:52

## 2022-03-06 RX ADMIN — LEVOFLOXACIN 750 MG: 750 TABLET, FILM COATED ORAL at 17:08

## 2022-03-06 RX ADMIN — Medication 10 ML: at 21:00

## 2022-03-06 RX ADMIN — OXYBUTYNIN CHLORIDE 5 MG: 5 TABLET ORAL at 08:52

## 2022-03-06 RX ADMIN — METOPROLOL TARTRATE 100 MG: 50 TABLET, FILM COATED ORAL at 08:52

## 2022-03-06 RX ADMIN — OXYBUTYNIN CHLORIDE 5 MG: 5 TABLET ORAL at 17:08

## 2022-03-06 RX ADMIN — GABAPENTIN 300 MG: 300 CAPSULE ORAL at 20:59

## 2022-03-06 RX ADMIN — ENOXAPARIN SODIUM 40 MG: 100 INJECTION SUBCUTANEOUS at 10:17

## 2022-03-06 RX ADMIN — ATORVASTATIN CALCIUM 10 MG: 10 TABLET, FILM COATED ORAL at 21:00

## 2022-03-06 RX ADMIN — Medication 2 UNITS: at 17:08

## 2022-03-06 RX ADMIN — ACETAMINOPHEN 650 MG: 325 TABLET ORAL at 15:50

## 2022-03-06 RX ADMIN — METOPROLOL TARTRATE 100 MG: 50 TABLET, FILM COATED ORAL at 17:08

## 2022-03-06 RX ADMIN — Medication 1 CAPSULE: at 08:52

## 2022-03-06 RX ADMIN — Medication 10 ML: at 15:50

## 2022-03-06 RX ADMIN — FERROUS SULFATE TAB 325 MG (65 MG ELEMENTAL FE) 325 MG: 325 (65 FE) TAB at 07:25

## 2022-03-06 RX ADMIN — TAMSULOSIN HYDROCHLORIDE 0.4 MG: 0.4 CAPSULE ORAL at 08:52

## 2022-03-06 RX ADMIN — Medication 10 ML: at 07:25

## 2022-03-06 NOTE — PROGRESS NOTES
Hospitalist Progress Note                                     NAME:  Louise Mandujano  :  1950  MRN:  [id number]  Date of Service:  3/6/2022    Summary: 73 y.o. female with past medical history of HTN, dyslipidemia, morbid obesity, kidney stone who presented on 2022 with generalized body weakness and SOB. CT scan of the abdomen performed showed new right urinary tract obstruction       Assessment/Plan:  Right ureteral calculus with obstruction and pyelonephritis  - S/p cystourethroscopy with right ureteral stent placement  Urology consulted and recs noted    Sepsis due to UTI/ Klebsiella bacteremia: Likely due to UTI and ureteral obstruction  Repeat blood cultures ordered. NGTD. Now on Levaquin (was on meropenem) CT of the abd/pelv after discussion w/ ID due to persistent leukocytosis did not reveal any abscess. Elevated troponin:   Cardiology workup, abnormal stress test showing Inferoapical ischemia  EKG showed no acute ischemic changes  Allergic to aspirin  Heart cath on 3/3 with no obstructive lesions    ELLYN- resolved:   Due to post obstructive uropathy  Scr on admission 3.21, resolved   nephrology consulted and managing    Leukocytosis- persists  Likely due to UTI, bacteremia  Now improving    Thrombocytopenia  Due to bacteremia, septicemia- improved    Hypotension- resolved with treatment of bacteremia and IVFluids    Overweight: BMI 45.61     Code status: Full  DVT prophylaxsis: sq Lovenox  Dispo: SNF per PT recs, d/w the patient and she is agreeable, CM consulted         Interval History/Subjective:  No acute events overnight. Denies pain. No n/v/d, no dyspnea. Review of Systems:  Pertinent items are noted in HPI. Objective:     VITALS:   Last 24hrs VS reviewed since prior progress note.  Most recent are:  Visit Vitals  BP (!) 145/71 (BP 1 Location: Right upper arm)   Pulse 80   Temp 98.2 °F (36.8 °C)   Resp 18   Ht 5' 8\" (1.727 m)   Wt 137 kg (302 lb 0.5 oz)   SpO2 96%   BMI 45.92 kg/m²     Patient Vitals for the past 24 hrs:   Temp Pulse Resp BP SpO2   03/06/22 0839 98.2 °F (36.8 °C) 80 18 (!) 145/71 96 %   03/06/22 0800  79      03/06/22 0551  72      03/06/22 0450 98.3 °F (36.8 °C) 75 18 119/79 96 %   03/06/22 0354  77      03/06/22 0151  74      03/06/22 0013 98.4 °F (36.9 °C) 68 18 126/71 96 %   03/05/22 2153  66      03/05/22 2002 98.8 °F (37.1 °C) 78 18 120/72 97 %   03/05/22 1957  78      03/05/22 1800  81      03/05/22 1629 98.4 °F (36.9 °C) 84 17 (!) 106/52 96 %   03/05/22 1400  79      03/05/22 1216 98.3 °F (36.8 °C) 76 20 132/77 95 %   03/05/22 1200  77      03/05/22 1000  74        No intake or output data in the 24 hours ending 03/06/22 0937     PHYSICAL EXAM:  General: No acute distress, obese  EENT: EOMI. Anicteric sclerae. Oral mucousa moist,  Resp: CTA bilaterally. No wheezing/rhonchi/rales. No accessory muscle use  CV: Regular rhythm, normal rate, no murmurs, gallops, rubs  GI: Soft, non distended, NT, BS+  Extremities: 1+ b/l LE edema  Neurologic: grossly non-focal  Psych: Not anxious nor agitated. Skin: dry    Lab Data Personally Reviewed: (see below)     Medications list Personally Reviewed:  x YES  NO     _______________________________________________________________________  Care Plan discussed with:  Patient/Family, Nurse and       Rima Ramirez MD     Procedures: see electronic medical records for all procedures/Xrays and details which were not copied into this note but were reviewed prior to creation of Plan.       LABS:  Recent Labs     03/05/22  0620 03/04/22  0629   WBC 18.0* 22.8*   HGB 11.0* 11.5   HCT 34.9* 37.7   * 91*     Recent Labs     03/05/22  0620 03/04/22  0629    145   K 3.8 4.1   * 122*   CO2 26 21   BUN 24* 28*   CREA 0.71 0.51*   GLU 99 96   CA 8.0* 6.6*     No results for input(s): ALT, AP, TBIL, TBILI, TP, ALB, GLOB, GGT, AML, LPSE in the last 72 hours. No lab exists for component: SGOT, GPT, AMYP, HLPSE  No results for input(s): INR, PTP, APTT, INREXT, INREXT in the last 72 hours. No results for input(s): FE, TIBC, PSAT, FERR in the last 72 hours. No results found for: FOL, RBCF   No results for input(s): PH, PCO2, PO2 in the last 72 hours. No results for input(s): CPK, CKNDX, TROIQ in the last 72 hours.     No lab exists for component: CPKMB  No results found for: CHOL, CHOLX, CHLST, CHOLV, HDL, HDLP, LDL, LDLC, DLDLP, TGLX, TRIGL, TRIGP, CHHD, CHHDX  Lab Results   Component Value Date/Time    Glucose (POC) 110 03/06/2022 06:16 AM    Glucose (POC) 95 03/05/2022 09:27 PM    Glucose (POC) 109 03/05/2022 04:31 PM    Glucose (POC) 93 03/05/2022 12:19 PM    Glucose (POC) 91 03/05/2022 06:57 AM     Lab Results   Component Value Date/Time    Color YELLOW/STRAW 03/03/2022 03:40 AM    Appearance CLEAR 03/03/2022 03:40 AM    Specific gravity 1.015 03/03/2022 03:40 AM    Specific gravity 1.015 02/26/2022 08:39 AM    pH (UA) 6.0 03/03/2022 03:40 AM    Protein 30 (A) 03/03/2022 03:40 AM    Glucose Negative 03/03/2022 03:40 AM    Ketone Negative 03/03/2022 03:40 AM    Bilirubin Negative 03/03/2022 03:40 AM    Urobilinogen 0.2 03/03/2022 03:40 AM    Nitrites Negative 03/03/2022 03:40 AM    Leukocyte Esterase SMALL (A) 03/03/2022 03:40 AM    Epithelial cells FEW 03/03/2022 03:40 AM    Bacteria Negative 03/03/2022 03:40 AM    WBC 20-50 03/03/2022 03:40 AM    RBC >100 (H) 03/03/2022 03:40 AM     Results     Procedure Component Value Units Date/Time    CULTURE, URINE [[id number]] Collected: 03/03/22 0340    Order Status: Completed Specimen: Urine Updated: 03/04/22 0709     Special Requests: --        NO SPECIAL REQUESTS  Reflexed from [id number]       Culture result: No growth (<1,000 CFU/ML)       CULTURE, BLOOD [[id number]] Collected: 03/02/22 0507    Order Status: Completed Specimen: Blood Updated: 03/06/22 0448     Special Requests: NO SPECIAL REQUESTS        Culture result: NO GROWTH 4 DAYS       CULTURE, BLOOD, PAIRED [[id number]]     Order Status: Canceled Specimen: Blood     CULTURE, BLOOD, PAIRED [[id number]]  (Abnormal)  (Susceptibility) Collected: 02/26/22 0736    Order Status: Completed Specimen: Blood Updated: 03/02/22 0547     Special Requests: No Special Requests        Culture result:       Klebsiella pneumoniae growing in all 4 bottles drawn            (NOTE) GVR GROWING IN 4 OF 4 BOTTLES CALLED TO AND READ BACK BY Alyson Pérez MLT AT 1250 ON 02/27/22 BY MD.    Susceptibility      Klebsiella pneumoniae     RENETTA     Amikacin ($) Susceptible     Ampicillin ($) Resistant     Ampicillin/sulbactam ($) Susceptible     Cefazolin ($) Susceptible     Cefepime ($$) Susceptible     Cefoxitin Susceptible     Ceftazidime ($) Susceptible     Ceftriaxone ($) Susceptible     Ciprofloxacin ($) Susceptible     Gentamicin ($) Susceptible     Levofloxacin ($) Susceptible     Meropenem ($$) Susceptible     Piperacillin/Tazobac ($) Susceptible     Tobramycin ($) Susceptible     Trimeth/Sulfa Susceptible                  Linear View                   COVID-19 RAPID TEST [[id number]] Collected: 02/26/22 0606    Order Status: Completed Specimen: Nasopharyngeal Updated: 02/26/22 0703     COVID-19 rapid test Not Detected        Comment: Rapid Abbott ID Now   Rapid NAAT:  The specimen is NEGATIVE for SARS-CoV-2, the novel coronavirus associated with COVID-19. Negative results should be treated as presumptive and, if inconsistent with clinical signs and symptoms or necessary for patient management, should be tested with an alternative molecular assay. Negative results do not preclude SARS-CoV-2 infection and should not be used as the sole basis for patient management decisions. This test has been authorized by the FDA under   an Emergency Use Authorization (EUA) for use by authorized laboratories.  Fact sheet for Healthcare Providers: ConventionUpdate.co.nz Fact sheet for Patients: ConventionUpdate.co.nz   Methodology: Isothermal Nucleic Acid Amplification             .   Current Facility-Administered Medications:     levoFLOXacin (LEVAQUIN) tablet 750 mg, 750 mg, Oral, Q24H, Sisi Jerome MD, 750 mg at 03/05/22 1731    metoprolol tartrate (LOPRESSOR) tablet 100 mg, 100 mg, Oral, BID, Yung Bowers MD, 100 mg at 03/06/22 0852    amLODIPine (NORVASC) tablet 10 mg, 10 mg, Oral, DAILY, Yung Bowers MD, 10 mg at 03/06/22 2887    gabapentin (NEURONTIN) capsule 300 mg, 300 mg, Oral, BID, Lexy Dolan MD, 300 mg at 03/06/22 2622    morphine injection 2 mg, 2 mg, IntraVENous, Q4H PRN, Yo Stoner MD, 2 mg at 03/05/22 0853    albuterol-ipratropium (DUO-NEB) 2.5 MG-0.5 MG/3 ML, 3 mL, Nebulization, Q4H PRN, Corine Adam MD    atorvastatin (LIPITOR) tablet 10 mg, 10 mg, Oral, QHS, Corine Adam MD, 10 mg at 03/05/22 2128    ferrous sulfate tablet 325 mg, 1 Tablet, Oral, ACB, Corine Adam MD, 325 mg at 03/06/22 0725    oxybutynin (DITROPAN) tablet 5 mg, 5 mg, Oral, BID, Corine Adam MD, 5 mg at 03/06/22 0852    tamsulosin (FLOMAX) capsule 0.4 mg, 0.4 mg, Oral, DAILY, Corine Adam MD, 0.4 mg at 03/06/22 0852    sodium chloride (NS) flush 5-40 mL, 5-40 mL, IntraVENous, Q8H, Corine Adam MD, 10 mL at 03/06/22 0725    sodium chloride (NS) flush 5-40 mL, 5-40 mL, IntraVENous, PRN, Corine Adam MD, 10 mL at 03/03/22 1102    acetaminophen (TYLENOL) tablet 650 mg, 650 mg, Oral, Q6H PRN, 650 mg at 03/05/22 1738 **OR** acetaminophen (TYLENOL) suppository 650 mg, 650 mg, Rectal, Q6H PRN, Corine Adam MD    polyethylene glycol (MIRALAX) packet 17 g, 17 g, Oral, DAILY PRN, Corine Adam MD, 17 g at 03/02/22 1058    ondansetron (ZOFRAN ODT) tablet 4 mg, 4 mg, Oral, Q8H PRN **OR** ondansetron (ZOFRAN) injection 4 mg, 4 mg, IntraVENous, Q6H PRN, Kia Deuce Claros MD    L.acidophilus-paracasei-S.thermophil-bifidobacter (RISAQUAD) 8 billion cell capsule, 1 Capsule, Oral, DAILY, Corine Adam MD, 1 Capsule at 03/06/22 0852    insulin lispro (HUMALOG) injection, , SubCUTAneous, AC&HS, Corine Adam MD, 2 Units at 03/02/22 1939    glucose chewable tablet 16 g, 4 Tablet, Oral, PRN, Corine Adam MD    glucagon (GLUCAGEN) injection 1 mg, 1 mg, IntraMUSCular, PRN, Corine dAam MD    dextrose 10 % infusion 0-250 mL, 0-250 mL, IntraVENous, PRN, Lashawn Trevino MD

## 2022-03-07 LAB
BACTERIA SPEC CULT: NORMAL
GLUCOSE BLD STRIP.AUTO-MCNC: 104 MG/DL (ref 65–117)
GLUCOSE BLD STRIP.AUTO-MCNC: 122 MG/DL (ref 65–117)
GLUCOSE BLD STRIP.AUTO-MCNC: 134 MG/DL (ref 65–117)
GLUCOSE BLD STRIP.AUTO-MCNC: 89 MG/DL (ref 65–117)
SERVICE CMNT-IMP: ABNORMAL
SERVICE CMNT-IMP: ABNORMAL
SERVICE CMNT-IMP: NORMAL

## 2022-03-07 PROCEDURE — 97116 GAIT TRAINING THERAPY: CPT

## 2022-03-07 PROCEDURE — 65660000000 HC RM CCU STEPDOWN

## 2022-03-07 PROCEDURE — 74011250637 HC RX REV CODE- 250/637: Performed by: INTERNAL MEDICINE

## 2022-03-07 PROCEDURE — 74011250637 HC RX REV CODE- 250/637: Performed by: FAMILY MEDICINE

## 2022-03-07 PROCEDURE — 74011000250 HC RX REV CODE- 250: Performed by: INTERNAL MEDICINE

## 2022-03-07 PROCEDURE — 97530 THERAPEUTIC ACTIVITIES: CPT

## 2022-03-07 PROCEDURE — 74011250636 HC RX REV CODE- 250/636: Performed by: HOSPITALIST

## 2022-03-07 PROCEDURE — 94760 N-INVAS EAR/PLS OXIMETRY 1: CPT

## 2022-03-07 PROCEDURE — 82962 GLUCOSE BLOOD TEST: CPT

## 2022-03-07 RX ADMIN — Medication 10 ML: at 06:22

## 2022-03-07 RX ADMIN — TAMSULOSIN HYDROCHLORIDE 0.4 MG: 0.4 CAPSULE ORAL at 09:25

## 2022-03-07 RX ADMIN — ENOXAPARIN SODIUM 40 MG: 100 INJECTION SUBCUTANEOUS at 09:25

## 2022-03-07 RX ADMIN — OXYBUTYNIN CHLORIDE 5 MG: 5 TABLET ORAL at 09:25

## 2022-03-07 RX ADMIN — Medication 1 CAPSULE: at 09:25

## 2022-03-07 RX ADMIN — FERROUS SULFATE TAB 325 MG (65 MG ELEMENTAL FE) 325 MG: 325 (65 FE) TAB at 06:30

## 2022-03-07 RX ADMIN — Medication 10 ML: at 17:14

## 2022-03-07 RX ADMIN — AMLODIPINE BESYLATE 10 MG: 5 TABLET ORAL at 09:25

## 2022-03-07 RX ADMIN — METOPROLOL TARTRATE 100 MG: 50 TABLET, FILM COATED ORAL at 09:25

## 2022-03-07 RX ADMIN — Medication 10 ML: at 21:18

## 2022-03-07 RX ADMIN — ATORVASTATIN CALCIUM 10 MG: 10 TABLET, FILM COATED ORAL at 21:17

## 2022-03-07 RX ADMIN — OXYBUTYNIN CHLORIDE 5 MG: 5 TABLET ORAL at 17:12

## 2022-03-07 RX ADMIN — GABAPENTIN 300 MG: 300 CAPSULE ORAL at 21:17

## 2022-03-07 RX ADMIN — METOPROLOL TARTRATE 100 MG: 50 TABLET, FILM COATED ORAL at 17:12

## 2022-03-07 RX ADMIN — LEVOFLOXACIN 750 MG: 750 TABLET, FILM COATED ORAL at 17:12

## 2022-03-07 RX ADMIN — GABAPENTIN 300 MG: 300 CAPSULE ORAL at 09:25

## 2022-03-07 NOTE — PROGRESS NOTES
Problem: Mobility Impaired (Adult and Pediatric)  Goal: *Acute Goals and Plan of Care (Insert Text)  Description: FUNCTIONAL STATUS PRIOR TO ADMISSION: Patient was modified independent using a single point cane for functional mobility. Patient reports being able to manage ambulation in a grocery store with a combination of cane and scooter. HOME SUPPORT PRIOR TO ADMISSION: The patient had a roommate who lived with her. The patient did most cooking and cleaning. Her roommate assisted with LE dressing PRN. Physical Therapy Goals  Initiated 3/2/2022  1. Patient will move from supine to sit and sit to supine  in bed with moderate assistance  within 7 day(s). 2.  Patient will transfer from bed to chair and chair to bed with moderate assistance  using the least restrictive device within 7 day(s). 3.  Patient will perform sit to stand with minimal assistance/contact guard assist within 7 day(s). 4.  Patient will ambulate with minimal assistance/contact guard assist for 75 feet with the least restrictive device within 7 day(s). Outcome: Progressing Towards Goal   PHYSICAL THERAPY TREATMENT  Patient: Kwesi Plata (6 y.o. female)  Date: 3/7/2022  Diagnosis: Sepsis ([facility] [address] [A41.9] Sepsis ([address]  Procedure(s) (LRB):  LEFT HEART CATH / CORONARY ANGIOGRAPHY (N/A) 4 Days Post-Op  Precautions: Fall  Chart, physical therapy assessment, plan of care and goals were reviewed. ASSESSMENT  Patient continues with skilled PT services and is slowly progressing towards goals. Pt received sitting in bedside chair. She reports she is wet due to [address]. Pt reports she has been ambulating to the bathroom for BM however she is having urgency and fearful of having an accident and continues to use the purewick. Pt agreeable and eager to increase her activity. Pt stands with CGA and ambulated with a rolling walker in hallway for an increased distance.   Pt ambulates with widened NATALIE with decreased step clearance with no episodes of loss of balance. Pt returned to chair at end of visit. Pt is making steady progress and spoke frequently about her concerns of returning home at this time and is eager to go to rehab. Current Level of Function Impacting Discharge (mobility/balance): transfers with CGA, ambulated with rolling walker x 80 feet with CGA    Other factors to consider for discharge: below her baseline, fall risk, social situation at home is a stressor to pt, encouraged pt to speak with CM with her concerns, social support limited         PLAN :  Patient continues to benefit from skilled intervention to address the above impairments. Continue treatment per established plan of care. to address goals. Recommendation for discharge: (in order for the patient to meet his/her long term goals)  Therapy up to 5 days/week in SNF setting    This discharge recommendation:  Has not yet been discussed the attending provider and/or case management    IF patient discharges home will need the following DME: patient owns DME required for discharge       SUBJECTIVE:   Patient stated I get stressed out at home.     OBJECTIVE DATA SUMMARY:   Critical Behavior:  Neurologic State: Alert  Orientation Level: Oriented X4  Cognition: Appropriate safety awareness,Follows commands  Safety/Judgement: Awareness of environment,Home safety  Functional Mobility Training:  Bed Mobility:   Not assessed, OOB in chair                  Transfers:  Sit to Stand: Contact guard assistance;Assist x1  Stand to Sit: Contact guard assistance;Assist x1                             Balance:  Standing: Impaired  Standing - Static: Constant support;Good  Standing - Dynamic : Constant support;Good  Ambulation/Gait Training:  Distance (ft): 80 Feet (ft)  Assistive Device: Walker, rolling;Gait belt  Ambulation - Level of Assistance: Contact guard assistance;Assist x1        Gait Abnormalities: Decreased step clearance        Base of Support: Widened     Speed/Ca: Slow  Step Length: Right shortened;Left shortened             Pain Rating:  Denies pain    Activity Tolerance:   Fair    After treatment patient left in no apparent distress:   Sitting in chair and Call bell within reach    COMMUNICATION/COLLABORATION:   The patients plan of care was discussed with: Registered nurse.      Elisa Vazquez   Time Calculation: 23 mins

## 2022-03-07 NOTE — PROGRESS NOTES
Bedside and Verbal shift change report given to Yvon Peterson  (oncoming nurse) by Jasmin Staples RN (offgoing nurse). Report included the following information SBAR, Kardex, MAR and Recent Results.

## 2022-03-07 NOTE — PROGRESS NOTES
Hospitalist Progress Note                                     NAME:  Ferny Vasquez  :  1950  MRN:  [id number]  Date of Service:  3/7/2022    Summary: 73 y.o. female with past medical history of HTN, dyslipidemia, morbid obesity, kidney stone who presented on 2022 with generalized body weakness and SOB. CT scan of the abdomen performed showed new right urinary tract obstruction       Assessment/Plan:  Right ureteral calculus with obstruction and pyelonephritis  - S/p cystourethroscopy with right ureteral stent placement  Urology consulted and recs noted    Sepsis due to UTI/ Klebsiella bacteremia: Likely due to UTI and ureteral obstruction  Repeat blood cultures ordered. NGTD. s/p  IV meropenem  Transitioned to  PO levofloxacin per ID   CT of the abd/pelv  Done  due to persistent leukocytosis did not reveal any abscess. Elevated troponin:   Cardiology workup, abnormal stress test showing Inferoapical ischemia  EKG showed no acute ischemic changes  Allergic to aspirin  Heart cath on 3/3 with no obstructive lesions    ELLYN- resolved:   Due to post obstructive uropathy  Scr on admission 3.21, resolved   nephrology consulted and managing    Leukocytosis- persists,with improvement   Likely due to UTI, bacteremia  Now improving    Thrombocytopenia  Due to bacteremia, septicemia- improved    Hypotension- resolved with treatment of bacteremia and IVFluids    Overweight: BMI 45.61     Code status: Full  DVT prophylaxsis: SCD  Dispo: SNF per PT recs, d/w the patient and she is agreeable         Interval History/Subjective:  No acute events overnight. RLQ abd pain returned overnight. No n/v/d, no dyspnea. Review of Systems:  Pertinent items are noted in HPI. Objective:     VITALS:   Last 24hrs VS reviewed since prior progress note.  Most recent are:  Visit Vitals  /64 (BP 1 Location: Left upper arm, BP Patient Position: At rest;Supine;Lying)   Pulse 80   Temp 98.6 °F (37 °C)   Resp 16   Ht 5' 8\" (1.727 m)   Wt 137 kg (302 lb 0.5 oz)   SpO2 100%   BMI 45.92 kg/m²     Patient Vitals for the past 24 hrs:   Temp Pulse Resp BP SpO2   03/07/22 0552  80      03/07/22 0454 98.6 °F (37 °C) 77 16 136/64    03/07/22 0353  72      03/07/22 0155  73      03/06/22 2234 98.6 °F (37 °C) 78 16 130/73 100 %   03/06/22 2154  75      03/06/22 1958 98.7 °F (37.1 °C) 76 16 134/84 98 %   03/06/22 1400  83      03/06/22 1200  82      03/06/22 1000  77      03/06/22 0839 98.2 °F (36.8 °C) 80 18 (!) 145/71 96 %   03/06/22 0800  79          Intake/Output Summary (Last 24 hours) at 3/7/2022 0741  Last data filed at 3/7/2022 0527  Gross per 24 hour   Intake    Output 1400 ml   Net -1400 ml        PHYSICAL EXAM:  General: No acute distress, obese  EENT: EOMI. Anicteric sclerae. Oral mucousa moist,  Resp: CTA bilaterally. No wheezing/rhonchi/rales. No accessory muscle use  CV: Regular rhythm, normal rate, no murmurs, gallops, rubs  GI: Soft, non distended, mild RLQ tenderness, BS+  Extremities: 1+ b/l LE edema  Neurologic: Moves all extremities. AAOx3,   Psych: Good insight. Not anxious nor agitated. Skin: dry    Lab Data Personally Reviewed: (see below)     Medications list Personally Reviewed:  x YES  NO     _______________________________________________________________________  Care Plan discussed with:  Patient/Family, Nurse and       Stefano Dobson MD     Procedures: see electronic medical records for all procedures/Xrays and details which were not copied into this note but were reviewed prior to creation of Plan. LABS:  Recent Labs     03/05/22  0620   WBC 18.0*   HGB 11.0*   HCT 34.9*   *     Recent Labs     03/05/22  0620      K 3.8   *   CO2 26   BUN 24*   CREA 0.71   GLU 99   CA 8.0*     No results for input(s): ALT, AP, TBIL, TBILI, TP, ALB, GLOB, GGT, AML, LPSE in the last 72 hours.     No lab exists for component: SGOT, GPT, AMYP, HLPSE  No results for input(s): INR, PTP, APTT, INREXT, INREXT in the last 72 hours. No results for input(s): FE, TIBC, PSAT, FERR in the last 72 hours. No results found for: FOL, RBCF   No results for input(s): PH, PCO2, PO2 in the last 72 hours. No results for input(s): CPK, CKNDX, TROIQ in the last 72 hours.     No lab exists for component: CPKMB  No results found for: CHOL, CHOLX, CHLST, CHOLV, HDL, HDLP, LDL, LDLC, DLDLP, TGLX, TRIGL, TRIGP, CHHD, CHHDX  Lab Results   Component Value Date/Time    Glucose (POC) 104 03/07/2022 06:21 AM    Glucose (POC) 113 03/06/2022 08:48 PM    Glucose (POC) 182 (H) 03/06/2022 04:39 PM    Glucose (POC) 136 (H) 03/06/2022 11:59 AM    Glucose (POC) 110 03/06/2022 06:16 AM     Lab Results   Component Value Date/Time    Color YELLOW/STRAW 03/03/2022 03:40 AM    Appearance CLEAR 03/03/2022 03:40 AM    Specific gravity 1.015 03/03/2022 03:40 AM    Specific gravity 1.015 02/26/2022 08:39 AM    pH (UA) 6.0 03/03/2022 03:40 AM    Protein 30 (A) 03/03/2022 03:40 AM    Glucose Negative 03/03/2022 03:40 AM    Ketone Negative 03/03/2022 03:40 AM    Bilirubin Negative 03/03/2022 03:40 AM    Urobilinogen 0.2 03/03/2022 03:40 AM    Nitrites Negative 03/03/2022 03:40 AM    Leukocyte Esterase SMALL (A) 03/03/2022 03:40 AM    Epithelial cells FEW 03/03/2022 03:40 AM    Bacteria Negative 03/03/2022 03:40 AM    WBC 20-50 03/03/2022 03:40 AM    RBC >100 (H) 03/03/2022 03:40 AM     Results     Procedure Component Value Units Date/Time    CULTURE, URINE [[id number]] Collected: 03/03/22 0340    Order Status: Completed Specimen: Urine Updated: 03/04/22 0709     Special Requests: --        NO SPECIAL REQUESTS  Reflexed from [id number]       Culture result: No growth (<1,000 CFU/ML)       CULTURE, BLOOD [[id number]] Collected: 03/02/22 0507    Order Status: Completed Specimen: Blood Updated: 03/07/22 0624     Special Requests: NO SPECIAL REQUESTS        Culture result: NO GROWTH 5 DAYS       CULTURE, BLOOD, PAIRED [[id number]]     Order Status: Canceled Specimen: Blood     CULTURE, BLOOD, PAIRED [[id number]]  (Abnormal)  (Susceptibility) Collected: 02/26/22 0736    Order Status: Completed Specimen: Blood Updated: 03/02/22 0547     Special Requests: No Special Requests        Culture result:       Klebsiella pneumoniae growing in all 4 bottles drawn            (NOTE) GVR GROWING IN 4 OF 4 BOTTLES CALLED TO AND READ BACK BY JOSHUA HoustonT AT 1250 ON 02/27/22 BY MD.    Susceptibility      Klebsiella pneumoniae     RENETTA     Amikacin ($) Susceptible     Ampicillin ($) Resistant     Ampicillin/sulbactam ($) Susceptible     Cefazolin ($) Susceptible     Cefepime ($$) Susceptible     Cefoxitin Susceptible     Ceftazidime ($) Susceptible     Ceftriaxone ($) Susceptible     Ciprofloxacin ($) Susceptible     Gentamicin ($) Susceptible     Levofloxacin ($) Susceptible     Meropenem ($$) Susceptible     Piperacillin/Tazobac ($) Susceptible     Tobramycin ($) Susceptible     Trimeth/Sulfa Susceptible                  Linear View                   COVID-19 RAPID TEST [[id number]] Collected: 02/26/22 0606    Order Status: Completed Specimen: Nasopharyngeal Updated: 02/26/22 0703     COVID-19 rapid test Not Detected        Comment: Rapid Abbott ID Now   Rapid NAAT:  The specimen is NEGATIVE for SARS-CoV-2, the novel coronavirus associated with COVID-19. Negative results should be treated as presumptive and, if inconsistent with clinical signs and symptoms or necessary for patient management, should be tested with an alternative molecular assay. Negative results do not preclude SARS-CoV-2 infection and should not be used as the sole basis for patient management decisions. This test has been authorized by the FDA under   an Emergency Use Authorization (EUA) for use by authorized laboratories.  Fact sheet for Healthcare Providers: ConventionUpdate.co.nz Fact sheet for Patients: ConventionUpdate.co.nz   Methodology: Isothermal Nucleic Acid Amplification             Alessio Connell MD

## 2022-03-07 NOTE — PROGRESS NOTES
Transition of Care Plan   RUR- 15% Moderate Risk   DISPOSITION: The disposition plan is to transition to a SNF [address] [facility]  [phone] - patient accepted.  F/U with PCP/Specialist     Transport: [facility]     Patient has been recommended for SNF. CM met with patient to provide the above update. Patient reports that she has been to [facility] in the past. CM provided SNF choice. The Plan for Transition of Care is related to the following treatment goals: SNF    The Patient was provided with a choice of provider and agrees   with the discharge plan. [x] Yes [] No    Freedom of choice list was provided with basic dialogue that supports the patient's individualized plan of care/goals, treatment preferences and shares the quality data associated with the providers. [x] Yes [] No    CM submitted referral for review via all scripts. At 5:31pm - CM received update from [facility], that she has been accepted to [facility]. AVS has been updated.    Chuck Nieves, MSW, CRM, LMHP-e  Available in [employer]

## 2022-03-07 NOTE — PROGRESS NOTES
ID Progress Note  3/7/2022    Subjective:     Up in chair, moving around more and more    Objective:     Antibiotics:  1. Merrem      Vitals:   Visit Vitals  /73 (BP 1 Location: Left upper arm, BP Patient Position: Sitting)   Pulse 84   Temp 97.7 °F (36.5 °C)   Resp 16   Ht 5' 8\" (1.727 m)   Wt 137 kg (302 lb 0.5 oz)   SpO2 97%   BMI 45.92 kg/m²        Tmax:  Temp (24hrs), Av.4 °F (36.9 °C), Min:97.7 °F (36.5 °C), Max:98.7 °F (37.1 °C)      Exam:  Lungs:  clear to auscultation bilaterally  Heart:  regular rate and rhythm  Abdomen:  soft, non-tender. Bowel sounds normal. No masses,  no organomegaly    Labs:      Recent Labs     22  0620   WBC 18.0*   HGB 11.0*   *   BUN 24*   CREA 0.71       Cultures:     No results found for: [facility]  Lab Results   Component Value Date/Time    Culture result: No growth (<1,000 CFU/ML) 2022 03:40 AM    Culture result: NO GROWTH 5 DAYS 2022 05:07 AM    Culture result: (A) 2022 07:36 AM     Klebsiella pneumoniae growing in all 4 bottles drawn    Culture result:  2022 07:36 AM     (NOTE) GVR GROWING IN 4 OF 4 BOTTLES CALLED TO AND READ BACK BY Lesli Sung MLT AT 1250 ON 22 BY MD.       Radiology:     Line/Insert Date:           Assessment:     1. UTI with bacteremia  2. Complicated UTI--Klebsiella from culture  3. Renal stones    Objective:     1. Change to levofloxacin  2. She can complete 2 weeks of therapy upon discharge with oral levofloxacin 750 mg daily--QTc 426  3.  Looking into rehab    Man Gutierres MD

## 2022-03-08 LAB
GLUCOSE BLD STRIP.AUTO-MCNC: 106 MG/DL (ref 65–117)
GLUCOSE BLD STRIP.AUTO-MCNC: 108 MG/DL (ref 65–117)
GLUCOSE BLD STRIP.AUTO-MCNC: 110 MG/DL (ref 65–117)
GLUCOSE BLD STRIP.AUTO-MCNC: 176 MG/DL (ref 65–117)
SERVICE CMNT-IMP: ABNORMAL
SERVICE CMNT-IMP: NORMAL

## 2022-03-08 PROCEDURE — 94760 N-INVAS EAR/PLS OXIMETRY 1: CPT

## 2022-03-08 PROCEDURE — 97116 GAIT TRAINING THERAPY: CPT

## 2022-03-08 PROCEDURE — 74011250637 HC RX REV CODE- 250/637: Performed by: INTERNAL MEDICINE

## 2022-03-08 PROCEDURE — 82962 GLUCOSE BLOOD TEST: CPT

## 2022-03-08 PROCEDURE — 65660000000 HC RM CCU STEPDOWN

## 2022-03-08 PROCEDURE — 74011250637 HC RX REV CODE- 250/637: Performed by: FAMILY MEDICINE

## 2022-03-08 PROCEDURE — 74011636637 HC RX REV CODE- 636/637: Performed by: INTERNAL MEDICINE

## 2022-03-08 PROCEDURE — 74011000250 HC RX REV CODE- 250: Performed by: INTERNAL MEDICINE

## 2022-03-08 PROCEDURE — 74011250636 HC RX REV CODE- 250/636: Performed by: HOSPITALIST

## 2022-03-08 RX ORDER — CARVEDILOL 6.25 MG/1
6.25 TABLET ORAL 2 TIMES DAILY
Qty: 60 TABLET | Refills: 0 | Status: ON HOLD | OUTPATIENT
Start: 2022-03-08 | End: 2022-04-03 | Stop reason: DRUGHIGH

## 2022-03-08 RX ORDER — ACETAMINOPHEN 325 MG/1
650 TABLET ORAL
Qty: 30 TABLET | Refills: 0 | Status: SHIPPED | OUTPATIENT
Start: 2022-03-08 | End: 2022-03-10 | Stop reason: SDUPTHER

## 2022-03-08 RX ORDER — POLYETHYLENE GLYCOL 3350 17 G/17G
17 POWDER, FOR SOLUTION ORAL
Qty: 30 PACKET | Refills: 0 | Status: SHIPPED | OUTPATIENT
Start: 2022-03-08 | End: 2022-03-10 | Stop reason: SDUPTHER

## 2022-03-08 RX ORDER — LEVOFLOXACIN 750 MG/1
750 TABLET ORAL EVERY 24 HOURS
Qty: 14 TABLET | Refills: 0 | Status: SHIPPED | OUTPATIENT
Start: 2022-03-08 | End: 2022-03-10

## 2022-03-08 RX ORDER — AMLODIPINE BESYLATE 10 MG/1
10 TABLET ORAL DAILY
Qty: 30 TABLET | Refills: 0 | Status: SHIPPED | OUTPATIENT
Start: 2022-03-09 | End: 2022-03-10 | Stop reason: SDUPTHER

## 2022-03-08 RX ADMIN — GABAPENTIN 300 MG: 300 CAPSULE ORAL at 08:42

## 2022-03-08 RX ADMIN — AMLODIPINE BESYLATE 10 MG: 5 TABLET ORAL at 08:42

## 2022-03-08 RX ADMIN — Medication 10 ML: at 21:20

## 2022-03-08 RX ADMIN — GABAPENTIN 300 MG: 300 CAPSULE ORAL at 21:20

## 2022-03-08 RX ADMIN — TAMSULOSIN HYDROCHLORIDE 0.4 MG: 0.4 CAPSULE ORAL at 08:42

## 2022-03-08 RX ADMIN — METOPROLOL TARTRATE 100 MG: 50 TABLET, FILM COATED ORAL at 17:27

## 2022-03-08 RX ADMIN — Medication 10 ML: at 17:28

## 2022-03-08 RX ADMIN — FERROUS SULFATE TAB 325 MG (65 MG ELEMENTAL FE) 325 MG: 325 (65 FE) TAB at 06:39

## 2022-03-08 RX ADMIN — ATORVASTATIN CALCIUM 10 MG: 10 TABLET, FILM COATED ORAL at 21:20

## 2022-03-08 RX ADMIN — LEVOFLOXACIN 750 MG: 750 TABLET, FILM COATED ORAL at 17:27

## 2022-03-08 RX ADMIN — METOPROLOL TARTRATE 100 MG: 50 TABLET, FILM COATED ORAL at 08:42

## 2022-03-08 RX ADMIN — ENOXAPARIN SODIUM 40 MG: 100 INJECTION SUBCUTANEOUS at 08:44

## 2022-03-08 RX ADMIN — Medication 1 CAPSULE: at 08:42

## 2022-03-08 RX ADMIN — Medication 10 ML: at 06:40

## 2022-03-08 RX ADMIN — Medication 2 UNITS: at 06:44

## 2022-03-08 RX ADMIN — ACETAMINOPHEN 650 MG: 325 TABLET ORAL at 08:50

## 2022-03-08 RX ADMIN — OXYBUTYNIN CHLORIDE 5 MG: 5 TABLET ORAL at 17:27

## 2022-03-08 RX ADMIN — OXYBUTYNIN CHLORIDE 5 MG: 5 TABLET ORAL at 08:42

## 2022-03-08 NOTE — PROGRESS NOTES
ID Progress Note  3/8/2022    Subjective:     Sitting up in chair, feeling pretty good  Review of Systems:            Symptom Y/N Comments   Symptom Y/N Comments   Fever/Chills n      Chest Pain  n      Poor Appetite       Edema        Cough       Abdominal Pain  n      Sputum       Joint Pain        SOB/PETERSON  n     Pruritis/Rash        Nausea/vomit n      Tolerating PT/OT        Diarrhea  n     Tolerating Diet        Constipation  n     Other           Could NOT obtain due to:       Objective:     Vitals:   Visit Vitals  BP (!) 144/80 (BP 1 Location: Left upper arm, BP Patient Position: At rest)   Pulse 73   Temp 98.6 °F (37 °C)   Resp 16   Ht 5' 8\" (1.727 m)   Wt 139 kg (306 lb 7 oz)   SpO2 98%   BMI 46.59 kg/m²        Tmax:  Temp (24hrs), Av.3 °F (36.8 °C), Min:97.7 °F (36.5 °C), Max:98.6 °F (37 °C)      PHYSICAL EXAM:  General: Morbidly obese, WD, WN. Alert, cooperative, no acute distress    EENT:  EOMI. Anicteric sclerae. MMM  Resp:  Clear in apex with decreased breath sounds at bases. no wheezing or rales. No accessory muscle use  CV:  Regular  rhythm,  No edema  GI:  Soft, Non distended, Non tender. +Bowel sounds  Neurologic:  Alert and oriented X 3, normal speech,   Psych:   Good insight. Not anxious nor agitated  Skin:  No rashes.   No jaundice    Labs:   Lab Results   Component Value Date/Time    WBC 18.0 (H) 2022 06:20 AM    HGB 11.0 (L) 2022 06:20 AM    HCT 34.9 (L) 2022 06:20 AM    PLATELET 582 (L)  06:20 AM    MCV 93.6 2022 06:20 AM     Lab Results   Component Value Date/Time    Sodium 144 2022 06:20 AM    Potassium 3.8 2022 06:20 AM    Chloride 114 (H) 2022 06:20 AM    CO2 26 2022 06:20 AM    Anion gap 4 (L) 2022 06:20 AM    Glucose 99 2022 06:20 AM    BUN 24 (H) 2022 06:20 AM    Creatinine 0.71 2022 06:20 AM    BUN/Creatinine ratio 34 (H) 2022 06:20 AM    GFR est AA >60 2022 06:20 AM    GFR est non-AA >60 03/05/2022 06:20 AM    Calcium 8.0 (L) 03/05/2022 06:20 AM    Bilirubin, total 0.4 03/01/2022 03:00 PM    Alk.  phosphatase 213 (H) 03/01/2022 03:00 PM    Protein, total 5.6 (L) 03/01/2022 03:00 PM    Albumin 2.0 (L) 03/01/2022 03:00 PM    Globulin 3.6 03/01/2022 03:00 PM    A-G Ratio 0.6 (L) 03/01/2022 03:00 PM    ALT (SGPT) 25 03/01/2022 03:00 PM     Results     Procedure Component Value Units Date/Time    CULTURE, URINE [[id number]] Collected: 03/03/22 0340    Order Status: Completed Specimen: Urine Updated: 03/04/22 0709     Special Requests: --        NO SPECIAL REQUESTS  Reflexed from [id number]       Culture result: No growth (<1,000 CFU/ML)       CULTURE, BLOOD [[id number]] Collected: 03/02/22 0507    Order Status: Completed Specimen: Blood Updated: 03/07/22 0624     Special Requests: NO SPECIAL REQUESTS        Culture result: NO GROWTH 5 DAYS       CULTURE, BLOOD, PAIRED [[id number]]     Order Status: Canceled Specimen: Blood     CULTURE, BLOOD, PAIRED [[id number]]  (Abnormal)  (Susceptibility) Collected: 02/26/22 0736    Order Status: Completed Specimen: Blood Updated: 03/02/22 0547     Special Requests: No Special Requests        Culture result:       Klebsiella pneumoniae growing in all 4 bottles drawn            (NOTE) GVR GROWING IN 4 OF 4 BOTTLES CALLED TO AND READ BACK BY Patrick Padron MLT AT 1250 ON 02/27/22 BY MD.    Susceptibility      Klebsiella pneumoniae     RENETTA     Amikacin ($) Susceptible     Ampicillin ($) Resistant     Ampicillin/sulbactam ($) Susceptible     Cefazolin ($) Susceptible     Cefepime ($$) Susceptible     Cefoxitin Susceptible     Ceftazidime ($) Susceptible     Ceftriaxone ($) Susceptible     Ciprofloxacin ($) Susceptible     Gentamicin ($) Susceptible     Levofloxacin ($) Susceptible     Meropenem ($$) Susceptible     Piperacillin/Tazobac ($) Susceptible     Tobramycin ($) Susceptible     Trimeth/Sulfa Susceptible                  Linear View                   COVID-19 RAPID TEST [[id number]] Collected: 02/26/22 0606    Order Status: Completed Specimen: Nasopharyngeal Updated: 02/26/22 0703     COVID-19 rapid test Not Detected        Comment: Rapid Abbott ID Now   Rapid NAAT:  The specimen is NEGATIVE for SARS-CoV-2, the novel coronavirus associated with COVID-19. Negative results should be treated as presumptive and, if inconsistent with clinical signs and symptoms or necessary for patient management, should be tested with an alternative molecular assay. Negative results do not preclude SARS-CoV-2 infection and should not be used as the sole basis for patient management decisions. This test has been authorized by the FDA under   an Emergency Use Authorization (EUA) for use by authorized laboratories. Fact sheet for Healthcare Providers: ConventionUpdate.co.nz Fact sheet for Patients: ConventionUpdate.co.nz   Methodology: Isothermal Nucleic Acid Amplification               Assessment and Plan   Right ureteral calculus  Presumed Complicated UTI (urine cx not done prior to procedure)  Bacteremia  S/p cystoscopy insertion right ureteral stent (2/26)  - afebrile, WBC 18    U/A (2/26) >100 WBC with 4+ bacteria, urine cx was not processed    U/A (3/3) 20-50 WBC, cx no growth    blood cx (2/26) klebsiella pneumoniae    Blood cx (3/2) no growth     CT of abd/pel (2/26) bilateral renal calculi, right greater than left, obstruction of the right kidney and right ureter secondary to an 8 mm stone positioned approximately 2 cm proximal to the  right ureterovesical junction. Renal US (3/2) Bilateral nonobstructive nephrolithiasis. Right nephroureteral stent in place. No hydronephrosis. ABX changed to Levofloxacin from Merrem on 3/4    No need for PICC line at this time. Complete 2 weeks of PO Levofloxacin, last dose 3/13    QTC 426ms      source of bacteremia most likely secondary to complicated UTI.      However, there was no urine cx done prior to cystoscopy and right ureteral stent placement.  Pt has hx of ESBL E-coli in the past.      Adjust abx prn    Fever work up if temp >= 100.4      Above plan of care discussed and agreed with Dr. Frederick Kim, NP

## 2022-03-08 NOTE — PROGRESS NOTES
Transition of Care Plan  · RUR- 15% Moderate Risk  · DISPOSITION: The disposition plan is to transition to a SNF [address] [facility]  [phone] - patient accepted. · Insurance auth needed  · F/U with PCP/Specialist    · Transport: [facility]     Patient has been recommended for SNF. Patient has been accepted to [facility]. Per attending, patient is stable for discharge. At 9:29am -  contacted [place] admin coordinator at [facility], who confirmed acceptance. At 9:35am - 9:42am - CM contacted [facility], patient will need insurance authorization completed before transitioning to facility today, also pending bed availability. Information was submitted to intake department to confirm if patient has coverage. [address]  Fax: [phone]  Address: [address], [place], [place]    At 11:15am -  contacted [facility] to follow up regarding confirmation of benefits.  was informed that no one has responded to ticket submitted by representative. At 11:19am -  attempted to provide this update to [address] [place] [address] coordinator. CM left a VM. At 2:01pm - CM contacted [facility] representative to follow up regarding confirmation of coverage. CM was informed, that a ticket was not submitted this morning, representative Daniella Rai reports she is submitting at ticket now (2:09pm). Dipesh Pierre reports she will call this CM. CM provided update to [address] [place] [address] coordinator. At 3:14pm - 3:23pm - CM contacted [facility] representative to follow up and receive update regarding coverage. Still pending per representative.     At 6:12pm - CM attempted to contact José Miguel Huber specialist with [facility] [employer] to start insurance auth.   [phone]  Option 2  Extension [phone]    CM left a VM at 6:14pm.    Gaudencio Pace, NILESH, CRM, LMHP-e  Available in [facility]

## 2022-03-08 NOTE — DISCHARGE INSTRUCTIONS
Discharge Instructions       PATIENT ID: Khanh López  MRN: [id number]   YOB: 1950    DATE OF ADMISSION: 2/26/2022  1:22 PM    DATE OF DISCHARGE: 3/8/2022    PRIMARY CARE PROVIDER: Nash Chou MD     ATTENDING PHYSICIAN: Umer Moctezuma MD  DISCHARGING PROVIDER: Dora Ace MD    To contact this individual call [phone] and ask the  to page. If unavailable ask to be transferred the Adult Hospitalist Department. DISCHARGE DIAGNOSES ***    CONSULTATIONS: IP CONSULT TO UROLOGY  IP CONSULT TO NEPHROLOGY  IP CONSULT TO INFECTIOUS DISEASES  IP CONSULT TO CARDIOLOGY    PROCEDURES/SURGERIES: Procedure(s):  LEFT HEART CATH / CORONARY ANGIOGRAPHY    PENDING TEST RESULTS:   At the time of discharge the following test results are still pending: ***    FOLLOW UP APPOINTMENTS:   Follow-up Information     Follow up With Specialties Details Why [address] [facility]   Follow up on 3/7/22 at 10:10 AM at the [facility] office with Dr. Tonia Muñoz. Call with questions or concerns. [phone] [address] [facility] and [address]  [address] [place] [address] Drive [address] [place] [address]    Nash Chou MD Gastroenterology   [address] [facility]  [phone]                 DIET: Cardiac Diet and Diabetic Diet      ACTIVITY: Activity as tolerated        Radiology      DISCHARGE MEDICATIONS:   See Medication Reconciliation Form    · It is important that you take the medication exactly as they are prescribed. · Keep your medication in the bottles provided by the pharmacist and keep a list of the medication names, dosages, and times to be taken in your wallet. · Do not take other medications without consulting your doctor. NOTIFY YOUR PHYSICIAN FOR ANY OF THE FOLLOWING:   Fever over 101 degrees for 24 hours.    Chest pain, shortness of breath, fever, chills, nausea, vomiting, diarrhea, change in mentation, falling, weakness, bleeding. Severe pain or pain not relieved by medications. Or, any other signs or symptoms that you may have questions about.       DISPOSITION:    Home With:   OT  PT  HH  RN      x SNF/Inpatient Rehab    Independent/assisted living    Hospice    Other:     CDMP Checked:   Yes ***     PROBLEM LIST Updated:  Yes ***       Signed:   Jones Núñez MD  3/8/2022  9:23 AM

## 2022-03-08 NOTE — PROGRESS NOTES
Problem: Mobility Impaired (Adult and Pediatric)  Goal: *Acute Goals and Plan of Care (Insert Text)  Description: FUNCTIONAL STATUS PRIOR TO ADMISSION: Patient was modified independent using a single point cane for functional mobility. Patient reports being able to manage ambulation in a grocery store with a combination of cane and scooter. HOME SUPPORT PRIOR TO ADMISSION: The patient had a roommate who lived with her. The patient did most cooking and cleaning. Her roommate assisted with LE dressing PRN. Physical Therapy Goals  Initiated 3/2/2022  1. Patient will move from supine to sit and sit to supine  in bed with moderate assistance  within 7 day(s). 2.  Patient will transfer from bed to chair and chair to bed with moderate assistance  using the least restrictive device within 7 day(s). 3.  Patient will perform sit to stand with minimal assistance/contact guard assist within 7 day(s). 4.  Patient will ambulate with minimal assistance/contact guard assist for 75 feet with the least restrictive device within 7 day(s). Outcome: Progressing Towards Goal   PHYSICAL THERAPY TREATMENT  Patient: Ciarra Mcintosh (48 y.o. female)  Date: 3/8/2022  Diagnosis: Sepsis ([facility] [A41.9] Sepsis ([address]  Procedure(s) (LRB):  LEFT HEART CATH / CORONARY ANGIOGRAPHY (N/A) 5 Days Post-Op  Precautions: Fall  Chart, physical therapy assessment, plan of care and goals were reviewed. ASSESSMENT  Patient continues with skilled PT services and is progressing towards goals. Pt received in bedside chair and agreeable to mobilize with therapy. Pt transfers sit to stand without rocking motion today with supervision. Pt ambulated with rolling walker for an increased ambulation distance with slow pace, increased trunk sway with no episodes of overt loss of balance, and a flexed posture. Pt reports improving tolerance for activity and has been sitting up in chair for good part of the day. Pt returned to chair at session end. Current Level of Function Impacting Discharge (mobility/balance): transfers with supervision, ambulation with rolling walker x 125 feet with CGA    Other factors to consider for discharge:  below her baseline, fall risk, social situation at home is a stressor to pt, encouraged pt to speak with CM with her concerns, social support limited         PLAN :  Patient continues to benefit from skilled intervention to address the above impairments. Continue treatment per established plan of care. to address goals. Recommendation for discharge: (in order for the patient to meet his/her long term goals)  Therapy up to 5 days/week in SNF setting    This discharge recommendation:  Has not yet been discussed the attending provider and/or case management    IF patient discharges home will need the following DME: patient owns DME required for discharge       SUBJECTIVE:   Patient stated I am feeling cold today.     OBJECTIVE DATA SUMMARY:   Critical Behavior:  Neurologic State: Alert  Orientation Level: Oriented X4  Cognition: Appropriate decision making,Appropriate for age attention/concentration,Appropriate safety awareness  Safety/Judgement: Awareness of environment,Home safety  Functional Mobility Training:  Bed Mobility:   Not assessed, OOB in chair                  Transfers:  Sit to Stand: Supervision  Stand to Sit: Supervision                             Balance:  Sitting: Intact  Standing: Impaired  Standing - Static: Good  Standing - Dynamic : Good  Ambulation/Gait Training:  Distance (ft): 125 Feet (ft)  Assistive Device: Walker, rolling;Gait belt  Ambulation - Level of Assistance: Contact guard assistance;Assist x1        Gait Abnormalities: Trunk sway increased;Decreased step clearance (flexed posture)        Base of Support: Widened     Speed/Ca: Slow  Step Length: Right shortened;Left shortened                 Pain Rating:  No complaints     Activity Tolerance:   Good    After treatment patient left in no apparent distress:   Sitting in chair and Call bell within reach    COMMUNICATION/COLLABORATION:   The patients plan of care was discussed with: Registered nurse.      Eilsa Mccormick   Time Calculation: 10 mins

## 2022-03-08 NOTE — PROGRESS NOTES
Physician Progress Note      Sue Coffey  CSN #:                  [id number]  :                       1950  ADMIT DATE:       2022 1:22 PM  DISCH DATE:  RESPONDING  PROVIDER #:        Lesley GANN MD          QUERY TEXT:    Patient admitted from OSH with septic stone. Documentation reflects NSTEMI in Urology Op Note and ED Physician notes, both from OSH and after transfer. ED RN note after transfer noted chest pain while performing ECG. The D/C summary noted elevated troponin, abnormal stress test showing inferoapical ischemia and s/p cardiac cath on 3/3 with no obstructive lesions. If possible, please document if NSTEMI was: The medical record reflects the following:    Risk Factors: Sepsis, UTI, ureteral calculus, HTN    Clinical Indicators:    -- H&P documented Suspected non-ST elevation myocardial infarction & patient was not started on aspirin, because the patient is allergic to aspirin. Also the patient was not started on full-dose heparin, because of the patient's significant thrombocytopenia and potential surgery for right renal stone. -- Urology Op Note (3/3) documented pt diagnosed at OSH with a NSTEMI and also had a CT performed showing a distal right ureteral calculus and superimposed UTI, thought to be the source for her NSTEMI. -- HS troponin results = 345 (OSH on ), 196, 368, 268, 263 (after transfer on ), 206 ()  -- Cardiology (3/2) documented  Inferoapical ischemia on yesterday's MPI  -- Cardiac cath (3/3) conclusion = Very mild nonocclusive CAD & Cardiology (3/4) documented No significant CAD on cath  -- ECG (OSH on ) = Sinus tachycardia, nonspecific ST and T wave abnormality, abnormal ECG & ( after transfer) = sinus tachycardia & 3/4 = NSR, normal ECG  -- TTE 3/1 = Left Ventricle: Left ventricle size is normal. Moderately increased wall thickness. Findings consistent with concentric remodeling. Normal wall motion.  Normal left ventricular systolic function with a visually estimated EF of 55 - 60%. Diastolic dysfunction present with normal LV EF.    -- ED RN documented ekg done and given to md. Made aware pt started to have chest pain as doing ekg. -- ED Physician noted obstructing right-sided renal stone along with infection and renal failure along with NSTEMI. -- ED Physician at OSH documented Septic stone with ELLYN and NSTEMI. Will transfer. & EKG shows sinus tach no signs of acute ischemic change  -- D/C summary = Elevated troponin: Cardiology workup, abnormal stress test showing Inferoapical ischemia - EKG showed no acute ischemic changes - S/p Heart cath on 3/3 with no obstructive lesions    Treatment: Cardiology consult, transfer from OSH, stress test, cardiac cath, ECG, serial lab monitoring of HS troponin, Urology consult, TTE, metoprolol, amlodipine, atorvastatin, nitroglycerin    Thank-you,  Yun García RN, [facility] [place]  Clinical   [phone]  [email or web address]  Options provided:  -- NSTEMI confirmed after study  -- NSTEMI ruled out after study  -- Other - I will add my own diagnosis  -- Disagree - Not applicable / Not valid  -- Disagree - Clinically unable to determine / Unknown  -- Refer to Clinical Documentation Reviewer    PROVIDER RESPONSE TEXT:    NSTEMI ruled out after study.     Query created by: Mireille Mancilla on 3/8/2022 11:13 AM      Electronically signed by:  Valentino Preez MD 3/8/2022 12:15 PM

## 2022-03-08 NOTE — PROGRESS NOTES
Bedside and Verbal shift change report given to Manoj Butt  (oncoming nurse) by [facility], RN (offgoing nurse).  Report included the following information SBAR, Kardex, MAR and Recent Results.

## 2022-03-08 NOTE — DISCHARGE SUMMARY
Discharge Summary       PATIENT ID: Michelle Mills  MRN: [id number]   YOB: 1950    DATE OF ADMISSION: 2/26/2022  1:22 PM    DATE OF DISCHARGE:3/10/22  PRIMARY CARE PROVIDER: Justin Brunson MD     ATTENDING PHYSICIAN:   DISCHARGING PROVIDER: Jones Núñez MD    To contact this individual call [phone] and ask the  to page. If unavailable ask to be transferred the Adult Hospitalist Department. CONSULTATIONS: IP CONSULT TO UROLOGY  IP CONSULT TO NEPHROLOGY  IP CONSULT TO INFECTIOUS DISEASES  IP CONSULT TO CARDIOLOGY    PROCEDURES/SURGERIES: Procedure(s):  LEFT HEART CATH / CORONARY ANGIOGRAPHY  Right ureteral calculus with obstruction and pyelonephritis  - S/p cystourethroscopy with right ureteral stent placement    DISCHARGE DIAGNOSES:   Right ureteral calculus with obstruction and pyelonephritis  Sepsis due to UTI/ Klebsiella bacteremia:      [address] [facility] COURSE:   This is a 70-year-old woman with a past medical history significant for hypertension, dyslipidemia, morbid obesity, asthma, kidney stone status post lithotripsy, type 2 diabetes, who was in her usual state of health until about a week ago when the patient developed weakness. The weakness was described as generalized weakness. The patient also complained of shortness of breath. The shortness of breath is worse with activity such as walking. The patient also complained of abdominal pain. The pain is located at the right lower quadrant with radiation to the groin. The pain is constant, sharp with no known aggravating or relieving factors associated with diarrhea. The patient has history of asthma, not on oxygen at home. The patient was taken to [facility] for further evaluation of her symptoms.   When the patient arrived at the emergency room, the patient was found to have significant finding on abdominal exam.  Because of that, CT scan of the abdomen and pelvis was obtained. The CT scan shows obstructing right renal stone. The patient was also found to have elevated troponin level as well as acute kidney injury. Her clinical presentation triggered code sepsis. The patient received fluid as per sepsis protocol. The patient also received broad-spectrum antibiotics. The patient required higher level of care, which cannot be provided at [facility]. The patient was then transferred to [facility] Emergency Room, so that the patient can be evaluated and admitted to [facility]. When the patient arrived at the emergency room, her blood pressure that was low at [facility] has stabilized. The emergency room physician consulted urologist, because of the obstructing right renal stone and the patient was subsequently referred to the hospitalist service for evaluation for [place] / PLAN:      y.o. female with past medical history of HTN, dyslipidemia, morbid obesity, kidney stone who presented on 2/26/2022 with generalized body weakness and SOB. CT scan of the abdomen performed showed new right urinary    Right ureteral calculus with obstruction and pyelonephritis  - S/p cystourethroscopy with right ureteral stent placement  Urology consulted and recs noted     Sepsis due to UTI/ Klebsiella bacteremia: Likely due to UTI and ureteral obstruction  Repeat blood cultures ordered. NGTD. s/p  IV meropenem  Transitioned to  PO levofloxacin 760mg per ID , to continue for 2 weeks   CT of the abd/pelv  Done  due to persistent leukocytosis did not reveal any abscess.     Elevated troponin:   Cardiology workup, abnormal stress test showing Inferoapical ischemia  EKG showed no acute ischemic changes  Allergic to aspirin  S/p Heart cath on 3/3 with no obstructive lesions  Card consulted as per last not on 3/4/22  Assessment/Plan:   No significant CAD on cath  Continue current cardiac meds  Signing off, please call with questions       ELLYN- resolved:   Due to post obstructive uropathy  Scr on admission 3.21, resolved   nephrology consulted and managing     Leukocytosis- persists,with improvement   Likely due to UTI, bacteremia  Now improving     Thrombocytopenia  Due to bacteremia, septicemia- improved     Hypotension- resolved with treatment of bacteremia and IVFluids     Overweight: BMI 45.61      Code status: Full  DVT prophylaxsis: SCD  Dispo: SNF per PT recs,had p2p with [employer] ,placement denies due to high functioning, pt appealed, that was also denied  D/c home with [facility], and out pt appts              BMI: Body mass index is 48.1 kg/m². .  Cardiac diet,diabetic diet              NOTIFY YOUR PHYSICIAN FOR ANY OF THE FOLLOWING:   Fever over 101 degrees for 24 hours. Chest pain, shortness of breath, fever, chills, nausea, vomiting, diarrhea, change in mentation, falling, weakness, bleeding. Severe pain or pain not relieved by medications, as well as any other signs or symptoms that you may have questions about. FOLLOW UP APPOINTMENTS:    Follow-up Information     Follow up With Specialties Details Why [address] [facility]   Follow up on 3/7/22 at 10:10 AM at the [facility] office with Dr. Karli Hendrickson. Call with questions or concerns. [phone] [address]  Colby Lo MD Gastroenterology   [address]      Ben[address]ette Main  [facility] Vascular Surgery, Interventional Cardiology, Cardiology Schedule an appointment as soon as possible for a visit in 2 weeks [address]  [phone]               DIET: Cardiac ,diabetic diet Diet    ACTIVITY: Activity as tolerated      DISCHARGE MEDICATIONS:  Current Discharge Medication List      START taking these medications    Details   acetaminophen (TYLENOL) 325 mg tablet Take 2 Tablets by mouth every six (6) hours as needed for Pain.   Qty: 30 Tablet, Refills: 0  Start date: 3/10/2022      amLODIPine (NORVASC) 10 mg tablet Take 1 Tablet by mouth daily. Qty: 30 Tablet, Refills: 0  Start date: 3/10/2022      L.acid,para-B. bifidum-S.therm (RISAQUAD) 8 billion cell cap cap Take 1 Capsule by mouth daily. Qty: 30 Capsule, Refills: 0  Start date: 3/10/2022      levoFLOXacin (LEVAQUIN) 750 mg tablet Take 1 Tablet by mouth every twenty-four (24) hours for 3 days. Qty: 3 Tablet, Refills: 0  Start date: 3/10/2022, End date: 3/13/2022      polyethylene glycol (MIRALAX) 17 gram packet Take 1 Packet by mouth daily as needed for Constipation. Qty: 30 Packet, Refills: 0  Start date: 3/10/2022         CONTINUE these medications which have CHANGED    Details   carvediloL (COREG) 6.25 mg tablet Take 1 Tablet by mouth two (2) times a day. Qty: 60 Tablet, Refills: 0  Start date: 3/8/2022         CONTINUE these medications which have NOT CHANGED    Details   atorvastatin (LIPITOR) 10 mg tablet       gabapentin (NEURONTIN) 300 mg capsule TK 1 C PO HS      albuterol (PROVENTIL HFA, VENTOLIN HFA, PROAIR HFA) 90 mcg/actuation inhaler Take  by inhalation. metFORMIN 500 mg/5 mL soln Take  by mouth. oxybutynin (DITROPAN) 5 mg tablet Take 5 mg by mouth two (2) times a day. tamsulosin (FLOMAX) 0.4 mg capsule Take 0.4 mg by mouth daily.          STOP taking these medications       aliskiren (TEKTURNA) 300 mg tablet Comments:   Reason for Stopping:         diazePAM (VALIUM) 5 mg tablet Comments:   Reason for Stopping:         ferrous sulfate 325 mg (65 mg iron) tablet Comments:   Reason for Stopping:         meloxicam (MOBIC) 15 mg tablet Comments:   Reason for Stopping:         naproxen (NAPROSYN) 375 mg tablet Comments:   Reason for Stopping:         ondansetron (ZOFRAN ODT) 8 mg disintegrating tablet Comments:   Reason for Stopping:               DISPOSITION:    Home With:family    OT  PT xx HH  RN       SNF/Rehab    Independent/assisted living    Hospice    Other: PATIENT CONDITION AT DISCHARGE:     Functional status    Poor    x Deconditioned     Independent      Cognition   x  Lucid     Forgetful     Dementia      Catheters/lines (plus indication)    Oliveros     PICC     PEG    x None      Code status    x Full code     DNR      Visit Vitals  BP (!) 144/74   Pulse 83   Temp 98.3 °F (36.8 °C)   Resp 14   Ht 5' 8\" (1.727 m)   Wt 143.5 kg (316 lb 5.8 oz)   SpO2 94%   BMI 48.10 kg/m²       PHYSICAL EXAM:  General: No acute distress, obese  EENT: EOMI. Anicteric sclerae. Oral mucousa moist,  Resp: CTA bilaterally. No wheezing/rhonchi/rales. No accessory muscle use  CV: Regular rhythm, normal rate, no murmurs, gallops, rubs  GI: Soft, non distended, mild RLQ tenderness, BS+  Extremities: 1+ b/l LE edema  Neurologic: Moves all extremities. AAOx3,   Psych: Good insight. Not anxious nor agitated.   Skin: dry    CHRONIC MEDICAL DIAGNOSES:  Problem List as of 3/10/2022 Date Reviewed: 2/27/2022          Codes Class Noted - Resolved    * (Principal) Sepsis ([facility] ICD-10-CM: A41.9  ICD-9-CM: 038.9, 995.91  2/26/2022 - Present        Obesity, morbid ([facility] ICD-10-CM: E66.01  ICD-9-CM: 278.01  9/4/2020 - Present        Acute pain ICD-10-CM: R52  ICD-9-CM: 338.19  7/7/2020 - Present        Arthritis ICD-10-CM: M19.90  ICD-9-CM: 716.90  7/7/2020 - Present        Asthma ICD-10-CM: J45.909  ICD-9-CM: 493.90  7/7/2020 - Present        At risk for falls ICD-10-CM: Z91.81  ICD-9-CM: V15.88  7/7/2020 - Present        Hematuria ICD-10-CM: R31.9  ICD-9-CM: 599.70  7/7/2020 - Present        Kidney stone ICD-10-CM: N20.0  ICD-9-CM: 592.0  7/7/2020 - Present        Edema ICD-10-CM: R60.9  ICD-9-CM: 782.3  7/7/2020 - Present        Hypertension ICD-10-CM: I10  ICD-9-CM: 401.9  7/7/2020 - Present        Menopause present ICD-10-CM: Z78.0  ICD-9-CM: 627.2  7/7/2020 - Present        Obesity ICD-10-CM: E66.9  ICD-9-CM: 278.00  7/7/2020 - Present        Postmenopausal state ICD-10-CM: Z78.0  ICD-9-CM: V49.81  7/7/2020 - Present        Urinary incontinence ICD-10-CM: R32  ICD-9-CM: 788.30  7/7/2020 - Present              Greater than 35 minutes were spent with the patient on counseling and coordination of care    Signed:   Faizan Barr MD  3/10/2022  9:02 AM

## 2022-03-09 LAB
ANION GAP SERPL CALC-SCNC: 1 MMOL/L (ref 5–15)
BASOPHILS # BLD: 0 K/UL (ref 0–0.1)
BASOPHILS NFR BLD: 1 % (ref 0–1)
BUN SERPL-MCNC: 16 MG/DL (ref 6–20)
BUN/CREAT SERPL: 19 (ref 12–20)
CALCIUM SERPL-MCNC: 8.3 MG/DL (ref 8.5–10.1)
CHLORIDE SERPL-SCNC: 111 MMOL/L (ref 97–108)
CO2 SERPL-SCNC: 29 MMOL/L (ref 21–32)
CREAT SERPL-MCNC: 0.86 MG/DL (ref 0.55–1.02)
DIFFERENTIAL METHOD BLD: ABNORMAL
EOSINOPHIL # BLD: 0.1 K/UL (ref 0–0.4)
EOSINOPHIL NFR BLD: 1 % (ref 0–7)
ERYTHROCYTE [DISTWIDTH] IN BLOOD BY AUTOMATED COUNT: 16.4 % (ref 11.5–14.5)
GLUCOSE BLD STRIP.AUTO-MCNC: 110 MG/DL (ref 65–117)
GLUCOSE BLD STRIP.AUTO-MCNC: 114 MG/DL (ref 65–117)
GLUCOSE BLD STRIP.AUTO-MCNC: 117 MG/DL (ref 65–117)
GLUCOSE BLD STRIP.AUTO-MCNC: 95 MG/DL (ref 65–117)
GLUCOSE SERPL-MCNC: 104 MG/DL (ref 65–100)
HCT VFR BLD AUTO: 31.7 % (ref 35–47)
HGB BLD-MCNC: 9.7 G/DL (ref 11.5–16)
IMM GRANULOCYTES # BLD AUTO: 0 K/UL (ref 0–0.04)
IMM GRANULOCYTES NFR BLD AUTO: 0 % (ref 0–0.5)
LYMPHOCYTES # BLD: 1.2 K/UL (ref 0.8–3.5)
LYMPHOCYTES NFR BLD: 25 % (ref 12–49)
MCH RBC QN AUTO: 29.4 PG (ref 26–34)
MCHC RBC AUTO-ENTMCNC: 30.6 G/DL (ref 30–36.5)
MCV RBC AUTO: 96.1 FL (ref 80–99)
MONOCYTES # BLD: 0.5 K/UL (ref 0–1)
MONOCYTES NFR BLD: 11 % (ref 5–13)
NEUTS SEG # BLD: 3.1 K/UL (ref 1.8–8)
NEUTS SEG NFR BLD: 62 % (ref 32–75)
NRBC # BLD: 0 K/UL (ref 0–0.01)
NRBC BLD-RTO: 0 PER 100 WBC
PLATELET # BLD AUTO: 310 K/UL (ref 150–400)
PMV BLD AUTO: 10.9 FL (ref 8.9–12.9)
POTASSIUM SERPL-SCNC: 3.8 MMOL/L (ref 3.5–5.1)
RBC # BLD AUTO: 3.3 M/UL (ref 3.8–5.2)
SERVICE CMNT-IMP: NORMAL
SODIUM SERPL-SCNC: 141 MMOL/L (ref 136–145)
WBC # BLD AUTO: 5 K/UL (ref 3.6–11)

## 2022-03-09 PROCEDURE — 82962 GLUCOSE BLOOD TEST: CPT

## 2022-03-09 PROCEDURE — 97116 GAIT TRAINING THERAPY: CPT

## 2022-03-09 PROCEDURE — 74011250637 HC RX REV CODE- 250/637: Performed by: INTERNAL MEDICINE

## 2022-03-09 PROCEDURE — 94760 N-INVAS EAR/PLS OXIMETRY 1: CPT

## 2022-03-09 PROCEDURE — 65660000000 HC RM CCU STEPDOWN

## 2022-03-09 PROCEDURE — 93005 ELECTROCARDIOGRAM TRACING: CPT

## 2022-03-09 PROCEDURE — 97535 SELF CARE MNGMENT TRAINING: CPT

## 2022-03-09 PROCEDURE — 74011250636 HC RX REV CODE- 250/636: Performed by: HOSPITALIST

## 2022-03-09 PROCEDURE — 74011000250 HC RX REV CODE- 250: Performed by: INTERNAL MEDICINE

## 2022-03-09 PROCEDURE — 36415 COLL VENOUS BLD VENIPUNCTURE: CPT

## 2022-03-09 PROCEDURE — 74011250637 HC RX REV CODE- 250/637: Performed by: FAMILY MEDICINE

## 2022-03-09 PROCEDURE — 85025 COMPLETE CBC W/AUTO DIFF WBC: CPT

## 2022-03-09 PROCEDURE — 80048 BASIC METABOLIC PNL TOTAL CA: CPT

## 2022-03-09 RX ADMIN — TAMSULOSIN HYDROCHLORIDE 0.4 MG: 0.4 CAPSULE ORAL at 09:06

## 2022-03-09 RX ADMIN — ENOXAPARIN SODIUM 40 MG: 100 INJECTION SUBCUTANEOUS at 09:07

## 2022-03-09 RX ADMIN — ATORVASTATIN CALCIUM 10 MG: 10 TABLET, FILM COATED ORAL at 21:40

## 2022-03-09 RX ADMIN — OXYBUTYNIN CHLORIDE 5 MG: 5 TABLET ORAL at 09:07

## 2022-03-09 RX ADMIN — LEVOFLOXACIN 750 MG: 750 TABLET, FILM COATED ORAL at 18:22

## 2022-03-09 RX ADMIN — METOPROLOL TARTRATE 100 MG: 50 TABLET, FILM COATED ORAL at 09:06

## 2022-03-09 RX ADMIN — AMLODIPINE BESYLATE 10 MG: 5 TABLET ORAL at 09:06

## 2022-03-09 RX ADMIN — Medication 10 ML: at 14:31

## 2022-03-09 RX ADMIN — GABAPENTIN 300 MG: 300 CAPSULE ORAL at 09:07

## 2022-03-09 RX ADMIN — METOPROLOL TARTRATE 100 MG: 50 TABLET, FILM COATED ORAL at 18:22

## 2022-03-09 RX ADMIN — Medication 10 ML: at 06:40

## 2022-03-09 RX ADMIN — OXYBUTYNIN CHLORIDE 5 MG: 5 TABLET ORAL at 18:23

## 2022-03-09 RX ADMIN — ACETAMINOPHEN 650 MG: 325 TABLET ORAL at 03:44

## 2022-03-09 RX ADMIN — Medication 10 ML: at 21:40

## 2022-03-09 RX ADMIN — FERROUS SULFATE TAB 325 MG (65 MG ELEMENTAL FE) 325 MG: 325 (65 FE) TAB at 06:39

## 2022-03-09 RX ADMIN — GABAPENTIN 300 MG: 300 CAPSULE ORAL at 21:40

## 2022-03-09 RX ADMIN — Medication 1 CAPSULE: at 09:07

## 2022-03-09 NOTE — PROGRESS NOTES
Transition of Care Plan  · RUR- 15% Moderate Risk  · DISPOSITION: The disposition plan is to transition to a SNF [address] [facility] [address] [phone] - patient accepted.   · Insurance auth started on 3/9/22  · F/U with PCP/Specialist    · Transport: [facility]     Patient has been recommended for SNF. Patient has been accepted to [facility]. Per attending, patient is stable for discharge. At 10:05am -  attempted to contact [facility] specialist with [facility] Delivery Team. CM left a VM, awaiting callback. [phone]  Option 2  Extension [phone]    At 11:50am -  contacted Janette specialist with [facility] who is requesting clinical documentation for the insurance auth. CM to fax to [phone]  Also fax to [phone]    Reference number: [id number]    Contact: Tita who is reviewing chart, extension number: [phone]    At 2:24pm -  contacted [facility] to follow up regarding if she has received fax for the [facility]. CM left a VM. At 2:26pm -  contacted [email or web address] to follow up regarding if she has received fax for the insurance [address] [place] [address]. CM left a VM. At 3:13pm -  received a call from [facility] who reports that she has received clinical chart and is currently being reviewed. At 4:43pm -  attempted to contact Tita to follow up regarding insurance auth completion. CM left a VM, awaiting a call back.     Ruben Turner, NILESH, CRM, LMHP-e  Available in [employer]

## 2022-03-09 NOTE — PROGRESS NOTES
ID Progress Note  3/9/2022    Subjective:     Sitting up in chair, feeling pretty good  Review of Systems:            Symptom Y/N Comments   Symptom Y/N Comments   Fever/Chills n      Chest Pain  n      Poor Appetite       Edema        Cough       Abdominal Pain  n      Sputum       Joint Pain        SOB/PETERSON  n     Pruritis/Rash        Nausea/vomit n      Tolerating PT/OT        Diarrhea  n     Tolerating Diet        Constipation  n     Other           Could NOT obtain due to:       Objective:     Vitals:   Visit Vitals  /79 (BP 1 Location: Left upper arm, BP Patient Position: At rest)   Pulse 83   Temp 98.8 °F (37.1 °C)   Resp 16   Ht 5' 8\" (1.727 m)   Wt 143.2 kg (315 lb 11.2 oz)   SpO2 97%   BMI 48.00 kg/m²        Tmax:  Temp (24hrs), Av.3 °F (36.8 °C), Min:97.7 °F (36.5 °C), Max:99.2 °F (37.3 °C)      PHYSICAL EXAM:  General: Morbidly obese, WD, WN. Alert, cooperative, no acute distress    EENT:  EOMI. Anicteric sclerae. MMM  Resp:  Clear in apex with decreased breath sounds at bases. no wheezing or rales. No accessory muscle use  CV:  Regular  rhythm,  No edema  GI:  Soft, Non distended, Non tender. +Bowel sounds  Neurologic:  Alert and oriented X 3, normal speech,   Psych:   Good insight. Not anxious nor agitated  Skin:  No rashes.   No jaundice    Labs:   Lab Results   Component Value Date/Time    WBC 18.0 (H) 2022 06:20 AM    HGB 11.0 (L) 2022 06:20 AM    HCT 34.9 (L) 2022 06:20 AM    PLATELET 745 (L) 7308 06:20 AM    MCV 93.6 2022 06:20 AM     Lab Results   Component Value Date/Time    Sodium 144 2022 06:20 AM    Potassium 3.8 2022 06:20 AM    Chloride 114 (H) 2022 06:20 AM    CO2 26 2022 06:20 AM    Anion gap 4 (L) 2022 06:20 AM    Glucose 99 2022 06:20 AM    BUN 24 (H) 2022 06:20 AM    Creatinine 0.71 2022 06:20 AM    BUN/Creatinine ratio 34 (H) 2022 06:20 AM    GFR est AA >60 2022 06:20 AM    GFR est non-AA >60 03/05/2022 06:20 AM    Calcium 8.0 (L) 03/05/2022 06:20 AM    Bilirubin, total 0.4 03/01/2022 03:00 PM    Alk.  phosphatase 213 (H) 03/01/2022 03:00 PM    Protein, total 5.6 (L) 03/01/2022 03:00 PM    Albumin 2.0 (L) 03/01/2022 03:00 PM    Globulin 3.6 03/01/2022 03:00 PM    A-G Ratio 0.6 (L) 03/01/2022 03:00 PM    ALT (SGPT) 25 03/01/2022 03:00 PM     Results     Procedure Component Value Units Date/Time    CULTURE, URINE [[id number]] Collected: 03/03/22 0340    Order Status: Completed Specimen: Urine Updated: 03/04/22 0709     Special Requests: --        NO SPECIAL REQUESTS  Reflexed from [id number]       Culture result: No growth (<1,000 CFU/ML)       CULTURE, BLOOD [[id number]] Collected: 03/02/22 0507    Order Status: Completed Specimen: Blood Updated: 03/07/22 0624     Special Requests: NO SPECIAL REQUESTS        Culture result: NO GROWTH 5 DAYS       CULTURE, BLOOD, PAIRED [[id number]]     Order Status: Canceled Specimen: Blood     CULTURE, BLOOD, PAIRED [[id number]]  (Abnormal)  (Susceptibility) Collected: 02/26/22 0736    Order Status: Completed Specimen: Blood Updated: 03/02/22 0547     Special Requests: No Special Requests        Culture result:       Klebsiella pneumoniae growing in all 4 bottles drawn            (NOTE) GVR GROWING IN 4 OF 4 BOTTLES CALLED TO AND READ BACK BY Liang Cancino MLT AT 1250 ON 02/27/22 BY MD.    Susceptibility      Klebsiella pneumoniae     RENETTA     Amikacin ($) Susceptible     Ampicillin ($) Resistant     Ampicillin/sulbactam ($) Susceptible     Cefazolin ($) Susceptible     Cefepime ($$) Susceptible     Cefoxitin Susceptible     Ceftazidime ($) Susceptible     Ceftriaxone ($) Susceptible     Ciprofloxacin ($) Susceptible     Gentamicin ($) Susceptible     Levofloxacin ($) Susceptible     Meropenem ($$) Susceptible     Piperacillin/Tazobac ($) Susceptible     Tobramycin ($) Susceptible     Trimeth/Sulfa Susceptible                  Linear View                   COVID-19 RAPID TEST [[id number]] Collected: 02/26/22 0606    Order Status: Completed Specimen: Nasopharyngeal Updated: 02/26/22 0703     COVID-19 rapid test Not Detected        Comment: Rapid Abbott ID Now   Rapid NAAT:  The specimen is NEGATIVE for SARS-CoV-2, the novel coronavirus associated with COVID-19. Negative results should be treated as presumptive and, if inconsistent with clinical signs and symptoms or necessary for patient management, should be tested with an alternative molecular assay. Negative results do not preclude SARS-CoV-2 infection and should not be used as the sole basis for patient management decisions. This test has been authorized by the FDA under   an Emergency Use Authorization (EUA) for use by authorized laboratories. Fact sheet for Healthcare Providers: ConventionUpdate.co.nz Fact sheet for Patients: ConventionUpdate.co.nz   Methodology: Isothermal Nucleic Acid Amplification               Assessment and Plan   Right ureteral calculus  Presumed Complicated UTI (urine cx not done prior to procedure)  Bacteremia  S/p cystoscopy insertion right ureteral stent (2/26)  - afebrile, WBC 18->5.0    U/A (2/26) >100 WBC with 4+ bacteria, urine cx was not processed    U/A (3/3) 20-50 WBC, cx no growth    blood cx (2/26) klebsiella pneumoniae    Blood cx (3/2) no growth     CT of abd/pel (2/26) bilateral renal calculi, right greater than left, obstruction of the right kidney and right ureter secondary to an 8 mm stone positioned approximately 2 cm proximal to the  right ureterovesical junction. Renal US (3/2) Bilateral nonobstructive nephrolithiasis. Right nephroureteral stent in place. No hydronephrosis. ABX changed to Levofloxacin from Rosalva Hamilton on 3/4    Complete 2 weeks of ABX therapy with PO Levofloxacin, last dose 3/13    QTC 426ms      source of bacteremia most likely secondary to complicated UTI.      However, there was no urine cx done prior to cystoscopy and right ureteral stent placement. Pt has hx of ESBL E-coli in the past.      Adjust abx prn    Fever work up if temp >= 100.4      Above plan of care discussed and agreed with Dr. Hailey Hayes   ID team signing off. Please contact us with any questions.           Sukhdeep Ventura NP

## 2022-03-09 NOTE — PROGRESS NOTES
Spiritual Care Partner Volunteer visited patient in Rm 207 on 3/9/22. Documented by:   Chaplain Brown MDiv, MACE  [phone] PRAY ([id number])

## 2022-03-09 NOTE — PROGRESS NOTES
Problem: Mobility Impaired (Adult and Pediatric)  Goal: *Acute Goals and Plan of Care (Insert Text)  Description: FUNCTIONAL STATUS PRIOR TO ADMISSION: Patient was modified independent using a single point cane for functional mobility. Patient reports being able to manage ambulation in a grocery store with a combination of cane and scooter. HOME SUPPORT PRIOR TO ADMISSION: The patient had a roommate who lived with her. The patient did most cooking and cleaning. Her roommate assisted with LE dressing PRN. Physical Therapy Goals  Initiated 3/2/2022  1. Patient will move from supine to sit and sit to supine  in bed with moderate assistance  within 7 day(s). 2.  Patient will transfer from bed to chair and chair to bed with moderate assistance  using the least restrictive device within 7 day(s). 3.  Patient will perform sit to stand with minimal assistance/contact guard assist within 7 day(s). 4.  Patient will ambulate with minimal assistance/contact guard assist for 75 feet with the least restrictive device within 7 day(s). Outcome: Progressing Towards Goal   PHYSICAL THERAPY TREATMENT  Patient: Ciarra Mcintosh (78 y.o. female)  Date: 3/9/2022  Diagnosis: Sepsis ([facility] [address] [A41.9] Sepsis ([facility] [address]  Procedure(s) (LRB):  LEFT HEART CATH / CORONARY ANGIOGRAPHY (N/A) 6 Days Post-Op  Precautions: Fall  Chart, physical therapy assessment, plan of care and goals were reviewed. ASSESSMENT  Patient continues with skilled PT services and is steadily progressing towards goals. Pt received up in chair and agreeable to participate with PT. Pt stands with heavy use of BUE to push up from chair with supervision. Pt ambulates with rolling walker with slow pace, flexed posture which pt reports is a habit, and improving balance overall. Pt tolerated an increased distance this date. Pt remained in chair at session end. Note pt has been up in chair most of the day.      Current Level of Function Impacting Discharge (mobility/balance): transfers with supervision, ambulation with rolling walker with CGA x 130 feet    Other factors to consider for discharge:  below her baseline, fall risk, social situation at home is a stressor to pt, encouraged pt to speak with CM with her concerns, social support limited         PLAN :  Patient continues to benefit from skilled intervention to address the above impairments. Continue treatment per established plan of care. to address goals. Recommendation for discharge: (in order for the patient to meet his/her long term goals)  Therapy up to 5 days/week in SNF setting      IF patient discharges home will need the following DME: patient owns DME required for discharge       SUBJECTIVE:   Patient agreeable to mobilize with therapy. \"it's a habit(looking down at floor while ambulating).     OBJECTIVE DATA SUMMARY:   Critical Behavior:  Neurologic State: Alert  Orientation Level: Appropriate for age,Oriented X4  Cognition: Follows commands,Appropriate safety awareness,Appropriate for age attention/concentration,Appropriate decision making  Safety/Judgement: Awareness of environment,Home safety  Functional Mobility Training:  Bed Mobility:   Not assessed, OOB in chair(most of the day)                 Transfers:  Sit to Stand: Supervision  Stand to Sit: Supervision                             Balance:  Sitting: Intact  Standing: Impaired  Standing - Static: Constant support;Good  Standing - Dynamic : Constant support;Good  Ambulation/Gait Training:  Distance (ft): 130 Feet (ft)  Assistive Device: Walker, rolling;Gait belt  Ambulation - Level of Assistance: Contact guard assistance;Assist x1        Gait Abnormalities: Decreased step clearance (flexed posture)        Base of Support: Widened     Speed/Ca: Slow  Step Length: Right shortened;Left shortened                Pain Rating:  Denies pain     Activity Tolerance:   Good    After treatment patient left in no apparent distress:   Sitting in chair and Call bell within reach    COMMUNICATION/COLLABORATION:   The patients plan of care was discussed with: Registered nurse.      Elisa Hunter   Time Calculation: 12 mins

## 2022-03-10 VITALS
RESPIRATION RATE: 14 BRPM | TEMPERATURE: 98.3 F | DIASTOLIC BLOOD PRESSURE: 74 MMHG | BODY MASS INDEX: 44.41 KG/M2 | SYSTOLIC BLOOD PRESSURE: 144 MMHG | WEIGHT: 293 LBS | OXYGEN SATURATION: 94 % | HEIGHT: 68 IN | HEART RATE: 84 BPM

## 2022-03-10 LAB
ATRIAL RATE: 82 BPM
CALCULATED P AXIS, ECG09: 47 DEGREES
CALCULATED R AXIS, ECG10: -3 DEGREES
CALCULATED T AXIS, ECG11: 0 DEGREES
DIAGNOSIS, 93000: NORMAL
GLUCOSE BLD STRIP.AUTO-MCNC: 103 MG/DL (ref 65–117)
GLUCOSE BLD STRIP.AUTO-MCNC: 93 MG/DL (ref 65–117)
P-R INTERVAL, ECG05: 186 MS
Q-T INTERVAL, ECG07: 364 MS
QRS DURATION, ECG06: 82 MS
QTC CALCULATION (BEZET), ECG08: 425 MS
SERVICE CMNT-IMP: NORMAL
SERVICE CMNT-IMP: NORMAL
VENTRICULAR RATE, ECG03: 82 BPM

## 2022-03-10 PROCEDURE — 97530 THERAPEUTIC ACTIVITIES: CPT

## 2022-03-10 PROCEDURE — 97116 GAIT TRAINING THERAPY: CPT | Performed by: PHYSICAL THERAPIST

## 2022-03-10 PROCEDURE — 74011250637 HC RX REV CODE- 250/637: Performed by: FAMILY MEDICINE

## 2022-03-10 PROCEDURE — 74011250637 HC RX REV CODE- 250/637: Performed by: INTERNAL MEDICINE

## 2022-03-10 PROCEDURE — 94760 N-INVAS EAR/PLS OXIMETRY 1: CPT

## 2022-03-10 PROCEDURE — 97530 THERAPEUTIC ACTIVITIES: CPT | Performed by: PHYSICAL THERAPIST

## 2022-03-10 PROCEDURE — 74011250636 HC RX REV CODE- 250/636: Performed by: HOSPITALIST

## 2022-03-10 PROCEDURE — 74011000250 HC RX REV CODE- 250: Performed by: INTERNAL MEDICINE

## 2022-03-10 PROCEDURE — 82962 GLUCOSE BLOOD TEST: CPT

## 2022-03-10 PROCEDURE — 97535 SELF CARE MNGMENT TRAINING: CPT

## 2022-03-10 RX ORDER — ACETAMINOPHEN 325 MG/1
650 TABLET ORAL
Qty: 30 TABLET | Refills: 0 | Status: SHIPPED | OUTPATIENT
Start: 2022-03-10 | End: 2022-04-03

## 2022-03-10 RX ORDER — LEVOFLOXACIN 750 MG/1
750 TABLET ORAL EVERY 24 HOURS
Qty: 3 TABLET | Refills: 0 | Status: SHIPPED | OUTPATIENT
Start: 2022-03-10 | End: 2022-03-13

## 2022-03-10 RX ORDER — AMLODIPINE BESYLATE 10 MG/1
10 TABLET ORAL DAILY
Qty: 30 TABLET | Refills: 0 | Status: ON HOLD | OUTPATIENT
Start: 2022-03-10 | End: 2022-04-05 | Stop reason: SDUPTHER

## 2022-03-10 RX ORDER — POLYETHYLENE GLYCOL 3350 17 G/17G
17 POWDER, FOR SOLUTION ORAL
Qty: 30 PACKET | Refills: 0 | Status: SHIPPED | OUTPATIENT
Start: 2022-03-10

## 2022-03-10 RX ADMIN — METOPROLOL TARTRATE 100 MG: 50 TABLET, FILM COATED ORAL at 09:01

## 2022-03-10 RX ADMIN — FERROUS SULFATE TAB 325 MG (65 MG ELEMENTAL FE) 325 MG: 325 (65 FE) TAB at 06:31

## 2022-03-10 RX ADMIN — Medication 10 ML: at 06:31

## 2022-03-10 RX ADMIN — TAMSULOSIN HYDROCHLORIDE 0.4 MG: 0.4 CAPSULE ORAL at 09:00

## 2022-03-10 RX ADMIN — Medication 1 CAPSULE: at 09:01

## 2022-03-10 RX ADMIN — ENOXAPARIN SODIUM 40 MG: 100 INJECTION SUBCUTANEOUS at 09:02

## 2022-03-10 RX ADMIN — GABAPENTIN 300 MG: 300 CAPSULE ORAL at 09:01

## 2022-03-10 RX ADMIN — AMLODIPINE BESYLATE 10 MG: 5 TABLET ORAL at 09:01

## 2022-03-10 RX ADMIN — OXYBUTYNIN CHLORIDE 5 MG: 5 TABLET ORAL at 09:01

## 2022-03-10 NOTE — PROGRESS NOTES
Bedside shift change report given to [address] [place] [address] Avenue (oncoming nurse) by [facility] (offgoing nurse).  Report included the following information SBAR, Kardex, Intake/Output, MAR and Cardiac Rhythm SR.

## 2022-03-10 NOTE — PROGRESS NOTES
CM notes reviewed  [employer] Specialist,requesting at [facility] be completed  Called [phone], talked with Tomer Nieto, scheduled P2P telephone appointment at 1pm EST, with Dr Chris Mcgregor

## 2022-03-10 NOTE — PROGRESS NOTES
Problem: Self Care Deficits Care Plan (Adult)  Goal: *Acute Goals and Plan of Care (Insert Text)  Description: FUNCTIONAL STATUS PRIOR TO ADMISSION: Patient was modified independent using a walker and single point cane for functional mobility. Patient required minimal assistance for basic and instrumental ADLs, primarily for lower extremity ADLs such as dressing. Patient reports completing IADLs independently such as cooking and cleaning and grocery shopping. Reports independent for community mobility with cane. HOME SUPPORT: The patient lived with her ex brother in law and required minimal assistance/contact guard assist for lower extremity ADLs such as dressing. Occupational Therapy Goals  Initiated 3/2/2022, Reviewed 3/10/22, goals upgraded  1. Patient will perform lower body dressing with moderate assistance  within 7 day(s), MET, mod I using AE  2. Patient will perform grooming at sink/in standing with minimal assistance/contact guard assist within 7 day(s), MET, upgrade to mod I  3. Patient will perform anterior bathing from neck to thighs with supervision/set-up within 7 day(s), MET, upgrade to mod I  4. Patient will perform toilet transfers with minimal assistance within 7 day(s), MET, upgrade to mod I  5. Patient will perform all aspects of toileting with moderate assistance  within 7 day(s), MET, upgrade to mod I  6. Patient will participate in upper extremity therapeutic exercise/activities with independence for 10 minutes within 7 day(s), con't goal   Outcome: Progressing Towards Goal   .otaw  Problem: Self Care Deficits Care Plan (Adult)  Goal: *Acute Goals and Plan of Care (Insert Text)  Description: FUNCTIONAL STATUS PRIOR TO ADMISSION: Patient was modified independent using a walker and single point cane for functional mobility. Patient required minimal assistance for basic and instrumental ADLs, primarily for lower extremity ADLs such as dressing.  Patient reports completing IADLs independently such as cooking and cleaning and grocery shopping. Reports independent for community mobility with cane. HOME SUPPORT: The patient lived with her ex brother in law and required minimal assistance/contact guard assist for lower extremity ADLs such as dressing. Occupational Therapy Goals  Initiated 3/2/2022, Reviewed 3/10/22, goals upgraded  1. Patient will perform lower body dressing with moderate assistance  within 7 day(s), MET, mod I using AE  2. Patient will perform grooming at sink/in standing with minimal assistance/contact guard assist within 7 day(s), MET, upgrade to mod I  3. Patient will perform anterior bathing from neck to thighs with supervision/set-up within 7 day(s), MET, upgrade to mod I  4. Patient will perform toilet transfers with minimal assistance within 7 day(s), MET, upgrade to mod I  5. Patient will perform all aspects of toileting with moderate assistance  within 7 day(s), MET, upgrade to mod I  6. Patient will participate in upper extremity therapeutic exercise/activities with independence for 10 minutes within 7 day(s), con't goal   Outcome: Progressing Towards Goal   OCCUPATIONAL THERAPY TREATMENT/WEEKLY RE-ASSESSMENT  Patient: Mariaelena Garcia (29 y.o. female)  Date: 3/10/2022  Diagnosis: Sepsis ([address] [A41.9] Sepsis ([address]  Procedure(s) (LRB):  LEFT HEART CATH / CORONARY ANGIOGRAPHY (N/A) 7 Days Post-Op  Precautions: Fall  Chart, occupational therapy assessment, plan of care, and goals were reviewed. ASSESSMENT  Patient continues with skilled OT services and is progressing towards goals. Pt presents close/at baseline with self-care and mobility. Pt lives with her brother-in-law and states that she is Rayma Due" to return home. Pt has achieved all goals established at initial eval and all goals have been upgraded to mod I. If pt returns home, recommend [address]. Will con't to follow 3x per week and address updated goals.        Current Level of Function Impacting Discharge (ADLs): supervision    Other factors to consider for discharge: per pt, she has mold everyone in her home         PLAN :  Goals have been updated based on progression since last assessment. Patient continues to benefit from skilled intervention to address the above impairments. Continue to follow patient 3 times a week to address goals. Recommend with staff: Silvester Cowden for all meals     Recommend next OT session: LB dressing using AE    Recommendation for discharge: (in order for the patient to meet his/her long term goals)  Occupational therapy at least 2 days/week in the home     This discharge recommendation:  A follow-up discussion with the attending provider and/or case management is planned    IF patient discharges home will need the following DME: none       SUBJECTIVE:   Patient stated I'm stressed about going home.     OBJECTIVE DATA SUMMARY:   Cognitive/Behavioral Status:  Neurologic State: Alert  Orientation Level: Oriented X4  Cognition: Appropriate decision making        Safety/Judgement: Awareness of environment    Functional Mobility and Transfers for ADLs:  Bed Mobility:  Supine to Sit: Modified independent; Additional time    Transfers:  Sit to Stand: Supervision;Modified independent  Functional Transfers  Bathroom Mobility: Supervision/set up;Modified independent  Bed to Chair: Supervision;Modified independent    Balance:  Sitting: Intact  Standing: Intact; With support    ADL Intervention:  Feeding  Feeding Assistance: Independent    Grooming  Grooming Assistance: Independent  Position Performed: Standing         Lower Body Dressing Assistance  Socks: Minimum assistance  Position Performed: Seated edge of bed    Toileting  Toileting Assistance: Supervision    Cognitive Retraining  Safety/Judgement: Awareness of environment      Pain:  No c/o pain    Activity Tolerance:   Good    After treatment patient left in no apparent distress:   Sitting in chair and Call bell within reach    COMMUNICATION/COLLABORATION:   The patients plan of care was discussed with: Physical therapist and Case management.      Matias Burden OTR/L  Time Calculation: 23 mins

## 2022-03-10 NOTE — PROGRESS NOTES
Transition of Care Plan  · RUR- 13% Moderate Risk  · DISPOSITION: The disposition plan is to transition home with family assistance. · F/U with PCP/Specialist    · Transport: Family    Patient has been recommended for SNF. Patient has been accepted to [facility]. Per attending, patient is stable for discharge. At 7:56am -  attempted to contact Lorelei Jonas Specialist to follow up regarding insurance Aileen Murillo. CM left a VM, awaiting a call back. At 8:28am -  received a call from [facility] Specialist who is requesting at [facility] be completed. Attending encouraged to call [phone], deadline to complete is by 4:30pm today. P2P will be completed today at 1:00pm.    At 10:43am -  informed by attending via perfect serve that the appeal was denied. At 10:45am -  provided this information to patient at bedside, patient would like to go to rehab before she goes home. Patient is requesting to appeal denial/discharge. At 10:46am -  provided this information to attending via perfect serve. At 10:50am -  provided patient with IM letter and information to appeal denial/disharge. Patient will stay in hospital until appeal is completed. At 11:15am -  received a call from Alyssa Specialist, who reports that the appeal was denied.  provided appeal number - [phone] option 1. Regina reports that the individual who intitiates the appeal will be contacted and then it can typically take about 48 hours to complete. (the review of the appeal). At 11:23am -  contacted Linda admin coordinator at [facility] to provide the above update. At 11:37am -  attempted to contactTiffany - coordinator with [facility] to provide update. At 12:35pm -  met with patient at bedside who reports that her appeal was denied. Patient to transition home today. Patient reports that her ex- will provide transportation later on today.     Medicare pt has received, reviewed, and signed 2nd IM letter informing them of their right to appeal the discharge. Signed copy has been placed on pt bedside chart.     NILESH Sandoval, SARWAT, LMHP-e  Available in Perfect Serve

## 2022-03-10 NOTE — PROGRESS NOTES
Bedside and Verbal shift change report given to Kamran Syed RN (oncoming nurse) by JUAN Chakraborty (offgoing nurse). Report included the following information SBAR, Kardex, ED Summary, Intake/Output, MAR, Recent Results and Cardiac Rhythm NSR.

## 2022-03-10 NOTE — PROGRESS NOTES
Spiritual Care Assessment/Progress Note  [address]      NAME: Allyson Valencia      MRN: [id number]  AGE: 73 y.o.  SEX: female  Yazdanism Affiliation: No preference   Language: English     3/10/2022     Total Time (in minutes): 49     Spiritual Assessment begun in [facility] through conversation with:         [x]Patient        [] Family    [] Friend(s)        Reason for Consult: Initial/Spiritual assessment, patient floor     Spiritual beliefs: (Please include comment if needed)     [x] Identifies with a jorge tradition: Augustus Lawrence        [] Supported by a jorge community:            [] Claims no spiritual orientation:           [] Seeking spiritual identity:                [] Adheres to an individual form of spirituality:           [] Not able to assess:                           Identified resources for coping:      [x] Prayer                               [] Music                  [] Guided Imagery     [] Family/friends                 [] Pet visits     [] Devotional reading                         [] Unknown     [] Other:                                               Interventions offered during this visit: (See comments for more details)    Patient Interventions: Initial/Spiritual assessment, patient floor,Affirmation of emotions/emotional suffering,Affirmation of jorge,Catharsis/review of pertinent events in supportive environment,Coping skills reviewed/reinforced,Life review/legacy,Prayer (actual),Prayer (assurance of)           Plan of Care:     [] Support spiritual and/or cultural needs    [] Support AMD and/or advance care planning process      [] Support grieving process   [] Coordinate Rites and/or Rituals    [] Coordination with community clergy   [] No spiritual needs identified at this time   [] Detailed Plan of Care below (See Comments)  [] Make referral to Music Therapy  [] Make referral to Pet Therapy     [] Make referral to Addiction services  [] Make referral to [facility]  [] Make referral to Spiritual Care Partner  [] No future visits requested        [x] Contact Spiritual Care for further referrals     Comments: Provided support for this pt in [facility]. Reviewed pt's chart prior to this visit. Facilitated life review to assess potential support needs or coping strategies. Provided support as pt processed thoughts and feelings about this hospitalization. Pt doesn't have family in this area but does speak with her [place] family over the phone. Pt has [employer] pastoral support. Pt noted her  has called to check on her regularly. Offered prayer for pt. Assured pt of prayers. Karla Hodges MDiv.  Staff   Request  Support/Spiritual Care Services on [phone])

## 2022-03-10 NOTE — PROGRESS NOTES
Hospitalist Progress Note                                     NAME:  Allyson Valencia  :  1950  MRN:  [id number]  Date of Service:  3/9/2022    Summary: 73 y.o. female with past medical history of HTN, dyslipidemia, morbid obesity, kidney stone who presented on 2022 with generalized body weakness and SOB. CT scan of the abdomen performed showed new right urinary tract obstruction       Assessment/Plan:  Right ureteral calculus with obstruction and pyelonephritis  - S/p cystourethroscopy with right ureteral stent placement  Urology consulted and recs noted    Sepsis due to UTI/ Klebsiella bacteremia: Likely due to UTI and ureteral obstruction  Repeat blood cultures ordered. NGTD. s/p  IV meropenem  Transitioned to  PO levofloxacin per ID   CT of the abd/pelv  Done  due to persistent leukocytosis did not reveal any abscess. Elevated troponin:   Cardiology workup, abnormal stress test showing Inferoapical ischemia  EKG showed no acute ischemic changes  Allergic to aspirin  Heart cath on 3/3 with no obstructive lesions    ELLYN- resolved:   Due to post obstructive uropathy  Scr on admission 3.21, resolved   nephrology consulted and managing    Leukocytosis- persists,with improvement   Likely due to UTI, bacteremia  Now improving    Thrombocytopenia  Due to bacteremia, septicemia- improved    Hypotension- resolved with treatment of bacteremia and IVFluids    Overweight: BMI 45.61     Code status: Full  DVT prophylaxsis: SCD  Dispo: SNF per PT recs, pending auth, pt is clinically stable for discharge          Interval History/Subjective:  No acute events overnight. RLQ abd pain returned overnight. No n/v/d, no dyspnea. Review of Systems:  Pertinent items are noted in HPI. Objective:     VITALS:   Last 24hrs VS reviewed since prior progress note.  Most recent are:  Visit Vitals  BP (!) 102/58   Pulse 84   Temp 98.9 °F (37.2 °C)   Resp 20   Ht 5' 8\" (1.727 m)   Wt 143.2 kg (315 lb 11.2 oz)   SpO2 95%   BMI 48.00 kg/m²     Patient Vitals for the past 24 hrs:   Temp Pulse Resp BP SpO2   03/09/22 1822  84  (!) 102/58    03/09/22 1800  85      03/09/22 1632 98.9 °F (37.2 °C) 83 20 113/69 95 %   03/09/22 1600  63      03/09/22 1400  87      03/09/22 1200  79      03/09/22 1143 99 °F (37.2 °C) 79 16 126/72 97 %   03/09/22 1000  90      03/09/22 0818 98.9 °F (37.2 °C) 83 16 (!) 144/67 95 %   03/09/22 0800  85      03/09/22 0359 98.8 °F (37.1 °C) 83 16 108/79 97 %   03/09/22 0027 99.2 °F (37.3 °C) 76 16 131/69 98 %   03/08/22 2041 97.7 °F (36.5 °C) 65 16 106/71 99 %       Intake/Output Summary (Last 24 hours) at 3/9/2022 1903  Last data filed at 3/9/2022 1701  Gross per 24 hour   Intake 2160 ml   Output 1200 ml   Net 960 ml        PHYSICAL EXAM:  General: No acute distress, obese  EENT: EOMI. Anicteric sclerae. Oral mucousa moist,  Resp: CTA bilaterally. No wheezing/rhonchi/rales. No accessory muscle use  CV: Regular rhythm, normal rate, no murmurs, gallops, rubs  GI: Soft, non distended, mild RLQ tenderness, BS+  Extremities: 1+ b/l LE edema  Neurologic: Moves all extremities. AAOx3,   Psych: Good insight. Not anxious nor agitated. Skin: dry    Lab Data Personally Reviewed: (see below)     Medications list Personally Reviewed:  x YES  NO     _______________________________________________________________________  Care Plan discussed with:  Patient/Family, Nurse and       Albina Blankenship MD     Procedures: see electronic medical records for all procedures/Xrays and details which were not copied into this note but were reviewed prior to creation of Plan.       LABS:  Recent Labs     03/09/22  1058   WBC 5.0   HGB 9.7*   HCT 31.7*        Recent Labs     03/09/22  1058      K 3.8   *   CO2 29   BUN 16   CREA 0.86   *   CA 8.3*     No results for input(s): ALT, AP, TBIL, TBILI, TP, ALB, GLOB, GGT, AML, LPSE in the last 72 hours. No lab exists for component: SGOT, GPT, AMYP, HLPSE  No results for input(s): INR, PTP, APTT, INREXT, INREXT in the last 72 hours. No results for input(s): FE, TIBC, PSAT, FERR in the last 72 hours. No results found for: FOL, RBCF   No results for input(s): PH, PCO2, PO2 in the last 72 hours. No results for input(s): CPK, CKNDX, TROIQ in the last 72 hours.     No lab exists for component: CPKMB  No results found for: CHOL, CHOLX, CHLST, CHOLV, HDL, HDLP, LDL, LDLC, DLDLP, TGLX, TRIGL, TRIGP, CHHD, CHHDX  Lab Results   Component Value Date/Time    Glucose (POC) 95 03/09/2022 06:05 PM    Glucose (POC) 114 03/09/2022 11:20 AM    Glucose (POC) 110 03/09/2022 06:38 AM    Glucose (POC) 110 03/08/2022 09:18 PM    Glucose (POC) 108 03/08/2022 05:08 PM     Lab Results   Component Value Date/Time    Color YELLOW/STRAW 03/03/2022 03:40 AM    Appearance CLEAR 03/03/2022 03:40 AM    Specific gravity 1.015 03/03/2022 03:40 AM    Specific gravity 1.015 02/26/2022 08:39 AM    pH (UA) 6.0 03/03/2022 03:40 AM    Protein 30 (A) 03/03/2022 03:40 AM    Glucose Negative 03/03/2022 03:40 AM    Ketone Negative 03/03/2022 03:40 AM    Bilirubin Negative 03/03/2022 03:40 AM    Urobilinogen 0.2 03/03/2022 03:40 AM    Nitrites Negative 03/03/2022 03:40 AM    Leukocyte Esterase SMALL (A) 03/03/2022 03:40 AM    Epithelial cells FEW 03/03/2022 03:40 AM    Bacteria Negative 03/03/2022 03:40 AM    WBC 20-50 03/03/2022 03:40 AM    RBC >100 (H) 03/03/2022 03:40 AM     Results     Procedure Component Value Units Date/Time    CULTURE, URINE [[id number]] Collected: 03/03/22 0340    Order Status: Completed Specimen: Urine Updated: 03/04/22 0709     Special Requests: --        NO SPECIAL REQUESTS  Reflexed from [id number]       Culture result: No growth (<1,000 CFU/ML)       CULTURE, BLOOD [[id number]] Collected: 03/02/22 0507    Order Status: Completed Specimen: Blood Updated: 03/07/22 0624     Special Requests: NO SPECIAL REQUESTS Culture result: NO GROWTH 5 DAYS       CULTURE, BLOOD, PAIRED [[id number]]     Order Status: Canceled Specimen: Blood     CULTURE, BLOOD, PAIRED [[id number]]  (Abnormal)  (Susceptibility) Collected: 02/26/22 0736    Order Status: Completed Specimen: Blood Updated: 03/02/22 0547     Special Requests: No Special Requests        Culture result:       Klebsiella pneumoniae growing in all 4 bottles drawn            (NOTE) GVR GROWING IN 4 OF 4 BOTTLES CALLED TO AND READ BACK BY Melody Cooper MLT AT 1250 ON 02/27/22 BY MD.    Susceptibility      Klebsiella pneumoniae     RENETTA     Amikacin ($) Susceptible     Ampicillin ($) Resistant     Ampicillin/sulbactam ($) Susceptible     Cefazolin ($) Susceptible     Cefepime ($$) Susceptible     Cefoxitin Susceptible     Ceftazidime ($) Susceptible     Ceftriaxone ($) Susceptible     Ciprofloxacin ($) Susceptible     Gentamicin ($) Susceptible     Levofloxacin ($) Susceptible     Meropenem ($$) Susceptible     Piperacillin/Tazobac ($) Susceptible     Tobramycin ($) Susceptible     Trimeth/Sulfa Susceptible                  Linear View                   COVID-19 RAPID TEST [[id number]] Collected: 02/26/22 0606    Order Status: Completed Specimen: Nasopharyngeal Updated: 02/26/22 0703     COVID-19 rapid test Not Detected        Comment: Rapid Abbott ID Now   Rapid NAAT:  The specimen is NEGATIVE for SARS-CoV-2, the novel coronavirus associated with COVID-19. Negative results should be treated as presumptive and, if inconsistent with clinical signs and symptoms or necessary for patient management, should be tested with an alternative molecular assay. Negative results do not preclude SARS-CoV-2 infection and should not be used as the sole basis for patient management decisions. This test has been authorized by the FDA under   an Emergency Use Authorization (EUA) for use by authorized laboratories.  Fact sheet for Healthcare Providers: ConventionUpdate.co.nz Fact sheet for Patients: ConventionUpdate.co.nz   Methodology: Isothermal Nucleic Acid Amplification             Alexx Perkins MD

## 2022-03-10 NOTE — PROGRESS NOTES
Problem: Mobility Impaired (Adult and Pediatric)  Goal: *Acute Goals and Plan of Care (Insert Text)  Description: FUNCTIONAL STATUS PRIOR TO ADMISSION: Patient was modified independent using a single point cane for functional mobility. Patient reports being able to manage ambulation in a grocery store with a combination of cane and scooter. HOME SUPPORT PRIOR TO ADMISSION: The patient had a roommate who lived with her. The patient did most cooking and cleaning. Her roommate assisted with LE dressing PRN. Physical Therapy Goals  Initiated 3/2/2022  1. Patient will move from supine to sit and sit to supine  in bed with moderate assistance  within 7 day(s). 2.  Patient will transfer from bed to chair and chair to bed with moderate assistance  using the least restrictive device within 7 day(s). 3.  Patient will perform sit to stand with minimal assistance/contact guard assist within 7 day(s). 4.  Patient will ambulate with minimal assistance/contact guard assist for 75 feet with the least restrictive device within 7 day(s). Outcome: Progressing Towards Goal   PHYSICAL THERAPY TREATMENT  Patient: Michelle Mills (25 y.o. female)  Date: 3/10/2022  Diagnosis: Sepsis ([facility] [address] [A41.9] Sepsis ([facility] [address]  Procedure(s) (LRB):  LEFT HEART CATH / CORONARY ANGIOGRAPHY (N/A) 7 Days Post-Op  Precautions: Fall  Chart, physical therapy assessment, plan of care and goals were reviewed. ASSESSMENT  Patient continues with skilled PT services and is progressing towards goals. Patient overall limited by pain, gait instability, decreased activity tolerance and impaired balance. Currently Micaela to come to EOB and SBA to come to stand. Amb approx 150 feet with RW and CGA with no overt LOB and slow zach. Patient overall moving well however she is hopeful to DC to SNF rather than home. States her home is in disrepair and has no assistance at home. Will continue to follow.        Other factors to consider for discharge: at risk for falls, limited assistance at home         PLAN :  Patient continues to benefit from skilled intervention to address the above impairments. Continue treatment per established plan of care. to address goals. Recommendation for discharge: (in order for the patient to meet his/her long term goals)  Therapy up to 5 days/week in SNF setting. If denied SNF then recommend [facility] PT      IF patient discharges home will need the following DME: patient owns DME required for discharge       SUBJECTIVE:   Patient stated I have a walker but I don't use it.     OBJECTIVE DATA SUMMARY:   Critical Behavior:  Neurologic State: Alert  Orientation Level: Oriented X4  Cognition: Appropriate decision making  Safety/Judgement: Awareness of environment  Functional Mobility Training:  Bed Mobility:     Supine to Sit: Modified independent; Additional time              Transfers:  Sit to Stand: Supervision;Modified independent  Stand to Sit: Supervision;Modified independent        Bed to Chair: Supervision;Modified independent                    Balance:  Sitting: Intact  Standing: Intact; With support  Ambulation/Gait Training:  Distance (ft): 150 Feet (ft)  Assistive Device: Gait belt;Walker, rolling  Ambulation - Level of Assistance: Contact guard assistance        Gait Abnormalities: Decreased step clearance;Shuffling gait        Base of Support: Widened     Speed/Ca: Pace decreased (<100 feet/min); Shuffled; Slow  Step Length: Left shortened;Right shortened          Pain Rating:  No c/o pain    Activity Tolerance:   Fair and requires rest breaks    After treatment patient left in no apparent distress:   Sitting in chair and Call bell within reach    COMMUNICATION/COLLABORATION:   The patients plan of care was discussed with: Physical therapist, Occupational therapist, and Registered nurse.      Mahogany Cleary, PT, DPT   Time Calculation: 23 mins

## 2022-03-18 PROBLEM — R52 ACUTE PAIN: Status: ACTIVE | Noted: 2020-07-07

## 2022-03-18 PROBLEM — N20.0 KIDNEY STONE: Status: ACTIVE | Noted: 2020-07-07

## 2022-03-18 PROBLEM — Z91.81 AT RISK FOR FALLS: Status: ACTIVE | Noted: 2020-07-07

## 2022-03-18 PROBLEM — E66.01 OBESITY, MORBID (HCC): Status: ACTIVE | Noted: 2020-09-04

## 2022-03-18 PROBLEM — Z78.0 MENOPAUSE PRESENT: Status: ACTIVE | Noted: 2020-07-07

## 2022-03-18 PROBLEM — I10 HYPERTENSION: Status: ACTIVE | Noted: 2020-07-07

## 2022-03-18 PROBLEM — R60.9 EDEMA: Status: ACTIVE | Noted: 2020-07-07

## 2022-03-19 PROBLEM — E66.9 OBESITY: Status: ACTIVE | Noted: 2020-07-07

## 2022-03-19 PROBLEM — R31.9 HEMATURIA: Status: ACTIVE | Noted: 2020-07-07

## 2022-03-19 PROBLEM — J45.909 ASTHMA: Status: ACTIVE | Noted: 2020-07-07

## 2022-03-19 PROBLEM — Z78.0 POSTMENOPAUSAL STATE: Status: ACTIVE | Noted: 2020-07-07

## 2022-03-19 PROBLEM — M19.90 ARTHRITIS: Status: ACTIVE | Noted: 2020-07-07

## 2022-03-19 PROBLEM — A41.9 SEPSIS (HCC): Status: ACTIVE | Noted: 2022-02-26

## 2022-03-19 PROBLEM — R32 URINARY INCONTINENCE: Status: ACTIVE | Noted: 2020-07-07

## 2022-04-02 ENCOUNTER — APPOINTMENT (OUTPATIENT)
Dept: ULTRASOUND IMAGING | Age: 72
DRG: 699 | End: 2022-04-02
Attending: NURSE PRACTITIONER
Payer: MEDICARE

## 2022-04-02 ENCOUNTER — HOSPITAL ENCOUNTER (INPATIENT)
Age: 72
LOS: 1 days | Discharge: OTHER INSTITUTION | DRG: 699 | End: 2022-04-03
Attending: HOSPITALIST | Admitting: HOSPITALIST
Payer: MEDICARE

## 2022-04-02 ENCOUNTER — APPOINTMENT (OUTPATIENT)
Dept: CT IMAGING | Age: 72
DRG: 699 | End: 2022-04-02
Attending: NURSE PRACTITIONER
Payer: MEDICARE

## 2022-04-02 DIAGNOSIS — R10.9 LEFT FLANK PAIN: Primary | ICD-10-CM

## 2022-04-02 DIAGNOSIS — A41.9 SEPSIS WITHOUT ACUTE ORGAN DYSFUNCTION, DUE TO UNSPECIFIED ORGANISM (HCC): ICD-10-CM

## 2022-04-02 DIAGNOSIS — T14.8XXA HEMATOMA AND CONTUSION: ICD-10-CM

## 2022-04-02 DIAGNOSIS — D72.829 LEUKOCYTOSIS, UNSPECIFIED TYPE: ICD-10-CM

## 2022-04-02 DIAGNOSIS — R19.7 DIARRHEA, UNSPECIFIED TYPE: ICD-10-CM

## 2022-04-02 LAB
ALBUMIN SERPL-MCNC: 3.5 G/DL (ref 3.5–5)
ALBUMIN/GLOB SERPL: 0.9 {RATIO} (ref 1.1–2.2)
ALP SERPL-CCNC: 104 U/L (ref 45–117)
ALT SERPL-CCNC: 15 U/L (ref 12–78)
ANION GAP SERPL CALC-SCNC: 5 MMOL/L (ref 5–15)
APPEARANCE UR: ABNORMAL
AST SERPL W P-5'-P-CCNC: 12 U/L (ref 15–37)
BACTERIA URNS QL MICRO: NEGATIVE /HPF
BASOPHILS # BLD: 0 K/UL (ref 0–0.1)
BASOPHILS NFR BLD: 0 % (ref 0–1)
BILIRUB SERPL-MCNC: 0.4 MG/DL (ref 0.2–1)
BILIRUB UR QL: NEGATIVE
BUN SERPL-MCNC: 16 MG/DL (ref 6–20)
BUN/CREAT SERPL: 17 (ref 12–20)
CA-I BLD-MCNC: 8.6 MG/DL (ref 8.5–10.1)
CHLORIDE SERPL-SCNC: 110 MMOL/L (ref 97–108)
CO2 SERPL-SCNC: 27 MMOL/L (ref 21–32)
COLOR UR: ABNORMAL
CREAT SERPL-MCNC: 0.95 MG/DL (ref 0.55–1.02)
DIFFERENTIAL METHOD BLD: ABNORMAL
EOSINOPHIL # BLD: 0 K/UL (ref 0–0.4)
EOSINOPHIL NFR BLD: 0 % (ref 0–7)
ERYTHROCYTE [DISTWIDTH] IN BLOOD BY AUTOMATED COUNT: 15.8 % (ref 11.5–14.5)
GLOBULIN SER CALC-MCNC: 4.1 G/DL (ref 2–4)
GLUCOSE SERPL-MCNC: 123 MG/DL (ref 65–100)
GLUCOSE UR STRIP.AUTO-MCNC: NEGATIVE MG/DL
HCT VFR BLD AUTO: 31.3 % (ref 35–47)
HGB BLD-MCNC: 9.7 G/DL (ref 11.5–16)
HGB UR QL STRIP: ABNORMAL
IMM GRANULOCYTES # BLD AUTO: 0.1 K/UL (ref 0–0.04)
IMM GRANULOCYTES NFR BLD AUTO: 0 % (ref 0–0.5)
KETONES UR QL STRIP.AUTO: NEGATIVE MG/DL
LEUKOCYTE ESTERASE UR QL STRIP.AUTO: ABNORMAL
LYMPHOCYTES # BLD: 0.8 K/UL (ref 0.8–3.5)
LYMPHOCYTES NFR BLD: 6 % (ref 12–49)
MCH RBC QN AUTO: 29.4 PG (ref 26–34)
MCHC RBC AUTO-ENTMCNC: 31 G/DL (ref 30–36.5)
MCV RBC AUTO: 94.8 FL (ref 80–99)
MONOCYTES # BLD: 1.1 K/UL (ref 0–1)
MONOCYTES NFR BLD: 8 % (ref 5–13)
MUCOUS THREADS URNS QL MICRO: ABNORMAL /LPF
NEUTS SEG # BLD: 11 K/UL (ref 1.8–8)
NEUTS SEG NFR BLD: 86 % (ref 32–75)
NITRITE UR QL STRIP.AUTO: NEGATIVE
NRBC # BLD: 0 K/UL (ref 0–0.01)
NRBC BLD-RTO: 0 PER 100 WBC
PH UR STRIP: 6 [PH] (ref 5–8)
PLATELET # BLD AUTO: 249 K/UL (ref 150–400)
PMV BLD AUTO: 10.7 FL (ref 8.9–12.9)
POTASSIUM SERPL-SCNC: 3.7 MMOL/L (ref 3.5–5.1)
PROT SERPL-MCNC: 7.6 G/DL (ref 6.4–8.2)
PROT UR STRIP-MCNC: 30 MG/DL
RBC # BLD AUTO: 3.3 M/UL (ref 3.8–5.2)
RBC #/AREA URNS HPF: ABNORMAL /HPF (ref 0–5)
SODIUM SERPL-SCNC: 142 MMOL/L (ref 136–145)
SP GR UR REFRACTOMETRY: 1.01 (ref 1–1.03)
UA: UC IF INDICATED,UAUC: ABNORMAL
UROBILINOGEN UR QL STRIP.AUTO: 0.1 EU/DL (ref 0.1–1)
WBC # BLD AUTO: 13.1 K/UL (ref 3.6–11)
WBC URNS QL MICRO: ABNORMAL /HPF (ref 0–4)

## 2022-04-02 PROCEDURE — 87086 URINE CULTURE/COLONY COUNT: CPT

## 2022-04-02 PROCEDURE — 74011250636 HC RX REV CODE- 250/636: Performed by: NURSE PRACTITIONER

## 2022-04-02 PROCEDURE — 81001 URINALYSIS AUTO W/SCOPE: CPT

## 2022-04-02 PROCEDURE — 96375 TX/PRO/DX INJ NEW DRUG ADDON: CPT

## 2022-04-02 PROCEDURE — 80053 COMPREHEN METABOLIC PANEL: CPT

## 2022-04-02 PROCEDURE — 76770 US EXAM ABDO BACK WALL COMP: CPT

## 2022-04-02 PROCEDURE — 85025 COMPLETE CBC W/AUTO DIFF WBC: CPT

## 2022-04-02 PROCEDURE — 96374 THER/PROPH/DIAG INJ IV PUSH: CPT

## 2022-04-02 PROCEDURE — 99285 EMERGENCY DEPT VISIT HI MDM: CPT

## 2022-04-02 PROCEDURE — 74011000636 HC RX REV CODE- 636: Performed by: NURSE PRACTITIONER

## 2022-04-02 PROCEDURE — 74177 CT ABD & PELVIS W/CONTRAST: CPT

## 2022-04-02 PROCEDURE — 96361 HYDRATE IV INFUSION ADD-ON: CPT

## 2022-04-02 PROCEDURE — 93005 ELECTROCARDIOGRAM TRACING: CPT

## 2022-04-02 RX ORDER — KETOROLAC TROMETHAMINE 30 MG/ML
15 INJECTION, SOLUTION INTRAMUSCULAR; INTRAVENOUS ONCE
Status: COMPLETED | OUTPATIENT
Start: 2022-04-02 | End: 2022-04-02

## 2022-04-02 RX ORDER — ONDANSETRON 2 MG/ML
4 INJECTION INTRAMUSCULAR; INTRAVENOUS
Status: COMPLETED | OUTPATIENT
Start: 2022-04-02 | End: 2022-04-02

## 2022-04-02 RX ADMIN — IOPAMIDOL 100 ML: 755 INJECTION, SOLUTION INTRAVENOUS at 22:17

## 2022-04-02 RX ADMIN — KETOROLAC TROMETHAMINE 15 MG: 30 INJECTION, SOLUTION INTRAMUSCULAR; INTRAVENOUS at 20:53

## 2022-04-02 RX ADMIN — SODIUM CHLORIDE 500 ML: 9 INJECTION, SOLUTION INTRAVENOUS at 23:34

## 2022-04-02 RX ADMIN — ONDANSETRON 4 MG: 2 INJECTION INTRAMUSCULAR; INTRAVENOUS at 20:53

## 2022-04-02 NOTE — ED TRIAGE NOTES
Pt having left flank pain that started today suddenly hx of kidney stones. Pt denies any hematuria or dyuria.

## 2022-04-03 ENCOUNTER — ANESTHESIA (OUTPATIENT)
Dept: SURGERY | Age: 72
DRG: 854 | End: 2022-04-03
Payer: MEDICARE

## 2022-04-03 ENCOUNTER — HOSPITAL ENCOUNTER (INPATIENT)
Age: 72
LOS: 2 days | Discharge: HOME | DRG: 854 | End: 2022-04-05
Attending: FAMILY MEDICINE | Admitting: INTERNAL MEDICINE
Payer: MEDICARE

## 2022-04-03 ENCOUNTER — ANESTHESIA EVENT (OUTPATIENT)
Dept: SURGERY | Age: 72
DRG: 854 | End: 2022-04-03
Payer: MEDICARE

## 2022-04-03 ENCOUNTER — APPOINTMENT (OUTPATIENT)
Dept: GENERAL RADIOLOGY | Age: 72
DRG: 854 | End: 2022-04-03
Attending: UROLOGY
Payer: MEDICARE

## 2022-04-03 VITALS
HEART RATE: 100 BPM | RESPIRATION RATE: 18 BRPM | OXYGEN SATURATION: 95 % | TEMPERATURE: 97.5 F | HEIGHT: 69 IN | BODY MASS INDEX: 43.25 KG/M2 | SYSTOLIC BLOOD PRESSURE: 143 MMHG | DIASTOLIC BLOOD PRESSURE: 81 MMHG | WEIGHT: 292 LBS

## 2022-04-03 PROBLEM — R10.9 ABDOMINAL PAIN: Status: ACTIVE | Noted: 2022-04-03

## 2022-04-03 PROBLEM — N30.00 ACUTE CYSTITIS: Status: ACTIVE | Noted: 2022-04-03

## 2022-04-03 PROBLEM — K76.89 HEPATIC CYST: Status: ACTIVE | Noted: 2022-04-03

## 2022-04-03 PROBLEM — N20.0 URINARY TRACT OBSTRUCTION BY KIDNEY STONE: Status: ACTIVE | Noted: 2022-04-03

## 2022-04-03 PROBLEM — N13.8 URINARY TRACT OBSTRUCTION BY KIDNEY STONE: Status: ACTIVE | Noted: 2022-04-03

## 2022-04-03 PROBLEM — N13.2 URETERAL STONE WITH HYDRONEPHROSIS: Status: ACTIVE | Noted: 2022-04-03

## 2022-04-03 LAB
ATRIAL RATE: 103 BPM
CALCULATED P AXIS, ECG09: 67 DEGREES
CALCULATED R AXIS, ECG10: 6 DEGREES
CALCULATED T AXIS, ECG11: 25 DEGREES
DIAGNOSIS, 93000: NORMAL
LACTATE SERPL-SCNC: 0.6 MMOL/L (ref 0.4–2)
P-R INTERVAL, ECG05: 188 MS
Q-T INTERVAL, ECG07: 310 MS
QRS DURATION, ECG06: 78 MS
QTC CALCULATION (BEZET), ECG08: 406 MS
VENTRICULAR RATE, ECG03: 103 BPM

## 2022-04-03 PROCEDURE — 2709999900 HC NON-CHARGEABLE SUPPLY: Performed by: UROLOGY

## 2022-04-03 PROCEDURE — 74011250636 HC RX REV CODE- 250/636: Performed by: NURSE ANESTHETIST, CERTIFIED REGISTERED

## 2022-04-03 PROCEDURE — 36415 COLL VENOUS BLD VENIPUNCTURE: CPT

## 2022-04-03 PROCEDURE — 74011000636 HC RX REV CODE- 636: Performed by: UROLOGY

## 2022-04-03 PROCEDURE — C1758 CATHETER, URETERAL: HCPCS | Performed by: UROLOGY

## 2022-04-03 PROCEDURE — 65270000029 HC RM PRIVATE

## 2022-04-03 PROCEDURE — 76060000033 HC ANESTHESIA 1 TO 1.5 HR: Performed by: UROLOGY

## 2022-04-03 PROCEDURE — 74011250637 HC RX REV CODE- 250/637: Performed by: HOSPITALIST

## 2022-04-03 PROCEDURE — 74011250636 HC RX REV CODE- 250/636: Performed by: UROLOGY

## 2022-04-03 PROCEDURE — C2617 STENT, NON-COR, TEM W/O DEL: HCPCS | Performed by: UROLOGY

## 2022-04-03 PROCEDURE — 76010000138 HC OR TIME 0.5 TO 1 HR: Performed by: UROLOGY

## 2022-04-03 PROCEDURE — 83605 ASSAY OF LACTIC ACID: CPT

## 2022-04-03 PROCEDURE — 74011000250 HC RX REV CODE- 250: Performed by: INTERNAL MEDICINE

## 2022-04-03 PROCEDURE — 74011000250 HC RX REV CODE- 250: Performed by: HOSPITALIST

## 2022-04-03 PROCEDURE — 74011000250 HC RX REV CODE- 250: Performed by: PHYSICIAN ASSISTANT

## 2022-04-03 PROCEDURE — 74420 UROGRAPHY RTRGR +-KUB: CPT

## 2022-04-03 PROCEDURE — 74011000250 HC RX REV CODE- 250: Performed by: UROLOGY

## 2022-04-03 PROCEDURE — 74011250636 HC RX REV CODE- 250/636: Performed by: PHYSICIAN ASSISTANT

## 2022-04-03 PROCEDURE — 87040 BLOOD CULTURE FOR BACTERIA: CPT

## 2022-04-03 PROCEDURE — 87077 CULTURE AEROBIC IDENTIFY: CPT

## 2022-04-03 PROCEDURE — 77030008684 HC TU ET CUF COVD -B: Performed by: NURSE ANESTHETIST, CERTIFIED REGISTERED

## 2022-04-03 PROCEDURE — 87186 SC STD MICRODIL/AGAR DIL: CPT

## 2022-04-03 PROCEDURE — 77030019927 HC TBNG IRR CYSTO BAXT -A: Performed by: UROLOGY

## 2022-04-03 PROCEDURE — 74011250636 HC RX REV CODE- 250/636: Performed by: ANESTHESIOLOGY

## 2022-04-03 PROCEDURE — 74011250636 HC RX REV CODE- 250/636: Performed by: INTERNAL MEDICINE

## 2022-04-03 PROCEDURE — 77030026438 HC STYL ET INTUB CARD -A: Performed by: NURSE ANESTHETIST, CERTIFIED REGISTERED

## 2022-04-03 PROCEDURE — 74011000250 HC RX REV CODE- 250: Performed by: NURSE ANESTHETIST, CERTIFIED REGISTERED

## 2022-04-03 PROCEDURE — 76210000006 HC OR PH I REC 0.5 TO 1 HR: Performed by: UROLOGY

## 2022-04-03 DEVICE — URETERAL STENT
Type: IMPLANTABLE DEVICE | Status: FUNCTIONAL
Brand: CONTOUR™

## 2022-04-03 RX ORDER — TAMSULOSIN HYDROCHLORIDE 0.4 MG/1
0.4 CAPSULE ORAL DAILY
Status: DISCONTINUED | OUTPATIENT
Start: 2022-04-04 | End: 2022-04-05 | Stop reason: HOSPADM

## 2022-04-03 RX ORDER — CARVEDILOL 25 MG/1
25 TABLET ORAL 2 TIMES DAILY WITH MEALS
COMMUNITY

## 2022-04-03 RX ORDER — MORPHINE SULFATE 2 MG/ML
2 INJECTION, SOLUTION INTRAMUSCULAR; INTRAVENOUS
Status: DISCONTINUED | OUTPATIENT
Start: 2022-04-03 | End: 2022-04-03 | Stop reason: HOSPADM

## 2022-04-03 RX ORDER — ROCURONIUM BROMIDE 10 MG/ML
INJECTION, SOLUTION INTRAVENOUS AS NEEDED
Status: DISCONTINUED | OUTPATIENT
Start: 2022-04-03 | End: 2022-04-03 | Stop reason: HOSPADM

## 2022-04-03 RX ORDER — ONDANSETRON 2 MG/ML
4 INJECTION INTRAMUSCULAR; INTRAVENOUS
Status: DISCONTINUED | OUTPATIENT
Start: 2022-04-03 | End: 2022-04-03 | Stop reason: HOSPADM

## 2022-04-03 RX ORDER — SODIUM CHLORIDE, SODIUM LACTATE, POTASSIUM CHLORIDE, CALCIUM CHLORIDE 600; 310; 30; 20 MG/100ML; MG/100ML; MG/100ML; MG/100ML
INJECTION, SOLUTION INTRAVENOUS
Status: DISCONTINUED | OUTPATIENT
Start: 2022-04-03 | End: 2022-04-03 | Stop reason: HOSPADM

## 2022-04-03 RX ORDER — AMLODIPINE BESYLATE 5 MG/1
5 TABLET ORAL DAILY
Status: DISCONTINUED | OUTPATIENT
Start: 2022-04-04 | End: 2022-04-05 | Stop reason: HOSPADM

## 2022-04-03 RX ORDER — SODIUM CHLORIDE 0.9 % (FLUSH) 0.9 %
5-40 SYRINGE (ML) INJECTION AS NEEDED
Status: DISCONTINUED | OUTPATIENT
Start: 2022-04-03 | End: 2022-04-03 | Stop reason: HOSPADM

## 2022-04-03 RX ORDER — SERTRALINE HYDROCHLORIDE 50 MG/1
50 TABLET, FILM COATED ORAL DAILY
Status: DISCONTINUED | OUTPATIENT
Start: 2022-04-03 | End: 2022-04-03 | Stop reason: HOSPADM

## 2022-04-03 RX ORDER — ONDANSETRON 4 MG/1
4 TABLET, ORALLY DISINTEGRATING ORAL
Status: DISCONTINUED | OUTPATIENT
Start: 2022-04-03 | End: 2022-04-05 | Stop reason: HOSPADM

## 2022-04-03 RX ORDER — SERTRALINE HYDROCHLORIDE 50 MG/1
50 TABLET, FILM COATED ORAL DAILY
Status: DISCONTINUED | OUTPATIENT
Start: 2022-04-04 | End: 2022-04-05 | Stop reason: HOSPADM

## 2022-04-03 RX ORDER — SODIUM CHLORIDE 0.9 % (FLUSH) 0.9 %
5-40 SYRINGE (ML) INJECTION EVERY 8 HOURS
Status: DISCONTINUED | OUTPATIENT
Start: 2022-04-03 | End: 2022-04-03 | Stop reason: HOSPADM

## 2022-04-03 RX ORDER — FENTANYL CITRATE 50 UG/ML
INJECTION, SOLUTION INTRAMUSCULAR; INTRAVENOUS AS NEEDED
Status: DISCONTINUED | OUTPATIENT
Start: 2022-04-03 | End: 2022-04-03 | Stop reason: HOSPADM

## 2022-04-03 RX ORDER — SODIUM CHLORIDE, SODIUM LACTATE, POTASSIUM CHLORIDE, CALCIUM CHLORIDE 600; 310; 30; 20 MG/100ML; MG/100ML; MG/100ML; MG/100ML
75 INJECTION, SOLUTION INTRAVENOUS CONTINUOUS
Status: DISCONTINUED | OUTPATIENT
Start: 2022-04-03 | End: 2022-04-03 | Stop reason: HOSPADM

## 2022-04-03 RX ORDER — AMLODIPINE BESYLATE 5 MG/1
5 TABLET ORAL DAILY
Status: DISCONTINUED | OUTPATIENT
Start: 2022-04-03 | End: 2022-04-03 | Stop reason: HOSPADM

## 2022-04-03 RX ORDER — ALBUTEROL SULFATE 90 UG/1
2 AEROSOL, METERED RESPIRATORY (INHALATION)
Status: DISCONTINUED | OUTPATIENT
Start: 2022-04-03 | End: 2022-04-03 | Stop reason: HOSPADM

## 2022-04-03 RX ORDER — TAMSULOSIN HYDROCHLORIDE 0.4 MG/1
0.4 CAPSULE ORAL DAILY
Status: DISCONTINUED | OUTPATIENT
Start: 2022-04-03 | End: 2022-04-03 | Stop reason: HOSPADM

## 2022-04-03 RX ORDER — MIDAZOLAM HYDROCHLORIDE 1 MG/ML
1 INJECTION, SOLUTION INTRAMUSCULAR; INTRAVENOUS AS NEEDED
Status: DISCONTINUED | OUTPATIENT
Start: 2022-04-03 | End: 2022-04-03 | Stop reason: HOSPADM

## 2022-04-03 RX ORDER — ONDANSETRON 2 MG/ML
INJECTION INTRAMUSCULAR; INTRAVENOUS AS NEEDED
Status: DISCONTINUED | OUTPATIENT
Start: 2022-04-03 | End: 2022-04-03 | Stop reason: HOSPADM

## 2022-04-03 RX ORDER — SUCCINYLCHOLINE CHLORIDE 20 MG/ML
INJECTION INTRAMUSCULAR; INTRAVENOUS AS NEEDED
Status: DISCONTINUED | OUTPATIENT
Start: 2022-04-03 | End: 2022-04-03 | Stop reason: HOSPADM

## 2022-04-03 RX ORDER — SODIUM CHLORIDE, SODIUM LACTATE, POTASSIUM CHLORIDE, CALCIUM CHLORIDE 600; 310; 30; 20 MG/100ML; MG/100ML; MG/100ML; MG/100ML
125 INJECTION, SOLUTION INTRAVENOUS CONTINUOUS
Status: DISCONTINUED | OUTPATIENT
Start: 2022-04-03 | End: 2022-04-04

## 2022-04-03 RX ORDER — ONDANSETRON 4 MG/1
4 TABLET, ORALLY DISINTEGRATING ORAL
Status: DISCONTINUED | OUTPATIENT
Start: 2022-04-03 | End: 2022-04-03 | Stop reason: HOSPADM

## 2022-04-03 RX ORDER — FENTANYL CITRATE 50 UG/ML
50 INJECTION, SOLUTION INTRAMUSCULAR; INTRAVENOUS AS NEEDED
Status: DISCONTINUED | OUTPATIENT
Start: 2022-04-03 | End: 2022-04-03 | Stop reason: HOSPADM

## 2022-04-03 RX ORDER — ATORVASTATIN CALCIUM 10 MG/1
10 TABLET, FILM COATED ORAL DAILY
Status: DISCONTINUED | OUTPATIENT
Start: 2022-04-04 | End: 2022-04-05 | Stop reason: HOSPADM

## 2022-04-03 RX ORDER — ACETAMINOPHEN 650 MG/1
650 SUPPOSITORY RECTAL
Status: DISCONTINUED | OUTPATIENT
Start: 2022-04-03 | End: 2022-04-05 | Stop reason: HOSPADM

## 2022-04-03 RX ORDER — CARVEDILOL 12.5 MG/1
25 TABLET ORAL 2 TIMES DAILY WITH MEALS
Status: DISCONTINUED | OUTPATIENT
Start: 2022-04-03 | End: 2022-04-03 | Stop reason: HOSPADM

## 2022-04-03 RX ORDER — SODIUM CHLORIDE 0.9 % (FLUSH) 0.9 %
5-40 SYRINGE (ML) INJECTION AS NEEDED
Status: DISCONTINUED | OUTPATIENT
Start: 2022-04-03 | End: 2022-04-05 | Stop reason: HOSPADM

## 2022-04-03 RX ORDER — ALLOPURINOL 100 MG/1
100 TABLET ORAL DAILY
Status: DISCONTINUED | OUTPATIENT
Start: 2022-04-04 | End: 2022-04-05 | Stop reason: HOSPADM

## 2022-04-03 RX ORDER — SODIUM CHLORIDE 9 MG/ML
50 INJECTION, SOLUTION INTRAVENOUS CONTINUOUS
Status: DISCONTINUED | OUTPATIENT
Start: 2022-04-03 | End: 2022-04-03 | Stop reason: HOSPADM

## 2022-04-03 RX ORDER — GABAPENTIN 300 MG/1
300 CAPSULE ORAL 2 TIMES DAILY
Status: DISCONTINUED | OUTPATIENT
Start: 2022-04-03 | End: 2022-04-03 | Stop reason: HOSPADM

## 2022-04-03 RX ORDER — ALLOPURINOL 100 MG/1
100 TABLET ORAL DAILY
Status: DISCONTINUED | OUTPATIENT
Start: 2022-04-03 | End: 2022-04-03 | Stop reason: HOSPADM

## 2022-04-03 RX ORDER — LEVOFLOXACIN 750 MG/1
750 TABLET ORAL DAILY
COMMUNITY
Start: 2022-03-25 | End: 2022-04-01

## 2022-04-03 RX ORDER — DULOXETIN HYDROCHLORIDE 30 MG/1
30 CAPSULE, DELAYED RELEASE ORAL DAILY
Status: DISCONTINUED | OUTPATIENT
Start: 2022-04-04 | End: 2022-04-05 | Stop reason: HOSPADM

## 2022-04-03 RX ORDER — OXYCODONE AND ACETAMINOPHEN 5; 325 MG/1; MG/1
1 TABLET ORAL AS NEEDED
Status: DISCONTINUED | OUTPATIENT
Start: 2022-04-03 | End: 2022-04-03 | Stop reason: HOSPADM

## 2022-04-03 RX ORDER — CARVEDILOL 6.25 MG/1
25 TABLET ORAL 2 TIMES DAILY WITH MEALS
Status: DISCONTINUED | OUTPATIENT
Start: 2022-04-03 | End: 2022-04-05 | Stop reason: HOSPADM

## 2022-04-03 RX ORDER — ALLOPURINOL 100 MG/1
100 TABLET ORAL DAILY
COMMUNITY
Start: 2022-02-16

## 2022-04-03 RX ORDER — FENTANYL CITRATE 50 UG/ML
25 INJECTION, SOLUTION INTRAMUSCULAR; INTRAVENOUS
Status: DISCONTINUED | OUTPATIENT
Start: 2022-04-03 | End: 2022-04-03 | Stop reason: HOSPADM

## 2022-04-03 RX ORDER — LIDOCAINE HYDROCHLORIDE 10 MG/ML
0.1 INJECTION, SOLUTION EPIDURAL; INFILTRATION; INTRACAUDAL; PERINEURAL AS NEEDED
Status: DISCONTINUED | OUTPATIENT
Start: 2022-04-03 | End: 2022-04-03 | Stop reason: HOSPADM

## 2022-04-03 RX ORDER — ONDANSETRON 2 MG/ML
4 INJECTION INTRAMUSCULAR; INTRAVENOUS AS NEEDED
Status: DISCONTINUED | OUTPATIENT
Start: 2022-04-03 | End: 2022-04-03 | Stop reason: HOSPADM

## 2022-04-03 RX ORDER — HYDROMORPHONE HYDROCHLORIDE 1 MG/ML
0.2 INJECTION, SOLUTION INTRAMUSCULAR; INTRAVENOUS; SUBCUTANEOUS
Status: DISCONTINUED | OUTPATIENT
Start: 2022-04-03 | End: 2022-04-03 | Stop reason: HOSPADM

## 2022-04-03 RX ORDER — LIDOCAINE HYDROCHLORIDE 20 MG/ML
INJECTION, SOLUTION EPIDURAL; INFILTRATION; INTRACAUDAL; PERINEURAL AS NEEDED
Status: DISCONTINUED | OUTPATIENT
Start: 2022-04-03 | End: 2022-04-03 | Stop reason: HOSPADM

## 2022-04-03 RX ORDER — ACETAMINOPHEN 325 MG/1
650 TABLET ORAL
Status: DISCONTINUED | OUTPATIENT
Start: 2022-04-03 | End: 2022-04-03 | Stop reason: HOSPADM

## 2022-04-03 RX ORDER — MIDAZOLAM HYDROCHLORIDE 1 MG/ML
0.5 INJECTION, SOLUTION INTRAMUSCULAR; INTRAVENOUS
Status: DISCONTINUED | OUTPATIENT
Start: 2022-04-03 | End: 2022-04-03 | Stop reason: HOSPADM

## 2022-04-03 RX ORDER — ONDANSETRON 2 MG/ML
4 INJECTION INTRAMUSCULAR; INTRAVENOUS
Status: DISCONTINUED | OUTPATIENT
Start: 2022-04-03 | End: 2022-04-03

## 2022-04-03 RX ORDER — DEXAMETHASONE SODIUM PHOSPHATE 4 MG/ML
INJECTION, SOLUTION INTRA-ARTICULAR; INTRALESIONAL; INTRAMUSCULAR; INTRAVENOUS; SOFT TISSUE AS NEEDED
Status: DISCONTINUED | OUTPATIENT
Start: 2022-04-03 | End: 2022-04-03 | Stop reason: HOSPADM

## 2022-04-03 RX ORDER — DULOXETIN HYDROCHLORIDE 30 MG/1
30 CAPSULE, DELAYED RELEASE ORAL DAILY
Status: DISCONTINUED | OUTPATIENT
Start: 2022-04-03 | End: 2022-04-03 | Stop reason: HOSPADM

## 2022-04-03 RX ORDER — DIPHENHYDRAMINE HYDROCHLORIDE 50 MG/ML
12.5 INJECTION, SOLUTION INTRAMUSCULAR; INTRAVENOUS AS NEEDED
Status: DISCONTINUED | OUTPATIENT
Start: 2022-04-03 | End: 2022-04-03 | Stop reason: HOSPADM

## 2022-04-03 RX ORDER — DULOXETIN HYDROCHLORIDE 30 MG/1
30 CAPSULE, DELAYED RELEASE ORAL DAILY
COMMUNITY
Start: 2022-01-18

## 2022-04-03 RX ORDER — SERTRALINE HYDROCHLORIDE 50 MG/1
50 TABLET, FILM COATED ORAL DAILY
COMMUNITY
Start: 2022-02-01

## 2022-04-03 RX ORDER — ATORVASTATIN CALCIUM 10 MG/1
10 TABLET, FILM COATED ORAL DAILY
Status: DISCONTINUED | OUTPATIENT
Start: 2022-04-03 | End: 2022-04-03 | Stop reason: HOSPADM

## 2022-04-03 RX ORDER — GABAPENTIN 300 MG/1
300 CAPSULE ORAL 2 TIMES DAILY
Status: DISCONTINUED | OUTPATIENT
Start: 2022-04-03 | End: 2022-04-05 | Stop reason: HOSPADM

## 2022-04-03 RX ORDER — PROPOFOL 10 MG/ML
INJECTION, EMULSION INTRAVENOUS AS NEEDED
Status: DISCONTINUED | OUTPATIENT
Start: 2022-04-03 | End: 2022-04-03 | Stop reason: HOSPADM

## 2022-04-03 RX ORDER — SAME BUTANEDISULFONATE/BETAINE 400-600 MG
500 POWDER IN PACKET (EA) ORAL DAILY
Status: DISCONTINUED | OUTPATIENT
Start: 2022-04-04 | End: 2022-04-03 | Stop reason: HOSPADM

## 2022-04-03 RX ORDER — LEVOFLOXACIN 5 MG/ML
500 INJECTION, SOLUTION INTRAVENOUS EVERY 24 HOURS
Status: DISCONTINUED | OUTPATIENT
Start: 2022-04-03 | End: 2022-04-03

## 2022-04-03 RX ORDER — ACETAMINOPHEN 650 MG/1
650 SUPPOSITORY RECTAL
Status: DISCONTINUED | OUTPATIENT
Start: 2022-04-03 | End: 2022-04-03 | Stop reason: HOSPADM

## 2022-04-03 RX ORDER — ACETAMINOPHEN 325 MG/1
650 TABLET ORAL
Status: DISCONTINUED | OUTPATIENT
Start: 2022-04-03 | End: 2022-04-05 | Stop reason: HOSPADM

## 2022-04-03 RX ADMIN — MEROPENEM 500 MG: 500 INJECTION, POWDER, FOR SOLUTION INTRAVENOUS at 23:47

## 2022-04-03 RX ADMIN — MEROPENEM 500 MG: 500 INJECTION, POWDER, FOR SOLUTION INTRAVENOUS at 17:22

## 2022-04-03 RX ADMIN — ALLOPURINOL 100 MG: 100 TABLET ORAL at 09:05

## 2022-04-03 RX ADMIN — SODIUM CHLORIDE, POTASSIUM CHLORIDE, SODIUM LACTATE AND CALCIUM CHLORIDE 125 ML/HR: 600; 310; 30; 20 INJECTION, SOLUTION INTRAVENOUS at 17:23

## 2022-04-03 RX ADMIN — PHENYLEPHRINE HYDROCHLORIDE 40 MCG/MIN: 10 INJECTION INTRAVENOUS at 21:29

## 2022-04-03 RX ADMIN — SODIUM CHLORIDE, POTASSIUM CHLORIDE, SODIUM LACTATE AND CALCIUM CHLORIDE 75 ML/HR: 600; 310; 30; 20 INJECTION, SOLUTION INTRAVENOUS at 21:00

## 2022-04-03 RX ADMIN — DEXAMETHASONE SODIUM PHOSPHATE 4 MG: 4 INJECTION, SOLUTION INTRAMUSCULAR; INTRAVENOUS at 21:38

## 2022-04-03 RX ADMIN — SUCCINYLCHOLINE CHLORIDE 200 MG: 20 INJECTION, SOLUTION INTRAMUSCULAR; INTRAVENOUS at 21:29

## 2022-04-03 RX ADMIN — SODIUM CHLORIDE, POTASSIUM CHLORIDE, SODIUM LACTATE AND CALCIUM CHLORIDE: 600; 310; 30; 20 INJECTION, SOLUTION INTRAVENOUS at 21:14

## 2022-04-03 RX ADMIN — DULOXETINE HYDROCHLORIDE 30 MG: 30 CAPSULE, DELAYED RELEASE ORAL at 09:05

## 2022-04-03 RX ADMIN — CARVEDILOL 25 MG: 12.5 TABLET, FILM COATED ORAL at 09:06

## 2022-04-03 RX ADMIN — SODIUM CHLORIDE, PRESERVATIVE FREE 1 G: 5 INJECTION INTRAVENOUS at 12:03

## 2022-04-03 RX ADMIN — SODIUM CHLORIDE, POTASSIUM CHLORIDE, SODIUM LACTATE AND CALCIUM CHLORIDE 125 ML/HR: 600; 310; 30; 20 INJECTION, SOLUTION INTRAVENOUS at 23:48

## 2022-04-03 RX ADMIN — SODIUM CHLORIDE, PRESERVATIVE FREE 10 ML: 5 INJECTION INTRAVENOUS at 06:32

## 2022-04-03 RX ADMIN — ACETAMINOPHEN 650 MG: 325 TABLET ORAL at 02:15

## 2022-04-03 RX ADMIN — ROCURONIUM BROMIDE 5 MG: 10 SOLUTION INTRAVENOUS at 21:29

## 2022-04-03 RX ADMIN — ONDANSETRON HYDROCHLORIDE 4 MG: 2 INJECTION, SOLUTION INTRAMUSCULAR; INTRAVENOUS at 21:47

## 2022-04-03 RX ADMIN — SERTRALINE HYDROCHLORIDE 50 MG: 50 TABLET ORAL at 09:05

## 2022-04-03 RX ADMIN — AMLODIPINE BESYLATE 5 MG: 5 TABLET ORAL at 09:06

## 2022-04-03 RX ADMIN — LIDOCAINE HYDROCHLORIDE 40 MG: 20 INJECTION, SOLUTION EPIDURAL; INFILTRATION; INTRACAUDAL; PERINEURAL at 21:29

## 2022-04-03 RX ADMIN — FENTANYL CITRATE 100 MCG: 50 INJECTION, SOLUTION INTRAMUSCULAR; INTRAVENOUS at 21:27

## 2022-04-03 RX ADMIN — SODIUM CHLORIDE, PRESERVATIVE FREE 10 ML: 5 INJECTION INTRAVENOUS at 01:05

## 2022-04-03 RX ADMIN — GABAPENTIN 300 MG: 300 CAPSULE ORAL at 09:06

## 2022-04-03 RX ADMIN — ATORVASTATIN CALCIUM 10 MG: 10 TABLET, FILM COATED ORAL at 09:05

## 2022-04-03 RX ADMIN — PROPOFOL 200 MG: 10 INJECTION, EMULSION INTRAVENOUS at 21:29

## 2022-04-03 RX ADMIN — TAMSULOSIN HYDROCHLORIDE 0.4 MG: 0.4 CAPSULE ORAL at 09:05

## 2022-04-03 NOTE — CONSULTS
Consult Date: 4/3/2022    IP CONSULT TO UROLOGY  Consult performed by: Rosalina Mann MD  Consult ordered by: Destini Stack MD  Reason for consult: left ureteral stone   Assessment/Recommendations: CTSP who was transferred to [facility] for possible surgery at mid day. Left ureteral stone  UTI evidence on UA, WBC elevated, low grade fever. Recent episode of sepsis related to obstructing RIGHT ureteral stone  Still has the stent in place  The UA could be a false positive, since the recent infection and the stent would still affect the UA    However, given other signs, plus the patient's proven prior issues with severe UTI associated with stones, I think we should stent her tonight. She last had lemonade at 12:30. Luckily she was not hungry and did not eat the turkey and all the trimmings that was on her lunch tray. She had breakfast with solids at 9:30. I have alerted the OR . Will move ahead with the stent when she has been NPO long enough. Antibiotics per the prior admission's culture for now. She did get Merrem at 12:00 - since this is every 8 hrs, we can consider this adequate surgical prophylaxis. Will need to continue it post op . Subjective     Past Medical History:   Diagnosis Date    Arthritis 7/7/2020    Asthma 7/7/2020    Hypertension 7/7/2020    Kidney stones       Past Surgical History:   Procedure Laterality Date    HX UROLOGICAL      Lithotripsy     Family History   Family history unknown: Yes      Social History     Tobacco Use    Smoking status: Never Smoker    Smokeless tobacco: Never Used   Substance Use Topics    Alcohol use: Never            Review of Systems   Constitutional: Positive for fever. Negative for activity change and appetite change. HENT: Negative for congestion and dental problem. Eyes: Negative for pain and discharge. Respiratory: Negative for apnea and chest tightness. Cardiovascular: Positive for leg swelling. Negative for chest pain. Gastrointestinal: Positive for abdominal pain. Negative for abdominal distention. Endocrine: Negative for cold intolerance and heat intolerance. Genitourinary: Positive for dysuria and flank pain. Negative for difficulty urinating. Musculoskeletal: Positive for back pain. Negative for arthralgias. Skin: Negative for color change. Allergic/Immunologic: Negative for environmental allergies and food allergies. Neurological: Negative for dizziness and facial asymmetry. Hematological: Negative for adenopathy. Does not bruise/bleed easily. Psychiatric/Behavioral: Negative for agitation and behavioral problems. Objective     Vital signs for last 24 hours:  Visit Vitals  /79   Pulse 100   Temp 99.8 °F (37.7 °C)   Resp 18   SpO2 94%       Intake/Output this shift:  Current Shift: No intake/output data recorded. Last 3 Shifts: No intake/output data recorded.     Data Review:   Recent Results (from the past 24 hour(s))   URINALYSIS W/ REFLEX CULTURE    Collection Time: 04/02/22  7:54 PM    Specimen: Urine   Result Value Ref Range    Color Yellow/Straw      Appearance Turbid (A) Clear      Specific gravity 1.008 1.003 - 1.030      pH (UA) 6.0 5.0 - 8.0      Protein 30 (A) Negative mg/dL    Glucose Negative Negative mg/dL    Ketone Negative Negative mg/dL    Bilirubin Negative Negative      Blood Moderate (A) Negative      Urobilinogen 0.1 0.1 - 1.0 EU/dL    Nitrites Negative Negative      Leukocyte Esterase Large (A) Negative      UA:UC IF INDICATED Urine Culture Ordered (A) Culture not indicated by UA result      WBC 20-50 0 - 4 /hpf    RBC 20-50 0 - 5 /hpf    Bacteria Negative Negative /hpf    Mucus Trace /lpf   CBC WITH AUTOMATED DIFF    Collection Time: 04/02/22  7:54 PM   Result Value Ref Range    WBC 13.1 (H) 3.6 - 11.0 K/uL    RBC 3.30 (L) 3.80 - 5.20 M/uL    HGB 9.7 (L) 11.5 - 16.0 g/dL    HCT 31.3 (L) 35.0 - 47.0 %    MCV 94.8 80.0 - 99.0 FL    MCH 29.4 26.0 - 34.0 PG    MCHC 31.0 30.0 - 36.5 g/dL    RDW 15.8 (H) 11.5 - 14.5 %    PLATELET 933 061 - 244 K/uL    MPV 10.7 8.9 - 12.9 FL    NRBC 0.0 0.0  WBC    ABSOLUTE NRBC 0.00 0.00 - 0.01 K/uL    NEUTROPHILS 86 (H) 32 - 75 %    LYMPHOCYTES 6 (L) 12 - 49 %    MONOCYTES 8 5 - 13 %    EOSINOPHILS 0 0 - 7 %    BASOPHILS 0 0 - 1 %    IMMATURE GRANULOCYTES 0 0 - 0.5 %    ABS. NEUTROPHILS 11.0 (H) 1.8 - 8.0 K/UL    ABS. LYMPHOCYTES 0.8 0.8 - 3.5 K/UL    ABS. MONOCYTES 1.1 (H) 0.0 - 1.0 K/UL    ABS. EOSINOPHILS 0.0 0.0 - 0.4 K/UL    ABS. BASOPHILS 0.0 0.0 - 0.1 K/UL    ABS. IMM. GRANS. 0.1 (H) 0.00 - 0.04 K/UL    DF AUTOMATED     METABOLIC PANEL, COMPREHENSIVE    Collection Time: 04/02/22  7:54 PM   Result Value Ref Range    Sodium 142 136 - 145 mmol/L    Potassium 3.7 3.5 - 5.1 mmol/L    Chloride 110 (H) 97 - 108 mmol/L    CO2 27 21 - 32 mmol/L    Anion gap 5 5 - 15 mmol/L    Glucose 123 (H) 65 - 100 mg/dL    BUN 16 6 - 20 mg/dL    Creatinine 0.95 0.55 - 1.02 mg/dL    BUN/Creatinine ratio 17 12 - 20      GFR est AA >60 >60 ml/min/1.73m2    GFR est non-AA 58 (L) >60 ml/min/1.73m2    Calcium 8.6 8.5 - 10.1 mg/dL    Bilirubin, total 0.4 0.2 - 1.0 mg/dL    AST (SGOT) 12 (L) 15 - 37 U/L    ALT (SGPT) 15 12 - 78 U/L    Alk.  phosphatase 104 45 - 117 U/L    Protein, total 7.6 6.4 - 8.2 g/dL    Albumin 3.5 3.5 - 5.0 g/dL    Globulin 4.1 (H) 2.0 - 4.0 g/dL    A-G Ratio 0.9 (L) 1.1 - 2.2     EKG, 12 LEAD, INITIAL    Collection Time: 04/02/22 11:43 PM   Result Value Ref Range    Ventricular Rate 103 BPM    Atrial Rate 103 BPM    P-R Interval 188 ms    QRS Duration 78 ms    Q-T Interval 310 ms    QTC Calculation (Bezet) 406 ms    Calculated P Axis 67 degrees    Calculated R Axis 6 degrees    Calculated T Axis 25 degrees    Diagnosis       Sinus tachycardia  Septal infarct (cited on or before 02-APR-2022)  Abnormal ECG  When compared with ECG of 02-APR-2022 23:42, (Unconfirmed)  No significant change was found  Confirmed by Mila John ([phone]) on 4/3/2022 11:08:18 AM     LACTIC ACID    Collection Time: 04/03/22 12:55 AM   Result Value Ref Range    Lactic acid 0.6 0.4 - 2.0 mmol/L       Physical Exam  Constitutional:       General: She is not in acute distress. Appearance: She is obese. She is not ill-appearing. HENT:      Head: Normocephalic and atraumatic. Right Ear: External ear normal.      Left Ear: External ear normal.      Nose: Nose normal.      Mouth/Throat:      Mouth: Mucous membranes are moist.      Pharynx: No oropharyngeal exudate. Eyes:      General:         Right eye: No discharge. Left eye: No discharge. Cardiovascular:      Rate and Rhythm: Regular rhythm. Tachycardia present. Heart sounds: No friction rub. No gallop. Pulmonary:      Effort: Pulmonary effort is normal. No respiratory distress. Abdominal:      General: There is no distension. Palpations: There is no mass. Musculoskeletal:         General: No swelling or tenderness. Cervical back: No rigidity or tenderness. Skin:     Coloration: Skin is not pale. Findings: No erythema. Neurological:      General: No focal deficit present. Mental Status: She is alert and oriented to person, place, and time. Mental status is at baseline. Psychiatric:         Mood and Affect: Mood normal.         Behavior: Behavior normal.         Thought Content:  Thought content normal.         Judgment: Judgment normal.

## 2022-04-03 NOTE — PROGRESS NOTES
Patient arrived to unit via stretcher X 2 ER staff members and no complaints of any severe discomfort by Prn Tylenol was administered for mild discomfort and was effective. Call bell within reach and Pure wick in place as well as Tele monitor.

## 2022-04-03 NOTE — PROGRESS NOTES
Day #1 of meropenum (continued from [facility] -severe penicllin allergy)  Indication:  UTI  Current regimen:  1gm q8h  Abx regimen:    Recent Labs     22   WBC 13.1*   CREA 0.95   BUN 16     Est CrCl: 82 ml/min  Temp (24hrs), Av.5 °F (36.9 °C), Min:97.5 °F (36.4 °C), Max:99.8 °F (37.7 °C)    Cultures: blood and urine pending    Plan: Change to 500gm q6h per [facility] protocol

## 2022-04-03 NOTE — PROGRESS NOTES
[phone] Dr[address] Thanh [facility] to notify patient's arrival to [address] [place] [address], MD aware. Paged Dr. Marcello Mccabe with hospitalist to notify him patient is here. Awaiting call back. [phone] Notified Dr. Marcello Mccabe of patient's arrival to [address] [place] [address].

## 2022-04-03 NOTE — ANESTHESIA PREPROCEDURE EVALUATION
Relevant Problems   No relevant active problems       Anesthetic History   No history of anesthetic complications            Review of Systems / Medical History  Patient summary reviewed, nursing notes reviewed and pertinent labs reviewed    Pulmonary          Undiagnosed apnea  Asthma        Neuro/Psych   Within defined limits           Cardiovascular    Hypertension              Exercise tolerance: <4 METS  Comments: Negative cardiac cath   GI/Hepatic/Renal         Renal disease: CRI and stones  Liver disease    Comments: Hepatic cyst Endo/Other        Morbid obesity and arthritis     Other Findings   Comments: Acute cystitis  Urinary tract obstruction by kidney stone             Physical Exam    Airway  Mallampati: IV  TM Distance: 4 - 6 cm  Neck ROM: decreased range of motion, short neck   Mouth opening: Diminished (comment)     Cardiovascular  Regular rate and rhythm,  S1 and S2 normal,  no murmur, click, rub, or gallop  Rhythm: regular  Rate: normal         Dental  No notable dental hx       Pulmonary      Decreased breath sounds: bilateral           Abdominal  GI exam deferred       Other Findings            Anesthetic Plan    ASA: 3  Anesthesia type: general          Induction: Intravenous  Anesthetic plan and risks discussed with: Patient

## 2022-04-03 NOTE — DISCHARGE SUMMARY
Transfer Summary    Hospital course:     This is a 28-year-old female admitted on 4/2/2022 with a right ureteral stone stone and stent placement performed at [facility] secondary to right kidney obstruction, essential hypertension, asthma who presents emergency department with left flank pain, left lower quadrant abdominal pain, right upper quadrant abdominal pain and diarrhea with frequent urination. Previous urinary tract infection with Klebsiella and treated with Levaquin. Unfortunately appears that the urinary tract infection has not resolved and urine culture pending. Treating ongoing diarrhea with probiotics. Patient will require transfer to [facility] due to left hydronephrosis with a 4 mm distal left ureteral stone. There is currently no urology coverage till April 7, 2022. Continue Merrem due to severe penicillin allergy. .  In regards to the large hepatic cyst and right upper quadrant abdominal pain patient can be evaluated at [facility] hepatic surgeon versus general surgery. Discussed case with hospitalist Dr. Betsey Timmons and patient has been accepted. In addition, spoke with Dr. Zackary Blanco of Urology.     Impression\plan:     1.  Left lower quadrant abdominal pain with left ureteral stone distally with hydronephrosis  No urology coverage and will need transfer to [facility] from previous interventions at the facility  Merrem due to severe penicillin allergy  Urine culture pending  Continue Flomax     2. Urinary tract infection  Urine culture pending  Merrem due to severe penicillin allergy  Follow-up urine culture     3.  Essential hypertension  Continue Coreg and amlodipine     4. Depression  Continue Cymbalta and Zoloft     5. Hypercholesterolemia  Continue Lipitor     6.   Diarrhea  Consider enteric cultures  White count slightly elevated may be related to urinary tract infection  Probiotic  Fiber

## 2022-04-03 NOTE — H&P
[address] [facility] Adult  Hospitalist Group  History and Physical      Date of Service:  4/3/2022  Primary Care Provider: Berenice Nascimento MD  Source of information: The patient and Chart review    Chief Complaint: No chief complaint on file. History of Presenting Illness:   Deny Cancino is a 73 y.o. female who presents as transfer from [facility] for urology coverage. H/o right ureteral stone s/p stent placement, essential hypertension, asthma who presented to OSH for pyelo. Pt requires transfer to [facility] d/t left hydronephrosis with a 4 mm distal left ureteral stone. Continue Merrem due to severe penicillin allergy. Large hepatic cyst was noted. Plan is for stent placement this evening. Pain currently controlled. Pt in NAD. The patient denies any headache, blurry vision, sore throat, trouble swallowing, trouble with speech, chest pain, SOB, cough, fever, chills, N/V/D, constipation, recent travels, sick contacts, focal or generalized neurological symptoms, falls, injuries, rashes, contact with COVID-19 diagnosed patients, hematemesis, melena, hemoptysis, hematuria, rashes, denies starting any new medications and denies any other concerns or problems besides as mentioned above. REVIEW OF SYSTEMS:  A comprehensive review of systems was negative except for that written in the History of Present Illness. Past Medical History:   Diagnosis Date    Arthritis 7/7/2020    Asthma 7/7/2020    Hypertension 7/7/2020    Kidney stones       Past Surgical History:   Procedure Laterality Date    HX UROLOGICAL      Lithotripsy     Prior to Admission medications    Medication Sig Start Date End Date Taking? Authorizing Provider   DULoxetine (CYMBALTA) 30 mg capsule Take 30 mg by mouth daily. 1/18/22   Provider, Historical   sertraline (ZOLOFT) 50 mg tablet Take 50 mg by mouth daily. 2/1/22   Provider, Historical   allopurinoL (ZYLOPRIM) 100 mg tablet Take 100 mg by mouth daily.  2/16/22   Provider, Historical carvediloL (COREG) 25 mg tablet Take 25 mg by mouth two (2) times daily (with meals). Provider, Historical   amLODIPine (NORVASC) 10 mg tablet Take 1 Tablet by mouth daily. 3/10/22   Irene Romero MD   L.acid,para-B. bifidum-S.therm (RISAQUAD) 8 billion cell cap cap Take 1 Capsule by mouth daily. 3/10/22   Irene Romero MD   polyethylene glycol (MIRALAX) 17 gram packet Take 1 Packet by mouth daily as needed for Constipation. 3/10/22   Irene Romero MD   atorvastatin (LIPITOR) 10 mg tablet Take 10 mg by mouth daily. 8/27/20   Provider, Historical   gabapentin (NEURONTIN) 300 mg capsule Take 300 mg by mouth two (2) times a day. 6/30/20   Provider, Historical   albuterol (PROVENTIL HFA, VENTOLIN HFA, PROAIR HFA) 90 mcg/actuation inhaler Take 2 Puffs by inhalation every four (4) hours as needed for Wheezing or Shortness of Breath. Provider, Historical   oxybutynin (DITROPAN) 5 mg tablet Take 5 mg by mouth two (2) times a day. Provider, Historical   tamsulosin (FLOMAX) 0.4 mg capsule Take 0.4 mg by mouth daily. Provider, Historical     Allergies   Allergen Reactions    Latex Unknown (comments)    Aspirin Unknown (comments)    Losartan Unknown (comments)    Penicillins Unknown (comments)      Family History   Family history unknown: Yes      Social History:  reports that she has never smoked. She has never used smokeless tobacco. She reports that she does not drink alcohol and does not use drugs. Family and social history were personally reviewed, all pertinent and relevant details are outlined as above.     Objective:     Visit Vitals  BP (!) 141/58   Pulse (!) 103   Temp 99.8 °F (37.7 °C)   Resp 18   SpO2 94%      O2 Device: None (Room air)    PHYSICAL EXAM:   General:          Alert, cooperative, no distress  Head:    Normocephalic, atraumatic  Eyes:   Conjunctivae clear, no scleral icterus, EOMs intact  ENT:   Mucosa normal. No drainage or sinus tenderness  Neck:               Supple, symmetrical, trachea midline  Lungs:             Clear to auscultation bilaterally, symmetric expansion, no respiratory distress  Chest wall:      No tenderness or deformity  Heart:              Regular rate and rhythm, no audible murmur  Abdomen:        Soft, non-tender, non-distended  Extremities:     Extremities normal, atraumatic, no cyanosis or edema  Skin:                Warm, dry, turgor normal, no rashes or lesions  Neurologic:      CNII-XII grossly intact. Sensory grossly within normal limits. A&O. SHAVER    Data Review: All diagnostic labs and studies have been reviewed. Abnormal Labs Reviewed - No data to display  All Micro Results     None        IMAGING:   XR RETROGRADE PYELOGRAM    (Results Pending)      ECG/ECHO:    Results for orders placed or performed during the hospital encounter of 04/02/22   EKG, 12 LEAD, INITIAL   Result Value Ref Range    Ventricular Rate 103 BPM    Atrial Rate 103 BPM    P-R Interval 188 ms    QRS Duration 78 ms    Q-T Interval 310 ms    QTC Calculation (Bezet) 406 ms    Calculated P Axis 67 degrees    Calculated R Axis 6 degrees    Calculated T Axis 25 degrees    Diagnosis       Sinus tachycardia  Septal infarct (cited on or before 02-APR-2022)  Abnormal ECG  When compared with ECG of 02-APR-2022 23:42, (Unconfirmed)  No significant change was found  Confirmed by Nimo Chauhan ([id number]) on 4/3/2022 11:08:18 AM         Assessment:   Given the patient's current clinical presentation, there is a high level of concern for decompensation if discharged from the emergency department. Complex decision making was performed, which includes reviewing the patient's available past medical records, laboratory results, and imaging studies.     Active Problems:    Urinary tract obstruction by kidney stone (4/3/2022)      Plan:     Pyelo 2/2 left ureteral stone with hydronephrosis  Cont Merrem due to severe penicillin allergy  Urine culture pending  Continue Flomax  Recent episode of sepsis related to obstructing right ureteral stone s/p stent     Essential hypertension  Continue Coreg and amlodipine     Depression  Continue Cymbalta and Zoloft     Hypercholesterolemia  Continue Lipitor    DIET: ADULT DIET Regular   ISOLATION PRECAUTIONS: There are currently no Active Isolations  CODE STATUS: Full Code  DVT PROPHYLAXIS: SCDs await until postop  FUNCTIONAL STATUS PRIOR TO HOSPITALIZATION: Fully active and ambulatory; able to carry on all self-care without restriction.   EARLY MOBILITY ASSESSMENT: Recommend routine ambulation while hospitalized with the assistance of nursing staff  Care Plan discussed with: Patient/Family and Nurse  Anticipated Disposition: Home w/Family  Anticipated Discharge: 24 hours to 48 hours    CRITICAL CARE WAS PERFORMED FOR THIS ENCOUNTER: NO.    Signed By: Hernan Moss MD     April 3, 2022

## 2022-04-03 NOTE — H&P
History and Physical              Subjective :   Chief Complaint : Lower abdominal pain left and right. Source of information : Patient, previous records and ED provider    History of present illness:   73 y.o. female with recent history of stent placement for ureteral stone with obstruction on right kidney present to the emergency room complaining of sudden onset of significant pain on the left side. It is increased and started onto the right side, radiating into the flank and groin. Denies anything in particular trigger, no exacerbating relieving factors. States previously she had significant pain but doing fairly well since discharge from the hospital.  Sharon Grajeda this started when she went for the groceries. Since then having trouble with increased air movement. Denies any nausea or vomiting. No fever or chills. Has frequency of urination but no dysuria or hematuria. Evaluation in the emergency room technically she fits the criteria of sepsis but no temperature and patient looks comfortable. Looks like systemic inflammatory response syndrome. Evaluation in the emergency room found with left distal ureteral stone with hydronephrosis, still has 4 mm distal right ureteral stone with a stent placed. No hydronephrosis noted. Urine analysis with signs of infection, recently treated for the Klebsiella positive cultures. Repeat cultures done was no growth. She was just given Levaquin few days ago that she finished yesterday of 1 week course. So did not start on any antibiotic.     Past Medical History:   Diagnosis Date    Arthritis 7/7/2020    Asthma 7/7/2020    Hypertension 7/7/2020    Kidney stones      Past Surgical History:   Procedure Laterality Date    HX UROLOGICAL      Lithotripsy     Family History   Family history unknown: Yes      Social History     Tobacco Use    Smoking status: Never Smoker    Smokeless tobacco: Never Used   Substance Use Topics    Alcohol use: Never       Prior to Admission medications    Medication Sig Start Date End Date Taking? Authorizing Provider   DULoxetine (CYMBALTA) 30 mg capsule Take 30 mg by mouth daily. 1/18/22   Provider, Historical   sertraline (ZOLOFT) 50 mg tablet Take 50 mg by mouth daily. 2/1/22   Provider, Historical   amLODIPine (NORVASC) 10 mg tablet Take 1 Tablet by mouth daily. 3/10/22   Jaun Lesch, MD   L.acid,para-B. bifidum-S.therm (RISAQUAD) 8 billion cell cap cap Take 1 Capsule by mouth daily. 3/10/22   Jaun Lesch, MD   polyethylene glycol (MIRALAX) 17 gram packet Take 1 Packet by mouth daily as needed for Constipation. 3/10/22   Jaun Lesch, MD   carvediloL (COREG) 6.25 mg tablet Take 1 Tablet by mouth two (2) times a day. 3/8/22   Jaun Lesch, MD   atorvastatin (LIPITOR) 10 mg tablet  8/27/20   Provider, Historical   gabapentin (NEURONTIN) 300 mg capsule TK 1 C PO HS 6/30/20   Provider, Historical   albuterol (PROVENTIL HFA, VENTOLIN HFA, PROAIR HFA) 90 mcg/actuation inhaler Take  by inhalation. Provider, Historical   metFORMIN 500 mg/5 mL soln Take  by mouth. Provider, Historical   oxybutynin (DITROPAN) 5 mg tablet Take 5 mg by mouth two (2) times a day. Provider, Historical   tamsulosin (FLOMAX) 0.4 mg capsule Take 0.4 mg by mouth daily. Provider, Historical     Allergies   Allergen Reactions    Latex Unknown (comments)    Aspirin Unknown (comments)    Losartan Unknown (comments)    Penicillins Unknown (comments)             Review of Systems:  Constitutional: Appetite is fair. Denies weight loss, no fever, no chills, no night sweats. Eye: No recent visual disturbances, no discharge, no double vision. Ear/nose/mouth/throat : No hearing disturbance, no ear pain, no nasal congestion, no sore throat, no trouble swallowing. Respiratory : No trouble breathing, no cough, no shortness of breath, no hemoptysis, no wheezing. Cardiovascular : No chest pain, no palpitation,  no orthopnea,  no peripheral edema.   Gastrointestinal : No nausea, no vomiting, ++ diarrhea,  No heartburn, No abdominal pain. Genitourinary : No dysuria, no hematuria, ++ increased frequency, No incontinence. Lymphatics : Unable to answer  Endocrine : No excessive thirst, No polyuria No cold intolerance, No heat intolerance. Immunologic : Denies seasonal allergies  Musculoskeletal : Does have some arthritis joint pains but no new complaints at this time. Does have chronic back pain. Integumentary : No rash, No pruritus, No ecchymosis. Hematology : No petechiae, No easy bruising,  No tendency to bleed easy. Neurology : Denies change in mental status, , No confusion, No numbness or tingling. Psychiatric : No mood swings, No anxiety, No depression. Vitals:     Patient Vitals for the past 12 hrs:   Temp Pulse Resp BP SpO2   04/03/22 0101 98.3 °F (36.8 °C) (!) 105 14 (!) 162/76 98 %   04/02/22 2331     96 %   04/02/22 2331  (!) 105 18 (!) 154/80 96 %   04/02/22 2309  (!) 104   100 %   04/02/22 1946    (!) 157/80    04/02/22 1946     100 %   04/02/22 1939 98.4 °F (36.9 °C) 100 18 (!) 202/92 100 %       Physical Exam:   General : Looks tired but comfortable, no respiratory distress noted. HEENT : PERRLA, normal oral mucosa, atraumatic normocephalic, Normal ear and nose. Neck : Supple, no JVD, no masses noted, no carotid bruit. Lungs : Breath sounds with good air entry bilaterally, no wheezes or rales, no accessory muscle use. CVS : Rhythm rate regular, S1+, S2+, no murmur or gallop. Abdomen : Soft, nontender, no organomegaly, bowel sounds active. Extremities : No edema noted,  pedal pulses palpable. Musculoskeletal : Fair range of motion, no joint swelling or effusion, muscle tone and power appears normal.   Skin : Moist, warm, no pathological rash. Lymphatic : No cervical lymphadenopathy. Neurological : Awake, alert, oriented to time place person. Psychiatric : Mood and affect appears appropriate to the situation.          Data Review: Recent Results (from the past 24 hour(s))   URINALYSIS W/ REFLEX CULTURE    Collection Time: 04/02/22  7:54 PM    Specimen: Urine   Result Value Ref Range    Color Yellow/Straw      Appearance Turbid (A) Clear      Specific gravity 1.008 1.003 - 1.030      pH (UA) 6.0 5.0 - 8.0      Protein 30 (A) Negative mg/dL    Glucose Negative Negative mg/dL    Ketone Negative Negative mg/dL    Bilirubin Negative Negative      Blood Moderate (A) Negative      Urobilinogen 0.1 0.1 - 1.0 EU/dL    Nitrites Negative Negative      Leukocyte Esterase Large (A) Negative      UA:UC IF INDICATED Urine Culture Ordered (A) Culture not indicated by UA result      WBC 20-50 0 - 4 /hpf    RBC 20-50 0 - 5 /hpf    Bacteria Negative Negative /hpf    Mucus Trace /lpf   CBC WITH AUTOMATED DIFF    Collection Time: 04/02/22  7:54 PM   Result Value Ref Range    WBC 13.1 (H) 3.6 - 11.0 K/uL    RBC 3.30 (L) 3.80 - 5.20 M/uL    HGB 9.7 (L) 11.5 - 16.0 g/dL    HCT 31.3 (L) 35.0 - 47.0 %    MCV 94.8 80.0 - 99.0 FL    MCH 29.4 26.0 - 34.0 PG    MCHC 31.0 30.0 - 36.5 g/dL    RDW 15.8 (H) 11.5 - 14.5 %    PLATELET 813 313 - 193 K/uL    MPV 10.7 8.9 - 12.9 FL    NRBC 0.0 0.0  WBC    ABSOLUTE NRBC 0.00 0.00 - 0.01 K/uL    NEUTROPHILS 86 (H) 32 - 75 %    LYMPHOCYTES 6 (L) 12 - 49 %    MONOCYTES 8 5 - 13 %    EOSINOPHILS 0 0 - 7 %    BASOPHILS 0 0 - 1 %    IMMATURE GRANULOCYTES 0 0 - 0.5 %    ABS. NEUTROPHILS 11.0 (H) 1.8 - 8.0 K/UL    ABS. LYMPHOCYTES 0.8 0.8 - 3.5 K/UL    ABS. MONOCYTES 1.1 (H) 0.0 - 1.0 K/UL    ABS. EOSINOPHILS 0.0 0.0 - 0.4 K/UL    ABS. BASOPHILS 0.0 0.0 - 0.1 K/UL    ABS. IMM.  GRANS. 0.1 (H) 0.00 - 0.04 K/UL    DF AUTOMATED     METABOLIC PANEL, COMPREHENSIVE    Collection Time: 04/02/22  7:54 PM   Result Value Ref Range    Sodium 142 136 - 145 mmol/L    Potassium 3.7 3.5 - 5.1 mmol/L    Chloride 110 (H) 97 - 108 mmol/L    CO2 27 21 - 32 mmol/L    Anion gap 5 5 - 15 mmol/L    Glucose 123 (H) 65 - 100 mg/dL    BUN 16 6 - 20 mg/dL Creatinine 0.95 0.55 - 1.02 mg/dL    BUN/Creatinine ratio 17 12 - 20      GFR est AA >60 >60 ml/min/1.73m2    GFR est non-AA 58 (L) >60 ml/min/1.73m2    Calcium 8.6 8.5 - 10.1 mg/dL    Bilirubin, total 0.4 0.2 - 1.0 mg/dL    AST (SGOT) 12 (L) 15 - 37 U/L    ALT (SGPT) 15 12 - 78 U/L    Alk. phosphatase 104 45 - 117 U/L    Protein, total 7.6 6.4 - 8.2 g/dL    Albumin 3.5 3.5 - 5.0 g/dL    Globulin 4.1 (H) 2.0 - 4.0 g/dL    A-G Ratio 0.9 (L) 1.1 - 2.2         Radiologic Studies :   CT Results  (Last 48 hours)               04/02/22 2216  CT ABD PELV W CONT Final result    Impression:  1. 4 mm distal left ureteral stone causing left-sided hydronephrosis. 2. 4 mm distal right ureteral stone although a right ureteral stent present. No   gross hydronephrosis on the right. 3. Bilateral nonobstructing renal calcifications. 4. Other findings as described. Narrative:  Axial images from the lung bases to the pubic symphysis were obtained following   intravenous administration of 100 mL Isovue-370. Sagittal and coronal   reformatted images were reviewed. All CT scans at this facility are performed   using dose reduction optimization techniques as appropriate to a performed exam   including the following:   automated exposure control, adjustments of the mA   and/or kV according to patient size, or use of iterative reconstruction   technique. Comparison March 4, 2022. INDICATION: Left flank pain history of renal calculi. Lung bases are clear. There is a 9.4 x 12.8 cm cyst in the anterior aspect of   the left hepatic lobe measuring 19 Hounsfield units. Spleen, pancreas and right   adrenal gland exhibit no focal abnormality. 2.3 x 1.7 cm left adrenal mass   appears unchanged. Calcification adjacent to the pancreatic head appears stable. Abdominal aorta is normal in caliber and contour. No gross retroperitoneal   adenopathy. Bilateral nonobstructing renal calcifications. Right double-J ureteral stent. There is a 4 mm stone in the distal left ureter causing mild left-sided   hydronephrosis. There is a 4 mm stone in the distal right ureter adjacent to the   stent. Urinary bladder is unremarkable. Uterus is stable. No gross free fluid or   adenopathy the in the pelvis. GI tract shows no evidence of obstruction or bowel   wall thickening. Normal appendix. Bone windows and reconstructed images show   degenerative changes of the spine. Degenerative changes of the hips, left worse   than right. Assessment and Plan :   Left lower quadrant abdominal pain: Secondary to obstructing ureteral stone with hydronephrosis, admitted for further management. SIRS : She does have mild leukocytosis, tachycardia but no temperature. Urine analysis with signs of infection, but just finished antibiotic and Klebsiella that grew initially was sensitive to antibiotic. Did not start on any antibiotics at this time as I am not suspecting any infection, but with a stent in the ureter she is a high risk. Benign essential hypertension: We will continue home medications    Diabetes mellitus type 2: Not on any medications at this time as per the patient. Bilateral ureteral stone, stent placed in the right ureter as mentioned    Admitted to medical floor, full CODE STATUS, home medications reviewed. CC : Bill Valerio MD  Signed By: Love Barlow MD     April 3, 2022      This dictation was done by dragon, computer voice recognition software. Often unanticipated grammatical, syntax, [place] phones and other interpretive errors are inadvertently transcribed. Please excuse errors that have escaped final proofreading.

## 2022-04-03 NOTE — ED NOTES
TRANSFER - OUT REPORT:    Verbal report given to [employer] (name) on Aleksey  being transferred to [address] [place] [address] Rd room 422 (unit) for routine progression of care       Report consisted of patients Situation, Background, Assessment and   Recommendations(SBAR). Information from the following report(s) SBAR, ED Summary, MAR, Recent Results, Med Rec Status and Cardiac Rhythm sinus tachycardia  was reviewed with the receiving nurse. Lines:   Peripheral IV 04/02/22 Anterior;Proximal;Right Forearm (Active)        Opportunity for questions and clarification was provided.       Patient transported with:   Monitor  Registered Nurse

## 2022-04-03 NOTE — PROGRESS NOTES
Hospitalist Progress Note    Subjective:   Daily Progress Note: 4/3/2022 10:29 AM    Right upper quadrant abdominal pain and left lower quadrant pelvis pain    Current Facility-Administered Medications   Medication Dose Route Frequency    sodium chloride (NS) flush 5-40 mL  5-40 mL IntraVENous Q8H    sodium chloride (NS) flush 5-40 mL  5-40 mL IntraVENous PRN    acetaminophen (TYLENOL) tablet 650 mg  650 mg Oral Q6H PRN    Or    acetaminophen (TYLENOL) suppository 650 mg  650 mg Rectal Q6H PRN    ondansetron (ZOFRAN ODT) tablet 4 mg  4 mg Oral Q6H PRN    Or    ondansetron (ZOFRAN) injection 4 mg  4 mg IntraVENous Q6H PRN    amLODIPine (NORVASC) tablet 5 mg  5 mg Oral DAILY    albuterol (PROVENTIL HFA, VENTOLIN HFA, PROAIR HFA) inhaler 2 Puff  2 Puff Inhalation Q4H PRN    tamsulosin (FLOMAX) capsule 0.4 mg  0.4 mg Oral DAILY    atorvastatin (LIPITOR) tablet 10 mg  10 mg Oral DAILY    gabapentin (NEURONTIN) capsule 300 mg  300 mg Oral BID    DULoxetine (CYMBALTA) capsule 30 mg  30 mg Oral DAILY    sertraline (ZOLOFT) tablet 50 mg  50 mg Oral DAILY    allopurinoL (ZYLOPRIM) tablet 100 mg  100 mg Oral DAILY    carvediloL (COREG) tablet 25 mg  25 mg Oral BID WITH MEALS        Review of Systems  Review of Systems   Constitutional: Positive for malaise/fatigue. HENT: Negative. Eyes: Negative. Respiratory: Negative for shortness of breath. Cardiovascular: Negative for chest pain and leg swelling. Gastrointestinal: Positive for abdominal pain and diarrhea. Negative for nausea and vomiting. Genitourinary: Positive for frequency. Musculoskeletal: Negative. Skin: Negative. Neurological: Negative. Psychiatric/Behavioral: Negative.              Objective:     Visit Vitals  BP (!) 143/81 (BP 1 Location: Left upper arm, BP Patient Position: At rest;Semi fowlers)   Pulse 100   Temp 97.5 °F (36.4 °C)   Resp 18   Ht 5' 9\" (1.753 m)   Wt 132.5 kg (292 lb)   SpO2 95%   BMI 43.12 kg/m²      O2 Device: None (Room air)    Temp (24hrs), Av.1 °F (36.7 °C), Min:97.5 °F (36.4 °C), Max:98.4 °F (36.9 °C)      701 - 1900  In: -   Out: 1100 [Urine:1100]  1901 -  07  In: 500 [I.V.:500]  Out: -     Recent Results (from the past 24 hour(s))   URINALYSIS W/ REFLEX CULTURE    Collection Time: 22  7:54 PM    Specimen: Urine   Result Value Ref Range    Color Yellow/Straw      Appearance Turbid (A) Clear      Specific gravity 1.008 1.003 - 1.030      pH (UA) 6.0 5.0 - 8.0      Protein 30 (A) Negative mg/dL    Glucose Negative Negative mg/dL    Ketone Negative Negative mg/dL    Bilirubin Negative Negative      Blood Moderate (A) Negative      Urobilinogen 0.1 0.1 - 1.0 EU/dL    Nitrites Negative Negative      Leukocyte Esterase Large (A) Negative      UA:UC IF INDICATED Urine Culture Ordered (A) Culture not indicated by UA result      WBC 20-50 0 - 4 /hpf    RBC 20-50 0 - 5 /hpf    Bacteria Negative Negative /hpf    Mucus Trace /lpf   CBC WITH AUTOMATED DIFF    Collection Time: 22  7:54 PM   Result Value Ref Range    WBC 13.1 (H) 3.6 - 11.0 K/uL    RBC 3.30 (L) 3.80 - 5.20 M/uL    HGB 9.7 (L) 11.5 - 16.0 g/dL    HCT 31.3 (L) 35.0 - 47.0 %    MCV 94.8 80.0 - 99.0 FL    MCH 29.4 26.0 - 34.0 PG    MCHC 31.0 30.0 - 36.5 g/dL    RDW 15.8 (H) 11.5 - 14.5 %    PLATELET 154 378 - 941 K/uL    MPV 10.7 8.9 - 12.9 FL    NRBC 0.0 0.0  WBC    ABSOLUTE NRBC 0.00 0.00 - 0.01 K/uL    NEUTROPHILS 86 (H) 32 - 75 %    LYMPHOCYTES 6 (L) 12 - 49 %    MONOCYTES 8 5 - 13 %    EOSINOPHILS 0 0 - 7 %    BASOPHILS 0 0 - 1 %    IMMATURE GRANULOCYTES 0 0 - 0.5 %    ABS. NEUTROPHILS 11.0 (H) 1.8 - 8.0 K/UL    ABS. LYMPHOCYTES 0.8 0.8 - 3.5 K/UL    ABS. MONOCYTES 1.1 (H) 0.0 - 1.0 K/UL    ABS. EOSINOPHILS 0.0 0.0 - 0.4 K/UL    ABS. BASOPHILS 0.0 0.0 - 0.1 K/UL    ABS. IMM.  GRANS. 0.1 (H) 0.00 - 0.04 K/UL    DF AUTOMATED     METABOLIC PANEL, COMPREHENSIVE    Collection Time: 22  7:54 PM   Result Value Ref Range    Sodium 142 136 - 145 mmol/L    Potassium 3.7 3.5 - 5.1 mmol/L    Chloride 110 (H) 97 - 108 mmol/L    CO2 27 21 - 32 mmol/L    Anion gap 5 5 - 15 mmol/L    Glucose 123 (H) 65 - 100 mg/dL    BUN 16 6 - 20 mg/dL    Creatinine 0.95 0.55 - 1.02 mg/dL    BUN/Creatinine ratio 17 12 - 20      GFR est AA >60 >60 ml/min/1.73m2    GFR est non-AA 58 (L) >60 ml/min/1.73m2    Calcium 8.6 8.5 - 10.1 mg/dL    Bilirubin, total 0.4 0.2 - 1.0 mg/dL    AST (SGOT) 12 (L) 15 - 37 U/L    ALT (SGPT) 15 12 - 78 U/L    Alk. phosphatase 104 45 - 117 U/L    Protein, total 7.6 6.4 - 8.2 g/dL    Albumin 3.5 3.5 - 5.0 g/dL    Globulin 4.1 (H) 2.0 - 4.0 g/dL    A-G Ratio 0.9 (L) 1.1 - 2.2     LACTIC ACID    Collection Time: 04/03/22 12:55 AM   Result Value Ref Range    Lactic acid 0.6 0.4 - 2.0 mmol/L        CT ABD PELV W CONT   Final Result   1. 4 mm distal left ureteral stone causing left-sided hydronephrosis. 2. 4 mm distal right ureteral stone although a right ureteral stent present. No   gross hydronephrosis on the right. 3. Bilateral nonobstructing renal calcifications. 4. Other findings as described. US RETROPERITONEUM COMP   Final Result         1. No renal obstructive change. 2. Bilateral renal calculi. 3. Right side ureteral stent. 4. Incidental hepatic cyst.           PHYSICAL EXAM:    Physical Exam  Vitals reviewed. Constitutional:       Appearance: She is not ill-appearing. HENT:      Head: Normocephalic and atraumatic. Mouth/Throat:      Mouth: Mucous membranes are moist.      Pharynx: Oropharynx is clear. Eyes:      Conjunctiva/sclera: Conjunctivae normal.   Cardiovascular:      Rate and Rhythm: Normal rate and regular rhythm. Heart sounds: Normal heart sounds. Pulmonary:      Effort: Pulmonary effort is normal.      Breath sounds: Normal breath sounds. Abdominal:      General: Abdomen is flat. Bowel sounds are normal.      Palpations: Abdomen is soft.       Comments: Tenderness in left lower quadrant and right upper quadrant, 1+   Musculoskeletal:         General: Normal range of motion. Cervical back: Normal range of motion and neck supple. Skin:     General: Skin is warm and dry. Neurological:      General: No focal deficit present. Mental Status: She is alert and oriented to person, place, and time. Mental status is at baseline. Psychiatric:         Mood and Affect: Mood normal.          Data Review    Recent Results (from the past 24 hour(s))   URINALYSIS W/ REFLEX CULTURE    Collection Time: 04/02/22  7:54 PM    Specimen: Urine   Result Value Ref Range    Color Yellow/Straw      Appearance Turbid (A) Clear      Specific gravity 1.008 1.003 - 1.030      pH (UA) 6.0 5.0 - 8.0      Protein 30 (A) Negative mg/dL    Glucose Negative Negative mg/dL    Ketone Negative Negative mg/dL    Bilirubin Negative Negative      Blood Moderate (A) Negative      Urobilinogen 0.1 0.1 - 1.0 EU/dL    Nitrites Negative Negative      Leukocyte Esterase Large (A) Negative      UA:UC IF INDICATED Urine Culture Ordered (A) Culture not indicated by UA result      WBC 20-50 0 - 4 /hpf    RBC 20-50 0 - 5 /hpf    Bacteria Negative Negative /hpf    Mucus Trace /lpf   CBC WITH AUTOMATED DIFF    Collection Time: 04/02/22  7:54 PM   Result Value Ref Range    WBC 13.1 (H) 3.6 - 11.0 K/uL    RBC 3.30 (L) 3.80 - 5.20 M/uL    HGB 9.7 (L) 11.5 - 16.0 g/dL    HCT 31.3 (L) 35.0 - 47.0 %    MCV 94.8 80.0 - 99.0 FL    MCH 29.4 26.0 - 34.0 PG    MCHC 31.0 30.0 - 36.5 g/dL    RDW 15.8 (H) 11.5 - 14.5 %    PLATELET 007 572 - 361 K/uL    MPV 10.7 8.9 - 12.9 FL    NRBC 0.0 0.0  WBC    ABSOLUTE NRBC 0.00 0.00 - 0.01 K/uL    NEUTROPHILS 86 (H) 32 - 75 %    LYMPHOCYTES 6 (L) 12 - 49 %    MONOCYTES 8 5 - 13 %    EOSINOPHILS 0 0 - 7 %    BASOPHILS 0 0 - 1 %    IMMATURE GRANULOCYTES 0 0 - 0.5 %    ABS. NEUTROPHILS 11.0 (H) 1.8 - 8.0 K/UL    ABS. LYMPHOCYTES 0.8 0.8 - 3.5 K/UL    ABS.  MONOCYTES 1.1 (H) 0.0 - 1.0 K/UL ABS. EOSINOPHILS 0.0 0.0 - 0.4 K/UL    ABS. BASOPHILS 0.0 0.0 - 0.1 K/UL    ABS. IMM. GRANS. 0.1 (H) 0.00 - 0.04 K/UL    DF AUTOMATED     METABOLIC PANEL, COMPREHENSIVE    Collection Time: 04/02/22  7:54 PM   Result Value Ref Range    Sodium 142 136 - 145 mmol/L    Potassium 3.7 3.5 - 5.1 mmol/L    Chloride 110 (H) 97 - 108 mmol/L    CO2 27 21 - 32 mmol/L    Anion gap 5 5 - 15 mmol/L    Glucose 123 (H) 65 - 100 mg/dL    BUN 16 6 - 20 mg/dL    Creatinine 0.95 0.55 - 1.02 mg/dL    BUN/Creatinine ratio 17 12 - 20      GFR est AA >60 >60 ml/min/1.73m2    GFR est non-AA 58 (L) >60 ml/min/1.73m2    Calcium 8.6 8.5 - 10.1 mg/dL    Bilirubin, total 0.4 0.2 - 1.0 mg/dL    AST (SGOT) 12 (L) 15 - 37 U/L    ALT (SGPT) 15 12 - 78 U/L    Alk. phosphatase 104 45 - 117 U/L    Protein, total 7.6 6.4 - 8.2 g/dL    Albumin 3.5 3.5 - 5.0 g/dL    Globulin 4.1 (H) 2.0 - 4.0 g/dL    A-G Ratio 0.9 (L) 1.1 - 2.2     LACTIC ACID    Collection Time: 04/03/22 12:55 AM   Result Value Ref Range    Lactic acid 0.6 0.4 - 2.0 mmol/L        Assessment/Plan:     Active Problems:    Sepsis ([address] (2/26/2022)      Ureteral stone with hydronephrosis (4/3/2022)      Hospital course: This is a 72-year-old female admitted on 4/2/2022 with a right ureteral stone stone and stent placement performed at [facility] secondary to right kidney obstruction, essential hypertension, asthma who presents emergency department with left flank pain, left lower quadrant abdominal pain, right upper quadrant abdominal pain and diarrhea with frequent urination. Previous urinary tract infection with Klebsiella and treated with Levaquin. Unfortunately appears that the urinary tract infection has not resolved and urine culture pending. Treating ongoing diarrhea with probiotics. Patient will require transfer to [facility] due to left hydronephrosis with a 4 mm distal left ureteral stone. There is currently no urology coverage till April 7, 2022.   Continue Merrem due to severe penicillin allergy. .  In regards to the large hepatic cyst and right upper quadrant abdominal pain patient can be evaluated at [place] hepatic surgeon versus general surgery. Impression\plan:    1. Left lower quadrant abdominal pain with left ureteral stone distally with hydronephrosis  No urology coverage and will need transfer to [facility] from previous interventions at the facility  Merrem due to severe penicillin allergy  Urine culture pending  Continue Flomax    2. Urinary tract infection  Urine culture pending  Merrem due to severe penicillin allergy  Follow-up urine culture    3. Essential hypertension  Continue Coreg and amlodipine    4. Depression  Continue Cymbalta and Zoloft    5. Hypercholesterolemia  Continue Lipitor    6. Diarrhea  Consider enteric cultures  White count slightly elevated may be related to urinary tract infection  Probiotic  Fiber    CODE STATUS: Full code    DVT prophylaxis: SCDs  Ulcer prophylaxis: Not indicated    Discharge barriers will need transfer for higher level care and urology coverage. Discussed with transfer center      Care Plan discussed with: Patient/Family    Total time spent with patient: >35 minutes.

## 2022-04-03 NOTE — ED PROVIDER NOTES
EMERGENCY DEPARTMENT HISTORY AND PHYSICAL EXAM      Date: 4/2/2022  Patient Name: Fab De La Garza    History of Presenting Illness     Chief Complaint   Patient presents with    Flank Pain       History Provided By: Patient    HPI: Fab De La Garza, 73 y.o. female with a past medical history significant diabetes, hypertension, hyperlipidemia, obesity and renal calculi with right ureteral stent recently placed and septic stone 2/26/22 presents to the ED with cc of left flank pain onset suddenly earlier today. Pain is consistent with her previous renal calculi. Describes as sharp nonradiating and accompanied by nausea without vomiting. Denies fever, chills, body aches. Did have a fall 1 week ago resulting in bruising to her right flank and to the left side of her spine without any other obvious signs of injury, visible hematuria or any neurological symptoms over the last 7 days since her fall. She slipped out of the chair and did not have any other mechanism causing the fall. Recently finished Levaquin a few days ago. There are no other complaints, changes, or physical findings at this time. PCP: Sandip Albarran MD    No current facility-administered medications on file prior to encounter. Current Outpatient Medications on File Prior to Encounter   Medication Sig Dispense Refill    DULoxetine (CYMBALTA) 30 mg capsule Take 30 mg by mouth daily.  sertraline (ZOLOFT) 50 mg tablet Take 50 mg by mouth daily.  amLODIPine (NORVASC) 10 mg tablet Take 1 Tablet by mouth daily. 30 Tablet 0    L.acid,para-B. bifidum-S.therm (RISAQUAD) 8 billion cell cap cap Take 1 Capsule by mouth daily. 30 Capsule 0    polyethylene glycol (MIRALAX) 17 gram packet Take 1 Packet by mouth daily as needed for Constipation. 30 Packet 0    [DISCONTINUED] acetaminophen (TYLENOL) 325 mg tablet Take 2 Tablets by mouth every six (6) hours as needed for Pain.  30 Tablet 0    carvediloL (COREG) 6.25 mg tablet Take 1 Tablet by mouth two (2) times a day. 60 Tablet 0    atorvastatin (LIPITOR) 10 mg tablet       gabapentin (NEURONTIN) 300 mg capsule TK 1 C PO HS      albuterol (PROVENTIL HFA, VENTOLIN HFA, PROAIR HFA) 90 mcg/actuation inhaler Take  by inhalation.  metFORMIN 500 mg/5 mL soln Take  by mouth.  oxybutynin (DITROPAN) 5 mg tablet Take 5 mg by mouth two (2) times a day.  tamsulosin (FLOMAX) 0.4 mg capsule Take 0.4 mg by mouth daily. Past History     Past Medical History:  Past Medical History:   Diagnosis Date    Arthritis 7/7/2020    Asthma 7/7/2020    Hypertension 7/7/2020    Kidney stones        Past Surgical History:  Past Surgical History:   Procedure Laterality Date    HX UROLOGICAL      Lithotripsy       Family History:  Family History   Family history unknown: Yes       Social History:  Social History     Tobacco Use    Smoking status: Never Smoker    Smokeless tobacco: Never Used   Substance Use Topics    Alcohol use: Never    Drug use: Never       Allergies: Allergies   Allergen Reactions    Latex Unknown (comments)    Aspirin Unknown (comments)    Losartan Unknown (comments)    Penicillins Unknown (comments)         Review of Systems     Review of Systems   Constitutional: Negative. Negative for appetite change, chills, fatigue and fever. HENT: Negative. Eyes: Negative. Negative for visual disturbance. Respiratory: Negative. Negative for cough and shortness of breath. Cardiovascular: Negative. Negative for chest pain, palpitations and leg swelling. Gastrointestinal: Positive for abdominal pain, diarrhea and nausea. Negative for abdominal distention, blood in stool, constipation and vomiting. Endocrine: Negative. Genitourinary: Positive for flank pain. Negative for decreased urine volume, dysuria, frequency, hematuria, pelvic pain, urgency, vaginal bleeding and vaginal discharge. Musculoskeletal: Positive for arthralgias and gait problem.  Negative for back pain, joint swelling and myalgias. Skin: Negative. Allergic/Immunologic: Negative. Neurological: Negative for dizziness, syncope, weakness, light-headedness, numbness and headaches. Hematological: Negative. Psychiatric/Behavioral: Negative. All other systems reviewed and are negative. Physical Exam     Physical Exam  Vitals and nursing note reviewed. Constitutional:       General: She is not in acute distress. Appearance: Normal appearance. She is obese. She is not ill-appearing, toxic-appearing or diaphoretic. HENT:      Head: Normocephalic and atraumatic. Mouth/Throat:      Mouth: Mucous membranes are moist.   Eyes:      General: No scleral icterus. Conjunctiva/sclera: Conjunctivae normal.   Cardiovascular:      Rate and Rhythm: Regular rhythm. Tachycardia present. Pulses: Normal pulses. Heart sounds: Normal heart sounds. Pulmonary:      Effort: Pulmonary effort is normal. No respiratory distress. Breath sounds: Normal breath sounds. Abdominal:      General: Bowel sounds are normal.      Palpations: Abdomen is soft. Tenderness: There is no abdominal tenderness. There is no right CVA tenderness or left CVA tenderness. Musculoskeletal:         General: Normal range of motion. Cervical back: Normal, normal range of motion and neck supple. Thoracic back: Normal.      Lumbar back: No swelling, edema, deformity, spasms, tenderness or bony tenderness. Normal range of motion. Back:       Right lower leg: No edema. Left lower leg: No edema. Skin:     General: Skin is warm and dry. Capillary Refill: Capillary refill takes less than 2 seconds. Findings: Bruising present. Neurological:      General: No focal deficit present. Mental Status: She is alert and oriented to person, place, and time. Mental status is at baseline.          Lab and Diagnostic Study Results     Labs -     Recent Results (from the past 12 hour(s)) URINALYSIS W/ REFLEX CULTURE    Collection Time: 04/02/22  7:54 PM    Specimen: Urine   Result Value Ref Range    Color Yellow/Straw      Appearance Turbid (A) Clear      Specific gravity 1.008 1.003 - 1.030      pH (UA) 6.0 5.0 - 8.0      Protein 30 (A) Negative mg/dL    Glucose Negative Negative mg/dL    Ketone Negative Negative mg/dL    Bilirubin Negative Negative      Blood Moderate (A) Negative      Urobilinogen 0.1 0.1 - 1.0 EU/dL    Nitrites Negative Negative      Leukocyte Esterase Large (A) Negative      UA:UC IF INDICATED Urine Culture Ordered (A) Culture not indicated by UA result      WBC 20-50 0 - 4 /hpf    RBC 20-50 0 - 5 /hpf    Bacteria Negative Negative /hpf    Mucus Trace /lpf   CBC WITH AUTOMATED DIFF    Collection Time: 04/02/22  7:54 PM   Result Value Ref Range    WBC 13.1 (H) 3.6 - 11.0 K/uL    RBC 3.30 (L) 3.80 - 5.20 M/uL    HGB 9.7 (L) 11.5 - 16.0 g/dL    HCT 31.3 (L) 35.0 - 47.0 %    MCV 94.8 80.0 - 99.0 FL    MCH 29.4 26.0 - 34.0 PG    MCHC 31.0 30.0 - 36.5 g/dL    RDW 15.8 (H) 11.5 - 14.5 %    PLATELET 256 271 - 716 K/uL    MPV 10.7 8.9 - 12.9 FL    NRBC 0.0 0.0  WBC    ABSOLUTE NRBC 0.00 0.00 - 0.01 K/uL    NEUTROPHILS 86 (H) 32 - 75 %    LYMPHOCYTES 6 (L) 12 - 49 %    MONOCYTES 8 5 - 13 %    EOSINOPHILS 0 0 - 7 %    BASOPHILS 0 0 - 1 %    IMMATURE GRANULOCYTES 0 0 - 0.5 %    ABS. NEUTROPHILS 11.0 (H) 1.8 - 8.0 K/UL    ABS. LYMPHOCYTES 0.8 0.8 - 3.5 K/UL    ABS. MONOCYTES 1.1 (H) 0.0 - 1.0 K/UL    ABS. EOSINOPHILS 0.0 0.0 - 0.4 K/UL    ABS. BASOPHILS 0.0 0.0 - 0.1 K/UL    ABS. IMM.  GRANS. 0.1 (H) 0.00 - 0.04 K/UL    DF AUTOMATED     METABOLIC PANEL, COMPREHENSIVE    Collection Time: 04/02/22  7:54 PM   Result Value Ref Range    Sodium 142 136 - 145 mmol/L    Potassium 3.7 3.5 - 5.1 mmol/L    Chloride 110 (H) 97 - 108 mmol/L    CO2 27 21 - 32 mmol/L    Anion gap 5 5 - 15 mmol/L    Glucose 123 (H) 65 - 100 mg/dL    BUN 16 6 - 20 mg/dL    Creatinine 0.95 0.55 - 1.02 mg/dL BUN/Creatinine ratio 17 12 - 20      GFR est AA >60 >60 ml/min/1.73m2    GFR est non-AA 58 (L) >60 ml/min/1.73m2    Calcium 8.6 8.5 - 10.1 mg/dL    Bilirubin, total 0.4 0.2 - 1.0 mg/dL    AST (SGOT) 12 (L) 15 - 37 U/L    ALT (SGPT) 15 12 - 78 U/L    Alk. phosphatase 104 45 - 117 U/L    Protein, total 7.6 6.4 - 8.2 g/dL    Albumin 3.5 3.5 - 5.0 g/dL    Globulin 4.1 (H) 2.0 - 4.0 g/dL    A-G Ratio 0.9 (L) 1.1 - 2.2         Radiologic Studies -   @lastxrresult@  CT Results  (Last 48 hours)               04/02/22 2216  CT ABD PELV W CONT Final result    Impression:  1. 4 mm distal left ureteral stone causing left-sided hydronephrosis. 2. 4 mm distal right ureteral stone although a right ureteral stent present. No   gross hydronephrosis on the right. 3. Bilateral nonobstructing renal calcifications. 4. Other findings as described. Narrative:  Axial images from the lung bases to the pubic symphysis were obtained following   intravenous administration of 100 mL Isovue-370. Sagittal and coronal   reformatted images were reviewed. All CT scans at this facility are performed   using dose reduction optimization techniques as appropriate to a performed exam   including the following:   automated exposure control, adjustments of the mA   and/or kV according to patient size, or use of iterative reconstruction   technique. Comparison March 4, 2022. INDICATION: Left flank pain history of renal calculi. Lung bases are clear. There is a 9.4 x 12.8 cm cyst in the anterior aspect of   the left hepatic lobe measuring 19 Hounsfield units. Spleen, pancreas and right   adrenal gland exhibit no focal abnormality. 2.3 x 1.7 cm left adrenal mass   appears unchanged. Calcification adjacent to the pancreatic head appears stable. Abdominal aorta is normal in caliber and contour. No gross retroperitoneal   adenopathy. Bilateral nonobstructing renal calcifications. Right double-J ureteral stent.    There is a 4 mm stone in the distal left ureter causing mild left-sided   hydronephrosis. There is a 4 mm stone in the distal right ureter adjacent to the   stent. Urinary bladder is unremarkable. Uterus is stable. No gross free fluid or   adenopathy the in the pelvis. GI tract shows no evidence of obstruction or bowel   wall thickening. Normal appendix. Bone windows and reconstructed images show   degenerative changes of the spine. Degenerative changes of the hips, left worse   than right. CXR Results  (Last 48 hours)    None            Medical Decision Making   - I am the first provider for this patient. - I reviewed the vital signs, available nursing notes, past medical history, past surgical history, family history and social history. - Initial assessment performed. The patients presenting problems have been discussed, and they are in agreement with the care plan formulated and outlined with them. I have encouraged them to ask questions as they arise throughout their visit. Vital Signs-Reviewed the patient's vital signs.   Patient Vitals for the past 12 hrs:   Temp Pulse Resp BP SpO2   04/03/22 0101 98.3 °F (36.8 °C) (!) 105 14 (!) 162/76 98 %   04/02/22 2331     96 %   04/02/22 2331  (!) 105 18 (!) 154/80 96 %   04/02/22 2309  (!) 104   100 %   04/02/22 1946    (!) 157/80    04/02/22 1946     100 %   04/02/22 1939 98.4 °F (36.9 °C) 100 18 (!) 202/92 100 %       Records Reviewed: Nursing Notes, Old Medical Records, Previous electrocardiograms, Previous Radiology Studies and Previous Laboratory Studies    The patient presents with back pain with a differential diagnosis of  kidney stone, lumbar strain, pyelonephritis, traumatic injury and sepsis, ELLYN, retroperitoneal bleeding, stent malfunction, obstructing renal calculi, hydronephrosis      ED Course:     ED Course as of 04/03/22 0128   Sat Apr 02, 2022   2206 Retroperitoneal ultrasound without evidence of kidney injury, abnormal fluid or suspicion of bleeding post fall 1 week ago. No acute findings. Awaiting CT. Leukocytosis 13.1 hemoglobin at baseline. Urinalysis is pending. No ELLYN on chemistry. Patient is afebrile without tachycardia or evidence of sepsis at this time. She is resting comfortably after medications. [KR]   Urinalysis with evidence of persistent UTI and renal calculi bilaterally. No evidence of bleeding or abnormality from fall on US or CT. Referred for admission to hospitalist services secondary to failed outpatient antibiotic therapy for UTI in presence of newly placed stent and history of septic stone and sepsis and she was accepted for admission. ED Course User Index  [KR] Lona Sharpe NP       Provider Notes (Medical Decision Making):     MDM  Number of Diagnoses or Management Options  Diarrhea, unspecified type: new, needed workup  Hematoma and contusion: new, needed workup  Left flank pain: new, needed workup  Leukocytosis, unspecified type: new, needed workup  Sepsis without acute organ dysfunction, due to unspecified organism [facility]): new, needed workup     Amount and/or Complexity of Data Reviewed  Clinical lab tests: ordered  Tests in the radiology section of CPT®: ordered and reviewed  Review and summarize past medical records: yes  Discuss the patient with other providers: yes  Independent visualization of images, tracings, or specimens: yes    Risk of Complications, Morbidity, and/or Mortality  Presenting problems: moderate    Patient Progress  Patient progress: stable           Disposition   Disposition: Condition stable  Admitted to Floor Medical Floor the case was discussed with the admitting physician Dr. Broderick Wilcox and Hospitalist services. Admitted    Diagnosis     Clinical Impression:   1. Left flank pain    2. Hematoma and contusion    3. Diarrhea, unspecified type    4.  Sepsis without acute organ dysfunction, due to unspecified organism ([facility] [address]    5. Leukocytosis, unspecified type Attestations:    Rolland Gosselin, NP    Please note that this dictation was completed with [employer], the computer voice recognition software. Quite often unanticipated grammatical, syntax, homophones, and other interpretive errors are inadvertently transcribed by the computer software. Please disregard these errors. Please excuse any errors that have escaped final proofreading. Thank you.

## 2022-04-03 NOTE — PROGRESS NOTES
12:58 Discharge plan of care/case management plan validated with provider discharge order. TRANSFER - OUT REPORT:    Verbal report given to Solange Ruiz RN(name) on Aleksey  being transferred to [facility]) for routine progression of care       Report consisted of patients Situation, Background, Assessment and   Recommendations(SBAR). Information from the following report(s) SBAR, MAR and Recent Results was reviewed with the receiving nurse. Lines:   Peripheral IV 04/02/22 Anterior;Proximal;Right Forearm (Active)   Site Assessment Intact 04/03/22 1156   Phlebitis Assessment 0 04/03/22 1156   Infiltration Assessment 0 04/03/22 1156   Dressing Status Clean, dry, & intact 04/03/22 1156   Dressing Type Transparent 04/03/22 1156   Hub Color/Line Status Pink 04/03/22 1156   Action Taken Open ports on tubing capped 04/03/22 1156   Alcohol Cap Used Yes 04/03/22 1156       Peripheral IV 04/03/22 Left;Posterior Forearm (Active)        Opportunity for questions and clarification was provided.       Patient transported with:   Monitor

## 2022-04-03 NOTE — ED NOTES
Pt resting and stable currently denies any pain. Pt has been voiding frequently using a pure wick. Pt has produced approximately 900 mL of cloudy yellow urine. Vitals stable with mildly elevated  sinus tachy. Pt has had a rate in the low 100's throughout stay with no fever. Add on labs including two sets of blood cultures and a lactic acid were drawn and sent per order. IV fluid bolus completed. Pt pending admission. Updated on plan of care. Admitting MD DR. Son Gamez at bedside for exam and consult at this time.

## 2022-04-03 NOTE — DISCHARGE INSTRUCTIONS
Patient Education        Sepsis: Care Instructions  Overview     Sepsis is an intense reaction to an infection. It can cause damage to the body and lead to dangerously low blood pressure. You may have inflammation across large areas of your body. It can damage tissue and even go deep into your organs. Infections that can lead to sepsis include:  · A skin infection such as from a cut. · A lung infection like pneumonia. · A kidney infection. · A gut infection such as E. coli. Sepsis is treated with antibiotics. Your doctor will try to find the infection that led to sepsis. Jos Bhatti also get fluids through a vein (IV). Machines will track your vital signs, including temperature, blood pressure, breathing rate, and pulse rate. The physical and mental effects of sepsis may not be seen for several weeks after treatment. And they may last long after the infection is gone. Physical problems may include:  · Feeling weak and tired. · Feeling out of breath. · Aches and pains. · Problems with getting around. · Trouble falling asleep or staying asleep. · Dry and itchy skin, brittle nails, and hair loss. Some of these effects can lead to problems with your organs or your feet, legs, hands, or arms. Sepsis can also affect your mind and emotions. Problems may include:  · Self-doubt. · Anxiety. · Nightmares. · Depression and mood problems. · Wanting to avoid other people. · Confusion. · Flashbacks and bad memories of your illness. It's important to care for yourself and try to avoid infections. This may lower your risk of getting sepsis again. Follow-up care is a key part of your treatment and safety. Be sure to make and go to all appointments, and call your doctor if you are having problems. It's also a good idea to know your test results and keep a list of the medicines you take. How can you care for yourself at home? · Be safe with medicines. Take your medicines exactly as prescribed.  Call your doctor if you think you are having a problem with your medicine. · If your doctor prescribed antibiotics, take them as directed. Do not stop taking them just because you feel better. You need to take the full course of antibiotics. · Help prevent infections that could again lead to sepsis. ? Try to avoid colds and flu. If you must be around people who have a cold or the flu, wash your hands often. And get a flu vaccine every year. ? Ask your doctor if you need a pneumococcal vaccine (to prevent pneumonia, meningitis, and other infections). If you have had one before, ask your doctor if you need another dose. ? Clean any wounds or scrapes. · Do not smoke or use other tobacco products. When you quit smoking, you are less likely to get a cold, the flu, bronchitis, and pneumonia. If you need help quitting, talk to your doctor about stop-smoking programs and medicines. These can increase your chances of quitting for good. · Drink plenty of fluids to prevent dehydration. Choose water and other clear liquids until you feel better. If you have kidney, heart, or liver disease and have to limit fluids, talk with your doctor before you increase the amount of fluids you drink. · Eat a healthy diet. Include fruits, vegetables, and whole grains in your diet every day. · If your doctor recommends it, try doing some physical activity. Walking is a good choice. Bit by bit, increase the amount you walk every day. · Talk with your family and friends about your challenges. Ask for help if you need it. · Keep a journal. Writing down your thoughts and feelings can help reduce your stress. · Ask family members to fill in gaps in your memory. · Set small goals for yourself that you can reach. Reward yourself for success. When should you call for help? Call 911  anytime you think you may need emergency care. For example, call if:    · You passed out (lost consciousness).    Call your doctor now or seek immediate medical care if:    · You have symptoms such as:  ? Shortness of breath. ? Feeling very sick. ? Severe pain. ? A fast heart rate. ? Cool, pale, or clammy skin. ? Feeling confused. ? Feeling very sleepy, or you are hard to wake up.     · You are dizzy or lightheaded, or you feel like you may faint.     · You have a fever or chills. Watch closely for changes in your health, and be sure to contact your doctor if:    · You do not get better as expected. Where can you learn more? Go to http://www.gray.com/  Enter T383 in the search box to learn more about \"Sepsis: Care Instructions. \"  Current as of: July 1, 2021               Content Version: 13.2  © 2160-0156 [employer]. Care instructions adapted under license by [employer] (which disclaims liability or warranty for this information). If you have questions about a medical condition or this instruction, always ask your healthcare professional. [place] [address] any warranty or liability for your use of this information.

## 2022-04-04 LAB
ALBUMIN SERPL-MCNC: 2.8 G/DL (ref 3.5–5)
ANION GAP SERPL CALC-SCNC: 4 MMOL/L (ref 5–15)
BACTERIA SPEC CULT: NORMAL
BASOPHILS # BLD: 0 K/UL (ref 0–0.1)
BASOPHILS NFR BLD: 0 % (ref 0–1)
BUN SERPL-MCNC: 9 MG/DL (ref 6–20)
BUN/CREAT SERPL: 12 (ref 12–20)
CALCIUM SERPL-MCNC: 8.6 MG/DL (ref 8.5–10.1)
CHLORIDE SERPL-SCNC: 107 MMOL/L (ref 97–108)
CO2 SERPL-SCNC: 28 MMOL/L (ref 21–32)
CREAT SERPL-MCNC: 0.76 MG/DL (ref 0.55–1.02)
DIFFERENTIAL METHOD BLD: ABNORMAL
EOSINOPHIL # BLD: 0 K/UL (ref 0–0.4)
EOSINOPHIL NFR BLD: 0 % (ref 0–7)
ERYTHROCYTE [DISTWIDTH] IN BLOOD BY AUTOMATED COUNT: 15.5 % (ref 11.5–14.5)
GLUCOSE SERPL-MCNC: 146 MG/DL (ref 65–100)
HCT VFR BLD AUTO: 29.5 % (ref 35–47)
HGB BLD-MCNC: 9.1 G/DL (ref 11.5–16)
IMM GRANULOCYTES # BLD AUTO: 0.1 K/UL (ref 0–0.04)
IMM GRANULOCYTES NFR BLD AUTO: 1 % (ref 0–0.5)
LYMPHOCYTES # BLD: 0.8 K/UL (ref 0.8–3.5)
LYMPHOCYTES NFR BLD: 9 % (ref 12–49)
MCH RBC QN AUTO: 29.2 PG (ref 26–34)
MCHC RBC AUTO-ENTMCNC: 30.8 G/DL (ref 30–36.5)
MCV RBC AUTO: 94.6 FL (ref 80–99)
MONOCYTES # BLD: 0.1 K/UL (ref 0–1)
MONOCYTES NFR BLD: 1 % (ref 5–13)
NEUTS SEG # BLD: 8.6 K/UL (ref 1.8–8)
NEUTS SEG NFR BLD: 89 % (ref 32–75)
NRBC # BLD: 0 K/UL (ref 0–0.01)
NRBC BLD-RTO: 0 PER 100 WBC
PHOSPHATE SERPL-MCNC: 2.7 MG/DL (ref 2.6–4.7)
PLATELET # BLD AUTO: 226 K/UL (ref 150–400)
PMV BLD AUTO: 10.5 FL (ref 8.9–12.9)
POTASSIUM SERPL-SCNC: 3.8 MMOL/L (ref 3.5–5.1)
RBC # BLD AUTO: 3.12 M/UL (ref 3.8–5.2)
SODIUM SERPL-SCNC: 139 MMOL/L (ref 136–145)
SPECIAL REQUESTS,SREQ: NORMAL
URATE SERPL-MCNC: 2.5 MG/DL (ref 2.6–6)
WBC # BLD AUTO: 9.7 K/UL (ref 3.6–11)

## 2022-04-04 PROCEDURE — 74011250636 HC RX REV CODE- 250/636: Performed by: UROLOGY

## 2022-04-04 PROCEDURE — 0T9B8ZZ DRAINAGE OF BLADDER, VIA NATURAL OR ARTIFICIAL OPENING ENDOSCOPIC: ICD-10-PCS | Performed by: UROLOGY

## 2022-04-04 PROCEDURE — 74011250637 HC RX REV CODE- 250/637: Performed by: INTERNAL MEDICINE

## 2022-04-04 PROCEDURE — 80069 RENAL FUNCTION PANEL: CPT

## 2022-04-04 PROCEDURE — 36415 COLL VENOUS BLD VENIPUNCTURE: CPT

## 2022-04-04 PROCEDURE — 0T778DZ DILATION OF LEFT URETER WITH INTRALUMINAL DEVICE, VIA NATURAL OR ARTIFICIAL OPENING ENDOSCOPIC: ICD-10-PCS | Performed by: UROLOGY

## 2022-04-04 PROCEDURE — 94760 N-INVAS EAR/PLS OXIMETRY 1: CPT

## 2022-04-04 PROCEDURE — 65270000029 HC RM PRIVATE

## 2022-04-04 PROCEDURE — 74011250637 HC RX REV CODE- 250/637: Performed by: UROLOGY

## 2022-04-04 PROCEDURE — 74011000250 HC RX REV CODE- 250: Performed by: UROLOGY

## 2022-04-04 PROCEDURE — 85025 COMPLETE CBC W/AUTO DIFF WBC: CPT

## 2022-04-04 PROCEDURE — 84550 ASSAY OF BLOOD/URIC ACID: CPT

## 2022-04-04 RX ADMIN — GABAPENTIN 300 MG: 300 CAPSULE ORAL at 09:55

## 2022-04-04 RX ADMIN — Medication 1 CAPSULE: at 09:56

## 2022-04-04 RX ADMIN — ALLOPURINOL 100 MG: 100 TABLET ORAL at 09:53

## 2022-04-04 RX ADMIN — GABAPENTIN 300 MG: 300 CAPSULE ORAL at 18:40

## 2022-04-04 RX ADMIN — DULOXETINE HYDROCHLORIDE 30 MG: 30 CAPSULE, DELAYED RELEASE ORAL at 09:56

## 2022-04-04 RX ADMIN — MEROPENEM 500 MG: 500 INJECTION, POWDER, FOR SOLUTION INTRAVENOUS at 12:35

## 2022-04-04 RX ADMIN — AMLODIPINE BESYLATE 5 MG: 5 TABLET ORAL at 09:55

## 2022-04-04 RX ADMIN — ATORVASTATIN CALCIUM 10 MG: 10 TABLET, FILM COATED ORAL at 18:40

## 2022-04-04 RX ADMIN — TAMSULOSIN HYDROCHLORIDE 0.4 MG: 0.4 CAPSULE ORAL at 09:55

## 2022-04-04 RX ADMIN — CARVEDILOL 25 MG: 6.25 TABLET, FILM COATED ORAL at 09:53

## 2022-04-04 RX ADMIN — Medication 1 LOZENGE: at 22:39

## 2022-04-04 RX ADMIN — MEROPENEM 500 MG: 500 INJECTION, POWDER, FOR SOLUTION INTRAVENOUS at 05:25

## 2022-04-04 RX ADMIN — CARVEDILOL 25 MG: 6.25 TABLET, FILM COATED ORAL at 18:39

## 2022-04-04 RX ADMIN — Medication 1 LOZENGE: at 18:39

## 2022-04-04 RX ADMIN — MEROPENEM 500 MG: 500 INJECTION, POWDER, FOR SOLUTION INTRAVENOUS at 18:41

## 2022-04-04 RX ADMIN — SERTRALINE 50 MG: 50 TABLET, FILM COATED ORAL at 09:55

## 2022-04-04 NOTE — PROGRESS NOTES
[facility]                                                                                          Hospitalist Progress Note  Ngozi Jerome MD  Answering service: [phone] from in house phone        Date of Service:  2022  NAME:  Vipin Zavala  :  1950  MRN:  [id number]    Admission Summary:   Vipin Zavala is a 73 y.o. female who presents as transfer from [facility] for urology coverage. H/o right ureteral stone s/p stent placement, essential hypertension, asthma who presented to OSH for pyelo. Pt requires transfer to [facility] d/t left hydronephrosis with a 4 mm distal left ureteral stone. Continue Merrem due to severe penicillin allergy. Large hepatic cyst was noted. Plan is for stent placement this evening. Pain currently controlled. Pt in NAD. Interval history / Subjective:   S/p left ureteral stent placement last night  Pain controlled  NAD     Assessment & Plan:     Pyelo 2/2 left ureteral stone with hydronephrosis  S/p ureteral stent 4/3/22  Cont Merrem due to severe penicillin allergy  Urine culture pending  Continue Flomax  Recent episode of sepsis related to obstructing right ureteral stone s/p stent  Urology now signed off  Can likely switch to PO abx tomorrow and dc home  Unfortunately UC have no growth  OSH BC positive? Will repeat    Essential hypertension  Continue Coreg and amlodipine     Depression  Continue Cymbalta and Zoloft     Hypercholesterolemia  Continue Lipitor    Code status: Full Code  Prophylaxis: SCD's  Care Plan discussed with: Patient/Family  Anticipated Disposition: Home w/Family  Anticipated Discharge: 24 hours to 48 hours     Hospital Problems  Date Reviewed: 4/3/2022          Codes Class Noted POA    Urinary tract obstruction by kidney stone ICD-10-CM: N20.0, N13.8  ICD-9-CM: 592.0, 599.69  4/3/2022 Unknown                Review of Systems:   Pertinent items are noted in HPI.     No fever/chills, poor appetite, cough, sputum, SOB, N/V, D/C, CP, or abd pain. Tolerating PO    Vital Signs:    Last 24hrs VS reviewed since prior progress note. Most recent are:  Visit Vitals  /70   Pulse 94   Temp 99 °F (37.2 °C)   Resp 18   SpO2 94%         Intake/Output Summary (Last 24 hours) at 4/4/2022 1530  Last data filed at 4/3/2022 2211  Gross per 24 hour   Intake 400 ml   Output 100 ml   Net 300 ml      Physical Examination:     I had a face to face encounter with this patient and independently examined them on 4/4/2022 as outlined below:    General: Alert, cooperative, no acute distress    EENT:  EOMI. Anicteric sclerae. MMM  Resp:  CTA bilaterally, no wheezing or rales. No accessory muscle use  CV:  RRR  GI:  Soft, Non distended, Non tender  Neurologic:  Alert and oriented, normal speech, SHAVER  Extremities: No edema or cyanosis  Psych:   Not anxious or agitated  Skin:  No rashes. No jaundice    Data Review:    I personally reviewed labs and imaging results    Labs:     Recent Labs     04/04/22 0525 04/02/22 1954   WBC 9.7 13.1*   HGB 9.1* 9.7*   HCT 29.5* 31.3*    249     Recent Labs     04/04/22 0525 04/02/22 1954    142   K 3.8 3.7    110*   CO2 28 27   BUN 9 16   CREA 0.76 0.95   * 123*   CA 8.6 8.6   PHOS 2.7  --    URICA 2.5*  --      Recent Labs     04/04/22 0525 04/02/22 1954   ALT  --  15   AP  --  104   TBILI  --  0.4   TP  --  7.6   ALB 2.8* 3.5   GLOB  --  4.1*     No results for input(s): INR, PTP, APTT, INREXT in the last 72 hours. No results for input(s): FE, TIBC, PSAT, FERR in the last 72 hours. No results found for: FOL, RBCF   No results for input(s): PH, PCO2, PO2 in the last 72 hours. No results for input(s): CPK, CKNDX, TROIQ in the last 72 hours.     No lab exists for component: CPKMB  No results found for: CHOL, CHOLX, CHLST, CHOLV, HDL, HDLP, LDL, LDLC, DLDLP, TGLX, TRIGL, TRIGP, CHHD, CHHDX  Lab Results   Component Value Date/Time    Glucose (POC) 93 03/10/2022 11:06 AM Glucose (POC) 103 03/10/2022 06:15 AM    Glucose (POC) 117 03/09/2022 09:10 PM    Glucose (POC) 95 03/09/2022 06:05 PM    Glucose (POC) 114 03/09/2022 11:20 AM     Lab Results   Component Value Date/Time    Color Yellow/Straw 04/02/2022 07:54 PM    Appearance Turbid (A) 04/02/2022 07:54 PM    Specific gravity 1.008 04/02/2022 07:54 PM    Specific gravity 1.015 03/03/2022 03:40 AM    pH (UA) 6.0 04/02/2022 07:54 PM    Protein 30 (A) 04/02/2022 07:54 PM    Glucose Negative 04/02/2022 07:54 PM    Ketone Negative 04/02/2022 07:54 PM    Bilirubin Negative 04/02/2022 07:54 PM    Urobilinogen 0.1 04/02/2022 07:54 PM    Nitrites Negative 04/02/2022 07:54 PM    Leukocyte Esterase Large (A) 04/02/2022 07:54 PM    Epithelial cells FEW 03/03/2022 03:40 AM    Bacteria Negative 04/02/2022 07:54 PM    WBC 20-50 04/02/2022 07:54 PM    RBC 20-50 04/02/2022 07:54 PM         Medications Reviewed:     Current Facility-Administered Medications   Medication Dose Route Frequency    benzocaine-menthoL (CEPACOL) lozenge 1 Lozenge  1 Lozenge Mucous Membrane Q4HWA    lactated Ringers infusion  125 mL/hr IntraVENous CONTINUOUS    allopurinoL (ZYLOPRIM) tablet 100 mg  100 mg Oral DAILY    amLODIPine (NORVASC) tablet 5 mg  5 mg Oral DAILY    atorvastatin (LIPITOR) tablet 10 mg  10 mg Oral DAILY    carvediloL (COREG) tablet 25 mg  25 mg Oral BID WITH MEALS    DULoxetine (CYMBALTA) capsule 30 mg  30 mg Oral DAILY    gabapentin (NEURONTIN) capsule 300 mg  300 mg Oral BID    meropenem (MERREM) 500 mg in sterile water (preservative free) 10 mL IV syringe  500 mg IntraVENous Q6H    L.acidophilus-paracasei-S.thermophil-bifidobacter (RISAQUAD) 8 billion cell capsule  1 Capsule Oral DAILY    sertraline (ZOLOFT) tablet 50 mg  50 mg Oral DAILY    tamsulosin (FLOMAX) capsule 0.4 mg  0.4 mg Oral DAILY    acetaminophen (TYLENOL) tablet 650 mg  650 mg Oral Q6H PRN    Or    acetaminophen (TYLENOL) suppository 650 mg  650 mg Rectal Q6H PRN    ondansetron (ZOFRAN ODT) tablet 4 mg  4 mg Oral Q6H PRN    sodium chloride (NS) flush 5-40 mL  5-40 mL IntraVENous PRN     ______________________________________________________________________  EXPECTED LENGTH OF STAY: 1d 21h  ACTUAL LENGTH OF STAY:          1                 Magdaleno Silver MD

## 2022-04-04 NOTE — OP NOTES
[address]   OPERATIVE REPORT    Name:  Steven Paulson  MR#:  [id number]  :  1950  ACCOUNT #:  [de-identified]  DATE OF SERVICE:  2022    PREOPERATIVE DIAGNOSIS:  Left ureteral stone with pyelonephritis. POSTOPERATIVE DIAGNOSIS:  Left ureteral stone with pyelonephritis. PROCEDURE PERFORMED:  Cystoscopy, left retrograde pyelogram, left double-J stent insertion. SURGEON:  Deandra Yoder MD    ASSISTANT:  0    ANESTHESIA:  General.    COMPLICATIONS:  0.    SPECIMENS REMOVED:  0l. IMPLANTS:  0    ESTIMATED BLOOD LOSS:  None. DRAINS:  6 x 28 double-J stent with no string. FINDINGS:  Pus coming out from the left kidney when I put the stent in, moderate hydro, distal left ureteral stone. PROCEDURE:  The patient is a 73. She has a stone in her right ureter recently and underwent a placement of a double-J stent when she was septic by Dr. Tracey Blanco and she got treated for the UTI and she has not yet had her operation for the stone in the right and that she came into the hospital with urinary tract obstruction on her left side from a new kidney stone that is down in the distal ureter. She went to the [facility], [facility], but Urology coverage there is not available, so she was transferred here at [facility] where we have the capacity and capability to care for her on the floor and she was having pain. At that time, she was febrile. We talked about options and given that presence of dirty urine and her history of sepsis recently and the obstructed kidney without subsequent stenting on right away, she agreed and wanted to go ahead. She is getting meropenem for her preoperative antibiotics. The case was delayed somewhat, because the patient's n.p.o. status was hard to ascertain, but eventually we were able to get her to the operating room this evening.   She was brought to the operating room and given general anesthetic, placed in lithotomy position, prepped and draped in the usual sterile fashion. I inserted the cystoscope per urethra and drained the bladder, which had some cloudy urine and found the left ureteral orifice readily, passed an open-ended catheter in there and then started retrograde pyelogram and then this showed a radiopaque filling defect in the distal left ureter and along with mild hydronephrosis and hydroureter above it. Then, I passed a wire up and removed the catheter and then over the wire put the 6 x 28 double-J stent with a curl forming in the bladder and a curl in the renal pelvis and then drained nicely the cloudy urine. I drained the bladder and then the patient was awakened and brought to the recovery room in good condition.       MD MIKE Barrios/FUENTES_ELIA_I/  D:  04/03/2022 22:21  T:  04/04/2022 4:06  JOB #:  [id number]

## 2022-04-04 NOTE — PERIOP NOTES
TRANSFER - OUT REPORT:    Verbal report given to [address] RN Arabella(name) on Aleksey  being transferred to 518(unit) for routine post - op       Report consisted of patients Situation, Background, Assessment and   Recommendations(SBAR). Time Pre op antibiotic given:1729 (scheduled abo)  Anesthesia Stop time: 5732  Oliveros Present on Transfer to floor:n  Order for Oliveros on Chart:n  Discharge Prescriptions with Chart:n    Information from the following report(s) SBAR, OR Summary, MAR, Recent Results and Cardiac Rhythm NSR was reviewed with the receiving nurse. Opportunity for questions and clarification was provided. Is the patient on 02? YES       L/Min 2       Other     Is the patient on a monitor? NO    Is the nurse transporting with the patient? NO    Surgical Waiting Area notified of patient's transfer from PACU? NO, no one waiting, patient does not want anyone notified      The following personal items collected during your admission accompanied patient upon transfer:   Dental Appliance: Dental Appliances: Other (comment) (denture cup left on overbed table in room 518)  Vision:    Hearing Aid:    Jewelry: Jewelry: None  Clothing: Clothing: Other (comment) (in room 518)  Other Valuables:  Other Valuables: Eyeglasses,Other (comment),Cell Phone (specs and cell phone left on overbed table in room 518)  Valuables sent to safe:      NO PERSONAL BELONGINGS IN PACU

## 2022-04-04 NOTE — ANESTHESIA POSTPROCEDURE EVALUATION
Procedure(s):  CYSTOSCOPY INSERTION LEFT URETERAL STENT. general    Anesthesia Post Evaluation      Multimodal analgesia: multimodal analgesia used between 6 hours prior to anesthesia start to PACU discharge  Patient location during evaluation: PACU  Patient participation: complete - patient participated  Level of consciousness: awake and alert  Pain management: adequate  Airway patency: patent  Anesthetic complications: no  Cardiovascular status: acceptable  Respiratory status: acceptable  Hydration status: acceptable  Comments: Seen, no complaints   Post anesthesia nausea and vomiting:  none  Temperature: baseline. INITIAL Post-op Vital signs:   Vitals Value Taken Time   /59 04/03/22 2230   Temp 37.6 °C (99.6 °F) 04/03/22 2220   Pulse 104 04/03/22 2233   Resp 15 04/03/22 2233   SpO2 100 % 04/03/22 2233   Vitals shown include unvalidated device data.

## 2022-04-04 NOTE — BRIEF OP NOTE
Brief Postoperative Note    Patient: Warren Worthy  YOB: 1950  MRN: [id number]    Date of Procedure: 4/3/2022     Pre-Op Diagnosis: left ureteral stone    Post-Op Diagnosis: Same as preoperative diagnosis.       Procedure(s):  CYSTOSCOPY INSERTION LEFT URETERAL STENT    Surgeon(s):  Patrick Jiang MD    Surgical Assistant: None    Anesthesia: General     Estimated Blood Loss (mL): Minimal    Complications: None    Specimens: * No specimens in log *     Implants: * No implants in log *    Drains: * No LDAs found *    Findings: some cloudy urine coming from the left     Electronically Signed by Jennifer Terrell MD on 4/3/2022 at 10:07 PM

## 2022-04-04 NOTE — PROGRESS NOTES
Progress Note    Patient: Abner Valenzuela MRN: [id number]  SSN: [id number]    YOB: 1950  Age: 73 y.o. Sex: female        ADMITTED:  4/3/2022 to Jose Alejandro Petit MD  for Urinary tract obstruction by kidney stone [N20.0, N13.8]         Abner Valenzuela is 1 Day Post-Op Procedure(s):  CYSTOSCOPY INSERTION LEFT URETERAL STENT. She is doing well. Patient is voiding without difficulty. Clear yellow UA noted. Denies fevers, chills, flank pain, stent colic, dysuria, or hematuria. Tmax 101.7, now 99. Tachycardia resolving. BP stable. WBC improved and now wnl. Hgb 9.1. Cr 0.76. UCx pending. BCx growing GNR in both bottles. Vitals:  Temp (24hrs), Av.4 °F (37.4 °C), Min:98.5 °F (36.9 °C), Max:101.7 °F (38.7 °C)     Blood pressure 127/70, pulse 94, temperature 99 °F (37.2 °C), resp. rate 18, SpO2 94 %. I&O's:   1901 -  0700  In: 400 [I.V.:400]  Out: 100 [Urine:100]   No intake/output data recorded.       Physical Exam  General: NAD, pleasant  Respiratory: no distress, breathing easily, room air  Abdomen: soft, no distention; non-tender to palpation  : no CVA tenderness, voiding independently, clear yellow UA  Neuro: Appropriate, no focal neurological deficits  Skin: warm, dry  Extremities: moves all, full ROM     Labs:   Recent Labs     22  0525 22   WBC 9.7 13.1*   HGB 9.1* 9.7*   HCT 29.5* 31.3*    249     Recent Labs     2225 22    142   K 3.8 3.7    110*   CO2 28 27   * 123*   BUN 9 16   CREA 0.76 0.95   CA 8.6 8.6        Cultures:      Imaging:       Assessment:     - 1 Day Post-Op Procedure(s):  CYSTOSCOPY INSERTION LEFT URETERAL STENT    Active Problems:    Urinary tract obstruction by kidney stone (4/3/2022)    4 mm stone in the distal left ureter causing mild left-sided  Hydronephrosis s/p stent    Right ureteral stone s/p stent     Bacteremia    Plan:     - Follow cultures and tailor abx if indicated. We discussed the importance of outpatient follow up for definitive tx of her b/l stones. She understands. F/U arranged and placed in the chart. Urology signing off. Please call for further questions. Supervising MD, Dr. Radha Perez.    Signed By: Herchel Meckel, NP - April 4, 2022

## 2022-04-04 NOTE — PROGRESS NOTES
RUR: 21% High    CHRISTINE: Anticipated discharge home. Likely [facility] (K:[phone]) once medically stable. Follow-up with PCP/specialist.     Primary Contact: Rhys Elizalde, [phone]    * Will need 2nd IM letter prior to discharge. Care Management Interventions  PCP Verified by CM: Yes (Dr. Red Yip, last seen within the past 5 days )  Mode of Transport at Discharge: Other (see comment) ([employer] transportation services )  Transition of Care Consult (CM Consult): Discharge Planning  Discharge Durable Medical Equipment: No  Physical Therapy Consult: No  Occupational Therapy Consult: No  Speech Therapy Consult: No  Support Systems: Friend/Neighbor  Confirm Follow Up Transport: Friends  The Plan for Transition of Care is Related to the Following Treatment Goals : Home  Discharge Location  Patient Expects to be Discharged to[de-identified] Home with family assistance    Reason for Admission:   UTI by kidney stone                RUR Score:     21% High     PCP: First and Last name:  Bimal Patterson MD     Name of Practice:   Are you a current patient: Yes/No: Yes   Approximate date of last visit: Per patient about 5 days ago   Can you do a virtual visit with your PCP: Yes             Resources/supports as identified by patient/family:   Friend and family support                Top Challenges facing patient (as identified by patient/family and CM): Finances/Medication cost?  No barriers disclosed                Transportation? Friend, [employer] transportation services through [employer] system or lack thereof? Living arrangements? Lives with roommate in a 1 story home with no steps to enter             Self-care/ADLs/Cognition? Alert and oriented x4, independent with ADL's, roommate assists with IADL's.           Current Advanced Directive/Advance Care Plan:  Full Code    Healthcare Decision Maker:   Click here to complete HealthCare Decision Makers including selection of the Healthcare Decision Maker Relationship (ie \"Primary\")      Primary Decision Maker: [facility] - [phone]    Payor Source Payor: Jaki Ferguson / Plan: [address] [place] [address] HMO / Product Type: Managed Care Medicare /                             Plan for utilizing home health:  Unknown at this time                   Transition of Care Plan:   Home    CM met with patient at bedside to introduce self and explain role. Patient lives with a roommate in a 1 story home with no steps to enter. Patient was modified independent with ADL's and IADL's. Roommate provides assistance with IADLs also. Patient ambulated with the use of a cane. Patient owns a cane, FWW, shower chair and BSC. Per patient, she has a friend that can provide transport; however, their car is currently in the shop. Patient expressed it would be better to utilize her [facility] through her insurance (p: [phone]). CM verified patient's PCP, demographics and insurance. Patient uses [employer] in order for long term prescriptions and [facility] on [address] in [place] for immediate prescriptions needs. CM to continue to follow as needed.     Lyric Sahni, MSW   [phone]

## 2022-04-05 VITALS
RESPIRATION RATE: 18 BRPM | SYSTOLIC BLOOD PRESSURE: 113 MMHG | TEMPERATURE: 98.2 F | OXYGEN SATURATION: 94 % | HEART RATE: 83 BPM | DIASTOLIC BLOOD PRESSURE: 66 MMHG

## 2022-04-05 PROCEDURE — 74011250636 HC RX REV CODE- 250/636: Performed by: UROLOGY

## 2022-04-05 PROCEDURE — 74011250637 HC RX REV CODE- 250/637: Performed by: UROLOGY

## 2022-04-05 PROCEDURE — 74011000250 HC RX REV CODE- 250: Performed by: UROLOGY

## 2022-04-05 PROCEDURE — 87040 BLOOD CULTURE FOR BACTERIA: CPT

## 2022-04-05 PROCEDURE — 36415 COLL VENOUS BLD VENIPUNCTURE: CPT

## 2022-04-05 PROCEDURE — 74011250637 HC RX REV CODE- 250/637: Performed by: INTERNAL MEDICINE

## 2022-04-05 RX ORDER — AMLODIPINE BESYLATE 10 MG/1
5 TABLET ORAL DAILY
Qty: 30 TABLET | Refills: 0 | Status: SHIPPED
Start: 2022-04-05

## 2022-04-05 RX ADMIN — ATORVASTATIN CALCIUM 10 MG: 10 TABLET, FILM COATED ORAL at 09:04

## 2022-04-05 RX ADMIN — Medication 1 CAPSULE: at 09:04

## 2022-04-05 RX ADMIN — MEROPENEM 500 MG: 500 INJECTION, POWDER, FOR SOLUTION INTRAVENOUS at 00:33

## 2022-04-05 RX ADMIN — CARVEDILOL 25 MG: 6.25 TABLET, FILM COATED ORAL at 07:16

## 2022-04-05 RX ADMIN — SERTRALINE 50 MG: 50 TABLET, FILM COATED ORAL at 09:03

## 2022-04-05 RX ADMIN — TAMSULOSIN HYDROCHLORIDE 0.4 MG: 0.4 CAPSULE ORAL at 09:03

## 2022-04-05 RX ADMIN — Medication 1 LOZENGE: at 12:17

## 2022-04-05 RX ADMIN — GABAPENTIN 300 MG: 300 CAPSULE ORAL at 09:03

## 2022-04-05 RX ADMIN — SODIUM CHLORIDE, PRESERVATIVE FREE 10 ML: 5 INJECTION INTRAVENOUS at 07:17

## 2022-04-05 RX ADMIN — DULOXETINE HYDROCHLORIDE 30 MG: 30 CAPSULE, DELAYED RELEASE ORAL at 09:02

## 2022-04-05 RX ADMIN — MEROPENEM 500 MG: 500 INJECTION, POWDER, FOR SOLUTION INTRAVENOUS at 12:17

## 2022-04-05 RX ADMIN — AMLODIPINE BESYLATE 5 MG: 5 TABLET ORAL at 09:04

## 2022-04-05 RX ADMIN — MEROPENEM 500 MG: 500 INJECTION, POWDER, FOR SOLUTION INTRAVENOUS at 07:16

## 2022-04-05 RX ADMIN — ALLOPURINOL 100 MG: 100 TABLET ORAL at 09:03

## 2022-04-05 NOTE — DISCHARGE INSTRUCTIONS
Take the levofloxacin (Levaquin) that you already have at home, 750 mg tab, one tab daily for 5 days.

## 2022-04-05 NOTE — PROGRESS NOTES
I have reviewed discharge instructions with the patient and spouse. The patient and spouse verbalized understanding. Discussed continuing her home medications with her. IV was pulled out and patient was discharged home with [facility].

## 2022-04-05 NOTE — DISCHARGE SUMMARY
Discharge Summary     PATIENT ID: Deny Cancino  MRN: [id number]   YOB: 1950    DATE OF ADMISSION: 4/3/2022  2:38 PM    DATE OF DISCHARGE: 4/5/2022  PRIMARY CARE PROVIDER: Berenice Nascimento MD   DISCHARGING PROVIDER: Fredy Lowe MD    To contact this individual call [phone] and ask the  to page. If unavailable ask to be transferred the Adult Hospitalist Department. CONSULTATIONS: None    PROCEDURES/SURGERIES: Procedure(s):  CYSTOSCOPY INSERTION LEFT URETERAL STENT    ADMITTING [facility] COURSE:   [address] a 73 y.o. female who presents as transfer from [address] [place] [address] for urology coverage. H/o right ureteral stone s/p stent placement, essential hypertension, asthma who presented to OSH for pyelo. Pt requires transfer [facility]  d/t left hydronephrosis with a 4 mm distal left ureteral stone. Continue Merrem due to severe penicillin allergy. Large hepatic cyst was noted. Plan is for stent placement this evening. Pain currently controlled. Pt in NAD. DISCHARGE DIAGNOSES / PLAN:      Pyelo 2/2 left ureteral stone with hydronephrosis  S/p ureteral stent 4/3/22  On Merrem here due to penicillin allergy  Continue Flomax  Recent episode of sepsis related to obstructing right ureteral stone s/p stent  Urology now signed off, needs outpatient follow up for definitive tx of her b/l stones  Unfortunately UC have no growth  OSH BC positive with GNR? No sensitivities  Repeat BC here pending but since pt clinically stable and not ill-appearing whatsoever, will dc home on abx with excellent bioavailability and for appropriate duration and if repeat cxs are positive I will call pt.  Pt expressed understanding  Will empirically complete course with levaquin- pt has \"plenty\" leftover levaquin 750 mg from prior to hospitalization, so she agreed to take levaquin 750mg daily for the next 5 days (total 8 day duration as 7-14 days is the general treatment duration for both pyelo and GNR bacteremia) and to return to ED if sxs worsen     Essential hypertension  Continue Coreg and amlodipine     Depression  Continue Cymbalta and Zoloft     Hypercholesterolemia  Continue Lipitor     ADDITIONAL CARE RECOMMENDATIONS: f/u pcp, urology    PENDING TEST RESULTS:   At the time of discharge the following test results are still pending: blood cxs    Patient Instructions     FOLLOW UP APPOINTMENTS:    Follow-up Information     Follow up With Specialties Details [facility]   Follow up on 4/12/22 at 9 AM at the [facility] for right and left stones. [phone] [address] [place] [address]    Micha Brizuela MD Family Medicine   [address] [place] [address]  [phone]           DIET: Regular Diet    ACTIVITY: Activity as tolerated    NOTIFY YOUR PHYSICIAN FOR ANY OF THE FOLLOWING:   Fever over 101 degrees for 24 hours. Chest pain, shortness of breath, fever, chills, nausea, vomiting, diarrhea, change in mentation, falling, weakness, bleeding. Severe pain or pain not relieved by medications. Or, any other signs or symptoms that you may have questions about. DISPOSITION:   x Home With:   OT  PT  HH  RN       Long term SNF/Inpatient Rehab    Independent/assisted living    Hospice    Other:       PATIENT CONDITION AT DISCHARGE:   Functional status    Poor     Deconditioned    x Independent      Cognition   x  Lucid     Forgetful     Dementia      Catheters/lines (plus indication)    Oliveros     PICC     PEG    x None      Code status   x  Full code     DNR      PHYSICAL EXAMINATION AT DISCHARGE:  General:        Alert, cooperative, no acute distress    EENT:            EOMI. Anicteric sclerae. MMM  Resp:             CTA bilaterally, no wheezing or rales.  No accessory muscle use  CV:                RRR  GI:                 Soft, Non distended, Non tender; improving left flank tenderness  Neurologic:     Alert and oriented, normal speech, SHAVER  Extremities:   No edema or cyanosis  Psych:             Not anxious or agitated  Skin:              No rashes.  No jaundice    CHRONIC MEDICAL DIAGNOSES:  Problem List as of 4/5/2022 Date Reviewed: 4/3/2022          Codes Class Noted - Resolved    Ureteral stone with hydronephrosis ICD-10-CM: N13.2  ICD-9-CM: 592.1, 978  4/3/2022 - Present        Abdominal pain ICD-10-CM: R10.9  ICD-9-CM: 789.00  4/3/2022 - Present        Hepatic cyst ICD-10-CM: K76.89  ICD-9-CM: 573.8  4/3/2022 - Present        Acute cystitis ICD-10-CM: N30.00  ICD-9-CM: 595.0  4/3/2022 - Present        Urinary tract obstruction by kidney stone ICD-10-CM: N20.0, N13.8  ICD-9-CM: 592.0, 599.69  4/3/2022 - Present        Sepsis ([facility] [address] ICD-10-CM: A41.9  ICD-9-CM: 038.9, 995.91  2/26/2022 - Present        Obesity, morbid ([facility] [address] ICD-10-CM: E66.01  ICD-9-CM: 278.01  9/4/2020 - Present        Acute pain ICD-10-CM: R52  ICD-9-CM: 338.19  7/7/2020 - Present        Arthritis ICD-10-CM: M19.90  ICD-9-CM: 716.90  7/7/2020 - Present        Asthma ICD-10-CM: J45.909  ICD-9-CM: 493.90  7/7/2020 - Present        At risk for falls ICD-10-CM: Z91.81  ICD-9-CM: V15.88  7/7/2020 - Present        Hematuria ICD-10-CM: R31.9  ICD-9-CM: 599.70  7/7/2020 - Present        Kidney stone ICD-10-CM: N20.0  ICD-9-CM: 592.0  7/7/2020 - Present        Edema ICD-10-CM: R60.9  ICD-9-CM: 782.3  7/7/2020 - Present        Hypertension ICD-10-CM: I10  ICD-9-CM: 401.9  7/7/2020 - Present        Menopause present ICD-10-CM: Z78.0  ICD-9-CM: 627.2  7/7/2020 - Present        Obesity ICD-10-CM: E66.9  ICD-9-CM: 278.00  7/7/2020 - Present        Postmenopausal state ICD-10-CM: Z78.0  ICD-9-CM: V49.81  7/7/2020 - Present        Urinary incontinence ICD-10-CM: R32  ICD-9-CM: 788.30  7/7/2020 - Present              DISCHARGE MEDICATIONS:  Current Discharge Medication List      CONTINUE these medications which have CHANGED    Details   amLODIPine (NORVASC) 10 mg tablet Take 0.5 Tablets by mouth daily.  Qty: 30 Tablet, Refills: 0  Start date: 4/5/2022         CONTINUE these medications which have NOT CHANGED    Details   DULoxetine (CYMBALTA) 30 mg capsule Take 30 mg by mouth daily. sertraline (ZOLOFT) 50 mg tablet Take 50 mg by mouth daily. allopurinoL (ZYLOPRIM) 100 mg tablet Take 100 mg by mouth daily. carvediloL (COREG) 25 mg tablet Take 25 mg by mouth two (2) times daily (with meals). L.acid,para-B. bifidum-S.therm (RISAQUAD) 8 billion cell cap cap Take 1 Capsule by mouth daily. Qty: 30 Capsule, Refills: 0      polyethylene glycol (MIRALAX) 17 gram packet Take 1 Packet by mouth daily as needed for Constipation. Qty: 30 Packet, Refills: 0      atorvastatin (LIPITOR) 10 mg tablet Take 10 mg by mouth daily. gabapentin (NEURONTIN) 300 mg capsule Take 300 mg by mouth two (2) times a day. albuterol (PROVENTIL HFA, VENTOLIN HFA, PROAIR HFA) 90 mcg/actuation inhaler Take 2 Puffs by inhalation every four (4) hours as needed for Wheezing or Shortness of Breath. oxybutynin (DITROPAN) 5 mg tablet Take 5 mg by mouth two (2) times a day. tamsulosin (FLOMAX) 0.4 mg capsule Take 0.4 mg by mouth daily.            Greater than 30 minutes were spent with the patient on counseling and coordination of care    Signed:   Sam Leija MD  4/5/2022  3:56 PM

## 2022-04-05 NOTE — PROGRESS NOTES
RUR: 21% High     CHRISTINE: Home. [employer] transport home (p:[phone]). Reservation #: [id number]. Follow-up with PCP/specialist.     Primary Contact: Mao Bañuelos, [phone]    2:20PM - CM reviewed chart. Patient is POD#2 for cystoscopy insertion left ureteral stent. Per review and ID rounds, awaiting repeat culture results. 4:30PM - CM noted discharge order. Per patient's request, CM arranged for [facility] cab transport (p: [phone],  reservation #: [id number]). A 5:00PM  time requested. Per [facility] representative, there is a 2 hour window from the  request time. Patient's transport will arrive between 4:30PM - 6:30PM.  Per representative, transportation company has been notified patient is ready for discharge now. No further CM needs.      Keila Melissa, NILESH   [phone]

## 2022-04-06 LAB
BACTERIA SPEC CULT: ABNORMAL
BACTERIA SPEC CULT: ABNORMAL
SPECIAL REQUESTS,SREQ: ABNORMAL

## 2022-04-10 LAB
BACTERIA SPEC CULT: NORMAL
SERVICE CMNT-IMP: NORMAL

## 2022-04-13 NOTE — PROGRESS NOTES
Called and spoke with pt re: +BC at OSH prior to admission here. These had not resulted before she was d/c from here, so she had gotten IV Merrem while hospitalized (3 days) and then resumed PO Levaquin at dc (she already had leftover from PTA) for 5 more days. Since C&S now show this is resistant, I called pt to ensure she is doing ok. She is doing well, no F/C/S or systemic symptoms. She followed up with urology yesterday and is scheduled for treatment of stones next month. Repeat BC drawn on day of dc had no growth. This was discussed with patient who expressed understanding. Pt feels well. No additional tx at this time. Will CC to PCP.

## 2022-04-13 NOTE — PROGRESS NOTES
Physician Progress Note      Diego Avila  CSN #:                  [id number]  :                       1950  ADMIT DATE:       4/3/2022 2:38 PM  [address] [place] DATE:        2022 6:51 PM  RESPONDING  PROVIDER #:        Devyn Gay MD          QUERY TEXT:    Pt was transferred from OSH on 4/3/22 with left hydronephrosis and a 4 mm distal left ureteral stone. D/C summary noted pyelo 2/2 left ureteral stone with hydronephrosis, with OSH blood cultures positive for eschericia coli. Pt was noted temp of 101.7 and -115 after transfer from OSH, and the pt was noted to have WBC of 13.1 at OSH. If possible, please document if you were evaluating and/or treating any of the following:     The medical record reflects the following:    Risk Factors: Pyelonephritis with left ureteral stone with hydronephrosis, recent episode of sepsis related to obstructing right ureteral stone    Clinical Indicators:  -- D/C summary stated: Pyelo 2/2 left ureteral stone with hydronephrosis - S/p ureteral stent 4/3/22  -- Temp = 101.7; HR = 103-115 on admission and after transfer from OSH  -- WBC = 13.1 on  @ OSH, 9.7 on  after transfer, no hematology results noted on 4/3, the day of transfer  -- Blood cultures from OSH on 4/3 = eschericia coli; repeat BCx after transfer = no growth 3 days  -- Urine culture = no growth    Treatment: IV Abx (meropenem), Urology consult, ureteral stent, IVF, serial lab monitoring, blood & urine cultures, vitals monitoring, d/c on PO Levaquin    Thank-you,  Benjamín Orellana RN, [facility]  Clinical   [phone]  [email or web address]  Options provided:  -- Escherichia coli sepsis, present on admission  -- Sepsis, present on admission  -- Pyelonephritis without sepsis  -- Other - I will add my own diagnosis  -- Disagree - Not applicable / Not valid  -- Disagree - Clinically unable to determine / Unknown  -- Refer to Clinical Documentation Reviewer    PROVIDER RESPONSE TEXT:    This patient had eschericia coli sepsis, which was present on admission.     Query created by: Angelica Hill on 4/8/2022 11:58 AM      Electronically signed by:  Donal Wallace MD 4/13/2022 7:32 AM

## 2022-04-15 NOTE — PROGRESS NOTES
Physician Progress Note      PATIENTLucia Das  CSN #:                  [id number]  :                       1950  ADMIT DATE:       2022 7:40 PM  [address] [place] DATE:        4/3/2022 1:45 PM  RESPONDING  PROVIDER #:        Isaac RUANO PA-C          QUERY TEXT:    Dear Edinson Bobby -    Pt admitted with UTI, renal stone, hydronephrosis. Pt noted to have previous procedure on 22 for right uretal stent placement secondary to stone. If possible, please document in the progress notes and discharge summary if you are evaluating and/or treating any of the following: The medical record reflects the following:  Risk Factors: 73 F presented with left flank pain; hx of kidney stones; recent stent placement on 22  Clinical Indicators: per DC Summary \"Unfortunately appears that the urinary tract infection has not resolved and urine culture pending\"; UA with +leukocyte esterase, WBC 20-50, RBC 20-50  Treatment: labs, IV Merrem, xf to  for Urology consult    Thank you,  SIXTO Persaud, RN, [facility]  Clinical   [email or web address] or contact via [employer]  Options provided:  -- UTI due to ureteral stent  -- UTI not due to ureteral stent  -- UTI as a complication of cystoscopy with stent placement on 22  -- UTI is not a complication of cystoscopy with stent placement on 22  -- Other - I will add my own diagnosis  -- Disagree - Not applicable / Not valid  -- Disagree - Clinically unable to determine / Unknown  -- Refer to Clinical Documentation Reviewer    PROVIDER RESPONSE TEXT:    UTI is a complication of cystoscopy with stent placement on 22.     Query created by: Tunde Rader on 2022 12:11 PM      Electronically signed by:  Frieda Zhang PA-C 4/15/2022 8:41 AM

## 2023-05-09 ENCOUNTER — HOSPITAL ENCOUNTER (EMERGENCY)
Facility: HOSPITAL | Age: 73
Discharge: HOME HEALTH | End: 2023-05-09
Attending: EMERGENCY MEDICINE
Payer: MEDICARE

## 2023-05-09 VITALS
TEMPERATURE: 98.9 F | HEIGHT: 68 IN | RESPIRATION RATE: 19 BRPM | HEART RATE: 88 BPM | OXYGEN SATURATION: 97 % | WEIGHT: 279 LBS | DIASTOLIC BLOOD PRESSURE: 64 MMHG | SYSTOLIC BLOOD PRESSURE: 108 MMHG | BODY MASS INDEX: 42.28 KG/M2

## 2023-05-09 DIAGNOSIS — N17.9 AKI (ACUTE KIDNEY INJURY) (HCC): ICD-10-CM

## 2023-05-09 DIAGNOSIS — E86.0 DEHYDRATION: Primary | ICD-10-CM

## 2023-05-09 DIAGNOSIS — R68.83 CHILLS: ICD-10-CM

## 2023-05-09 LAB
ALBUMIN SERPL-MCNC: 3.3 G/DL (ref 3.5–5)
ALBUMIN/GLOB SERPL: 0.8 (ref 1.1–2.2)
ALP SERPL-CCNC: 110 U/L (ref 45–117)
ALT SERPL-CCNC: 28 U/L (ref 12–78)
ANION GAP SERPL CALC-SCNC: 9 MMOL/L (ref 5–15)
AST SERPL W P-5'-P-CCNC: 29 U/L (ref 15–37)
BASOPHILS # BLD: 0 K/UL (ref 0–0.1)
BASOPHILS NFR BLD: 0 % (ref 0–1)
BILIRUB SERPL-MCNC: 0.4 MG/DL (ref 0.2–1)
BUN SERPL-MCNC: 15 MG/DL (ref 6–20)
BUN/CREAT SERPL: 14 (ref 12–20)
CA-I BLD-MCNC: 9 MG/DL (ref 8.5–10.1)
CHLORIDE SERPL-SCNC: 106 MMOL/L (ref 97–108)
CO2 SERPL-SCNC: 30 MMOL/L (ref 21–32)
CREAT SERPL-MCNC: 1.07 MG/DL (ref 0.55–1.02)
DIFFERENTIAL METHOD BLD: ABNORMAL
EOSINOPHIL # BLD: 0.1 K/UL (ref 0–0.4)
EOSINOPHIL NFR BLD: 2 % (ref 0–7)
ERYTHROCYTE [DISTWIDTH] IN BLOOD BY AUTOMATED COUNT: 15.9 % (ref 11.5–14.5)
GLOBULIN SER CALC-MCNC: 4.3 G/DL (ref 2–4)
GLUCOSE BLD STRIP.AUTO-MCNC: 108 MG/DL (ref 65–100)
GLUCOSE SERPL-MCNC: 112 MG/DL (ref 65–100)
HCT VFR BLD AUTO: 33.1 % (ref 35–47)
HGB BLD-MCNC: 10.3 G/DL (ref 11.5–16)
IMM GRANULOCYTES # BLD AUTO: 0 K/UL (ref 0–0.04)
IMM GRANULOCYTES NFR BLD AUTO: 0 % (ref 0–0.5)
LYMPHOCYTES # BLD: 1.4 K/UL (ref 0.8–3.5)
LYMPHOCYTES NFR BLD: 20 % (ref 12–49)
MAGNESIUM SERPL-MCNC: 2.2 MG/DL (ref 1.6–2.4)
MCH RBC QN AUTO: 29.4 PG (ref 26–34)
MCHC RBC AUTO-ENTMCNC: 31.1 G/DL (ref 30–36.5)
MCV RBC AUTO: 94.6 FL (ref 80–99)
MONOCYTES # BLD: 0.6 K/UL (ref 0–1)
MONOCYTES NFR BLD: 9 % (ref 5–13)
NEUTS SEG # BLD: 4.8 K/UL (ref 1.8–8)
NEUTS SEG NFR BLD: 69 % (ref 32–75)
PERFORMED BY:: ABNORMAL
PLATELET # BLD AUTO: 187 K/UL (ref 150–400)
PMV BLD AUTO: 11 FL (ref 8.9–12.9)
POTASSIUM SERPL-SCNC: 4.4 MMOL/L (ref 3.5–5.1)
PROT SERPL-MCNC: 7.6 G/DL (ref 6.4–8.2)
RBC # BLD AUTO: 3.5 M/UL (ref 3.8–5.2)
SODIUM SERPL-SCNC: 145 MMOL/L (ref 136–145)
WBC # BLD AUTO: 6.9 K/UL (ref 3.6–11)

## 2023-05-09 PROCEDURE — 99284 EMERGENCY DEPT VISIT MOD MDM: CPT

## 2023-05-09 PROCEDURE — 82962 GLUCOSE BLOOD TEST: CPT

## 2023-05-09 PROCEDURE — 85025 COMPLETE CBC W/AUTO DIFF WBC: CPT

## 2023-05-09 PROCEDURE — 96360 HYDRATION IV INFUSION INIT: CPT

## 2023-05-09 PROCEDURE — 80053 COMPREHEN METABOLIC PANEL: CPT

## 2023-05-09 PROCEDURE — 83735 ASSAY OF MAGNESIUM: CPT

## 2023-05-09 PROCEDURE — 2580000003 HC RX 258: Performed by: EMERGENCY MEDICINE

## 2023-05-09 RX ORDER — SERTRALINE HYDROCHLORIDE 100 MG/1
100 TABLET, FILM COATED ORAL EVERY MORNING
COMMUNITY
Start: 2023-02-24

## 2023-05-09 RX ORDER — 0.9 % SODIUM CHLORIDE 0.9 %
500 INTRAVENOUS SOLUTION INTRAVENOUS
Status: COMPLETED | OUTPATIENT
Start: 2023-05-09 | End: 2023-05-09

## 2023-05-09 RX ORDER — ALLOPURINOL 100 MG/1
TABLET ORAL
COMMUNITY
Start: 2023-05-08

## 2023-05-09 RX ORDER — ATORVASTATIN CALCIUM 10 MG/1
TABLET, FILM COATED ORAL
COMMUNITY
Start: 2023-02-28

## 2023-05-09 RX ORDER — FUROSEMIDE 40 MG/1
TABLET ORAL
COMMUNITY
Start: 2023-04-27

## 2023-05-09 RX ORDER — CARVEDILOL 25 MG/1
TABLET ORAL
COMMUNITY
Start: 2023-05-05

## 2023-05-09 RX ORDER — GABAPENTIN 300 MG/1
CAPSULE ORAL
COMMUNITY
Start: 2023-02-02

## 2023-05-09 RX ORDER — SOLIFENACIN SUCCINATE 5 MG/1
5 TABLET, FILM COATED ORAL DAILY
COMMUNITY
Start: 2023-03-15

## 2023-05-09 RX ADMIN — SODIUM CHLORIDE 500 ML: 9 INJECTION, SOLUTION INTRAVENOUS at 14:16

## 2023-05-09 ASSESSMENT — PAIN SCALES - GENERAL
PAINLEVEL_OUTOF10: 0
PAINLEVEL_OUTOF10: 0

## 2023-05-09 ASSESSMENT — PAIN - FUNCTIONAL ASSESSMENT
PAIN_FUNCTIONAL_ASSESSMENT: NONE - DENIES PAIN
PAIN_FUNCTIONAL_ASSESSMENT: 0-10

## 2023-05-09 NOTE — ED PROVIDER NOTES
[facility] EMERGENCY DEPARTMENT  EMERGENCY DEPARTMENT HISTORY AND PHYSICAL EXAM      Date: 5/9/2023  Patient Name: Yudi Pierre  MRN: [id number]  [place]: 1950  Date of evaluation: 5/9/2023  Provider: Dirk Spann MD   Note Started: 1:37 PM EDT 5/9/23    HISTORY OF PRESENT ILLNESS     Chief Complaint   Patient presents with    Chills       History Provided By: Patient    HPI: Yudi Pierre is a 81 y.o. female with a history of hypertension, arthritis, anemia, presenting for chills. Patient states that for the past 3 days has been having feeling of just being cold and having chills. Denies any fevers, nasal congestion, rhinorrhea, cold symptoms, cough, urine symptoms, diarrhea, vomiting. Patient states that she does not eat very well and is down to eating only 1 meal a day. Does drink a lot of water however. PAST MEDICAL HISTORY   Past Medical History:  Past Medical History:   Diagnosis Date    Arthritis 7/7/2020    Asthma 7/7/2020    Hypertension 7/7/2020    Kidney stones        Past Surgical History:  Past Surgical History:   Procedure Laterality Date    UROLOGICAL SURGERY      Lithotripsy       Family History:  History reviewed. No pertinent family history. Social History:  Social History     Tobacco Use    Smoking status: Never    Smokeless tobacco: Never   Substance Use Topics    Alcohol use: Never    Drug use: Never       Allergies:   Allergies   Allergen Reactions    Latex Hives    Aspirin Hives    Pcn [Penicillins] Hives       PCP: Audra Dangelo MD    Current Meds:   Current Facility-Administered Medications   Medication Dose Route Frequency Provider Last Rate Last Admin    0.9 % sodium chloride bolus  500 mL IntraVENous NOW Dirk Spann MD         Current Outpatient Medications   Medication Sig Dispense Refill    allopurinol (ZYLOPRIM) 100 MG tablet       carvedilol (COREG) 25 MG tablet       atorvastatin (LIPITOR) 10 MG tablet       furosemide (LASIX) 40 MG tablet       gabapentin

## 2023-05-09 NOTE — DISCHARGE INSTRUCTIONS
Your lab work showed normal blood levels and normal glucose. You do have some kidney injury likely due to not eating 3 meals a day. Ensure you keep drinking lots of water every day. Follow-up with your primary care doctor. Thank you! Thank you for allowing me to care for you in the emergency department. It is my goal to provide you with excellent care. If you have not received excellent quality care, please ask to speak to the nurse manager. Please fill out the survey that will come to you by mail or email since we listen to your feedback! Below you will find a list of your tests from today's visit. Should you have any questions, please do not hesitate to call the emergency department.     Labs  Recent Results (from the past 12 hour(s))   POCT Glucose    Collection Time: 05/09/23  1:43 PM   Result Value Ref Range    POC Glucose 108 (H) 65 - 100 mg/dL    Performed by: Sandra Welch    CBC with Auto Differential    Collection Time: 05/09/23  1:44 PM   Result Value Ref Range    WBC 6.9 3.6 - 11.0 K/uL    RBC 3.50 (L) 3.80 - 5.20 M/uL    Hemoglobin 10.3 (L) 11.5 - 16.0 g/dL    Hematocrit 33.1 (L) 35.0 - 47.0 %    MCV 94.6 80.0 - 99.0 FL    MCH 29.4 26.0 - 34.0 PG    MCHC 31.1 30.0 - 36.5 g/dL    RDW 15.9 (H) 11.5 - 14.5 %    Platelets 018 599 - 790 K/uL    MPV 11.0 8.9 - 12.9 FL    Seg Neutrophils 69 32 - 75 %    Lymphocytes 20 12 - 49 %    Monocytes 9 5 - 13 %    Eosinophils % 2 0 - 7 %    Basophils 0 0 - 1 %    Immature Granulocytes 0 0.0 - 0.5 %    Segs Absolute 4.8 1.8 - 8.0 K/UL    Absolute Lymph # 1.4 0.8 - 3.5 K/UL    Absolute Mono # 0.6 0.0 - 1.0 K/UL    Absolute Eos # 0.1 0.0 - 0.4 K/UL    Basophils Absolute 0.0 0.0 - 0.1 K/UL    Absolute Immature Granulocyte 0.0 0.00 - 0.04 K/UL    Differential Type AUTOMATED     Comprehensive Metabolic Panel    Collection Time: 05/09/23  1:44 PM   Result Value Ref Range    Sodium 145 136 - 145 mmol/L    Potassium 4.4 3.5 - 5.1 mmol/L    Chloride 106 97 -

## 2023-05-09 NOTE — ED TRIAGE NOTES
Pt started on Sunday with a hard chill and a headache. Still continues with the chills and just being cold. Denies fevers or other symptoms.

## 2023-05-22 RX ORDER — TAMSULOSIN HYDROCHLORIDE 0.4 MG/1
0.4 CAPSULE ORAL DAILY
COMMUNITY

## 2023-05-22 RX ORDER — POLYETHYLENE GLYCOL 3350 17 G/17G
17 POWDER, FOR SOLUTION ORAL DAILY PRN
COMMUNITY
Start: 2022-03-10

## 2023-05-22 RX ORDER — DULOXETIN HYDROCHLORIDE 30 MG/1
30 CAPSULE, DELAYED RELEASE ORAL DAILY
COMMUNITY
Start: 2022-01-18

## 2023-05-22 RX ORDER — AMLODIPINE BESYLATE 10 MG/1
5 TABLET ORAL DAILY
COMMUNITY
Start: 2022-04-05

## 2023-05-22 RX ORDER — OXYBUTYNIN CHLORIDE 5 MG/1
5 TABLET ORAL 2 TIMES DAILY
COMMUNITY

## 2023-05-22 RX ORDER — ALBUTEROL SULFATE 90 UG/1
2 AEROSOL, METERED RESPIRATORY (INHALATION) EVERY 4 HOURS PRN
COMMUNITY

## 2023-09-20 NOTE — PROGRESS NOTES
Bedside and Verbal shift change report given to Roya (oncoming nurse) by Len Canales (offgoing nurse). Report included the following information SBAR, Kardex, MAR and Recent Results. Writer called Pipestone County Lin Montelongo, RN  [facility]

## 2023-11-29 NOTE — Clinical Note
RN ordered vest through [employer], faxed order form to [phone]   Right radial clipped prepped with ChloraPrep and draped.

## 2023-12-12 ENCOUNTER — HOSPITAL ENCOUNTER (EMERGENCY)
Facility: HOSPITAL | Age: 73
Discharge: HOME | End: 2023-12-12
Attending: EMERGENCY MEDICINE
Payer: MEDICARE

## 2023-12-12 ENCOUNTER — APPOINTMENT (OUTPATIENT)
Facility: HOSPITAL | Age: 73
End: 2023-12-12
Payer: MEDICARE

## 2023-12-12 ENCOUNTER — HOSPITAL ENCOUNTER (EMERGENCY)
Facility: HOSPITAL | Age: 73
Discharge: HOME | End: 2023-12-14
Attending: EMERGENCY MEDICINE
Payer: MEDICARE

## 2023-12-12 VITALS
HEART RATE: 92 BPM | HEIGHT: 69 IN | SYSTOLIC BLOOD PRESSURE: 132 MMHG | OXYGEN SATURATION: 97 % | DIASTOLIC BLOOD PRESSURE: 68 MMHG | TEMPERATURE: 98.9 F | BODY MASS INDEX: 40.88 KG/M2 | WEIGHT: 276 LBS | RESPIRATION RATE: 15 BRPM

## 2023-12-12 DIAGNOSIS — R60.9 PERIPHERAL EDEMA: Primary | ICD-10-CM

## 2023-12-12 LAB
ALBUMIN SERPL-MCNC: 3.5 G/DL (ref 3.5–5)
ALBUMIN/GLOB SERPL: 0.9 (ref 1.1–2.2)
ALP SERPL-CCNC: 170 U/L (ref 45–117)
ALT SERPL-CCNC: 31 U/L (ref 12–78)
ANION GAP SERPL CALC-SCNC: 4 MMOL/L (ref 5–15)
AST SERPL W P-5'-P-CCNC: 36 U/L (ref 15–37)
BASOPHILS # BLD: 0 K/UL (ref 0–0.1)
BASOPHILS NFR BLD: 0 % (ref 0–1)
BILIRUB SERPL-MCNC: 0.6 MG/DL (ref 0.2–1)
BNP SERPL-MCNC: 184 PG/ML
BUN SERPL-MCNC: 16 MG/DL (ref 6–20)
BUN/CREAT SERPL: 15 (ref 12–20)
CA-I BLD-MCNC: 8.7 MG/DL (ref 8.5–10.1)
CHLORIDE SERPL-SCNC: 108 MMOL/L (ref 97–108)
CO2 SERPL-SCNC: 30 MMOL/L (ref 21–32)
CREAT SERPL-MCNC: 1.06 MG/DL (ref 0.55–1.02)
DIFFERENTIAL METHOD BLD: ABNORMAL
ECHO BSA: 2.47 M2
EKG ATRIAL RATE: 96 BPM
EKG DIAGNOSIS: NORMAL
EKG P AXIS: 58 DEGREES
EKG P-R INTERVAL: 182 MS
EKG Q-T INTERVAL: 328 MS
EKG QRS DURATION: 82 MS
EKG QTC CALCULATION (BAZETT): 414 MS
EKG R AXIS: -11 DEGREES
EKG T AXIS: 11 DEGREES
EKG VENTRICULAR RATE: 96 BPM
EOSINOPHIL # BLD: 0.1 K/UL (ref 0–0.4)
EOSINOPHIL NFR BLD: 1 % (ref 0–7)
ERYTHROCYTE [DISTWIDTH] IN BLOOD BY AUTOMATED COUNT: 16.8 % (ref 11.5–14.5)
FLUAV AG NPH QL IA: NEGATIVE
FLUBV AG NOSE QL IA: NEGATIVE
GLOBULIN SER CALC-MCNC: 3.7 G/DL (ref 2–4)
GLUCOSE SERPL-MCNC: 109 MG/DL (ref 65–100)
HCT VFR BLD AUTO: 31 % (ref 35–47)
HGB BLD-MCNC: 9.8 G/DL (ref 11.5–16)
IMM GRANULOCYTES # BLD AUTO: 0 K/UL (ref 0–0.04)
IMM GRANULOCYTES NFR BLD AUTO: 0 % (ref 0–0.5)
LYMPHOCYTES # BLD: 1.2 K/UL (ref 0.8–3.5)
LYMPHOCYTES NFR BLD: 17 % (ref 12–49)
MCH RBC QN AUTO: 29.6 PG (ref 26–34)
MCHC RBC AUTO-ENTMCNC: 31.6 G/DL (ref 30–36.5)
MCV RBC AUTO: 93.7 FL (ref 80–99)
MONOCYTES # BLD: 0.6 K/UL (ref 0–1)
MONOCYTES NFR BLD: 8 % (ref 5–13)
NEUTS SEG # BLD: 5.1 K/UL (ref 1.8–8)
NEUTS SEG NFR BLD: 74 % (ref 32–75)
NRBC # BLD: 0 K/UL (ref 0–0.01)
NRBC BLD-RTO: 0 PER 100 WBC
PLATELET # BLD AUTO: 159 K/UL (ref 150–400)
PMV BLD AUTO: 10.5 FL (ref 8.9–12.9)
POTASSIUM SERPL-SCNC: 4.1 MMOL/L (ref 3.5–5.1)
PROT SERPL-MCNC: 7.2 G/DL (ref 6.4–8.2)
RBC # BLD AUTO: 3.31 M/UL (ref 3.8–5.2)
SARS-COV-2 RDRP RESP QL NAA+PROBE: NOT DETECTED
SODIUM SERPL-SCNC: 142 MMOL/L (ref 136–145)
TROPONIN I SERPL HS-MCNC: 5 NG/L (ref 0–51)
TROPONIN I SERPL HS-MCNC: 5 NG/L (ref 0–51)
WBC # BLD AUTO: 6.9 K/UL (ref 3.6–11)

## 2023-12-12 PROCEDURE — 87804 INFLUENZA ASSAY W/OPTIC: CPT

## 2023-12-12 PROCEDURE — 93005 ELECTROCARDIOGRAM TRACING: CPT | Performed by: EMERGENCY MEDICINE

## 2023-12-12 PROCEDURE — 93970 EXTREMITY STUDY: CPT

## 2023-12-12 PROCEDURE — 80053 COMPREHEN METABOLIC PANEL: CPT

## 2023-12-12 PROCEDURE — 84484 ASSAY OF TROPONIN QUANT: CPT

## 2023-12-12 PROCEDURE — 36415 COLL VENOUS BLD VENIPUNCTURE: CPT

## 2023-12-12 PROCEDURE — 71275 CT ANGIOGRAPHY CHEST: CPT

## 2023-12-12 PROCEDURE — 85025 COMPLETE CBC W/AUTO DIFF WBC: CPT

## 2023-12-12 PROCEDURE — 6370000000 HC RX 637 (ALT 250 FOR IP): Performed by: EMERGENCY MEDICINE

## 2023-12-12 PROCEDURE — 71045 X-RAY EXAM CHEST 1 VIEW: CPT

## 2023-12-12 PROCEDURE — 83880 ASSAY OF NATRIURETIC PEPTIDE: CPT

## 2023-12-12 PROCEDURE — 87635 SARS-COV-2 COVID-19 AMP PRB: CPT

## 2023-12-12 PROCEDURE — 93970 EXTREMITY STUDY: CPT | Performed by: SURGERY

## 2023-12-12 PROCEDURE — 99285 EMERGENCY DEPT VISIT HI MDM: CPT

## 2023-12-12 PROCEDURE — 6360000004 HC RX CONTRAST MEDICATION: Performed by: EMERGENCY MEDICINE

## 2023-12-12 RX ORDER — FUROSEMIDE 40 MG/1
40 TABLET ORAL DAILY
Qty: 7 TABLET | Refills: 0 | Status: SHIPPED | OUTPATIENT
Start: 2023-12-12 | End: 2023-12-19

## 2023-12-12 RX ORDER — FUROSEMIDE 40 MG/1
40 TABLET ORAL
Status: COMPLETED | OUTPATIENT
Start: 2023-12-12 | End: 2023-12-12

## 2023-12-12 RX ADMIN — IOPAMIDOL 100 ML: 755 INJECTION, SOLUTION INTRAVENOUS at 13:18

## 2023-12-12 RX ADMIN — FUROSEMIDE 40 MG: 40 TABLET ORAL at 14:40

## 2023-12-12 ASSESSMENT — PAIN DESCRIPTION - LOCATION: LOCATION: CHEST

## 2023-12-12 ASSESSMENT — LIFESTYLE VARIABLES
HOW MANY STANDARD DRINKS CONTAINING ALCOHOL DO YOU HAVE ON A TYPICAL DAY: PATIENT DOES NOT DRINK
HOW OFTEN DO YOU HAVE A DRINK CONTAINING ALCOHOL: NEVER

## 2023-12-12 ASSESSMENT — PAIN - FUNCTIONAL ASSESSMENT
PAIN_FUNCTIONAL_ASSESSMENT: 0-10
PAIN_FUNCTIONAL_ASSESSMENT: NONE - DENIES PAIN

## 2023-12-12 ASSESSMENT — PAIN SCALES - GENERAL: PAINLEVEL_OUTOF10: 6

## 2023-12-12 NOTE — ED PROVIDER NOTES
31.6 30.0 - 36.5 g/dL    RDW 16.8 (H) 11.5 - 14.5 %    Platelets 763 881 - 290 K/uL    MPV 10.5 8.9 - 12.9 FL    Nucleated RBCs 0.0 0.0  WBC    nRBC 0.00 0.00 - 0.01 K/uL    Neutrophils % 74 32 - 75 %    Lymphocytes % 17 12 - 49 %    Monocytes % 8 5 - 13 %    Eosinophils % 1 0 - 7 %    Basophils % 0 0 - 1 %    Immature Granulocytes 0 0 - 0.5 %    Neutrophils Absolute 5.1 1.8 - 8.0 K/UL    Lymphocytes Absolute 1.2 0.8 - 3.5 K/UL    Monocytes Absolute 0.6 0.0 - 1.0 K/UL    Eosinophils Absolute 0.1 0.0 - 0.4 K/UL    Basophils Absolute 0.0 0.0 - 0.1 K/UL    Absolute Immature Granulocyte 0.0 0.00 - 0.04 K/UL    Differential Type AUTOMATED     Comprehensive Metabolic Panel    Collection Time: 12/12/23 11:03 AM   Result Value Ref Range    Sodium 142 136 - 145 mmol/L    Potassium 4.1 3.5 - 5.1 mmol/L    Chloride 108 97 - 108 mmol/L    CO2 30 21 - 32 mmol/L    Anion Gap 4 (L) 5 - 15 mmol/L    Glucose 109 (H) 65 - 100 mg/dL    BUN 16 6 - 20 mg/dL    Creatinine 1.06 (H) 0.55 - 1.02 mg/dL    Bun/Cre Ratio 15 12 - 20      Est, Glom Filt Rate 56 (L) >60 ml/min/1.73m2    Calcium 8.7 8.5 - 10.1 mg/dL    Total Bilirubin 0.6 0.2 - 1.0 mg/dL    AST 36 15 - 37 U/L    ALT 31 12 - 78 U/L    Alk Phosphatase 170 (H) 45 - 117 U/L    Total Protein 7.2 6.4 - 8.2 g/dL    Albumin 3.5 3.5 - 5.0 g/dL    Globulin 3.7 2.0 - 4.0 g/dL    Albumin/Globulin Ratio 0.9 (L) 1.1 - 2.2     Troponin    Collection Time: 12/12/23 11:03 AM   Result Value Ref Range    Troponin, High Sensitivity 5 0 - 51 ng/L   Brain Natriuretic Peptide    Collection Time: 12/12/23 11:03 AM   Result Value Ref Range    NT Pro- (H) <125 pg/mL   Troponin    Collection Time: 12/12/23 12:14 PM   Result Value Ref Range    Troponin, High Sensitivity 5 0 - 51 ng/L   Vascular duplex lower extremity venous bilateral    Collection Time: 12/12/23  1:48 PM   Result Value Ref Range    Body Surface Area 2.47 m2       EKG:. See ED Course Below    Radiologic Studies:  Non-plain film

## 2023-12-12 NOTE — DISCHARGE INSTRUCTIONS
received in the Emergency Department were for an urgent problem and are not intended as complete care. It is important that you follow-up with a doctor, nurse practitioner, or physician assistant to:  (1) confirm your diagnosis,  (2) re-evaluation of changes in your illness and treatment, and  (3) for ongoing care. Please take your discharge instructions with you when you go to your follow-up appointment. If you have any problem arranging a follow-up appointment, contact the Emergency Department. If your symptoms become worse or you do not improve as expected and you are unable to reach your health care provider, please return to the Emergency Department. We are available 24 hours a day. If a prescription has been provided, please have it filled as soon as possible to prevent a delay in treatment. If you have any questions or reservations about taking the medication due to side effects or interactions with other medications, please call your primary care provider or contact the ER.

## 2023-12-18 PROBLEM — R60.0 LEG EDEMA: Status: ACTIVE | Noted: 2023-12-18

## 2024-01-10 ENCOUNTER — APPOINTMENT (OUTPATIENT)
Facility: HOSPITAL | Age: 74
End: 2024-01-10
Attending: EMERGENCY MEDICINE
Payer: MEDICARE

## 2024-01-10 ENCOUNTER — HOSPITAL ENCOUNTER (EMERGENCY)
Facility: HOSPITAL | Age: 74
Discharge: HOME | End: 2024-01-10
Attending: EMERGENCY MEDICINE
Payer: MEDICARE

## 2024-01-10 ENCOUNTER — APPOINTMENT (OUTPATIENT)
Facility: HOSPITAL | Age: 74
End: 2024-01-10
Payer: MEDICARE

## 2024-01-10 VITALS
DIASTOLIC BLOOD PRESSURE: 70 MMHG | HEIGHT: 69 IN | HEART RATE: 81 BPM | RESPIRATION RATE: 21 BRPM | BODY MASS INDEX: 43.4 KG/M2 | SYSTOLIC BLOOD PRESSURE: 146 MMHG | TEMPERATURE: 98.3 F | OXYGEN SATURATION: 97 % | WEIGHT: 293 LBS

## 2024-01-10 DIAGNOSIS — R60.9 EDEMA, UNSPECIFIED TYPE: Primary | ICD-10-CM

## 2024-01-10 LAB
ALBUMIN SERPL-MCNC: 3.4 G/DL (ref 3.5–5)
ALBUMIN/GLOB SERPL: 0.8 (ref 1.1–2.2)
ALP SERPL-CCNC: 157 U/L (ref 45–117)
ALT SERPL-CCNC: 21 U/L (ref 12–78)
ANION GAP SERPL CALC-SCNC: 5 MMOL/L (ref 5–15)
AST SERPL W P-5'-P-CCNC: ABNORMAL U/L (ref 15–37)
BASOPHILS # BLD: 0 K/UL (ref 0–0.1)
BASOPHILS NFR BLD: 0 % (ref 0–1)
BILIRUB SERPL-MCNC: 0.5 MG/DL (ref 0.2–1)
BNP SERPL-MCNC: 137 PG/ML
BUN SERPL-MCNC: 19 MG/DL (ref 6–20)
BUN/CREAT SERPL: 16 (ref 12–20)
CA-I BLD-MCNC: 8.9 MG/DL (ref 8.5–10.1)
CHLORIDE SERPL-SCNC: 108 MMOL/L (ref 97–108)
CO2 SERPL-SCNC: 28 MMOL/L (ref 21–32)
CREAT SERPL-MCNC: 1.2 MG/DL (ref 0.55–1.02)
DIFFERENTIAL METHOD BLD: ABNORMAL
ECHO BSA: 2.6 M2
EKG ATRIAL RATE: 92 BPM
EKG DIAGNOSIS: NORMAL
EKG P AXIS: 51 DEGREES
EKG P-R INTERVAL: 182 MS
EKG Q-T INTERVAL: 348 MS
EKG QRS DURATION: 88 MS
EKG QTC CALCULATION (BAZETT): 430 MS
EKG R AXIS: -12 DEGREES
EKG T AXIS: 1 DEGREES
EKG VENTRICULAR RATE: 92 BPM
EOSINOPHIL # BLD: 0.1 K/UL (ref 0–0.4)
EOSINOPHIL NFR BLD: 3 % (ref 0–7)
ERYTHROCYTE [DISTWIDTH] IN BLOOD BY AUTOMATED COUNT: 17.3 % (ref 11.5–14.5)
GLOBULIN SER CALC-MCNC: 4.2 G/DL (ref 2–4)
GLUCOSE SERPL-MCNC: 90 MG/DL (ref 65–100)
HCT VFR BLD AUTO: 31.2 % (ref 35–47)
HGB BLD-MCNC: 9.8 G/DL (ref 11.5–16)
IMM GRANULOCYTES # BLD AUTO: 0 K/UL (ref 0–0.04)
IMM GRANULOCYTES NFR BLD AUTO: 0 % (ref 0–0.5)
LYMPHOCYTES # BLD: 1.3 K/UL (ref 0.8–3.5)
LYMPHOCYTES NFR BLD: 26 % (ref 12–49)
MAGNESIUM SERPL-MCNC: NORMAL MG/DL (ref 1.6–2.4)
MCH RBC QN AUTO: 29.3 PG (ref 26–34)
MCHC RBC AUTO-ENTMCNC: 31.4 G/DL (ref 30–36.5)
MCV RBC AUTO: 93.1 FL (ref 80–99)
MONOCYTES # BLD: 0.4 K/UL (ref 0–1)
MONOCYTES NFR BLD: 8 % (ref 5–13)
NEUTS SEG # BLD: 3.1 K/UL (ref 1.8–8)
NEUTS SEG NFR BLD: 63 % (ref 32–75)
NRBC # BLD: 0 K/UL (ref 0–0.01)
NRBC BLD-RTO: 0 PER 100 WBC
PLATELET # BLD AUTO: 200 K/UL (ref 150–400)
PMV BLD AUTO: 11.4 FL (ref 8.9–12.9)
POTASSIUM SERPL-SCNC: ABNORMAL MMOL/L (ref 3.5–5.1)
PROT SERPL-MCNC: 7.6 G/DL (ref 6.4–8.2)
RBC # BLD AUTO: 3.35 M/UL (ref 3.8–5.2)
SODIUM SERPL-SCNC: 141 MMOL/L (ref 136–145)
TROPONIN I SERPL HS-MCNC: <4 NG/L (ref 0–51)
WBC # BLD AUTO: 4.9 K/UL (ref 3.6–11)

## 2024-01-10 PROCEDURE — 6360000002 HC RX W HCPCS: Performed by: EMERGENCY MEDICINE

## 2024-01-10 PROCEDURE — 71275 CT ANGIOGRAPHY CHEST: CPT

## 2024-01-10 PROCEDURE — 71045 X-RAY EXAM CHEST 1 VIEW: CPT

## 2024-01-10 PROCEDURE — 83880 ASSAY OF NATRIURETIC PEPTIDE: CPT

## 2024-01-10 PROCEDURE — 80053 COMPREHEN METABOLIC PANEL: CPT

## 2024-01-10 PROCEDURE — 84484 ASSAY OF TROPONIN QUANT: CPT

## 2024-01-10 PROCEDURE — 99285 EMERGENCY DEPT VISIT HI MDM: CPT

## 2024-01-10 PROCEDURE — 6360000004 HC RX CONTRAST MEDICATION: Performed by: EMERGENCY MEDICINE

## 2024-01-10 PROCEDURE — 93970 EXTREMITY STUDY: CPT

## 2024-01-10 PROCEDURE — 85025 COMPLETE CBC W/AUTO DIFF WBC: CPT

## 2024-01-10 PROCEDURE — 96374 THER/PROPH/DIAG INJ IV PUSH: CPT

## 2024-01-10 PROCEDURE — 93005 ELECTROCARDIOGRAM TRACING: CPT | Performed by: EMERGENCY MEDICINE

## 2024-01-10 PROCEDURE — 83735 ASSAY OF MAGNESIUM: CPT

## 2024-01-10 RX ORDER — FUROSEMIDE 10 MG/ML
40 INJECTION INTRAMUSCULAR; INTRAVENOUS
Status: COMPLETED | OUTPATIENT
Start: 2024-01-10 | End: 2024-01-10

## 2024-01-10 RX ADMIN — IOPAMIDOL 100 ML: 755 INJECTION, SOLUTION INTRAVENOUS at 18:06

## 2024-01-10 RX ADMIN — FUROSEMIDE 40 MG: 10 INJECTION, SOLUTION INTRAMUSCULAR; INTRAVENOUS at 14:18

## 2024-01-10 ASSESSMENT — PAIN SCALES - GENERAL
PAINLEVEL_OUTOF10: 7
PAINLEVEL_OUTOF10: 0

## 2024-01-10 ASSESSMENT — PAIN DESCRIPTION - LOCATION: LOCATION: CHEST

## 2024-01-10 ASSESSMENT — PAIN - FUNCTIONAL ASSESSMENT: PAIN_FUNCTIONAL_ASSESSMENT: 0-10

## 2024-01-10 NOTE — ED TRIAGE NOTES
Pt was sent by [employer] to get iv diuretics. Bilat lower leg edema.   Alert oriented and ambulatory on arrival.     Pt has complaint of chest pain and shortness of breath.

## 2024-01-10 NOTE — CONSULTS
tablet, , Disp: , Rfl:     carvedilol (COREG) 25 MG tablet, , Disp: , Rfl:     atorvastatin (LIPITOR) 10 MG tablet, , Disp: , Rfl:     furosemide (LASIX) 40 MG tablet, , Disp: , Rfl:     gabapentin (NEURONTIN) 300 MG capsule, , Disp: , Rfl:     sertraline (ZOLOFT) 100 MG tablet, Take 1 tablet by mouth every morning, Disp: , Rfl:     solifenacin (VESICARE) 5 MG tablet, Take 1 tablet by mouth daily, Disp: , Rfl:      ALLERGIES:  Allergies reviewed with the patient,  Allergies   Allergen Reactions    Latex Hives     Other reaction(s): Unknown (comments)    Aspirin Hives     Other reaction(s): Unknown (comments)    Losartan      Other reaction(s): Unknown (comments)    Penicillins Hives     Other reaction(s): Unknown (comments)    .      FAMILY HISTORY:  Family history reviewed.        SOCIAL HISTORY:  Notable for no tobacco use, no heavy alcohol or illicit drug use.      REVIEW OF SYSTEMS:  Complete review of systems performed, pertinents noted above, all other systems are negative.    PHYSICAL EXAMINATION:    General:  Alert, oriented, in NAD  Cardiovascular:  RRR, No murmur  Respiratory:  Lungs are diminished  Abdomen:  Soft, nontender  Extremities: +2 lower extremity edema  Skin:  Dry, warm  Psych:  Normal affect      Vitals:    01/10/24 1511   BP: (!) 152/65   Pulse: 93   Resp: 20   Temp:    SpO2:        Recent labs results and imaging reviewed.     Discussed case with Dr. Long and our impression and recommendations are as follows:  Shortness of breath: Pending labs and CTA chest. Chest xray negative. Agree with IV diuresis. Echo completed this morning with preliminary preserved EF. Will obtain full report.   Hypertension: BP stable, resume home medications  Hyperlipidemia: Continue statin   Lymphedema: per primary     Thank you for involving us in the care of this patient.  Please do not hesitate to call me or Dr. Long if additional questions arise.    MEGHANN BOONE, APRN - NP  1/10/2024

## 2024-01-11 NOTE — DISCHARGE INSTRUCTIONS
Thank you!  Thank you for allowing me to care for you in the emergency department. It is my goal to provide you with excellent care. If you have not received excellent quality care, please ask to speak to the nurse manager. Please fill out the survey that will come to you by mail or email since we listen to your feedback!     Below you will find a list of your tests from today's visit.  Should you have any questions, please do not hesitate to call the emergency department.    Labs  Recent Results (from the past 12 hour(s))   CBC with Auto Differential    Collection Time: 01/10/24  2:22 PM   Result Value Ref Range    WBC 4.9 3.6 - 11.0 K/uL    RBC 3.35 (L) 3.80 - 5.20 M/uL    Hemoglobin 9.8 (L) 11.5 - 16.0 g/dL    Hematocrit 31.2 (L) 35.0 - 47.0 %    MCV 93.1 80.0 - 99.0 FL    MCH 29.3 26.0 - 34.0 PG    MCHC 31.4 30.0 - 36.5 g/dL    RDW 17.3 (H) 11.5 - 14.5 %    Platelets 200 150 - 400 K/uL    MPV 11.4 8.9 - 12.9 FL    Nucleated RBCs 0.0 0.0  WBC    nRBC 0.00 0.00 - 0.01 K/uL    Neutrophils % 63 32 - 75 %    Lymphocytes % 26 12 - 49 %    Monocytes % 8 5 - 13 %    Eosinophils % 3 0 - 7 %    Basophils % 0 0 - 1 %    Immature Granulocytes 0 0 - 0.5 %    Neutrophils Absolute 3.1 1.8 - 8.0 K/UL    Lymphocytes Absolute 1.3 0.8 - 3.5 K/UL    Monocytes Absolute 0.4 0.0 - 1.0 K/UL    Eosinophils Absolute 0.1 0.0 - 0.4 K/UL    Basophils Absolute 0.0 0.0 - 0.1 K/UL    Absolute Immature Granulocyte 0.0 0.00 - 0.04 K/UL    Differential Type AUTOMATED     Comprehensive Metabolic Panel    Collection Time: 01/10/24  2:22 PM   Result Value Ref Range    Sodium 141 136 - 145 mmol/L    Potassium Hemolyzed, Recollection Recommended 3.5 - 5.1 mmol/L    Chloride 108 97 - 108 mmol/L    CO2 28 21 - 32 mmol/L    Anion Gap 5 5 - 15 mmol/L    Glucose 90 65 - 100 mg/dL    BUN 19 6 - 20 mg/dL    Creatinine 1.20 (H) 0.55 - 1.02 mg/dL    Bun/Cre Ratio 16 12 - 20      Est, Glom Filt Rate 48 (L) >60 ml/min/1.73m2    Calcium 8.9 8.5 - 10.1

## 2024-01-11 NOTE — ED PROVIDER NOTES
recognition software. Quite often unanticipated grammatical, syntax, homophones, and other interpretive errors are inadvertently transcribed by the computer software. Please disregards these errors. Please excuse any errors that have escaped final proofreading.)     Sheldon Hall,   01/11/24 0922

## 2024-01-30 ENCOUNTER — TRANSCRIBE ORDERS (OUTPATIENT)
Facility: HOSPITAL | Age: 74
End: 2024-01-30

## 2024-01-30 DIAGNOSIS — K76.89 FLOATING LIVER: Primary | ICD-10-CM

## 2024-02-13 ENCOUNTER — TRANSCRIBE ORDERS (OUTPATIENT)
Facility: HOSPITAL | Age: 74
End: 2024-02-13

## 2024-02-13 DIAGNOSIS — R10.30 ABDOMINAL PAIN, LOWER: Primary | ICD-10-CM

## 2024-02-16 ENCOUNTER — HOSPITAL ENCOUNTER (OUTPATIENT)
Facility: HOSPITAL | Age: 74
End: 2024-02-16
Payer: MEDICARE

## 2024-02-16 DIAGNOSIS — R10.30 ABDOMINAL PAIN, LOWER: ICD-10-CM

## 2024-02-16 PROCEDURE — 6360000004 HC RX CONTRAST MEDICATION: Performed by: FAMILY MEDICINE

## 2024-02-16 PROCEDURE — 74183 MRI ABD W/O CNTR FLWD CNTR: CPT

## 2024-02-16 PROCEDURE — A9577 INJ MULTIHANCE: HCPCS | Performed by: FAMILY MEDICINE

## 2024-02-16 RX ADMIN — GADOBENATE DIMEGLUMINE 20 ML: 529 INJECTION, SOLUTION INTRAVENOUS at 10:37

## 2024-03-15 ENCOUNTER — TRANSCRIBE ORDERS (OUTPATIENT)
Facility: HOSPITAL | Age: 74
End: 2024-03-15

## 2024-03-15 DIAGNOSIS — I42.9 PRIMARY CARDIOMYOPATHY (HCC): Primary | ICD-10-CM

## 2024-03-15 DIAGNOSIS — E78.5 HYPERLIPIDEMIA, UNSPECIFIED HYPERLIPIDEMIA TYPE: ICD-10-CM

## 2024-03-15 DIAGNOSIS — I25.119 ATHEROSCLEROSIS OF NATIVE CORONARY ARTERY WITH ANGINA PECTORIS, UNSPECIFIED WHETHER NATIVE OR TRANSPLANTED HEART (HCC): ICD-10-CM

## 2024-04-07 ENCOUNTER — HOSPITAL ENCOUNTER (EMERGENCY)
Facility: HOSPITAL | Age: 74
Discharge: HOME | End: 2024-04-07
Payer: MEDICARE

## 2024-04-07 ENCOUNTER — APPOINTMENT (OUTPATIENT)
Facility: HOSPITAL | Age: 74
End: 2024-04-07
Payer: MEDICARE

## 2024-04-07 VITALS
OXYGEN SATURATION: 99 % | RESPIRATION RATE: 16 BRPM | WEIGHT: 293 LBS | DIASTOLIC BLOOD PRESSURE: 68 MMHG | HEIGHT: 68 IN | SYSTOLIC BLOOD PRESSURE: 136 MMHG | TEMPERATURE: 98.6 F | BODY MASS INDEX: 44.41 KG/M2 | HEART RATE: 89 BPM

## 2024-04-07 DIAGNOSIS — I51.9 LEFT VENTRICULAR DIASTOLIC DYSFUNCTION: ICD-10-CM

## 2024-04-07 DIAGNOSIS — R60.9 2+ PITTING EDEMA: ICD-10-CM

## 2024-04-07 DIAGNOSIS — I25.10 CORONARY ARTERY DISEASE INVOLVING NATIVE HEART WITHOUT ANGINA PECTORIS, UNSPECIFIED VESSEL OR LESION TYPE: Primary | ICD-10-CM

## 2024-04-07 LAB
ANION GAP SERPL CALC-SCNC: 8 MMOL/L (ref 5–15)
BASOPHILS # BLD: 0 K/UL (ref 0–0.1)
BASOPHILS NFR BLD: 1 % (ref 0–1)
BNP SERPL-MCNC: 184 PG/ML
BUN SERPL-MCNC: 12 MG/DL (ref 6–20)
BUN/CREAT SERPL: 12 (ref 12–20)
CA-I BLD-MCNC: 8.8 MG/DL (ref 8.5–10.1)
CHLORIDE SERPL-SCNC: 106 MMOL/L (ref 97–108)
CO2 SERPL-SCNC: 33 MMOL/L (ref 21–32)
CREAT SERPL-MCNC: 1.03 MG/DL (ref 0.55–1.02)
DIFFERENTIAL METHOD BLD: ABNORMAL
EKG ATRIAL RATE: 78 BPM
EKG DIAGNOSIS: NORMAL
EKG P AXIS: 56 DEGREES
EKG P-R INTERVAL: 192 MS
EKG Q-T INTERVAL: 380 MS
EKG QRS DURATION: 82 MS
EKG QTC CALCULATION (BAZETT): 433 MS
EKG R AXIS: -12 DEGREES
EKG T AXIS: -7 DEGREES
EKG VENTRICULAR RATE: 78 BPM
EOSINOPHIL # BLD: 0.2 K/UL (ref 0–0.4)
EOSINOPHIL NFR BLD: 4 % (ref 0–7)
ERYTHROCYTE [DISTWIDTH] IN BLOOD BY AUTOMATED COUNT: 18.7 % (ref 11.5–14.5)
GLUCOSE SERPL-MCNC: 119 MG/DL (ref 65–100)
HCT VFR BLD AUTO: 31 % (ref 35–47)
HGB BLD-MCNC: 9.6 G/DL (ref 11.5–16)
IMM GRANULOCYTES # BLD AUTO: 0 K/UL (ref 0–0.04)
IMM GRANULOCYTES NFR BLD AUTO: 0 % (ref 0–0.5)
LYMPHOCYTES # BLD: 1.1 K/UL (ref 0.8–3.5)
LYMPHOCYTES NFR BLD: 24 % (ref 12–49)
MCH RBC QN AUTO: 28.6 PG (ref 26–34)
MCHC RBC AUTO-ENTMCNC: 31 G/DL (ref 30–36.5)
MCV RBC AUTO: 92.3 FL (ref 80–99)
MONOCYTES # BLD: 0.3 K/UL (ref 0–1)
MONOCYTES NFR BLD: 7 % (ref 5–13)
NEUTS SEG # BLD: 2.8 K/UL (ref 1.8–8)
NEUTS SEG NFR BLD: 64 % (ref 32–75)
PLATELET # BLD AUTO: 212 K/UL (ref 150–400)
PMV BLD AUTO: 11.6 FL (ref 8.9–12.9)
POTASSIUM SERPL-SCNC: 4.1 MMOL/L (ref 3.5–5.1)
RBC # BLD AUTO: 3.36 M/UL (ref 3.8–5.2)
SODIUM SERPL-SCNC: 147 MMOL/L (ref 136–145)
TROPONIN I SERPL HS-MCNC: <4 NG/L (ref 0–51)
WBC # BLD AUTO: 4.3 K/UL (ref 3.6–11)

## 2024-04-07 PROCEDURE — 71045 X-RAY EXAM CHEST 1 VIEW: CPT

## 2024-04-07 PROCEDURE — 36415 COLL VENOUS BLD VENIPUNCTURE: CPT

## 2024-04-07 PROCEDURE — 96374 THER/PROPH/DIAG INJ IV PUSH: CPT

## 2024-04-07 PROCEDURE — 99285 EMERGENCY DEPT VISIT HI MDM: CPT

## 2024-04-07 PROCEDURE — 6360000002 HC RX W HCPCS: Performed by: NURSE PRACTITIONER

## 2024-04-07 PROCEDURE — 80048 BASIC METABOLIC PNL TOTAL CA: CPT

## 2024-04-07 PROCEDURE — 93005 ELECTROCARDIOGRAM TRACING: CPT | Performed by: NURSE PRACTITIONER

## 2024-04-07 PROCEDURE — 85025 COMPLETE CBC W/AUTO DIFF WBC: CPT

## 2024-04-07 PROCEDURE — 84484 ASSAY OF TROPONIN QUANT: CPT

## 2024-04-07 PROCEDURE — 83880 ASSAY OF NATRIURETIC PEPTIDE: CPT

## 2024-04-07 RX ORDER — BUMETANIDE 1 MG/1
1 TABLET ORAL DAILY
Qty: 30 TABLET | Refills: 0 | Status: SHIPPED | OUTPATIENT
Start: 2024-04-07

## 2024-04-07 RX ORDER — BUMETANIDE 0.25 MG/ML
2 INJECTION INTRAMUSCULAR; INTRAVENOUS
Status: COMPLETED | OUTPATIENT
Start: 2024-04-07 | End: 2024-04-07

## 2024-04-07 RX ORDER — SPIRONOLACTONE 25 MG/1
25 TABLET ORAL DAILY
Qty: 30 TABLET | Refills: 0 | Status: SHIPPED | OUTPATIENT
Start: 2024-04-07 | End: 2024-05-07

## 2024-04-07 RX ADMIN — BUMETANIDE 2 MG: 0.25 INJECTION INTRAMUSCULAR; INTRAVENOUS at 14:46

## 2024-04-07 ASSESSMENT — PAIN - FUNCTIONAL ASSESSMENT: PAIN_FUNCTIONAL_ASSESSMENT: NONE - DENIES PAIN

## 2024-04-07 NOTE — ED PROVIDER NOTES
Skin is warm.   Neurological:      General: No focal deficit present.      Mental Status: She is alert. Mental status is at baseline.      Motor: Weakness present.      Gait: Gait abnormal (due to pain in legs.).   Psychiatric:         Mood and Affect: Mood normal.         Behavior: Behavior normal.       EKG today Sinus rhythm rate 78. +flipped twave in lead V1.       SCREENINGS          Venous Dopplers done 01/10/2024    Left Lower Venous    No evidence of acute deep vein and superficial thrombosis in the left lower extremity.     Middle Femoral Vein: Not well visualized.   Distal Femoral Vein: Not well visualized.   Posterior Tibial Vein: Not well visualized.   Peroneal Vein: Not well visualized.       Vascular duplex 12/2023    No evidence of acute deep vein thrombosis in the right lower extremity. No evidence of deep vein thrombosis in the right lower extremity. Vessels demonstrate normal compressibility, color filling, and phasic and spontaneous flow.    No evidence of acute deep vein thrombosis in the left lower extremity. No evidence of deep vein thrombosis in the left lower extremity. Vessels demonstrate normal compressibility, color filling, and phasic and spontaneous flow.     This is vascular lab report on [facility], patient's YOB: 1950.  Date of examination: December 12, 2023  Examination: Bilateral lower extremity venous duplex on examination.  Technician: Ms. Malissa Carrillo  Clinical history: Patient is 73 years old woman with right thigh edema, left leg pain and edema  Indication: Right thigh edema, left leg pain and edema  Technique: Bilateral leg venous structures examined using venous duplex ultrasound with 2D grayscale, color-flow and spectral Doppler analysis.     Findings:  Right side:  common femoral vein is compressible, there is no filling defect.  common femoral vein augments.  Proximal femoral vein is compressible, there is no filling defect.  Mid femoral vein

## 2024-04-07 NOTE — DISCHARGE INSTRUCTIONS
Starting spironolactone daily to work with your bumex for swelling.   Elevate your legs.   Compression if possible. To improve circulation.   Limit fluid intake to 2000ml or less.   Monitor your sodium intake and keep below 2000mg daily.

## 2024-04-07 NOTE — ED TRIAGE NOTES
Rash on bilateral legs, worse on right leg. Oozing and painful for about a month, worse since yesterday. Oozing on back of right calf and BLE+3 edema

## 2024-04-08 ENCOUNTER — HOSPITAL ENCOUNTER (OUTPATIENT)
Facility: HOSPITAL | Age: 74
Discharge: HOME | End: 2024-04-11
Payer: MEDICARE

## 2024-04-08 ENCOUNTER — HOSPITAL ENCOUNTER (OUTPATIENT)
Facility: HOSPITAL | Age: 74
Discharge: HOME | End: 2024-04-10
Payer: MEDICARE

## 2024-04-08 VITALS
HEIGHT: 68 IN | HEART RATE: 82 BPM | DIASTOLIC BLOOD PRESSURE: 69 MMHG | SYSTOLIC BLOOD PRESSURE: 129 MMHG | BODY MASS INDEX: 44.41 KG/M2 | WEIGHT: 293 LBS

## 2024-04-08 DIAGNOSIS — I25.119 ATHEROSCLEROSIS OF NATIVE CORONARY ARTERY WITH ANGINA PECTORIS, UNSPECIFIED WHETHER NATIVE OR TRANSPLANTED HEART (HCC): ICD-10-CM

## 2024-04-08 DIAGNOSIS — I42.9 PRIMARY CARDIOMYOPATHY (HCC): ICD-10-CM

## 2024-04-08 DIAGNOSIS — E78.5 HYPERLIPIDEMIA, UNSPECIFIED HYPERLIPIDEMIA TYPE: ICD-10-CM

## 2024-04-08 PROCEDURE — 6360000002 HC RX W HCPCS

## 2024-04-08 PROCEDURE — 93017 CV STRESS TEST TRACING ONLY: CPT

## 2024-04-08 PROCEDURE — A9500 TC99M SESTAMIBI: HCPCS | Performed by: INTERNAL MEDICINE

## 2024-04-08 PROCEDURE — 3430000000 HC RX DIAGNOSTIC RADIOPHARMACEUTICAL: Performed by: INTERNAL MEDICINE

## 2024-04-08 RX ORDER — TETRAKIS(2-METHOXYISOBUTYLISOCYANIDE)COPPER(I) TETRAFLUOROBORATE 1 MG/ML
31.5 INJECTION, POWDER, LYOPHILIZED, FOR SOLUTION INTRAVENOUS
Status: COMPLETED | OUTPATIENT
Start: 2024-04-08 | End: 2024-04-08

## 2024-04-08 RX ORDER — REGADENOSON 0.08 MG/ML
INJECTION, SOLUTION INTRAVENOUS
Status: COMPLETED
Start: 2024-04-08 | End: 2024-04-08

## 2024-04-08 RX ADMIN — REGADENOSON 0.4 MG: 0.08 INJECTION, SOLUTION INTRAVENOUS at 09:04

## 2024-04-08 RX ADMIN — TETRAKIS(2-METHOXYISOBUTYLISOCYANIDE)COPPER(I) TETRAFLUOROBORATE 31.5 MILLICURIE: 1 INJECTION, POWDER, LYOPHILIZED, FOR SOLUTION INTRAVENOUS at 09:05

## 2024-04-09 ENCOUNTER — HOSPITAL ENCOUNTER (OUTPATIENT)
Facility: HOSPITAL | Age: 74
Discharge: HOME | End: 2024-04-12
Payer: MEDICARE

## 2024-04-09 PROCEDURE — 3430000000 HC RX DIAGNOSTIC RADIOPHARMACEUTICAL: Performed by: INTERNAL MEDICINE

## 2024-04-09 PROCEDURE — A9500 TC99M SESTAMIBI: HCPCS | Performed by: INTERNAL MEDICINE

## 2024-04-09 RX ORDER — TETRAKIS(2-METHOXYISOBUTYLISOCYANIDE)COPPER(I) TETRAFLUOROBORATE 1 MG/ML
29.7 INJECTION, POWDER, LYOPHILIZED, FOR SOLUTION INTRAVENOUS
Status: COMPLETED | OUTPATIENT
Start: 2024-04-09 | End: 2024-04-09

## 2024-04-09 RX ADMIN — TETRAKIS(2-METHOXYISOBUTYLISOCYANIDE)COPPER(I) TETRAFLUOROBORATE 29.7 MILLICURIE: 1 INJECTION, POWDER, LYOPHILIZED, FOR SOLUTION INTRAVENOUS at 10:30

## 2024-04-12 LAB
ECHO BSA: 2.54 M2
NUC STRESS EJECTION FRACTION: 77 %
STRESS BASELINE DIAS BP: 69 MMHG
STRESS BASELINE HR: 82 BPM
STRESS BASELINE SYS BP: 129 MMHG
STRESS PERCENT HR ACHIEVED: 63 %
STRESS POST PEAK HR: 94 BPM
STRESS STAGE 1 BP: NORMAL MMHG
STRESS STAGE 1 DURATION: NORMAL MIN:SEC
STRESS STAGE 1 HR: 89 BPM
STRESS STAGE 2 DURATION: NORMAL MIN:SEC
STRESS STAGE 2 HR: 85 BPM
STRESS STAGE 3 BP: NORMAL MMHG
STRESS STAGE 3 DURATION: NORMAL MIN:SEC
STRESS STAGE 3 HR: 85 BPM
STRESS STAGE 4 DURATION: NORMAL MIN:SEC
STRESS STAGE 4 HR: 85 BPM
STRESS TARGET HR: 149 BPM

## 2024-06-07 ENCOUNTER — TRANSCRIBE ORDERS (OUTPATIENT)
Facility: HOSPITAL | Age: 74
End: 2024-06-07

## 2024-06-07 DIAGNOSIS — I89.0 LYMPHEDEMA: Primary | ICD-10-CM

## 2024-06-14 ENCOUNTER — HOSPITAL ENCOUNTER (EMERGENCY)
Facility: HOSPITAL | Age: 74
Discharge: HOME | End: 2024-06-14
Payer: MEDICARE

## 2024-06-14 VITALS
BODY MASS INDEX: 44.25 KG/M2 | RESPIRATION RATE: 18 BRPM | TEMPERATURE: 98.1 F | WEIGHT: 292 LBS | OXYGEN SATURATION: 99 % | DIASTOLIC BLOOD PRESSURE: 76 MMHG | HEIGHT: 68 IN | SYSTOLIC BLOOD PRESSURE: 147 MMHG | HEART RATE: 75 BPM

## 2024-06-14 DIAGNOSIS — R21 RASH AND OTHER NONSPECIFIC SKIN ERUPTION: Primary | ICD-10-CM

## 2024-06-14 PROCEDURE — 99283 EMERGENCY DEPT VISIT LOW MDM: CPT

## 2024-06-14 RX ORDER — DOXYCYCLINE HYCLATE 100 MG
100 TABLET ORAL 2 TIMES DAILY
Qty: 20 TABLET | Refills: 0 | Status: SHIPPED | OUTPATIENT
Start: 2024-06-14 | End: 2024-06-24

## 2024-06-14 RX ORDER — BETAMETHASONE DIPROPIONATE 0.5 MG/G
CREAM TOPICAL
Qty: 15 G | Refills: 0 | Status: SHIPPED | OUTPATIENT
Start: 2024-06-14 | End: 2024-07-14

## 2024-06-14 ASSESSMENT — PAIN - FUNCTIONAL ASSESSMENT: PAIN_FUNCTIONAL_ASSESSMENT: 0-10

## 2024-06-14 ASSESSMENT — LIFESTYLE VARIABLES
HOW OFTEN DO YOU HAVE A DRINK CONTAINING ALCOHOL: NEVER
HOW MANY STANDARD DRINKS CONTAINING ALCOHOL DO YOU HAVE ON A TYPICAL DAY: PATIENT DOES NOT DRINK

## 2024-06-14 ASSESSMENT — PAIN SCALES - GENERAL: PAINLEVEL_OUTOF10: 8

## 2024-06-14 ASSESSMENT — PAIN DESCRIPTION - LOCATION: LOCATION: LEG

## 2024-06-14 ASSESSMENT — PAIN DESCRIPTION - ORIENTATION: ORIENTATION: LEFT;RIGHT;LOWER

## 2024-06-14 NOTE — ED TRIAGE NOTES
Itching and painful rash/blisters to bilateral lower legs. Current blisters started Sunday.     Has been seen by PCP for other rash/area on lower leg.

## 2024-06-14 NOTE — ED PROVIDER NOTES
[facility] EMERGENCY DEPT  EMERGENCY DEPARTMENT HISTORY AND PHYSICAL EXAM      Date: 6/14/2024  Patient Name: Yahaira Song  MRN: [id number]  YOB: 1950  Date of evaluation: 6/14/2024  Provider: RUDY Hassan NP   Note Started: 12:46 PM EDT 6/14/24    HISTORY OF PRESENT ILLNESS     Chief Complaint   Patient presents with    Rash       History Provided By: Patient    HPI: Yahaira Song is a 73 y.o. female Patient presents with a 5 day history of rash.    Location: bilateral lower legs  Description: red, raised, pustular, hypopigmented around bilateral lower legs  Mucus membrane involvement: no  Palms and soles involvement: no  Prior episodes: yes  Painful: burning  Itching: yes  Sick contacts: no  Fever: no  Medicines prior to presentation: Vaseline    Patient denies any nausea, vomiting, fevers, chills, runny nose, sore throat, headache, or any other symptoms.      PAST MEDICAL HISTORY   Past Medical History:  Past Medical History:   Diagnosis Date    Arthritis 7/7/2020    Asthma 7/7/2020    Hypertension 7/7/2020    Kidney stones     Liver cyst        Past Surgical History:  Past Surgical History:   Procedure Laterality Date    UROLOGICAL SURGERY      Lithotripsy       Family History:  No family history on file.    Social History:  Social History     Tobacco Use    Smoking status: Never    Smokeless tobacco: Never   Vaping Use    Vaping Use: Never used   Substance Use Topics    Alcohol use: Never    Drug use: Never       Allergies:  Allergies   Allergen Reactions    Latex Hives     Other reaction(s): Unknown (comments)    Aspirin Hives     Other reaction(s): Unknown (comments)    Losartan      Other reaction(s): Unknown (comments)    Penicillins Hives     Other reaction(s): Unknown (comments)       PCP: Ioana Perez MD    Current Meds:   No current facility-administered medications for this encounter.     Current Outpatient Medications   Medication Sig Dispense Refill    doxycycline hyclate  known as: Lasix  Take 1 tablet by mouth daily for 7 days     gabapentin 300 MG capsule  Commonly known as: NEURONTIN     oxyBUTYnin 5 MG tablet  Commonly known as: DITROPAN     polyethylene glycol 17 GM/SCOOP powder  Commonly known as: GLYCOLAX     sertraline 100 MG tablet  Commonly known as: ZOLOFT     solifenacin 5 MG tablet  Commonly known as: VESICARE     spironolactone 25 MG tablet  Commonly known as: ALDACTONE  Take 1 tablet by mouth daily     tamsulosin 0.4 MG capsule  Commonly known as: FLOMAX           * This list has 2 medication(s) that are the same as other medications prescribed for you. Read the directions carefully, and ask your doctor or other care provider to review them with you.                   Where to Get Your Medications        These medications were sent to [id number] [place], [place] - [address] - P [phone] - F [phone] [address] [address]      Phone: [phone]   augmented betamethasone dipropionate 0.05 % cream  doxycycline hyclate 100 MG tablet           DISCONTINUED MEDICATIONS:  Current Discharge Medication List          I am the Primary Clinician of Record: RUDY Hassan NP (electronically signed)    (Please note that parts of this dictation were completed with voice recognition software. Quite often unanticipated grammatical, syntax, homophones, and other interpretive errors are inadvertently transcribed by the computer software. Please disregards these errors. Please excuse any errors that have escaped final proofreading.)     Peggy Salazar APRN - NP  06/14/24 2066

## 2024-07-18 ENCOUNTER — HOSPITAL ENCOUNTER (OUTPATIENT)
Facility: HOSPITAL | Age: 74
Setting detail: RECURRING SERIES
Discharge: HOME | End: 2024-07-21
Payer: MEDICARE

## 2024-07-18 PROCEDURE — 97167 OT EVAL HIGH COMPLEX 60 MIN: CPT

## 2024-07-18 PROCEDURE — 97535 SELF CARE MNGMENT TRAINING: CPT

## 2024-07-19 ENCOUNTER — OFFICE VISIT (OUTPATIENT)
Age: 74
End: 2024-07-19
Payer: MEDICARE

## 2024-07-19 VITALS
WEIGHT: 293 LBS | OXYGEN SATURATION: 94 % | DIASTOLIC BLOOD PRESSURE: 60 MMHG | TEMPERATURE: 98.1 F | SYSTOLIC BLOOD PRESSURE: 134 MMHG | BODY MASS INDEX: 44.41 KG/M2 | HEIGHT: 68 IN | HEART RATE: 88 BPM

## 2024-07-19 DIAGNOSIS — R97.8 OTHER ABNORMAL TUMOR MARKERS: ICD-10-CM

## 2024-07-19 DIAGNOSIS — R74.8 ELEVATED LIVER ENZYMES: ICD-10-CM

## 2024-07-19 DIAGNOSIS — N18.6 END STAGE RENAL DISEASE (HCC): ICD-10-CM

## 2024-07-19 DIAGNOSIS — R16.0 LIVER MASS: Primary | ICD-10-CM

## 2024-07-19 LAB
ALBUMIN SERPL-MCNC: 3.5 G/DL (ref 3.5–5)
ALBUMIN/GLOB SERPL: 1.1 (ref 1.1–2.2)
ALP SERPL-CCNC: 120 U/L (ref 45–117)
ALT SERPL-CCNC: 12 U/L (ref 12–78)
ANION GAP SERPL CALC-SCNC: 5 MMOL/L (ref 5–15)
AST SERPL-CCNC: 11 U/L (ref 15–37)
BASOPHILS # BLD: 0 K/UL (ref 0–0.1)
BASOPHILS NFR BLD: 0 % (ref 0–1)
BILIRUB DIRECT SERPL-MCNC: 0.1 MG/DL (ref 0–0.2)
BILIRUB SERPL-MCNC: 0.4 MG/DL (ref 0.2–1)
BUN SERPL-MCNC: 14 MG/DL (ref 6–20)
BUN/CREAT SERPL: 14 (ref 12–20)
CALCIUM SERPL-MCNC: 8.8 MG/DL (ref 8.5–10.1)
CEA SERPL-MCNC: 1.7 NG/ML
CHLORIDE SERPL-SCNC: 110 MMOL/L (ref 97–108)
CO2 SERPL-SCNC: 29 MMOL/L (ref 21–32)
COMMENT:: NORMAL
CREAT SERPL-MCNC: 0.99 MG/DL (ref 0.55–1.02)
DIFFERENTIAL METHOD BLD: ABNORMAL
EOSINOPHIL # BLD: 0.1 K/UL (ref 0–0.4)
EOSINOPHIL NFR BLD: 3 % (ref 0–7)
ERYTHROCYTE [DISTWIDTH] IN BLOOD BY AUTOMATED COUNT: 17.5 % (ref 11.5–14.5)
FERRITIN SERPL-MCNC: 153 NG/ML (ref 26–388)
GLOBULIN SER CALC-MCNC: 3.3 G/DL (ref 2–4)
GLUCOSE SERPL-MCNC: 83 MG/DL (ref 65–100)
HBV SURFACE AB SER QL: REACTIVE
HBV SURFACE AB SER-ACNC: 18.37 MIU/ML
HBV SURFACE AG SER QL: <0.1 INDEX
HBV SURFACE AG SER QL: NEGATIVE
HCT VFR BLD AUTO: 30.2 % (ref 35–47)
HGB BLD-MCNC: 9.4 G/DL (ref 11.5–16)
IMM GRANULOCYTES # BLD AUTO: 0 K/UL (ref 0–0.04)
IMM GRANULOCYTES NFR BLD AUTO: 0 % (ref 0–0.5)
IRON SATN MFR SERPL: 19 % (ref 20–50)
IRON SERPL-MCNC: 42 UG/DL (ref 35–150)
LYMPHOCYTES # BLD: 1 K/UL (ref 0.8–3.5)
LYMPHOCYTES NFR BLD: 31 % (ref 12–49)
MCH RBC QN AUTO: 28.7 PG (ref 26–34)
MCHC RBC AUTO-ENTMCNC: 31.1 G/DL (ref 30–36.5)
MCV RBC AUTO: 92.1 FL (ref 80–99)
MONOCYTES # BLD: 0.2 K/UL (ref 0–1)
MONOCYTES NFR BLD: 8 % (ref 5–13)
NEUTS SEG # BLD: 1.8 K/UL (ref 1.8–8)
NEUTS SEG NFR BLD: 58 % (ref 32–75)
NRBC # BLD: 0 K/UL (ref 0–0.01)
NRBC BLD-RTO: 0 PER 100 WBC
PLATELET # BLD AUTO: 146 K/UL (ref 150–400)
PMV BLD AUTO: 11.7 FL (ref 8.9–12.9)
POTASSIUM SERPL-SCNC: 3.7 MMOL/L (ref 3.5–5.1)
PROT SERPL-MCNC: 6.8 G/DL (ref 6.4–8.2)
RBC # BLD AUTO: 3.28 M/UL (ref 3.8–5.2)
SODIUM SERPL-SCNC: 144 MMOL/L (ref 136–145)
SPECIMEN HOLD: NORMAL
TIBC SERPL-MCNC: 219 UG/DL (ref 250–450)
WBC # BLD AUTO: 3.2 K/UL (ref 3.6–11)

## 2024-07-19 PROCEDURE — 3075F SYST BP GE 130 - 139MM HG: CPT | Performed by: NURSE PRACTITIONER

## 2024-07-19 PROCEDURE — 3017F COLORECTAL CA SCREEN DOC REV: CPT | Performed by: NURSE PRACTITIONER

## 2024-07-19 PROCEDURE — 1123F ACP DISCUSS/DSCN MKR DOCD: CPT | Performed by: NURSE PRACTITIONER

## 2024-07-19 PROCEDURE — 1090F PRES/ABSN URINE INCON ASSESS: CPT | Performed by: NURSE PRACTITIONER

## 2024-07-19 PROCEDURE — 3078F DIAST BP <80 MM HG: CPT | Performed by: NURSE PRACTITIONER

## 2024-07-19 PROCEDURE — G8417 CALC BMI ABV UP PARAM F/U: HCPCS | Performed by: NURSE PRACTITIONER

## 2024-07-19 PROCEDURE — G8400 PT W/DXA NO RESULTS DOC: HCPCS | Performed by: NURSE PRACTITIONER

## 2024-07-19 PROCEDURE — G8427 DOCREV CUR MEDS BY ELIG CLIN: HCPCS | Performed by: NURSE PRACTITIONER

## 2024-07-19 PROCEDURE — 99204 OFFICE O/P NEW MOD 45 MIN: CPT | Performed by: NURSE PRACTITIONER

## 2024-07-19 PROCEDURE — 1036F TOBACCO NON-USER: CPT | Performed by: NURSE PRACTITIONER

## 2024-07-19 ASSESSMENT — PATIENT HEALTH QUESTIONNAIRE - PHQ9
DEPRESSION UNABLE TO ASSESS: FUNCTIONAL CAPACITY MOTIVATION LIMITS ACCURACY
SUM OF ALL RESPONSES TO PHQ9 QUESTIONS 1 & 2: 0
2. FEELING DOWN, DEPRESSED OR HOPELESS: NOT AT ALL
SUM OF ALL RESPONSES TO PHQ QUESTIONS 1-9: 0
1. LITTLE INTEREST OR PLEASURE IN DOING THINGS: NOT AT ALL
SUM OF ALL RESPONSES TO PHQ QUESTIONS 1-9: 0

## 2024-07-19 ASSESSMENT — ANXIETY QUESTIONNAIRES
GAD7 TOTAL SCORE: 0
5. BEING SO RESTLESS THAT IT IS HARD TO SIT STILL: NOT AT ALL
6. BECOMING EASILY ANNOYED OR IRRITABLE: NOT AT ALL
4. TROUBLE RELAXING: NOT AT ALL
7. FEELING AFRAID AS IF SOMETHING AWFUL MIGHT HAPPEN: NOT AT ALL
3. WORRYING TOO MUCH ABOUT DIFFERENT THINGS: NOT AT ALL
2. NOT BEING ABLE TO STOP OR CONTROL WORRYING: NOT AT ALL
IF YOU CHECKED OFF ANY PROBLEMS ON THIS QUESTIONNAIRE, HOW DIFFICULT HAVE THESE PROBLEMS MADE IT FOR YOU TO DO YOUR WORK, TAKE CARE OF THINGS AT HOME, OR GET ALONG WITH OTHER PEOPLE: NOT DIFFICULT AT ALL
1. FEELING NERVOUS, ANXIOUS, OR ON EDGE: NOT AT ALL

## 2024-07-19 NOTE — THERAPY EVALUATION
exercises and self MLD over 3 consecutive sessions to improve circulation and decongest limb and for increased independence/tolerance for functional activities within 12 visits.  3. Patient will tolerate wearing MLB/compression garment for 3 days between sessions over 3 consecutive visits to improve circulation and decongest limb and for increased independence/tolerance for functional activities within 12 visits.    Long Term Goals: To be accomplished in 24 treatments.  1. Patient will demonstrate improved edema management evidenced by performing donning/doffing of garments from dependent to modified independent 3/3 times within session to aid in reducing risk for infection in 24 visits.  2. Patient will demonstrate decrease in self-perceived functional impairment as evidenced by improved score on LLIS outcome measure from 78% impairment to 50% impairment within 24 visits.  3. Patient will demonstrate stable limb volume of BLE with no more than 100 mL increase/decrease each limb over 3 visits to increase patient's ability to safely transition to home maintenance phase of CDT within 24 visits.     Frequency / Duration: Patient to be seen 2 times per week for 24 treatments.    Patient/ Caregiver education and instruction: Diagnosis, prognosis, self care, activity modification, brace/ splint application, exercises, and other lymphedema management   []  Plan of care has been reviewed with PTA      Certification Period: 7/18/024-10/10/2024      Rosa M Pierce OT       7/19/2024       2:06 PM        ===================================================================  I certify that the above Therapy Services are being furnished while the patient is under my care. I agree with the treatment plan and certify that this therapy is necessary.    Physician's Signature:_________________________   DATE:_________   TIME:________                           Ioana Perez MD    ** Signature, Date and Time must be completed for valid

## 2024-07-19 NOTE — PROGRESS NOTES
[facility]      Nav Disla MD, FACP, FACG, FAASLD      SHAWN Sheppard-C    Shauna Romo, [facility]   Yeimi Cashfranciscofelicitas, [facility]   Krystal Naif, [facility]-  Damon De Los Santos, [facility]   Caitlyn Tate, [facility]   Keiko Siegel, [facility]   [address], [address]   [place]  [address] [phone]   FAX: [phone]  [facility]   [address], [address]   [place]  [address] [phone]   FAX: [phone]       Patient Care Team:  Ioana Perez MD as PCP - General      Patient Active Problem List   Diagnosis    Kidney stone    Acute pain    Edema    At risk for falls    Hypertension    Obesity, morbid (HCC)    Menopause present    Obesity    Urinary incontinence    Arthritis    Asthma    Hematuria    Postmenopausal state    Sepsis (HCC)    Ureteral stone with hydronephrosis    Abdominal pain    Hepatic cyst    Acute cystitis    Urinary tract obstruction by kidney stone    Leg edema       The clinicians listed above have asked me to see Yahaira Song in consultation regarding a liver cyst.  All medical records sent by the referring physicians were reviewed including imaging studies.    The patient is a 73 y.o. female without any history of previous liver disease.      The patient underwent a MRI for evaluation of non-specific abdominal pain in 2/2024.      This demonstrated a single cyst in the left lobe measuring 16.7 cm in diameter.    No liver cancer makers have been ordered to date or the results are not available to me.    Imaging studies of the liver suggest a normal liver in addition to the cyst.    In the office today the patient has the following symptoms:  swelling of the lower extremities,       The patient is not experiencing the following symptoms which are commonly seen

## 2024-07-19 NOTE — PROGRESS NOTES
Chief Complaint   Patient presents with    New Patient     Vitals:    07/19/24 0936   BP: 134/60   Site: Left Upper Arm   Position: Sitting   Pulse: 88   Temp: 98.1 °F (36.7 °C)   TempSrc: Temporal   SpO2: 94%   Weight: 133.8 kg (295 lb)   Height: 1.727 m (5' 8\")     .  \"Have you been to the ER, urgent care clinic since your last visit?  Hospitalized since your last visit?\"    NO    “Have you seen or consulted any other health care providers outside of [facility] since your last visit?”    NO    Have you had a mammogram?”   YES - Where: [facility] Nurse/CMA to request most recent records if not in the chart    No breast cancer screening on file         “Have you had a colorectal cancer screening such as a colonoscopy/FIT/Cologuard?    NO    No colonoscopy on file  No cologuard on file  No FIT/FOBT on file   No flexible sigmoidoscopy on file         Click Here for Release of Records Request

## 2024-07-20 LAB
HAV AB SER QL IA: NEGATIVE
HBV CORE AB SERPL QL IA: NEGATIVE
HCV AB SERPL QL IA: NORMAL
HCV IGG SERPL QL IA: NON REACTIVE S/CO RATIO

## 2024-07-21 LAB
A1AT SERPL-MCNC: 122 MG/DL (ref 101–187)
A2 MACROGLOB SERPL-MCNC: 118 MG/DL (ref 110–276)
ALT SERPL-CCNC: 10 IU/L (ref 0–40)
APO A-I SERPL-MCNC: 152 MG/DL (ref 116–209)
AST SERPL W P-5'-P-CCNC: 16 IU/L (ref 0–40)
BILIRUB SERPL-MCNC: 0.2 MG/DL (ref 0–1.2)
CANCER AG19-9 SERPL-ACNC: 184 U/ML (ref 0–35)
CERULOPLASMIN SERPL-MCNC: 29.8 MG/DL (ref 19–39)
CHOLEST SERPL-MCNC: 235 MG/DL (ref 100–199)
FIBROSIS SCORING:: ABNORMAL
FIBROSIS STAGE SERPL QL: ABNORMAL
GGT SERPL-CCNC: 58 IU/L (ref 0–60)
GLUCOSE SERPL-MCNC: 82 MG/DL (ref 70–99)
HAPTOGLOB SERPL-MCNC: 158 MG/DL (ref 42–346)
INTERPRETATION: ABNORMAL
LABORATORY COMMENT REPORT: ABNORMAL
LIVER FIBR SCORE SERPL CALC.FIBROSURE: 0.07 (ref 0–0.21)
Lab: ABNORMAL
NASH - STEATOSIS GRADE: ABNORMAL
NASH - STEATOSIS GRADING: ABNORMAL
NASH - STEATOSIS SCORE: 0.59 (ref 0–0.4)
NASH SCORING: ABNORMAL
NECROINFLAMMATORY ACT GRADE SERPL QL: ABNORMAL
NECROINFLAMMATORY ACT SCORE SERPL: 0.23 (ref 0–0.25)
SERVICE CMNT-IMP: ABNORMAL
TRIGL SERPL-MCNC: 106 MG/DL (ref 0–149)

## 2024-07-22 LAB
MITOCHONDRIA M2 IGG SER-ACNC: <20 UNITS (ref 0–20)
SMA IGG SER-ACNC: 7 UNITS (ref 0–19)

## 2024-07-25 ENCOUNTER — HOSPITAL ENCOUNTER (OUTPATIENT)
Facility: HOSPITAL | Age: 74
Setting detail: RECURRING SERIES
Discharge: HOME | End: 2024-07-28
Payer: MEDICARE

## 2024-07-25 PROCEDURE — 97140 MANUAL THERAPY 1/> REGIONS: CPT

## 2024-07-25 NOTE — PROGRESS NOTES
/ OT services to address ROM deficits, analyze compression product fit and use, instruct in home lymphedema management program, and measure for compression products to address functional deficits and attain remaining goals.    Progress toward goals / Updated goals:  []  See Progress Note/Recertification    Short Term Goals: To be accomplished in 12 treatments.  1. Patient will independently identify at least 4 practices of proper skin care/infection prevention over 3 consecutive sessions to reduce risk of infections and wounds in 12 visits.  2. Patient will demonstrate independence in lymphedema home program of therapeutic exercises and self MLD over 3 consecutive sessions to improve circulation and decongest limb and for increased independence/tolerance for functional activities within 12 visits.  3. Patient will tolerate wearing MLB/compression garment for 3 days between sessions over 3 consecutive visits to improve circulation and decongest limb and for increased independence/tolerance for functional activities within 12 visits.     Long Term Goals: To be accomplished in 24 treatments.  1. Patient will demonstrate improved edema management evidenced by performing donning/doffing of garments from dependent to modified independent 3/3 times within session to aid in reducing risk for infection in 24 visits.  2. Patient will demonstrate decrease in self-perceived functional impairment as evidenced by improved score on LLIS outcome measure from 78% impairment to 50% impairment within 24 visits.  3. Patient will demonstrate stable limb volume of BLE with no more than 100 mL increase/decrease each limb over 3 visits to increase patient's ability to safely transition to home maintenance phase of CDT within 24 visits.       PLAN  YES Continue plan of care  Re-Cert Due: 10/10/2024  PN Due:  8/18/2024  []  Upgrade activities as tolerated  []  Discharge due to:  []  Other:      Rosa M Pierce OT       7/25/2024       3:35

## 2024-07-26 LAB
AFP L3 MFR SERPL: NORMAL % (ref 0–9.9)
AFP SERPL-MCNC: 2.2 NG/ML (ref 0–9.2)

## 2024-07-30 ENCOUNTER — CLINICAL DOCUMENTATION (OUTPATIENT)
Age: 74
End: 2024-07-30

## 2024-07-30 DIAGNOSIS — R97.8 ELEVATED CA 19-9 LEVEL: ICD-10-CM

## 2024-07-30 DIAGNOSIS — K76.89 HEPATIC CYST: Primary | ICD-10-CM

## 2024-08-07 ENCOUNTER — HOSPITAL ENCOUNTER (OUTPATIENT)
Facility: HOSPITAL | Age: 74
Discharge: HOME | End: 2024-08-10
Payer: MEDICARE

## 2024-08-07 VITALS
TEMPERATURE: 96.9 F | HEIGHT: 68 IN | WEIGHT: 292 LBS | BODY MASS INDEX: 44.25 KG/M2 | DIASTOLIC BLOOD PRESSURE: 64 MMHG | HEART RATE: 79 BPM | OXYGEN SATURATION: 98 % | SYSTOLIC BLOOD PRESSURE: 144 MMHG | RESPIRATION RATE: 18 BRPM

## 2024-08-07 DIAGNOSIS — R97.8 ELEVATED CA 19-9 LEVEL: ICD-10-CM

## 2024-08-07 DIAGNOSIS — K76.89 HEPATIC CYST: ICD-10-CM

## 2024-08-07 LAB
ANION GAP SERPL CALC-SCNC: 1 MMOL/L (ref 5–15)
APPEARANCE FLD: ABNORMAL
BASOPHILS # BLD: 0 K/UL (ref 0–0.1)
BASOPHILS NFR BLD: 0 % (ref 0–1)
BUN SERPL-MCNC: 17 MG/DL (ref 6–20)
BUN/CREAT SERPL: 16 (ref 12–20)
CA-I BLD-MCNC: 8.9 MG/DL (ref 8.5–10.1)
CHLORIDE SERPL-SCNC: 111 MMOL/L (ref 97–108)
CO2 SERPL-SCNC: 31 MMOL/L (ref 21–32)
COLOR FLD: ABNORMAL
CREAT SERPL-MCNC: 1.06 MG/DL (ref 0.55–1.02)
DIFFERENTIAL METHOD BLD: ABNORMAL
EOSINOPHIL # BLD: 0.1 K/UL (ref 0–0.4)
EOSINOPHIL NFR BLD: 3 % (ref 0–7)
ERYTHROCYTE [DISTWIDTH] IN BLOOD BY AUTOMATED COUNT: 17.5 % (ref 11.5–14.5)
GLUCOSE SERPL-MCNC: 108 MG/DL (ref 65–100)
HCT VFR BLD AUTO: 30.1 % (ref 35–47)
HGB BLD-MCNC: 9.1 G/DL (ref 11.5–16)
IMM GRANULOCYTES # BLD AUTO: 0 K/UL (ref 0–0.04)
IMM GRANULOCYTES NFR BLD AUTO: 0 % (ref 0–0.5)
INR PPP: 1.1 (ref 0.9–1.1)
LYMPHOCYTES # BLD: 0.8 K/UL (ref 0.8–3.5)
LYMPHOCYTES NFR BLD: 25 % (ref 12–49)
LYMPHOCYTES NFR FLD: 35 %
MCH RBC QN AUTO: 27.8 PG (ref 26–34)
MCHC RBC AUTO-ENTMCNC: 30.2 G/DL (ref 30–36.5)
MCV RBC AUTO: 92 FL (ref 80–99)
MONOCYTES # BLD: 0.3 K/UL (ref 0–1)
MONOCYTES NFR BLD: 8 % (ref 5–13)
MONOCYTES NFR FLD: 6 %
NEUTROPHILS NFR FLD: 59 %
NEUTS SEG # BLD: 2.1 K/UL (ref 1.8–8)
NEUTS SEG NFR BLD: 64 % (ref 32–75)
NRBC # BLD: 0 K/UL (ref 0–0.01)
NRBC BLD-RTO: 0 PER 100 WBC
NUC CELL # FLD: 1532 /CU MM
PLATELET # BLD AUTO: 124 K/UL (ref 150–400)
PMV BLD AUTO: 11.2 FL (ref 8.9–12.9)
POTASSIUM SERPL-SCNC: 4.3 MMOL/L (ref 3.5–5.1)
PROTHROMBIN TIME: 14.4 SEC (ref 11.9–14.6)
RBC # BLD AUTO: 3.27 M/UL (ref 3.8–5.2)
RBC # FLD: >100 /CU MM
SODIUM SERPL-SCNC: 143 MMOL/L (ref 136–145)
SPECIMEN SOURCE FLD: ABNORMAL
WBC # BLD AUTO: 3.3 K/UL (ref 3.6–11)

## 2024-08-07 PROCEDURE — 85025 COMPLETE CBC W/AUTO DIFF WBC: CPT

## 2024-08-07 PROCEDURE — 88112 CYTOPATH CELL ENHANCE TECH: CPT

## 2024-08-07 PROCEDURE — 99152 MOD SED SAME PHYS/QHP 5/>YRS: CPT

## 2024-08-07 PROCEDURE — 36415 COLL VENOUS BLD VENIPUNCTURE: CPT

## 2024-08-07 PROCEDURE — 10160 PNXR ASPIR ABSC HMTMA BULLA: CPT

## 2024-08-07 PROCEDURE — 6360000002 HC RX W HCPCS

## 2024-08-07 PROCEDURE — 89050 BODY FLUID CELL COUNT: CPT

## 2024-08-07 PROCEDURE — 87205 SMEAR GRAM STAIN: CPT

## 2024-08-07 PROCEDURE — 2580000003 HC RX 258: Performed by: FAMILY MEDICINE

## 2024-08-07 PROCEDURE — 99156 MOD SED OTH PHYS/QHP 5/>YRS: CPT

## 2024-08-07 PROCEDURE — 85610 PROTHROMBIN TIME: CPT

## 2024-08-07 PROCEDURE — 87075 CULTR BACTERIA EXCEPT BLOOD: CPT

## 2024-08-07 PROCEDURE — 87070 CULTURE OTHR SPECIMN AEROBIC: CPT

## 2024-08-07 PROCEDURE — 80048 BASIC METABOLIC PNL TOTAL CA: CPT

## 2024-08-07 RX ORDER — MIDAZOLAM HYDROCHLORIDE 2 MG/2ML
INJECTION, SOLUTION INTRAMUSCULAR; INTRAVENOUS PRN
Status: COMPLETED | OUTPATIENT
Start: 2024-08-07 | End: 2024-08-07

## 2024-08-07 RX ORDER — SODIUM CHLORIDE 9 MG/ML
INJECTION, SOLUTION INTRAVENOUS CONTINUOUS
Status: DISCONTINUED | OUTPATIENT
Start: 2024-08-07 | End: 2024-08-11 | Stop reason: HOSPADM

## 2024-08-07 RX ORDER — ATORVASTATIN CALCIUM 10 MG/1
10 TABLET, FILM COATED ORAL DAILY
COMMUNITY

## 2024-08-07 RX ORDER — FENTANYL CITRATE 50 UG/ML
INJECTION, SOLUTION INTRAMUSCULAR; INTRAVENOUS PRN
Status: COMPLETED | OUTPATIENT
Start: 2024-08-07 | End: 2024-08-07

## 2024-08-07 RX ORDER — LORATADINE 10 MG/1
10 TABLET ORAL DAILY
COMMUNITY

## 2024-08-07 RX ORDER — SOLIFENACIN SUCCINATE 5 MG/1
5 TABLET, FILM COATED ORAL DAILY
COMMUNITY

## 2024-08-07 RX ADMIN — SODIUM CHLORIDE: 9 INJECTION, SOLUTION INTRAVENOUS at 10:13

## 2024-08-07 RX ADMIN — FENTANYL CITRATE 50 MCG: 50 INJECTION, SOLUTION INTRAMUSCULAR; INTRAVENOUS at 11:12

## 2024-08-07 RX ADMIN — FENTANYL CITRATE 50 MCG: 50 INJECTION, SOLUTION INTRAMUSCULAR; INTRAVENOUS at 11:16

## 2024-08-07 RX ADMIN — MIDAZOLAM HYDROCHLORIDE 1 MG: 1 INJECTION, SOLUTION INTRAMUSCULAR; INTRAVENOUS at 11:12

## 2024-08-07 RX ADMIN — MIDAZOLAM HYDROCHLORIDE 1 MG: 1 INJECTION, SOLUTION INTRAMUSCULAR; INTRAVENOUS at 11:16

## 2024-08-07 ASSESSMENT — PAIN - FUNCTIONAL ASSESSMENT
PAIN_FUNCTIONAL_ASSESSMENT: NONE - DENIES PAIN
PAIN_FUNCTIONAL_ASSESSMENT: 0-10

## 2024-08-07 ASSESSMENT — PAIN SCALES - GENERAL: PAINLEVEL_OUTOF10: 0

## 2024-08-07 NOTE — PROGRESS NOTES
Discharge instructions printed and provided to patient with all questions answered in full. PIV x1 removed with cath tip intact. Pt VSS and no signs of distress noted. Pt tolerating liquids and solids with no issues. Pt ambulating within room with no issues. Pt wheeled down to main entrance accompanied by staff. Pt condition stable at time of discharge.

## 2024-08-07 NOTE — H&P
measures 16.7 x 11.6 x  15.9 cm. Local mass effect on the IVC, CBD, pancreas, and bowel. No signal  dropout on out of phase images. No solid hepatic mass. Smooth hepatic surface.    Biliary tree: Moderate gallbladder distention. No gallstone or inflammation.  Majority of the CBD is not visible due to compression by the hepatic cyst.  Common hepatic duct measures 10 mm in diameter. Distal CBD measures 3 mm in  diameter. No significant intrahepatic biliary dilatation.    Pancreas: Moderate atrophy. No mass or inflammation.    Spleen: Within normal limits. Normal size.    Adrenal glands: 2.2 cm left adrenal adenoma has signal dropout on the out of  phase images. No significant change. Right adrenal gland is within normal  limits.    Kidneys: No mass or hydronephrosis. Symmetric enhancement. Small benign cysts.    Vasculature: Hepatic cyst has mass effect on the patent main portal vein.    Bowel: No obstruction.    Lymph nodes: No lymphadenopathy.    Miscellaneous: Moderate muscle atrophy. No ascites. Lumbar spinal stenosis.    Impression  1. Increased size of the 16.7 x 11.6 x 15.9 cm complicated chronic hepatic cyst.  Local mass effect includes narrowing the CBD.  2. Unchanged left adrenal adenoma.  3. No hydronephrosis of this time.     LABS  Lab Results   Component Value Date/Time    WBC 3.3 08/07/2024 10:15 AM    HGB 9.1 08/07/2024 10:15 AM    HCT 30.1 08/07/2024 10:15 AM     08/07/2024 10:15 AM    MCV 92.0 08/07/2024 10:15 AM     Lab Results   Component Value Date/Time     07/19/2024 10:15 AM    K 3.7 07/19/2024 10:15 AM     07/19/2024 10:15 AM    CO2 29 07/19/2024 10:15 AM    BUN 14 07/19/2024 10:15 AM    GFRAA >60 04/04/2022 05:25 AM     Lab Results   Component Value Date/Time    INR 1.1 08/07/2024 10:15 AM         PHYSICAL EXAM   Patient Vitals for the past 24 hrs:   BP Temp Temp src Pulse Resp SpO2 Height Weight   08/07/24 0950 132/71 98 °F (36.7 °C) Oral 74 18 95 % 1.727 m (5' 8\") 132.5 kg

## 2024-08-08 ENCOUNTER — HOSPITAL ENCOUNTER (OUTPATIENT)
Facility: HOSPITAL | Age: 74
Setting detail: RECURRING SERIES
End: 2024-08-08
Payer: MEDICARE

## 2024-08-08 LAB — CYTOLOGY-NON GYN: NORMAL

## 2024-08-08 NOTE — PROGRESS NOTES
PHYSICAL THERAPY/OCCUPATIONAL THERAPY - MEDICARE DAILY TREATMENT NOTE (updated 3/23)      Date: 2024          Patient Name:  Yahaira Song :  1950   Medical   Diagnosis:  Lymphedema [I89.0] Treatment Diagnosis:  I89.0     Lymphedema, not elsewhere classified    Referral Source:  Ioana Perez MD Insurance:   Payor: [employer] MEDICARE / Plan: [employer] GOLD PLUS HMO / Product Type: *No Product type* /                     Pt arrived to lymphedema clinic today for her 0815 am appointment.   She c/o not feeling well and stating her legs have gotten more swollen.  She arrived without [place] on legs.  She stated her MD had removed the bandages earlier this week as the pt had c/o not feeling well and pain in the legs.  The pt was diagnosed, per pt report, with cellulitis.  Pt started on ABX  for 24 hrs?; pt not able to verbalize date of when she began her medication.    Lymphedema treatment to be deferred due to acute infection.  Will follow up after pt on ABX for at least 3 days.   Pt verbalized understanding.

## 2024-08-11 LAB
BACTERIA SPEC CULT: NORMAL
GRAM STN SPEC: NORMAL
GRAM STN SPEC: NORMAL
Lab: NORMAL

## 2024-08-13 ENCOUNTER — APPOINTMENT (OUTPATIENT)
Facility: HOSPITAL | Age: 74
End: 2024-08-13
Payer: MEDICARE

## 2024-08-16 ENCOUNTER — HOSPITAL ENCOUNTER (OUTPATIENT)
Facility: HOSPITAL | Age: 74
Setting detail: RECURRING SERIES
Discharge: HOME | End: 2024-08-19
Payer: MEDICARE

## 2024-08-16 PROCEDURE — 97140 MANUAL THERAPY 1/> REGIONS: CPT

## 2024-08-16 NOTE — PROGRESS NOTES
floor (push foot on the gas pedal)              -Flex foot back to the ceiling (pull foot off the gas pedal)              -Alternate between the right and left foot  4.  Foot Rotation                                                                                     5x  -[place] the foot at the ankle in a clockwise direction              -[place] the foot at the ankle in a counterclockwise direction              -Alternate between the right and left foot  5.  Leg Flexion/Extension                                                                       5x  -Pull heel back towards buttocks slowly              -Straighten out to a normal position slowly              -Alternate between right and left legs  6. Seated March  5x            -Bend and lift one leg             -Relax            -Alternate legs     60 60     Total Total         Modalities Rationale:      Decrease swelling; improve skin integrity   to improve patient's ability to progress to PLOF and address remaining functional goals.      Skin assessment post-treatment (if applicable):    [x]  intact    []  redness- no adverse reaction                 []redness - adverse reaction:          Patient Education: [x] Review HEP    [x]  Patient Education billed concurrently with other procedures   [] MLD Patient Education Continued education in self MLD technique with bathing and skin care  [] Progressed/Changed HEP based on:   [] positioning   [] Kinesiotape   [x] Skin care   [x] wound care   [] other:   [] Patient/caregiver in multi-layer bandaging donning principles.  Patient / caregiver re-demonstrated bandaging. [] Yes  [x] No  Compression bandaging/garment precautions reviewed: [x] Yes  [] No        Assessment   Pt with lymphedema in the BLE; she presents today with acute on chronic swelling of the BLE as pt has not been able to wear compression due to recent cellulitis infection.    Pt seen today for skin care and application of distal BLE MLB.   OT reviewed

## 2024-08-22 ENCOUNTER — HOSPITAL ENCOUNTER (OUTPATIENT)
Facility: HOSPITAL | Age: 74
Setting detail: RECURRING SERIES
Discharge: HOME | End: 2024-08-25
Payer: MEDICARE

## 2024-08-22 PROCEDURE — 97140 MANUAL THERAPY 1/> REGIONS: CPT

## 2024-08-22 NOTE — PROGRESS NOTES
[facility]   a part of [facility]  [address], [address]   [place] [address]   Phone: [phone]  Fax: [phone]      OCCUPATIONAL THERAPY PROGRESS NOTE  Patient Name:  Yahaira Song :  1950   Treatment/Medical Diagnosis:  Lymphedema [I89.0]   Referral Source:  Ioana Perez MD     Date of Initial Visit:  2024 Attended Visits:  4 Missed Visits:  2     SUMMARY OF TREATMENT/ASSESSMENT:  Pt had recent diagnosis of cellulitis; she missed mx appointment due to infection.  She has been cleared by her MD to continue with lymphedema treatment.  She is finished with her ABX treatment; pt currently has no open wounds.     She is tolerating aspects of CDT, including application of multiple layer short stretch bandages, skin care, exercise, and elevation.   Goal is to measure and fit for custom day and night time garments for BLE.  OT recommending a compression biker or ashley for trunk and proximal leg.  Pt verbalized understanding.       Information from eval (2024)  Pt is a 74 yo female presenting to the lymphedema clinic with swelling of the BLE.  She has h/o wounds on distal BLE.  She currently is seeing a wound care MD at her PCP office.  She states she has had swelling in her legs for several years.  The swelling has gotten worse which impacts her ability to walk, perform self care.     CURRENT STATUS/GOALS    Short Term Goals: To be accomplished in 12 treatments.    1. Patient will independently identify at least 4 practices of proper skin care/infection prevention over 3 consecutive sessions to reduce risk of infections and wounds in 12 visits.  Ongoing 2024  2. Patient will demonstrate independence in lymphedema home program of therapeutic exercises and self MLD over 3 consecutive sessions to improve circulation and decongest limb and for increased independence/tolerance for functional activities within 12 visits.Ongoing 2024  3.  Patient will tolerate wearing MLB/compression garment for 3 days between sessions over 3 consecutive visits to improve circulation and decongest limb and for increased independence/tolerance for functional activities within 12 visits. Ongoing 8/22/2024     Long Term Goals: To be accomplished in 24 treatments.  1. Patient will demonstrate improved edema management evidenced by performing donning/doffing of garments from dependent to modified independent 3/3 times within session to aid in reducing risk for infection in 24 visits.  2. Patient will demonstrate decrease in self-perceived functional impairment as evidenced by improved score on LLIS outcome measure from 78% impairment to 50% impairment within 24 visits.  3. Patient will demonstrate stable limb volume of BLE with no more than 100 mL increase/decrease each limb over 3 visits to increase patient's ability to safely transition to home maintenance phase of CDT within 24 visits.       RECOMMENDATIONS FOR SKILLED THERAPY  Patient will benefit from continued complete decongestive therapy (CDT) including manual lymphatic drainage (MLD) technique, short-stretch textile bandages/compression system to decongest limb, and kinesiotaping as appropriate. Patient will receive instruction in proper skin care to recognize signs/symptoms of and prevent infection, therapeutic exercise, and self-MLD for independent home program and restorative lymphatic performance. Patient verbalizes understanding of plan of care at this time.          Rosa M Pierce OT       8/22/2024       2:28 PM    If you have any questions/comments please contact us directly at [phone].   Thank you for allowing us to assist in the care of your patient.

## 2024-08-22 NOTE — PROGRESS NOTES
PHYSICAL THERAPY/OCCUPATIONAL THERAPY - MEDICARE DAILY TREATMENT NOTE (updated 3/23)      Date: 2024          Patient Name:  Yahaira Song :  1950   Medical   Diagnosis:  Lymphedema [I89.0] Treatment Diagnosis:  I89.0     Lymphedema, not elsewhere classified    Referral Source:  Ioana Perez MD Insurance:   Payor: HUMANA MEDICARE / Plan: HUMANA GOLD PLUS HMO / Product Type: *No Product type* /                     Patient  verified yes     Visit #   Current  / Total 4 24   Time   In / Out 1000 1050   Total Treatment Time 50   Total Timed Codes 50   1:1 Treatment Time 50      [facility] Totals Reminder:  bill using total billable   min of TIMED therapeutic procedures and modalities.   8-22 min = 1 unit; 23-37 min = 2 units; 38-52 min = 3 units;  53-67 min = 4 units; 68-82 min = 5 units         SUBJECTIVE    Pain Level (0-10 scale): Chronic back and leg pain; agreeable to OT session, did not rate     Any medication changes, allergies to medications, adverse drug reactions, diagnosis change, or new procedure performed?: [x] No    [] Yes (see summary sheet for update)  Medications: Verified on Patient Summary List    Subjective functional status/changes:     No acute issues  Tolerated bandages, kept them on distal BLE since last appointment  Pt did request sitting edge of mat instead of supine due to leg pain    Information from eval (2024)  Pt is a 74 yo female presenting to the lymphedema clinic with swelling of the BLE.  She has h/o wounds on distal BLE.  She currently is seeing a wound care MD at her PCP office.  She states she has had swelling in her legs for several years.  The swelling has gotten worse which impacts her ability to walk, perform self care.      The pt has the following PMH per her chart:  Essential HTN  -Goiter, unspecified  -Hypercholesterolemia  -Morbid obesity  -Osteoarthritis      OBJECTIVE      Skin Integrity and Tissue Assessment  Dermal Status: Wound/lesion present  the ceiling (pull foot off the gas pedal)              -Alternate between the right and left foot  4.  Foot Rotation                                                                                     5x  -[place] the foot at the ankle in a clockwise direction              -[place] the foot at the ankle in a counterclockwise direction              -Alternate between the right and left foot  5.  Leg Flexion/Extension                                                                       5x  -Pull heel back towards buttocks slowly              -Straighten out to a normal position slowly              -Alternate between right and left legs  6. Seated March  5x            -Bend and lift one leg             -Relax            -Alternate legs     50 50     Total Total         Modalities Rationale:      Decrease swelling; improve skin integrity   to improve patient's ability to progress to PLOF and address remaining functional goals.      Skin assessment post-treatment (if applicable):    [x]  intact    []  redness- no adverse reaction                 []redness - adverse reaction:          Patient Education: [x] Review HEP    [x]  Patient Education billed concurrently with other procedures   [] MLD Patient Education Continued education in self MLD technique with bathing and skin care  [] Progressed/Changed HEP based on:   [] positioning   [] Kinesiotape   [x] Skin care   [x] wound care   [] other:   [] Patient/caregiver in multi-layer bandaging donning principles.  Patient / caregiver re-demonstrated bandaging. [] Yes  [x] No  Compression bandaging/garment precautions reviewed: [x] Yes  [] No        Assessment   Pt with lymphedema in the BLE; Pt seen today for skin care and application of distal BLE MLB.   OT reviewed importance of infection control, need for compression, and exercise as tolerated. Pt verbalized understanding.  Continue POC.     Patient will continue to benefit from skilled PT / OT services to address ROM

## 2024-08-27 ENCOUNTER — HOSPITAL ENCOUNTER (OUTPATIENT)
Facility: HOSPITAL | Age: 74
Setting detail: RECURRING SERIES
Discharge: HOME | End: 2024-08-30
Payer: MEDICARE

## 2024-08-27 PROCEDURE — 97140 MANUAL THERAPY 1/> REGIONS: CPT

## 2024-08-27 NOTE — PROGRESS NOTES
Statement of Medical Necessity    Page 1 of 2      Yahaira Song 1950 Today's Date: 8/27/2024 ALIA: [employer]   Payor: HUMANA MEDICARE / Plan: HUMANA GOLD PLUS HMO / Product Type: *No Product type* /  ME: TBD  Refills: 2               Diagnosis  []   I97.2 Post-Mastectomy Lymphedema []   I87.2 Venous Insufficiency   [x]   I89.0 Lymphedema, other secondary  []   I83.019 Venous Stasis Ulcer LE, Right   []   I89.9 Unspecified Lymphatic Disorder []   I83.029 Venous Stasis Ulcer LE, Left   []   R60.9 Swelling not relieved by elevation []   Q82.0 Hereditary/ Congenital Lymphedema   []   C50.211 Malignant neoplasm of breast, Right []   G89.3  Cancer associated pain   []   C50.212 Malignant neoplasm of breast, Left [x]   L03.115 LE Cellulitis, Right   []    [x]   L03.116 LE Cellulitis, Left                                                           Yahaira Song    1950  Page 2 of 2    Physician Order for DME for Diagnosis of I89.0, h/o cellulitis and wounds,  as Listed on Statement of Medical Necessity, Page 1        Recommended Product:  Units Upper Extremity Rt Lt Units Lower Extremity Rt Lt    Circ-Aid, Ready Wrap, Sigvaris Arm    Inelastic binders (Circ-Aid, Farrow)  []Foot   []Below Knee   []Knee   []Thigh      Circ-Aid Ready Wrap, Sigvaris hand    Gus Fermín, night use []Full Leg  []Lower Leg      Tribute Arm, night use    Tribute, night use  [x]Full Leg  []Lower Leg x x    Gus Fermín Arm, night use    Preet Sleeve Leg/ Foot, night use      Gradiant Compression Sleeves & Gloves  []Custom [] RM Arm:  []CCL1 []CCL2 []CCL3  []Custom [] RM Glove: []CCL1 []CCL2 []CCL3      Gradient Compression Stockings   [x]Custom  []RM Lower Extremity:   []CCL1       [x]CCL2         []CCL3   []Knee       [x]Thigh        []Waist Length x x    Preet sleeve arm w/ hand, night use    Tribute Wrap, night use      Compression Bra          Compression Vest         The above patient was referred for treatment of Lymphedema due to the

## 2024-08-27 NOTE — PROGRESS NOTES
PHYSICAL THERAPY/OCCUPATIONAL THERAPY - MEDICARE DAILY TREATMENT NOTE (updated 3/23)      Date: 2024          Patient Name:  Yahaira Song :  1950   Medical   Diagnosis:  Lymphedema [I89.0] Treatment Diagnosis:  I89.0     Lymphedema, not elsewhere classified    Referral Source:  Ioana Perez MD Insurance:   Payor: HUMANA MEDICARE / Plan: HUMANA GOLD PLUS HMO / Product Type: *No Product type* /                     Patient  verified yes     Visit #   Current  / Total 5 24   Time   In / Out 0830 0930   Total Treatment Time 60   Total Timed Codes 60   1:1 Treatment Time 60      [facility] Totals Reminder:  bill using total billable   min of TIMED therapeutic procedures and modalities.   8-22 min = 1 unit; 23-37 min = 2 units; 38-52 min = 3 units;  53-67 min = 4 units; 68-82 min = 5 units         SUBJECTIVE    Pain Level (0-10 scale): Chronic back and leg pain; agreeable to OT session, did not rate     Any medication changes, allergies to medications, adverse drug reactions, diagnosis change, or new procedure performed?: [x] No    [] Yes (see summary sheet for update)  Medications: Verified on Patient Summary List    Subjective functional status/changes:     No acute issues  Tolerated bandages, kept them on distal BLE since last appointment  Pt did request sitting edge of mat instead of supine due to leg pain    Information from eval (2024)  Pt is a 72 yo female presenting to the lymphedema clinic with swelling of the BLE.  She has h/o wounds on distal BLE.  She currently is seeing a wound care MD at her PCP office.  She states she has had swelling in her legs for several years.  The swelling has gotten worse which impacts her ability to walk, perform self care.      The pt has the following PMH per her chart:  Essential HTN  -Goiter, unspecified  -Hypercholesterolemia  -Morbid obesity  -Osteoarthritis      OBJECTIVE      Skin Integrity and Tissue Assessment  Dermal Status: Wound/lesion present  itching/discomfort  4.  Toes/fingers are blue/cold/numb. Poor capillary refill  5.  If bandages are soiled or wet.     The following exercises were reviewed in conjunciton with MLD/MLB:    1.  Toe Clenches                                                                                     5x  -Curl toes and squeeze for 2 seconds              -Relax for 2 seconds              -Alternate between the right and left foot  2.  Toe Spread                                                                                        5x  -Spread out toes as far as possible for 2 seconds              -Relax for 2 seconds              -Alternate between the right and left foot  3.  Ankle Pumps                                                                                      5x  -Point foot down to the floor (push foot on the gas pedal)              -Flex foot back to the ceiling (pull foot off the gas pedal)              -Alternate between the right and left foot  4.  Foot Rotation                                                                                     5x  -[place] the foot at the ankle in a clockwise direction              -[place] the foot at the ankle in a counterclockwise direction              -Alternate between the right and left foot  5.  Leg Flexion/Extension                                                                       5x  -Pull heel back towards buttocks slowly              -Straighten out to a normal position slowly              -Alternate between right and left legs  6. Seated March  5x            -Bend and lift one leg             -Relax            -Alternate legs     60 60     Total Total         Modalities Rationale:      Decrease swelling; improve skin integrity   to improve patient's ability to progress to PLOF and address remaining functional goals.      Skin assessment post-treatment (if applicable):    [x]  intact    []  redness- no adverse reaction                 []redness - adverse

## 2024-08-28 ENCOUNTER — TELEPHONE (OUTPATIENT)
Age: 74
End: 2024-08-28

## 2024-08-28 NOTE — TELEPHONE ENCOUNTER
----- Message from RUDY Clark NP sent at 8/27/2024  3:05 PM EDT -----  We please schedule this medicine for follow-up with me next March?    Thanks,  Argelia

## 2024-08-29 ENCOUNTER — HOSPITAL ENCOUNTER (OUTPATIENT)
Facility: HOSPITAL | Age: 74
Setting detail: RECURRING SERIES
End: 2024-08-29
Payer: MEDICARE

## 2024-08-29 PROCEDURE — 97140 MANUAL THERAPY 1/> REGIONS: CPT

## 2024-08-29 NOTE — PROGRESS NOTES
PHYSICAL THERAPY/OCCUPATIONAL THERAPY - MEDICARE DAILY TREATMENT NOTE (updated 3/23)      Date: 2024          Patient Name:  Yahaira Song :  1950   Medical   Diagnosis:  Lymphedema [I89.0] Treatment Diagnosis:  I89.0     Lymphedema, not elsewhere classified    Referral Source:  Ioana Perez MD Insurance:   Payor: HUMANA MEDICARE / Plan: HUMANA GOLD PLUS HMO / Product Type: *No Product type* /                     Patient  verified yes     Visit #   Current  / Total 6 24   Time   In / Out 0830 0915   Total Treatment Time 45   Total Timed Codes 45   1:1 Treatment Time 45      [facility] Totals Reminder:  bill using total billable   min of TIMED therapeutic procedures and modalities.   8-22 min = 1 unit; 23-37 min = 2 units; 38-52 min = 3 units;  53-67 min = 4 units; 68-82 min = 5 units         SUBJECTIVE    Pain Level (0-10 scale): Chronic back and leg pain; agreeable to OT session, did not rate     Any medication changes, allergies to medications, adverse drug reactions, diagnosis change, or new procedure performed?: [x] No    [] Yes (see summary sheet for update)  Medications: Verified on Patient Summary List    Subjective functional status/changes:     No acute issues  Tolerated bandages, kept them on distal BLE since last appointment      Information from [facility] (2024)  Pt is a 74 yo female presenting to the lymphedema clinic with swelling of the BLE.  She has h/o wounds on distal BLE.  She currently is seeing a wound care MD at her PCP office.  She states she has had swelling in her legs for several years.  The swelling has gotten worse which impacts her ability to walk, perform self care.      The pt has the following PMH per her chart:  Essential HTN  -Goiter, unspecified  -Hypercholesterolemia  -Morbid obesity  -Osteoarthritis      OBJECTIVE      Skin Integrity and Tissue Assessment  Dermal Status: Wound/lesion present left distal LE, lateral aspect mid-shin.  No open wounds on the RLE  drainage as indicated. We discussed the need for consistent compression system for lymphedema management. Diaphragmatic breathing instruction and skin care education was performed,applying low pH lotion to extremity using upward strokes to stimulate lymphatic vessels.    Manual Lymphatic Drainage (MLD):  Area to decongest: LE Bilateral, foot, ankle, calf, and mid knee   Skin/wound care/debridement: Reviewed skin care principles: Patient's extremity bathed with bath wipes and combination of Eurcerin and Zinc paste added to distal BLE      Applied multi-layer compression bandaging to: Pt arrived to clinic with bandages on from last appointment  The following multi-layer bandages were applied to distal BLE:   Protouch Stockinette    Padding layer:  Cellona and Fleece  Transelast on toes, bilateral feet    Foam:  10 cm roll    Short stretch bandages:   6 cm, 8 cm, and 10 cm            Due to history of wounds and cellulitis, the pt would benefit from custom knee high garments to assist with the containment and self management of long term lymphedema symptoms.  She would benefit from JUZO Flat Knit thigh high stockings,strong silver.    The patient requires custom flat knit thigh high pantyhose because they fit out of standard sizing and need more containment to prevent swelling through the garment.    The patient requires a silicone top band to prevent the garment from slipping down.    The skin ghas tissue and folds that require a special fabric or knit pattern.    The patient requires a closed toe due to swelling of the toes and distal foot.   Silver is required due to h/o cellulitis and open wounds.     Pt also requires custom night time garments to ensure containment over night due to persistent swelling.      Remove Compression Bandages if:  1.  Unusual shortness of breath  2.  Unrelieved pain despite performance of exercise  3.  Uncontrolled itching/discomfort  4.  Toes/fingers are blue/cold/numb. Poor capillary

## 2024-09-04 ENCOUNTER — HOSPITAL ENCOUNTER (OUTPATIENT)
Facility: HOSPITAL | Age: 74
Setting detail: RECURRING SERIES
Discharge: HOME | End: 2024-09-07
Payer: MEDICARE

## 2024-09-04 PROCEDURE — 97140 MANUAL THERAPY 1/> REGIONS: CPT

## 2024-09-04 NOTE — PROGRESS NOTES
PHYSICAL THERAPY/OCCUPATIONAL THERAPY - MEDICARE DAILY TREATMENT NOTE (updated 3/23)      Date: 2024          Patient Name:  Yahaira Song :  1950   Medical   Diagnosis:  Lymphedema [I89.0] Treatment Diagnosis:  I89.0     Lymphedema, not elsewhere classified    Referral Source:  Ioana Perez MD Insurance:   Payor: HUMANA MEDICARE / Plan: HUMANA GOLD PLUS HMO / Product Type: *No Product type* /                     Patient  verified yes     Visit #   Current  / Total 7 24   Time   In / Out 0883 0997   Total Treatment Time 45   Total Timed Codes 45   1:1 Treatment Time 45      [facility] Totals Reminder:  bill using total billable   min of TIMED therapeutic procedures and modalities.   8-22 min = 1 unit; 23-37 min = 2 units; 38-52 min = 3 units;  53-67 min = 4 units; 68-82 min = 5 units         SUBJECTIVE    Pain Level (0-10 scale): Chronic back and leg pain; agreeable to OT session, did not rate     Any medication changes, allergies to medications, adverse drug reactions, diagnosis change, or new procedure performed?: [x] No    [] Yes (see summary sheet for update)  Medications: Verified on Patient Summary List    Subjective functional status/changes:     No acute issues  Tolerated bandages, kept them on distal BLE since last appointment  Pleasant and cooperative; \"just tickled with my ankles.. they look so good; I can spread my toes.\"      Information from [facility]ly (2024)  Pt is a 72 yo female presenting to the lymphedema clinic with swelling of the BLE.  She has h/o wounds on distal BLE.  She currently is seeing a wound care MD at her PCP office.  She states she has had swelling in her legs for several years.  The swelling has gotten worse which impacts her ability to walk, perform self care.      The pt has the following PMH per her chart:  Essential HTN  -Goiter, unspecified  -Hypercholesterolemia  -Morbid obesity  -Osteoarthritis      OBJECTIVE  Skin Integrity and Tissue Assessment  Dermal

## 2024-09-06 ENCOUNTER — HOSPITAL ENCOUNTER (OUTPATIENT)
Facility: HOSPITAL | Age: 74
Setting detail: RECURRING SERIES
End: 2024-09-06
Payer: MEDICARE

## 2024-09-11 ENCOUNTER — HOSPITAL ENCOUNTER (OUTPATIENT)
Facility: HOSPITAL | Age: 74
Setting detail: RECURRING SERIES
Discharge: HOME | End: 2024-09-14
Payer: MEDICARE

## 2024-09-11 PROCEDURE — 97140 MANUAL THERAPY 1/> REGIONS: CPT

## 2024-09-13 ENCOUNTER — HOSPITAL ENCOUNTER (OUTPATIENT)
Facility: HOSPITAL | Age: 74
Setting detail: RECURRING SERIES
Discharge: HOME | End: 2024-09-16
Payer: MEDICARE

## 2024-09-13 PROCEDURE — 97140 MANUAL THERAPY 1/> REGIONS: CPT

## 2024-09-18 ENCOUNTER — HOSPITAL ENCOUNTER (OUTPATIENT)
Facility: HOSPITAL | Age: 74
Setting detail: RECURRING SERIES
Discharge: HOME | End: 2024-09-21
Payer: MEDICARE

## 2024-09-18 PROCEDURE — 97140 MANUAL THERAPY 1/> REGIONS: CPT

## 2024-09-20 ENCOUNTER — HOSPITAL ENCOUNTER (OUTPATIENT)
Facility: HOSPITAL | Age: 74
Setting detail: RECURRING SERIES
Discharge: HOME | End: 2024-09-23
Payer: MEDICARE

## 2024-09-20 PROCEDURE — 97140 MANUAL THERAPY 1/> REGIONS: CPT

## 2024-09-25 ENCOUNTER — HOSPITAL ENCOUNTER (OUTPATIENT)
Facility: HOSPITAL | Age: 74
Setting detail: RECURRING SERIES
Discharge: HOME | End: 2024-09-28
Payer: MEDICARE

## 2024-09-25 PROCEDURE — 97140 MANUAL THERAPY 1/> REGIONS: CPT

## 2024-09-27 ENCOUNTER — HOSPITAL ENCOUNTER (OUTPATIENT)
Facility: HOSPITAL | Age: 74
Setting detail: RECURRING SERIES
Discharge: HOME | End: 2024-09-30
Payer: MEDICARE

## 2024-09-27 PROCEDURE — 97140 MANUAL THERAPY 1/> REGIONS: CPT

## 2024-09-27 NOTE — PROGRESS NOTES
PHYSICAL THERAPY/OCCUPATIONAL THERAPY - MEDICARE DAILY TREATMENT NOTE (updated 3/23)      Date:2024      Patient Name:  Yahaira Song :  1950   Medical   Diagnosis:  Lymphedema [I89.0] Treatment Diagnosis:  I89.0     Lymphedema, not elsewhere classified    Referral Source:  Ioana Perez MD Insurance:   Payor: HUMANA MEDICARE / Plan: HUMANA GOLD PLUS HMO / Product Type: *No Product type* /                     Patient  verified yes     Visit #   Current  / Total 13 24   Time   In / Out 1130 1225   Total Treatment Time 55   Total Timed Codes 55   1:1 Treatment Time 55      [facility] Totals Reminder:  bill using total billable   min of TIMED therapeutic procedures and modalities.   8-22 min = 1 unit; 23-37 min = 2 units; 38-52 min = 3 units;  53-67 min = 4 units; 68-82 min = 5 units         SUBJECTIVE    Pain Level (0-10 scale): Chronic back and leg pain; agreeable to OT session, did not rate     Any medication changes, allergies to medications, adverse drug reactions, diagnosis change, or new procedure performed?: [x] No    [] Yes (see summary sheet for update)  Medications: Verified on Patient Summary List    Subjective functional status/changes:     No acute issues  Tolerated bandages, kept them on distal BLE since last appointment    Information from [facility] (2024)  Pt is a 74 yo female presenting to the lymphedema clinic with swelling of the BLE.  She has h/o wounds on distal BLE.  She currently is seeing a wound care MD at her PCP office.  She states she has had swelling in her legs for several years.  The swelling has gotten worse which impacts her ability to walk, perform self care.      The pt has the following PMH per her chart:  Essential HTN  -Goiter, unspecified  -Hypercholesterolemia  -Morbid obesity  -Osteoarthritis      OBJECTIVE  Skin Integrity and Tissue Assessment  Dermal Status: all wounds healed  Texture/Consistency:  dense connective tissue proliferation bilateral LE;

## 2024-10-01 ENCOUNTER — HOSPITAL ENCOUNTER (OUTPATIENT)
Facility: HOSPITAL | Age: 74
Setting detail: RECURRING SERIES
Discharge: HOME | End: 2024-10-04
Payer: MEDICARE

## 2024-10-01 PROCEDURE — 97140 MANUAL THERAPY 1/> REGIONS: CPT

## 2024-10-01 NOTE — PROGRESS NOTES
swelling up to upper thighs; scars from bilateral knee replacements   Sensation:  impaired from knees down bilaterally   Pigmentation/Color Change:  scarring bilateral distal legs; no s/s of infection   Circulatory:  intact     Nails:  impaired nail care; jagged nails  Stemmers Sign: positive      Height: 68 inches         Weight: 294 lbs  BMI: 44.7  (36 or greater: adversely affecting lymphedema)     AROM:  ROM of BUE WFL per observation with functional tasks      Circumferences:      Lower Extremity Girth (cm):  Right   7/18/2024  Right   8/16/24 Right  09/18/2024 Left   7/18/2024  Left   8/16/24 Left  09/18/2024   MTP: 26.5 26 25.0 27 26.5 25.0   Midfoot/navicular: 29 28.5 27.0 30.5 29.5 26.0   Ankle: 33 35 25.0 34 33.5 27.0   Calf    (16 cm from ankle) 42.5 46 36.5 45.5 46 38.0   Calf    (24 cm from ankle) 47.5 49 42.5 52 47 42.0         Volumetric Measurements:     Date Right (mL) Left (mL) Volume difference %  Difference   09/18/2024 3252.95 3241.32 11.62 0.36   07/18/2024 5176.43 4706.05 470.38 9.09           Therapeutic Procedures:  Tx Min Billable or 1:1 Min (if diff from Tx Min) Procedure, Rationale, Specifics   [id number] Manual Therapy (timed):  decrease pain, increase ROM, increase tissue extensibility, and decrease edema to improve patient's ability to progress to PLOF and address remaining functional goals.  The manual therapy interventions were performed at a separate and distinct time from the therapeutic activities interventions . (see flow sheet as applicable)     Details if applicable:    The patient was provided review of lymphedema:  regarding the role and function of the lymphatic system, and instructed in the lymphedema management protocol of complete decongestive therapy (CDT). This includes skin care to prevent breakdown or infection, lymphedema exercises, custom compression therapy options (bandaging, compression garments, compression pump, Farrow Wraps, JoViPak, Preet Sleeve, etc. as

## 2024-10-04 ENCOUNTER — HOSPITAL ENCOUNTER (OUTPATIENT)
Facility: HOSPITAL | Age: 74
Setting detail: RECURRING SERIES
Discharge: HOME | End: 2024-10-07
Payer: MEDICARE

## 2024-10-04 PROCEDURE — 97140 MANUAL THERAPY 1/> REGIONS: CPT

## 2024-10-04 NOTE — PROGRESS NOTES
ensure containment over night due to persistent swelling.      Remove Compression Bandages if:  1.  Unusual shortness of breath  2.  Unrelieved pain despite performance of exercise  3.  Uncontrolled itching/discomfort  4.  Toes/fingers are blue/cold/numb. Poor capillary refill  5.  If bandages are soiled or wet.        40 40    Total Total         Modalities Rationale:      Decrease swelling; improve skin integrity   to improve patient's ability to progress to PLOF and address remaining functional goals.      Skin assessment post-treatment (if applicable):    [x]  intact    []  redness- no adverse reaction                 []redness - adverse reaction:          Patient Education: [x] Review HEP    [x]  Patient Education billed concurrently with other procedures   [] MLD Patient Education Continued education in self MLD technique with bathing and skin care  [] Progressed/Changed HEP based on:   [] positioning   [] Kinesiotape   [x] Skin care   [x] wound care   [] other:   [] Patient/caregiver in multi-layer bandaging donning principles.  Patient / caregiver re-demonstrated bandaging. [] Yes  [x] No  Compression bandaging/garment precautions reviewed: [x] Yes  [] No        Assessment   Pt with acute on chronic swelling in the BLE; Pt seen today for skin care and application of distal BLE MLB.  She is tolerating aspect of CDT and is compliant with all therapy recommendations, including low salt diet, elevation, and exercising when tolerated.   Pt is awaiting arrival of her custom day and night time garments.  Continue POC.     Patient will continue to benefit from skilled PT / OT services to address ROM deficits, analyze compression product fit and use, instruct in home lymphedema management program, and measure for compression products to address functional deficits and attain remaining goals.    Progress toward goals / Updated goals:  []  See Progress Note/Recertification    Short Term Goals: To be accomplished in 12

## 2024-10-08 ENCOUNTER — HOSPITAL ENCOUNTER (OUTPATIENT)
Facility: HOSPITAL | Age: 74
Setting detail: RECURRING SERIES
Discharge: HOME | End: 2024-10-11
Payer: MEDICARE

## 2024-10-08 PROCEDURE — 97140 MANUAL THERAPY 1/> REGIONS: CPT

## 2024-10-08 NOTE — PROGRESS NOTES
therapeutic exercises and self MLD over 3 consecutive sessions to improve circulation and decongest limb and for increased independence/tolerance for functional activities within 12 visits.MET 9/13/2024  3. Patient will tolerate wearing MLB/compression garment for 3 days between sessions over 3 consecutive visits to improve circulation and decongest limb and for increased independence/tolerance for functional activities within 12 visits. Ongoing 10/8/2024     Long Term Goals: To be accomplished in 24 treatments.  1. Patient will demonstrate improved edema management evidenced by performing donning/doffing of garments from dependent to modified independent 3/3 times within session to aid in reducing risk for infection in 24 visits.  2. Patient will demonstrate decrease in self-perceived functional impairment as evidenced by improved score on LLIS outcome measure from 78% impairment to 50% impairment within 24 visits.ongoing  3. Patient will demonstrate stable limb volume of BLE with no more than 100 mL increase/decrease each limb over 3 visits to increase patient's ability to safely transition to home maintenance phase of CDT within 24 visits. ongoing      PLAN  YES Continue plan of care  Re-Cert Due: 10/10/2024  Progress Note: 10/18/2024  []  Upgrade activities as tolerated  []  Discharge due to:  []  Other:      Rosa M Pierce OT       10/8/2024       3:16 PM

## 2024-10-11 ENCOUNTER — HOSPITAL ENCOUNTER (OUTPATIENT)
Facility: HOSPITAL | Age: 74
Setting detail: RECURRING SERIES
Discharge: HOME | End: 2024-10-14
Payer: MEDICARE

## 2024-10-11 PROCEDURE — 97763 ORTHC/PROSTC MGMT SBSQ ENC: CPT

## 2024-10-14 NOTE — THERAPY RECERTIFICATION
in 12 treatments.    1. Patient will tolerate wearing MLB/compression garment for 3 days between sessions over 3 consecutive visits to improve circulation and decongest limb and for increased independence/tolerance for functional activities within 12 visits. Ongoing 10/14/2024     Long Term Goals: To be accomplished in 24 treatments.  1. Patient will demonstrate improved edema management evidenced by performing donning/doffing of garments from dependent to modified independent 3/3 times within session to aid in reducing risk for infection in 24 visits.  2. Patient will demonstrate decrease in self-perceived functional impairment as evidenced by improved score on LLIS outcome measure from 78% impairment to 50% impairment within 24 visits.ongoing  3. Patient will demonstrate stable limb volume of BLE with no more than 100 mL increase/decrease each limb over 3 visits to increase patient's ability to safely transition to home maintenance phase of CDT within 24 visits. ongoing        Frequency / Duration:   Patient to be seen   2   times per week for  12 weeks.        Assessments/Recommendations for Continuation of Care: Continue POC to maximize I and safety with self management of long term lymphedema symptoms.  The pt is awaiting delivery of night time garments.     If you have any questions/comments please contact us directly at [phone].   Thank you for allowing us to assist in the care of your patient.    Certification Period: 10/10/2024-01/03/25        Rosa M Pierce OT       10/14/2024       9:18 AM      ___ I have read the above report and request that my patient continue as recommended.   ___ I have read the above report and request that my patient continue therapy with the following changes/special instructions: ________________________________________________   ___ I have read the above report and request that my patient be discharged from therapy.     Physician's Signature:_________________________

## 2024-10-14 NOTE — PROGRESS NOTES
find that you are unable to get your stockings on in the morning due to an increase in leg swelling, please notify your therapist as soon as possible at [phone].       Pt states her roommate will be able to assist her as needed to don garments.  Pt to assess her ability to manage with the garments over the weekend and return next week.   Pt is still waiting for delivery of her nighttime garments.    35 35    Total Total            Modalities Rationale:      Decrease swelling; improve skin integrity   to improve patient's ability to progress to PLOF and address remaining functional goals.      Skin assessment post-treatment (if applicable):    [x]  intact    []  redness- no adverse reaction                 []redness - adverse reaction:          Patient Education: [x] Review HEP    [x]  Patient Education billed concurrently with other procedures   [] MLD Patient Education Continued education in self MLD technique with bathing and skin care  [] Progressed/Changed HEP based on:   [] positioning   [] Kinesiotape   [x] Skin care   [x] wound care   [] other:   [] Patient/caregiver in multi-layer bandaging donning principles.  Patient / caregiver re-demonstrated bandaging. [] Yes  [x] No  Compression bandaging/garment precautions reviewed: [x] Yes  [] No        Assessment   Pt received her custom daytime garments.  They fit appropriate.  Pt to assess her ability to manage her symptoms over the weekend with the garments.    Pt is awaiting arrival of her night time garments.  Continue POC.     Patient will continue to benefit from skilled PT / OT services to address ROM deficits, analyze compression product fit and use, instruct in home lymphedema management program, and measure for compression products to address functional deficits and attain remaining goals.    Progress toward goals / Updated goals:  []  See Progress Note/Recertification    Short Term Goals: To be accomplished in 12 treatments.    1. Patient will

## 2024-10-15 ENCOUNTER — HOSPITAL ENCOUNTER (OUTPATIENT)
Facility: HOSPITAL | Age: 74
Setting detail: RECURRING SERIES
Discharge: HOME | End: 2024-10-18
Payer: MEDICARE

## 2024-10-15 PROCEDURE — 97140 MANUAL THERAPY 1/> REGIONS: CPT

## 2024-10-15 NOTE — PROGRESS NOTES
PHYSICAL THERAPY/OCCUPATIONAL THERAPY - MEDICARE DAILY TREATMENT NOTE (updated 3/23)      Date:10/15/2024      Patient Name:  Yahaira Song :  1950   Medical   Diagnosis:  Lymphedema [I89.0] Treatment Diagnosis:  I89.0     Lymphedema, not elsewhere classified    Referral Source:  Ioana Perez MD Insurance:   Payor: HU[employer]NA MEDICARE / Plan: HU[employer]NA CHOICE-PPO MEDICARE / Product Type: *No Product type* /                     Patient  verified yes     Visit #   Current  / Total 18 24   Time   In / Out 1130 1205   Total Treatment Time 35   Total Timed Codes 35   1:1 Treatment Time 35      [facility] Totals Reminder:  bill using total billable   min of TIMED therapeutic procedures and modalities.   8-22 min = 1 unit; 23-37 min = 2 units; 38-52 min = 3 units;  53-67 min = 4 units; 68-82 min = 5 units         SUBJECTIVE    Pain Level (0-10 scale): Chronic back and leg pain; agreeable to OT session, did not rate     Any medication changes, allergies to medications, adverse drug reactions, diagnosis change, or new procedure performed?: [x] No    [] Yes (see summary sheet for update)  Medications: Verified on Patient Summary List    Subjective functional status/changes:     No acute issues  Pt states she and her friend were able to don the garments over the weekend with use of gloves but it took a long time.  She slept in them last night; OT advised not to do that.  She is awaiting her nighttime garments.   OT and pt discussed pneumatic pump as well to assist with lymphedema symptoms.   Pt needing to have her toe nails cut as they pressure against the nails are causing pain with the new custom garments.   Pt to call her MD ASAP to see if she can get an appointment sooner than her November scheduled date.     Information from eval (2024)  Pt is a 72 yo female presenting to the lymphedema clinic with swelling of the BLE.  She has h/o wounds on distal BLE.  She currently is seeing a wound care MD at her PCP

## 2024-10-18 ENCOUNTER — HOSPITAL ENCOUNTER (OUTPATIENT)
Facility: HOSPITAL | Age: 74
Setting detail: RECURRING SERIES
Discharge: HOME | End: 2024-10-21
Payer: MEDICARE

## 2024-10-18 PROCEDURE — 97140 MANUAL THERAPY 1/> REGIONS: CPT

## 2024-10-18 NOTE — PROGRESS NOTES
PHYSICAL THERAPY/OCCUPATIONAL THERAPY - MEDICARE DAILY TREATMENT NOTE (updated 3/23)      Date:10/18/2024      Patient Name:  Yahaira Song :  1950   Medical   Diagnosis:  Lymphedema [I89.0] Treatment Diagnosis:  I89.0     Lymphedema, not elsewhere classified    Referral Source:  Ioana Perez MD Insurance:   Payor: HU[employer]NA MEDICARE / Plan: HU[employer] CHOICE-PPO MEDICARE / Product Type: *No Product type* /                     Patient  verified yes     Visit #   Current  / Total 19 24   Time   In / Out 1130 1213   Total Treatment Time 43   Total Timed Codes 43   1:1 Treatment Time 43      [facility] Totals Reminder:  bill using total billable   min of TIMED therapeutic procedures and modalities.   8-22 min = 1 unit; 23-37 min = 2 units; 38-52 min = 3 units;  53-67 min = 4 units; 68-82 min = 5 units         SUBJECTIVE    Pain Level (0-10 scale): Chronic back and leg pain; agreeable to OT session, did not rate     Any medication changes, allergies to medications, adverse drug reactions, diagnosis change, or new procedure performed?: [x] No    [] Yes (see summary sheet for update)  Medications: Verified on Patient Summary List    Subjective functional status/changes:     No acute issues  Pt called MD office to get a nail care appointment; scheduled in November     Information from [facility]ly (2024)  Pt is a 74 yo female presenting to the lymphedema clinic with swelling of the BLE.  She has h/o wounds on distal BLE.  She currently is seeing a wound care MD at her PCP office.  She states she has had swelling in her legs for several years.  The swelling has gotten worse which impacts her ability to walk, perform self care.      The pt has the following PMH per her chart:  Essential HTN  -Goiter, unspecified  -Hypercholesterolemia  -Morbid obesity  -Osteoarthritis      OBJECTIVE  Skin Integrity and Tissue Assessment  Dermal Status: all wounds healed  Texture/Consistency:  dense connective tissue

## 2024-10-23 ENCOUNTER — HOSPITAL ENCOUNTER (OUTPATIENT)
Facility: HOSPITAL | Age: 74
Setting detail: RECURRING SERIES
Discharge: HOME | End: 2024-10-26
Payer: MEDICARE

## 2024-10-23 PROCEDURE — 97140 MANUAL THERAPY 1/> REGIONS: CPT

## 2024-10-23 NOTE — PROGRESS NOTES
PHYSICAL THERAPY/OCCUPATIONAL THERAPY - MEDICARE DAILY TREATMENT NOTE (updated 3/23)      Date:10/23/2024      Patient Name:  Yahaira Song :  1950   Medical   Diagnosis:  Lymphedema [I89.0] Treatment Diagnosis:  I89.0     Lymphedema, not elsewhere classified    Referral Source:  Ioana Perez MD Insurance:   Payor: HU[employer]NA MEDICARE / Plan: HU[employer]NA CHOICE-PPO MEDICARE / Product Type: *No Product type* /                     Patient  verified yes     Visit #   Current  / Total 20 24   Time   In / Out 1300 1350   Total Treatment Time 50   Total Timed Codes 50   1:1 Treatment Time 50      [facility] Totals Reminder:  bill using total billable   min of TIMED therapeutic procedures and modalities.   8-22 min = 1 unit; 23-37 min = 2 units; 38-52 min = 3 units;  53-67 min = 4 units; 68-82 min = 5 units         SUBJECTIVE    Pain Level (0-10 scale): Chronic back and leg pain; agreeable to OT session, did not rate     Any medication changes, allergies to medications, adverse drug reactions, diagnosis change, or new procedure performed?: [x] No    [] Yes (see summary sheet for update)  Medications: Verified on Patient Summary List    Subjective functional status/changes:     Pt reports falling; her MD is aware; they have ordered her a rollator.  OT recommended PT for balance.  Pt not wanting this at this time.   Pt arrived with bandages intact from last visit   Pt unable to wear custom garments due to nail issue; nail care appointment scheduled in November   She is awaiting her night time garment delivery     Information from [employer] (2024)  Pt is a 74 yo female presenting to the lymphedema clinic with swelling of the BLE.  She has h/o wounds on distal BLE.  She currently is seeing a wound care MD at her PCP office.  She states she has had swelling in her legs for several years.  The swelling has gotten worse which impacts her ability to walk, perform self care.      The pt has the following PMH per her

## 2024-10-25 ENCOUNTER — HOSPITAL ENCOUNTER (OUTPATIENT)
Facility: HOSPITAL | Age: 74
Setting detail: RECURRING SERIES
Discharge: HOME | End: 2024-10-28
Payer: MEDICARE

## 2024-10-25 PROCEDURE — 97140 MANUAL THERAPY 1/> REGIONS: CPT

## 2024-10-25 NOTE — PROGRESS NOTES
and decongest limb and for increased independence/tolerance for functional activities within 12 visits.MET 9/13/2024  3. Patient will tolerate wearing MLB/compression garment for 3 days between sessions over 3 consecutive visits to improve circulation and decongest limb and for increased independence/tolerance for functional activities within 12 visits. Ongoing 10/25/2024     Long Term Goals: To be accomplished in 24 treatments.  1. Patient will demonstrate improved edema management evidenced by performing donning/doffing of garments from dependent to modified independent 3/3 times within session to aid in reducing risk for infection in 24 visits.  2. Patient will demonstrate decrease in self-perceived functional impairment as evidenced by improved score on LLIS outcome measure from 78% impairment to 50% impairment within 24 visits.ongoing  3. Patient will demonstrate stable limb volume of BLE with no more than 100 mL increase/decrease each limb over 3 visits to increase patient's ability to safely transition to home maintenance phase of CDT within 24 visits. ongoing      PLAN  YES Continue plan of care  Re-Cert Due: 1/3/2025  Progress Note: 11/18/2024  []  Upgrade activities as tolerated  []  Discharge due to:  []  Other:      Rosa M Pierce OT       10/25/2024       1:36 PM

## 2024-10-29 ENCOUNTER — HOSPITAL ENCOUNTER (OUTPATIENT)
Facility: HOSPITAL | Age: 74
Setting detail: RECURRING SERIES
Discharge: HOME | End: 2024-11-01
Payer: MEDICARE

## 2024-10-29 PROCEDURE — 97140 MANUAL THERAPY 1/> REGIONS: CPT

## 2024-10-30 NOTE — PROGRESS NOTES
Farrow Wraps, JoViPak, Preet Sleeve, etc. as needed), and decongestion with manual lymphatic drainage as indicated. We discussed the need for consistent compression system for lymphedema management. Diaphragmatic breathing instruction and skin care education was performed,applying low pH lotion to extremity using upward strokes to stimulate lymphatic vessels.  Manual Lymphatic Drainage (MLD):  Area to decongest: LE Bilateral, foot, ankle, calf, and mid knee   Skin/wound care/debridement: Reviewed skin care principles:   Skin care products. and Prevention of cellulitis.   Applied multi-layer compression bandaging to:   The following multi-layer bandages were applied:  Protouch Stockinette      Padding layer:  Cellona  10 in Foam       Short stretch bandages:   6 cm, 8 cm, and 10 cm on bilateral distal LE        Bandages covered in TG  for protection    OT verified product and fit of nighttime garments.  OT reviewed wearing schedule, how to don/doff, safety/precautions.  Pt verbalized understanding and will have assistance at home to don/doff.      55 55    Total Total            Modalities Rationale:      Decrease swelling; improve skin integrity   to improve patient's ability to progress to PLOF and address remaining functional goals.         Skin assessment post-treatment (if applicable):    [x]  intact    []  redness- no adverse reaction                 []redness - adverse reaction:          Patient Education: [x] Review HEP    [x]  Patient Education billed concurrently with other procedures   [] MLD Patient Education Continued education in self MLD technique with bathing and skin care  [] Progressed/Changed HEP based on:   [] positioning   [] Kinesiotape   [x] Skin care   [x] wound care   [] other:   [] Patient/caregiver in multi-layer bandaging donning principles.  Patient / caregiver re-demonstrated bandaging. [] Yes  [x] No  Compression bandaging/garment precautions reviewed: [x] Yes  []

## 2024-11-01 ENCOUNTER — HOSPITAL ENCOUNTER (OUTPATIENT)
Facility: HOSPITAL | Age: 74
Setting detail: RECURRING SERIES
Discharge: HOME | End: 2024-11-04
Payer: MEDICARE

## 2024-11-01 PROCEDURE — 97140 MANUAL THERAPY 1/> REGIONS: CPT

## 2024-11-01 NOTE — PROGRESS NOTES
PHYSICAL THERAPY/OCCUPATIONAL THERAPY - MEDICARE DAILY TREATMENT NOTE (updated 3/23)      Date:2024      Patient Name:  Yahaira Song :  1950   Medical   Diagnosis:  Lymphedema [I89.0] Treatment Diagnosis:  I89.0     Lymphedema, not elsewhere classified    Referral Source:  Ioana Perez MD Insurance:   Payor: HU[employer]NA MEDICARE / Plan: HU[employer]NA CHOICE-PPO MEDICARE / Product Type: *No Product type* /                     Patient  verified yes     Visit #   Current  / Total 23 24   Time   In / Out 1135 1215   Total Treatment Time 40   Total Timed Codes 40   1:1 Treatment Time 40      [facility] Totals Reminder:  bill using total billable   min of TIMED therapeutic procedures and modalities.   8-22 min = 1 unit; 23-37 min = 2 units; 38-52 min = 3 units;  53-67 min = 4 units; 68-82 min = 5 units         SUBJECTIVE    Pain Level (0-10 scale): Chronic back and leg pain; agreeable to OT session, did not rate     Any medication changes, allergies to medications, adverse drug reactions, diagnosis change, or new procedure performed?: [x] No    [] Yes (see summary sheet for update)  Medications: Verified on Patient Summary List    Subjective functional status/changes:     Pt arrived with bandages intact from last visit   Pt unable to wear custom garments due to nail issue; nail care appointment scheduled in November         Information from carly (2024)  Pt is a 74 yo female presenting to the lymphedema clinic with swelling of the BLE.  She has h/o wounds on distal BLE.  She currently is seeing a wound care MD at her PCP office.  She states she has had swelling in her legs for several years.  The swelling has gotten worse which impacts her ability to walk, perform self care.      The pt has the following PMH per her chart:  Essential HTN  -Goiter, unspecified  -Hypercholesterolemia  -Morbid obesity  -Osteoarthritis      OBJECTIVE  Skin Integrity and Tissue Assessment  Dermal Status: all wounds

## 2024-11-05 ENCOUNTER — HOSPITAL ENCOUNTER (OUTPATIENT)
Facility: HOSPITAL | Age: 74
Discharge: HOME | End: 2024-11-07
Payer: MEDICARE

## 2024-11-05 ENCOUNTER — HOSPITAL ENCOUNTER (OUTPATIENT)
Facility: HOSPITAL | Age: 74
Setting detail: RECURRING SERIES
Discharge: HOME | End: 2024-11-08
Payer: MEDICARE

## 2024-11-05 ENCOUNTER — HOSPITAL ENCOUNTER (EMERGENCY)
Facility: HOSPITAL | Age: 74
Discharge: HOME | End: 2024-11-05
Payer: MEDICARE

## 2024-11-05 ENCOUNTER — APPOINTMENT (OUTPATIENT)
Facility: HOSPITAL | Age: 74
End: 2024-11-05
Payer: MEDICARE

## 2024-11-05 VITALS
TEMPERATURE: 98 F | OXYGEN SATURATION: 100 % | WEIGHT: 293 LBS | RESPIRATION RATE: 16 BRPM | SYSTOLIC BLOOD PRESSURE: 139 MMHG | HEIGHT: 68 IN | HEART RATE: 63 BPM | DIASTOLIC BLOOD PRESSURE: 68 MMHG | BODY MASS INDEX: 44.41 KG/M2

## 2024-11-05 DIAGNOSIS — E87.6 HYPOKALEMIA: ICD-10-CM

## 2024-11-05 DIAGNOSIS — M79.89 LEG SWELLING: Primary | ICD-10-CM

## 2024-11-05 DIAGNOSIS — M79.605 LEFT LEG PAIN: ICD-10-CM

## 2024-11-05 LAB
ALBUMIN SERPL-MCNC: 3.4 G/DL (ref 3.5–5)
ALBUMIN/GLOB SERPL: 0.8 (ref 1.1–2.2)
ALP SERPL-CCNC: 198 U/L (ref 45–117)
ALT SERPL-CCNC: 26 U/L (ref 12–78)
ANION GAP SERPL CALC-SCNC: 8 MMOL/L (ref 2–12)
AST SERPL W P-5'-P-CCNC: 31 U/L (ref 15–37)
BASOPHILS # BLD: 0 K/UL (ref 0–0.1)
BASOPHILS NFR BLD: 1 % (ref 0–1)
BILIRUB SERPL-MCNC: 0.5 MG/DL (ref 0.2–1)
BNP SERPL-MCNC: 242 PG/ML
BUN SERPL-MCNC: 21 MG/DL (ref 6–20)
BUN/CREAT SERPL: 18 (ref 12–20)
CA-I BLD-MCNC: 9 MG/DL (ref 8.5–10.1)
CHLORIDE SERPL-SCNC: 104 MMOL/L (ref 97–108)
CO2 SERPL-SCNC: 31 MMOL/L (ref 21–32)
CREAT SERPL-MCNC: 1.14 MG/DL (ref 0.55–1.02)
D DIMER PPP FEU-MCNC: 3.73 MG/L FEU (ref 0.19–0.5)
DIFFERENTIAL METHOD BLD: ABNORMAL
ECHO BSA: 2.6 M2
EOSINOPHIL # BLD: 0.1 K/UL (ref 0–0.4)
EOSINOPHIL NFR BLD: 3 % (ref 0–7)
ERYTHROCYTE [DISTWIDTH] IN BLOOD BY AUTOMATED COUNT: 16.7 % (ref 11.5–14.5)
GLOBULIN SER CALC-MCNC: 4.2 G/DL (ref 2–4)
GLUCOSE SERPL-MCNC: 96 MG/DL (ref 65–100)
HCT VFR BLD AUTO: 29.8 % (ref 35–47)
HGB BLD-MCNC: 9.1 G/DL (ref 11.5–16)
IMM GRANULOCYTES # BLD AUTO: 0 K/UL (ref 0–0.04)
IMM GRANULOCYTES NFR BLD AUTO: 0 % (ref 0–0.5)
LYMPHOCYTES # BLD: 0.8 K/UL (ref 0.8–3.5)
LYMPHOCYTES NFR BLD: 26 % (ref 12–49)
MCH RBC QN AUTO: 28.5 PG (ref 26–34)
MCHC RBC AUTO-ENTMCNC: 30.5 G/DL (ref 30–36.5)
MCV RBC AUTO: 93.4 FL (ref 80–99)
MONOCYTES # BLD: 0.3 K/UL (ref 0–1)
MONOCYTES NFR BLD: 8 % (ref 5–13)
NEUTS SEG # BLD: 2 K/UL (ref 1.8–8)
NEUTS SEG NFR BLD: 62 % (ref 32–75)
PLATELET # BLD AUTO: 153 K/UL (ref 150–400)
PMV BLD AUTO: 11.2 FL (ref 8.9–12.9)
POTASSIUM SERPL-SCNC: 3.2 MMOL/L (ref 3.5–5.1)
PROT SERPL-MCNC: 7.6 G/DL (ref 6.4–8.2)
RBC # BLD AUTO: 3.19 M/UL (ref 3.8–5.2)
SODIUM SERPL-SCNC: 143 MMOL/L (ref 136–145)
TROPONIN I SERPL HS-MCNC: 8 NG/L (ref 0–51)
WBC # BLD AUTO: 3.2 K/UL (ref 3.6–11)

## 2024-11-05 PROCEDURE — 36415 COLL VENOUS BLD VENIPUNCTURE: CPT

## 2024-11-05 PROCEDURE — 80053 COMPREHEN METABOLIC PANEL: CPT

## 2024-11-05 PROCEDURE — 85379 FIBRIN DEGRADATION QUANT: CPT

## 2024-11-05 PROCEDURE — 6370000000 HC RX 637 (ALT 250 FOR IP): Performed by: NURSE PRACTITIONER

## 2024-11-05 PROCEDURE — 99285 EMERGENCY DEPT VISIT HI MDM: CPT

## 2024-11-05 PROCEDURE — 83880 ASSAY OF NATRIURETIC PEPTIDE: CPT

## 2024-11-05 PROCEDURE — 85025 COMPLETE CBC W/AUTO DIFF WBC: CPT

## 2024-11-05 PROCEDURE — 97140 MANUAL THERAPY 1/> REGIONS: CPT

## 2024-11-05 PROCEDURE — 84484 ASSAY OF TROPONIN QUANT: CPT

## 2024-11-05 PROCEDURE — 93005 ELECTROCARDIOGRAM TRACING: CPT | Performed by: NURSE PRACTITIONER

## 2024-11-05 PROCEDURE — 93971 EXTREMITY STUDY: CPT

## 2024-11-05 PROCEDURE — 71045 X-RAY EXAM CHEST 1 VIEW: CPT

## 2024-11-05 RX ORDER — POTASSIUM CHLORIDE 750 MG/1
40 TABLET, EXTENDED RELEASE ORAL ONCE
Status: COMPLETED | OUTPATIENT
Start: 2024-11-05 | End: 2024-11-05

## 2024-11-05 RX ADMIN — POTASSIUM CHLORIDE 40 MEQ: 750 TABLET, EXTENDED RELEASE ORAL at 15:37

## 2024-11-05 ASSESSMENT — PAIN SCALES - GENERAL: PAINLEVEL_OUTOF10: 8

## 2024-11-05 ASSESSMENT — PAIN - FUNCTIONAL ASSESSMENT: PAIN_FUNCTIONAL_ASSESSMENT: 0-10

## 2024-11-05 NOTE — PROGRESS NOTES
PHYSICAL THERAPY/OCCUPATIONAL THERAPY - MEDICARE DAILY TREATMENT NOTE (updated 3/23)      Date:2024      Patient Name:  Yahaira Song :  1950   Medical   Diagnosis:  Lymphedema [I89.0] Treatment Diagnosis:  I89.0     Lymphedema, not elsewhere classified    Referral Source:  Ioana Perez MD Insurance:   Payor: HU[employer]NA MEDICARE / Plan: HU[employer]NA CHOICE-PPO MEDICARE / Product Type: *No Product type* /                     Patient  verified yes     Visit #   Current  / Total 24 24   Time   In / Out 1130 1145   Total Treatment Time 15   Total Timed Codes 15   1:1 Treatment Time 15      [facility] Totals Reminder:  bill using total billable   min of TIMED therapeutic procedures and modalities.   8-22 min = 1 unit; 23-37 min = 2 units; 38-52 min = 3 units;  53-67 min = 4 units; 68-82 min = 5 units         SUBJECTIVE    Pain Level (0-10 scale): Chronic back and leg pain; agreeable to OT session, did not rate     Any medication changes, allergies to medications, adverse drug reactions, diagnosis change, or new procedure performed?: [x] No    [] Yes (see summary sheet for update)  Medications: Verified on Patient Summary List    Subjective functional status/changes:     Pt arrived stating she \"wasn't good\".   Pt states she has gained 15 lbs in the past few days.   Pt states she is supposed to go to PCP after OT appointment for \"cardiac monitor\".  She also stated she did not tell her provider about her increased fluid gain in BLE from toes to hips.    OT stressed the importance of going straight to PCP appointment or ER due to possible cardiac issue due to symptoms.  Pt agreed.           Information from eval (2024)  Pt is a 72 yo female presenting to the lymphedema clinic with swelling of the BLE.  She has h/o wounds on distal BLE.  She currently is seeing a wound care MD at her PCP office.  She states she has had swelling in her legs for several years.  The swelling has gotten worse which impacts her

## 2024-11-05 NOTE — ED TRIAGE NOTES
Pt states she is having swelling and pain in left leg.  Sent here from [facility] for DVT rule out

## 2024-11-05 NOTE — ED PROVIDER NOTES
[facility] EMERGENCY DEPT  EMERGENCY DEPARTMENT HISTORY AND PHYSICAL EXAM      Date: 11/5/2024  Patient Name: Yahaira Song  MRN: [id number]  YOB: 1950  Date of evaluation: 11/5/2024  Provider: RUDY Hassan NP   Note Started: 3:21 PM EST 11/5/24    HISTORY OF PRESENT ILLNESS     Chief Complaint   Patient presents with    Leg Pain    Leg Swelling       History Provided By: Patient    HPI: Yahaira Song is a 74 y.o. female past history reviewed as listed below presents from her PCP office for pain and swelling in her left leg from thigh down to foot.  Has baseline chronic lower extremity edema however the left leg is greater and she was sent over to rule out DVT.  She denies any numbness or tingling, chest pain, nausea vomiting diarrhea abdominal pain back pain.  States that she has baseline shortness of breath on exertion.  Denies fever.  No other complaints by patient    PAST MEDICAL HISTORY   Past Medical History:  Past Medical History:   Diagnosis Date    Arthritis 7/7/2020    Asthma 7/7/2020    Hypertension 7/7/2020    Kidney stones     Liver cyst        Past Surgical History:  Past Surgical History:   Procedure Laterality Date    IR AIR ABS HEMATOMA BULLA CYST  8/7/2024    IR AIR ABS HEMATOMA BULLA CYST 8/7/2024 Petty Kevin APRN - NP SSR RAD ANGIO IR    UROLOGICAL SURGERY      Lithotripsy       Family History:  Family History   Family history unknown: Yes       Social History:  Social History     Tobacco Use    Smoking status: Never    Smokeless tobacco: Never   Vaping Use    Vaping status: Never Used   Substance Use Topics    Alcohol use: Never    Drug use: Never       Allergies:  Allergies   Allergen Reactions    Latex Hives     Other reaction(s): Unknown (comments)    Aspirin Hives     Other reaction(s): Unknown (comments)    Losartan      Other reaction(s): Unknown (comments)    Penicillins Hives     Other reaction(s): Unknown (comments)       PCP: Mona Wade APRN -  details.  None     Social Determinants affecting Diagnosis/Treatment: None    Smoking Cessation: Not Applicable    PROCEDURES   Unless otherwise noted above, none.  Procedures      CRITICAL CARE TIME   Patient does not meet Critical Care Time, 0 minutes    ED IMPRESSION     1. Leg swelling    2. Left leg pain    3. Hypokalemia          DISPOSITION/PLAN   DISPOSITION Decision To Discharge 11/05/2024 03:35:28 PM        Discharge Note: The patient is stable for discharge home. The signs, symptoms, diagnosis, and discharge instructions have been discussed, understanding conveyed, and agreed upon. The patient is to follow up as recommended or return to ER should their symptoms worsen.      PATIENT REFERRED TO:  [place] [id number]  [phone]    Go to registration for your ultrasound of your leg.        DISCHARGE MEDICATIONS:     Medication List        ASK your doctor about these medications      albuterol sulfate  (90 Base) MCG/ACT inhaler  Commonly known as: PROVENTIL;VENTOLIN;PROAIR     allopurinol 100 MG tablet  Commonly known as: ZYLOPRIM     AMOXICILLIN-POT CLAVULANATE PO     atorvastatin 10 MG tablet  Commonly known as: LIPITOR     bumetanide 1 MG tablet  Commonly known as: Bumex  Take 1 tablet by mouth daily     carvedilol 25 MG tablet  Commonly known as: COREG     gabapentin 300 MG capsule  Commonly known as: NEURONTIN     loratadine 10 MG tablet  Commonly known as: CLARITIN     sertraline 100 MG tablet  Commonly known as: ZOLOFT     solifenacin 5 MG tablet  Commonly known as: VESICARE                DISCONTINUED MEDICATIONS:  Discharge Medication List as of 11/5/2024  3:26 PM          I am the Primary Clinician of Record: RUDY Hassan NP (electronically signed)    (Please note that parts of this dictation were completed with voice recognition software. Quite often unanticipated grammatical, syntax, homophones, and other

## 2024-11-06 LAB
EKG ATRIAL RATE: 69 BPM
EKG DIAGNOSIS: NORMAL
EKG P AXIS: 66 DEGREES
EKG P-R INTERVAL: 198 MS
EKG Q-T INTERVAL: 384 MS
EKG QRS DURATION: 94 MS
EKG QTC CALCULATION (BAZETT): 411 MS
EKG R AXIS: -9 DEGREES
EKG T AXIS: -15 DEGREES
EKG VENTRICULAR RATE: 69 BPM

## 2024-11-08 ENCOUNTER — HOSPITAL ENCOUNTER (OUTPATIENT)
Facility: HOSPITAL | Age: 74
Setting detail: RECURRING SERIES
Discharge: HOME | End: 2024-11-11
Payer: MEDICARE

## 2024-11-08 PROCEDURE — 97140 MANUAL THERAPY 1/> REGIONS: CPT

## 2024-11-08 NOTE — PROGRESS NOTES
stressed reviewed precautions with wraps.      Patient will continue to benefit from skilled PT / OT services to address ROM deficits, analyze compression product fit and use, instruct in home lymphedema management program, and measure for compression products to address functional deficits and attain remaining goals.    Progress toward goals / Updated goals:  []  See Progress Note/Recertification    Short Term Goals: To be accomplished in 12 treatments.    1. Patient will independently identify at least 4 practices of proper skin care/infection prevention over 3 consecutive sessions to reduce risk of infections and wounds in 12 visits.  MET 9/13/2024  2. Patient will demonstrate independence in lymphedema home program of therapeutic exercises and self MLD over 3 consecutive sessions to improve circulation and decongest limb and for increased independence/tolerance for functional activities within 12 visits.MET 9/13/2024  3. Patient will tolerate wearing MLB/compression garment for 3 days between sessions over 3 consecutive visits to improve circulation and decongest limb and for increased independence/tolerance for functional activities within 12 visits. Ongoing 11/8/2024     Long Term Goals: To be accomplished in 24 treatments.  1. Patient will demonstrate improved edema management evidenced by performing donning/doffing of garments from dependent to modified independent 3/3 times within session to aid in reducing risk for infection in 24 visits.  2. Patient will demonstrate decrease in self-perceived functional impairment as evidenced by improved score on LLIS outcome measure from 78% impairment to 50% impairment within 24 visits.ongoing  3. Patient will demonstrate stable limb volume of BLE with no more than 100 mL increase/decrease each limb over 3 visits to increase patient's ability to safely transition to home maintenance phase of CDT within 24 visits. ongoing      PLAN  YES Continue plan of care  Re-Cert

## 2024-11-11 ENCOUNTER — APPOINTMENT (OUTPATIENT)
Facility: HOSPITAL | Age: 74
End: 2024-11-11
Payer: MEDICARE

## 2024-11-14 ENCOUNTER — HOSPITAL ENCOUNTER (OUTPATIENT)
Facility: HOSPITAL | Age: 74
Setting detail: RECURRING SERIES
Discharge: HOME | End: 2024-11-17
Payer: MEDICARE

## 2024-11-14 PROCEDURE — 97140 MANUAL THERAPY 1/> REGIONS: CPT

## 2024-11-14 NOTE — PROGRESS NOTES
PHYSICAL THERAPY/OCCUPATIONAL THERAPY - MEDICARE DAILY TREATMENT NOTE (updated 3/23)      Date:2024      Patient Name:  Yahaira Song :  1950   Medical   Diagnosis:  Lymphedema [I89.0] Treatment Diagnosis:  I89.0     Lymphedema, not elsewhere classified    Referral Source:  Ioana Perez MD Insurance:   Payor: HU[employer]NA MEDICARE / Plan: HUMANA CHOICE-PPO MEDICARE / Product Type: *No Product type* /                     Patient  verified yes     Visit #   Current  / Total 25 48   Time   In / Out 1400 1500   Total Treatment Time 60   Total Timed Codes 60   1:1 Treatment Time 60      [facility] Totals Reminder:  bill using total billable   min of TIMED therapeutic procedures and modalities.   8-22 min = 1 unit; 23-37 min = 2 units; 38-52 min = 3 units;  53-67 min = 4 units; 68-82 min = 5 units         SUBJECTIVE    Pain Level (0-10 scale): No acute pain reported today; pt has tolerated the Profore wraps since her last appointment but they have slid down the leg causing pressure on her medial calf areas; OT reminded pt to remove all bandages and wraps if pain and discomfort arise.     Pt now with a heart monitor    She has recently had her toe nails clipped       Subjective functional status/changes:        Pt with personal stressors; candid about talking with therapist about them  Tolerated Profore wraps; still on from last appointment        Information from [facility] (2024)  Pt is a 74 yo female presenting to the lymphedema clinic with swelling of the BLE.  She has h/o wounds on distal BLE.  She currently is seeing a wound care MD at her PCP office.  She states she has had swelling in her legs for several years.  The swelling has gotten worse which impacts her ability to walk, perform self care.      The pt has the following PMH per her chart:  Essential HTN  -Goiter, unspecified  -Hypercholesterolemia  -Morbid obesity  -Osteoarthritis      OBJECTIVE  Skin Integrity and Tissue Assessment  Dermal

## 2024-11-19 ENCOUNTER — APPOINTMENT (OUTPATIENT)
Facility: HOSPITAL | Age: 74
End: 2024-11-19
Payer: MEDICARE

## 2024-11-22 ENCOUNTER — HOSPITAL ENCOUNTER (OUTPATIENT)
Facility: HOSPITAL | Age: 74
Setting detail: RECURRING SERIES
Discharge: HOME | End: 2024-11-25
Payer: MEDICARE

## 2024-11-22 PROCEDURE — 97140 MANUAL THERAPY 1/> REGIONS: CPT

## 2024-11-22 NOTE — PROGRESS NOTES
PHYSICAL THERAPY/OCCUPATIONAL THERAPY - MEDICARE DAILY TREATMENT NOTE (updated 3/23)      Date:2024      Patient Name:  Yahaira Song :  1950   Medical   Diagnosis:  Lymphedema [I89.0] Treatment Diagnosis:  I89.0     Lymphedema, not elsewhere classified    Referral Source:  Ioana Perez MD Insurance:   Payor: HUMANA MEDICARE / Plan: HUMANA CHOICE-PPO MEDICARE / Product Type: *No Product type* /                     Patient  verified yes     Visit #   Current  / Total 26 48   Time   In / Out 1400 1457   Total Treatment Time 57   Total Timed Codes 57   1:1 Treatment Time 57      [facility] Totals Reminder:  bill using total billable   min of TIMED therapeutic procedures and modalities.   8-22 min = 1 unit; 23-37 min = 2 units; 38-52 min = 3 units;  53-67 min = 4 units; 68-82 min = 5 units         SUBJECTIVE    Pain Level (0-10 scale): No acute pain reported today    Pt continues to have the heart monitor    She has recently had her toe nails clipped       Subjective functional status/changes:        Pt with personal stressors; candid about talking with therapist about them  Pt was not able to make it into clinic earlier this week, so her bandages had fallen down the leg causing acute on chronic swelling of the bilateral ankles.   Pt with increased swelling of the thighs.  OT encouraged the patient to seek medical attention if the swelling continues and she experiences worsen SOB or other symptoms.  Pt states she will be seeing her PCP next week.          Information from [facility] (2024)  Pt is a 74 yo female presenting to the lymphedema clinic with swelling of the BLE.  She has h/o wounds on distal BLE.  She currently is seeing a wound care MD at her PCP office.  She states she has had swelling in her legs for several years.  The swelling has gotten worse which impacts her ability to walk, perform self care.      The pt has the following PMH per her chart:  Essential HTN  -Goiter,

## 2024-11-22 NOTE — PROGRESS NOTES
[facility]   a part of [facility]  [address], [address]   [place] [address]   Phone: [phone]  Fax: [phone]      OCCUPATIONAL THERAPY PROGRESS NOTE  Patient Name:  Yahaira Song :  1950   Treatment/Medical Diagnosis:  Lymphedema [I89.0]   Referral Source:  Ioana Perez MD     Date of Initial Visit:   Attended Visits:  26 Missed Visits:  3     SUMMARY OF TREATMENT/ASSESSMENT:  Information from evaluation (2024):  Pt is a 74 yo female presenting to the lymphedema clinic with swelling of the BLE. She has h/o wounds on distal BLE. She currently is seeing a wound care MD at her PCP office. She states she has had swelling in her legs for several years. The swelling has gotten worse which impacts her ability to walk, perform self care.      10/11/2024:  Wounds have healed.  The pt has tolerated aspects of CDT, including elevation, skin care, multiple layer short stretch bandaging, exercise as tolerated, low salt diet.   She has reduced bilaterally.  Today, the pt's custom garments were assessed for fit and comfort.  Pt states she has assistance at home to help don/doff.  OT concerned regarding mobility and donning stockings.  OT and pt discussed safety with donning, precautions and schedule of use.   Pt verbalized understanding.     The pt is awaiting delivery of her nighttime garments.     2024:  Pt has received her nighttime and daytime custom garments but she is unable to fit in them due to acute on chronic swelling of the entire legs from toes to upper thighs.  Pt currently with a heart monitor.  OT continues to stress the importance of seeking medical attention if her SOB and other symptoms worsen.   MLB and skin care will continue until pt is able to fit into custom garments.   Pt has personal stressor and other issues that may be impacting her lymphedema symptoms.      CURRENT STATUS/GOALS    Short Term Goals: To be

## 2024-11-26 ENCOUNTER — HOSPITAL ENCOUNTER (OUTPATIENT)
Facility: HOSPITAL | Age: 74
Setting detail: RECURRING SERIES
Discharge: HOME | End: 2024-11-29
Payer: MEDICARE

## 2024-11-26 PROCEDURE — 97140 MANUAL THERAPY 1/> REGIONS: CPT

## 2024-11-26 NOTE — PROGRESS NOTES
deficits and attain remaining goals.    Progress toward goals / Updated goals:  []  See Progress Note/Recertification    Short Term Goals: To be accomplished in 12 treatments.    1. Patient will independently identify at least 4 practices of proper skin care/infection prevention over 3 consecutive sessions to reduce risk of infections and wounds in 12 visits.  MET 9/13/2024  2. Patient will demonstrate independence in lymphedema home program of therapeutic exercises and self MLD over 3 consecutive sessions to improve circulation and decongest limb and for increased independence/tolerance for functional activities within 12 visits.MET 9/13/2024  3. Patient will tolerate wearing MLB/compression garment for 3 days between sessions over 3 consecutive visits to improve circulation and decongest limb and for increased independence/tolerance for functional activities within 12 visits. Ongoing 11/26/2024     Long Term Goals: To be accomplished in 24 treatments.  1. Patient will demonstrate improved edema management evidenced by performing donning/doffing of garments from dependent to modified independent 3/3 times within session to aid in reducing risk for infection in 24 visits.  2. Patient will demonstrate decrease in self-perceived functional impairment as evidenced by improved score on LLIS outcome measure from 78% impairment to 50% impairment within 24 visits.ongoing  3. Patient will demonstrate stable limb volume of BLE with no more than 100 mL increase/decrease each limb over 3 visits to increase patient's ability to safely transition to home maintenance phase of CDT within 24 visits. ongoing      PLAN  YES Continue plan of care  Re-Cert Due: 1/3/2025  Progress Note: 12/18/2024  []  Upgrade activities as tolerated  []  Discharge due to:  []  Other:      Rosa M Pierce OT       11/26/2024       2:57 PM

## 2024-11-27 ENCOUNTER — APPOINTMENT (OUTPATIENT)
Facility: HOSPITAL | Age: 74
End: 2024-11-27
Payer: MEDICARE

## 2024-12-04 ENCOUNTER — HOSPITAL ENCOUNTER (OUTPATIENT)
Facility: HOSPITAL | Age: 74
Setting detail: RECURRING SERIES
Discharge: HOME | End: 2024-12-07
Payer: MEDICARE

## 2024-12-04 PROCEDURE — 97140 MANUAL THERAPY 1/> REGIONS: CPT

## 2024-12-05 NOTE — PROGRESS NOTES
PHYSICAL THERAPY/OCCUPATIONAL THERAPY - MEDICARE DAILY TREATMENT NOTE (updated 3/23)      Date:2024      Patient Name:  Yahaira Song :  1950   Medical   Diagnosis:  Lymphedema [I89.0] Treatment Diagnosis:  I89.0     Lymphedema, not elsewhere classified    Referral Source:  Ioana Perez MD Insurance:   Payor: HUMANA MEDICARE / Plan: HUMANA CHOICE-PPO MEDICARE / Product Type: *No Product type* /                     Patient  verified yes     Visit #   Current  / Total 28 48   Time   In / Out 1305 1350   Total Treatment Time 45   Total Timed Codes 45   1:1 Treatment Time 45      [facility] Totals Reminder:  bill using total billable   min of TIMED therapeutic procedures and modalities.   8-22 min = 1 unit; 23-37 min = 2 units; 38-52 min = 3 units;  53-67 min = 4 units; 68-82 min = 5 units         SUBJECTIVE    Pain Level (0-10 scale): No acute pain reported today        Subjective functional status/changes:     Pt with personal stressors; candid about talking with therapist about them  Pt states she has not worn her custom compression stockings since last appointment as she has no one to don for her.   Her roommate is unable to help her.  OT encouraged the patient to speak her PCP further about the social issues.  Pt with acute on chronic swelling. She no longer fits into her custom garments for day or night as she has not had appropriate compression on legs for nearly a week.   Pt is at risk for wounds and infections.      Information from eval (2024)  Pt is a 72 yo female presenting to the lymphedema clinic with swelling of the BLE.  She has h/o wounds on distal BLE.  She currently is seeing a wound care MD at her PCP office.  She states she has had swelling in her legs for several years.  The swelling has gotten worse which impacts her ability to walk, perform self care.      The pt has the following PMH per her chart:  Essential HTN  -Goiter, unspecified  -Hypercholesterolemia  -Morbid

## 2024-12-11 ENCOUNTER — HOSPITAL ENCOUNTER (OUTPATIENT)
Facility: HOSPITAL | Age: 74
Setting detail: RECURRING SERIES
Discharge: HOME | End: 2024-12-14
Payer: MEDICARE

## 2024-12-11 PROCEDURE — 97140 MANUAL THERAPY 1/> REGIONS: CPT

## 2024-12-12 NOTE — PROGRESS NOTES
to address functional deficits and attain remaining goals.    Progress toward goals / Updated goals:  []  See Progress Note/Recertification    Short Term Goals: To be accomplished in 12 treatments.    1. Patient will independently identify at least 4 practices of proper skin care/infection prevention over 3 consecutive sessions to reduce risk of infections and wounds in 12 visits.  MET 9/13/2024  2. Patient will demonstrate independence in lymphedema home program of therapeutic exercises and self MLD over 3 consecutive sessions to improve circulation and decongest limb and for increased independence/tolerance for functional activities within 12 visits.MET 9/13/2024  3. Patient will tolerate wearing MLB/compression garment for 3 days between sessions over 3 consecutive visits to improve circulation and decongest limb and for increased independence/tolerance for functional activities within 12 visits. Ongoing 12/12/2024     Long Term Goals: To be accomplished in 24 treatments.  1. Patient will demonstrate improved edema management evidenced by performing donning/doffing of garments from dependent to modified independent 3/3 times within session to aid in reducing risk for infection in 24 visits.  2. Patient will demonstrate decrease in self-perceived functional impairment as evidenced by improved score on LLIS outcome measure from 78% impairment to 50% impairment within 24 visits.ongoing  3. Patient will demonstrate stable limb volume of BLE with no more than 100 mL increase/decrease each limb over 3 visits to increase patient's ability to safely transition to home maintenance phase of CDT within 24 visits. ongoing      PLAN  YES Continue plan of care  Re-Cert Due: 1/3/2025  Progress Note: 12/18/2024  []  Upgrade activities as tolerated  []  Discharge due to:  []  Other:      Rosa M Pierce OT       12/12/2024       9:14 AM

## 2024-12-16 ENCOUNTER — HOSPITAL ENCOUNTER (OUTPATIENT)
Facility: HOSPITAL | Age: 74
Setting detail: RECURRING SERIES
Discharge: HOME | End: 2024-12-19
Payer: MEDICARE

## 2024-12-16 PROCEDURE — 97140 MANUAL THERAPY 1/> REGIONS: CPT

## 2024-12-17 NOTE — PROGRESS NOTES
PHYSICAL THERAPY/OCCUPATIONAL THERAPY - MEDICARE DAILY TREATMENT NOTE (updated 3/23)      Date:2024      Patient Name:  Yahaira Song :  1950   Medical   Diagnosis:  Lymphedema [I89.0] Treatment Diagnosis:  I89.0     Lymphedema, not elsewhere classified    Referral Source:  Ioana Perez MD Insurance:   Payor: HUMANA MEDICARE / Plan: HUMANA CHOICE-PPO MEDICARE / Product Type: *No Product type* /                     Patient  verified yes     Visit #   Current  / Total 30 48   Time   In / Out 1400 1500   Total Treatment Time 60   Total Timed Codes 60   1:1 Treatment Time 60      [facility] Totals Reminder:  bill using total billable   min of TIMED therapeutic procedures and modalities.   8-22 min = 1 unit; 23-37 min = 2 units; 38-52 min = 3 units;  53-67 min = 4 units; 68-82 min = 5 units         SUBJECTIVE    Pain Level (0-10 scale): Pt did not rate pain but states her leg has been hurting since her last appointment.  She removed the Profores as she was uncomfortable last week, so she has been without bandages for 4 days and she presents with an increase of fluid and new blisters on the medial aspect of the L foot and small ulcerations on the distal lower leg, medially.    She has discomfort in the creases of her ankle.          Subjective functional status/changes:      Pt with acute on chronic swelling. She no longer fits into her custom garments for day or night as she has not had appropriate compression on legs for nearly a week.   Pt is at risk for wounds and infections.      Information from [facility] (2024)  Pt is a 72 yo female presenting to the lymphedema clinic with swelling of the BLE.  She has h/o wounds on distal BLE.  She currently is seeing a wound care MD at her PCP office.  She states she has had swelling in her legs for several years.  The swelling has gotten worse which impacts her ability to walk, perform self care.      The pt has the following PMH per her chart:  Essential

## 2024-12-20 ENCOUNTER — HOSPITAL ENCOUNTER (OUTPATIENT)
Facility: HOSPITAL | Age: 74
Setting detail: RECURRING SERIES
Discharge: HOME | End: 2024-12-23
Payer: MEDICARE

## 2024-12-20 PROCEDURE — 97140 MANUAL THERAPY 1/> REGIONS: CPT

## 2024-12-26 ENCOUNTER — HOSPITAL ENCOUNTER (OUTPATIENT)
Facility: HOSPITAL | Age: 74
Setting detail: RECURRING SERIES
Discharge: HOME | End: 2024-12-29
Payer: MEDICARE

## 2024-12-26 PROCEDURE — 97140 MANUAL THERAPY 1/> REGIONS: CPT

## 2024-12-26 NOTE — PROGRESS NOTES
PHYSICAL THERAPY/OCCUPATIONAL THERAPY - MEDICARE DAILY TREATMENT NOTE (updated 3/23)      Date: 2024      Patient Name:  Yahaira Song :  1950   Medical   Diagnosis:  Lymphedema [I89.0] Treatment Diagnosis:  I89.0     Lymphedema, not elsewhere classified    Referral Source:  Ioana Perez MD Insurance:   Payor: HUMANA MEDICARE / Plan: HUMANA CHOICE-PPO MEDICARE / Product Type: *No Product type* /                     Patient  verified yes     Visit #   Current  / Total 31 48   Time   In / Out 1400 1450   Total Treatment Time 50   Total Timed Codes 50   1:1 Treatment Time 50      [facility] Totals Reminder:  bill using total billable   min of TIMED therapeutic procedures and modalities.   8-22 min = 1 unit; 23-37 min = 2 units; 38-52 min = 3 units;  53-67 min = 4 units; 68-82 min = 5 units         SUBJECTIVE    Pain Level (0-10 scale):  No acute pain reported       Subjective functional status/changes:      Pt with acute on chronic swelling. She no longer fits into her custom garments for day or night as she has not had appropriate compression on legs for nearly a week.   Pt is at risk for wounds and infections.   Pt arrived to the clinic ambulating in her nighttime garments as she had no other options for compression.  She had to remove the Profores on Tuesday due having a bone scan.   She had arrived to the clinic today soiled from urine as she had \"diuretic shots\" given to her at the MD's office.   Pt had to excuse herself to the bathroom during treatment.     Information from [facility] (2024)  Pt is a 74 yo female presenting to the lymphedema clinic with swelling of the BLE.  She has h/o wounds on distal BLE.  She currently is seeing a wound care MD at her PCP office.  She states she has had swelling in her legs for several years.  The swelling has gotten worse which impacts her ability to walk, perform self care.      The pt has the following PMH per her chart:  Essential HTN  -Goiter,

## 2024-12-26 NOTE — PROGRESS NOTES
[facility]   a part of [facility]  [address], [address]   [place] [address]   Phone: [phone]  Fax: [phone]      OCCUPATIONAL THERAPY PROGRESS NOTE  Patient Name:  Yahaira Song :  1950   Treatment/Medical Diagnosis:  Lymphedema [I89.0]   Referral Source:  Ioana Perez MD     Date of Initial Visit:  2024 Attended Visits:  31 Missed Visits:  3     SUMMARY OF TREATMENT/ASSESSMENT:  Information from evaluation (2024):  Pt is a 74 yo female presenting to the lymphedema clinic with swelling of the BLE. She has h/o wounds on distal BLE. She currently is seeing a wound care MD at her PCP office. She states she has had swelling in her legs for several years. The swelling has gotten worse which impacts her ability to walk, perform self care.      10/11/2024:  Wounds have healed.  The pt has tolerated aspects of CDT, including elevation, skin care, multiple layer short stretch bandaging, exercise as tolerated, low salt diet.   She has reduced bilaterally.  Today, the pt's custom garments were assessed for fit and comfort.  Pt states she has assistance at home to help don/doff.  OT concerned regarding mobility and donning stockings.  OT and pt discussed safety with donning, precautions and schedule of use.   Pt verbalized understanding.     The pt is awaiting delivery of her nighttime garments.     2024:  Pt has received her nighttime and daytime custom garments but she is unable to fit in them due to acute on chronic swelling of the entire legs from toes to upper thighs.  Pt currently with a heart monitor.  OT continues to stress the importance of seeking medical attention if her SOB and other symptoms worsen.   MLB and skin care will continue until pt is able to fit into custom garments.   Pt has personal stressor and other issues that may be impacting her lymphedema symptoms.      2024:  Pt now with acute wounds and blisters on 
Goals: To be accomplished in 12 treatments.    1. Patient will independently identify at least 4 practices of proper skin care/infection prevention over 3 consecutive sessions to reduce risk of infections and wounds in 12 visits.  MET 9/13/2024  2. Patient will demonstrate independence in lymphedema home program of therapeutic exercises and self MLD over 3 consecutive sessions to improve circulation and decongest limb and for increased independence/tolerance for functional activities within 12 visits.MET 9/13/2024  3. Patient will tolerate wearing MLB/compression garment for 3 days between sessions over 3 consecutive visits to improve circulation and decongest limb and for increased independence/tolerance for functional activities within 12 visits. Ongoing 12/20/2024     Long Term Goals: To be accomplished in 24 treatments.  1. Patient will demonstrate improved edema management evidenced by performing donning/doffing of garments from dependent to modified independent 3/3 times within session to aid in reducing risk for infection in 24 visits.  2. Patient will demonstrate decrease in self-perceived functional impairment as evidenced by improved score on LLIS outcome measure from 78% impairment to 50% impairment within 24 visits.ongoing  3. Patient will demonstrate stable limb volume of BLE with no more than 100 mL increase/decrease each limb over 3 visits to increase patient's ability to safely transition to home maintenance phase of CDT within 24 visits. ongoing      PLAN  YES Continue plan of care  Re-Cert Due: 1/3/2025  Progress Note: 12/18/2024  []  Upgrade activities as tolerated  []  Discharge due to:  []  Other:      Rosa M Pierce OT       12/20/2024       3:47 PM

## 2024-12-29 ENCOUNTER — APPOINTMENT (OUTPATIENT)
Facility: HOSPITAL | Age: 74
DRG: 854 | End: 2024-12-29
Payer: MEDICARE

## 2024-12-29 ENCOUNTER — HOSPITAL ENCOUNTER (EMERGENCY)
Facility: HOSPITAL | Age: 74
Discharge: HOME | DRG: 854 | End: 2024-12-29
Attending: EMERGENCY MEDICINE
Payer: MEDICARE

## 2024-12-29 VITALS
TEMPERATURE: 98.4 F | HEART RATE: 90 BPM | BODY MASS INDEX: 44.41 KG/M2 | SYSTOLIC BLOOD PRESSURE: 151 MMHG | OXYGEN SATURATION: 98 % | WEIGHT: 293 LBS | HEIGHT: 68 IN | DIASTOLIC BLOOD PRESSURE: 78 MMHG | RESPIRATION RATE: 16 BRPM

## 2024-12-29 DIAGNOSIS — R50.9 FEVER IN ADULT: ICD-10-CM

## 2024-12-29 DIAGNOSIS — N10 ACUTE PYELONEPHRITIS: Primary | ICD-10-CM

## 2024-12-29 LAB
ALBUMIN SERPL-MCNC: 3.5 G/DL (ref 3.5–5)
ALBUMIN/GLOB SERPL: 1 (ref 1.1–2.2)
ALP SERPL-CCNC: 212 U/L (ref 45–117)
ALT SERPL-CCNC: 32 U/L (ref 12–78)
ANION GAP SERPL CALC-SCNC: 4 MMOL/L (ref 2–12)
APPEARANCE UR: CLEAR
AST SERPL W P-5'-P-CCNC: 33 U/L (ref 15–37)
BACTERIA URNS QL MICRO: NEGATIVE /HPF
BASOPHILS # BLD: 0 K/UL (ref 0–0.1)
BASOPHILS NFR BLD: 0 % (ref 0–1)
BILIRUB SERPL-MCNC: 1.8 MG/DL (ref 0.2–1)
BILIRUB UR QL: NEGATIVE
BUN SERPL-MCNC: 16 MG/DL (ref 6–20)
BUN/CREAT SERPL: 15 (ref 12–20)
CA-I BLD-MCNC: 8.5 MG/DL (ref 8.5–10.1)
CHLORIDE SERPL-SCNC: 105 MMOL/L (ref 97–108)
CO2 SERPL-SCNC: 29 MMOL/L (ref 21–32)
COLOR UR: ABNORMAL
CREAT SERPL-MCNC: 1.06 MG/DL (ref 0.55–1.02)
DIFFERENTIAL METHOD BLD: ABNORMAL
EOSINOPHIL # BLD: 0 K/UL (ref 0–0.4)
EOSINOPHIL NFR BLD: 0 % (ref 0–7)
ERYTHROCYTE [DISTWIDTH] IN BLOOD BY AUTOMATED COUNT: 18.2 % (ref 11.5–14.5)
FLUAV RNA SPEC QL NAA+PROBE: NOT DETECTED
FLUBV RNA SPEC QL NAA+PROBE: NOT DETECTED
GLOBULIN SER CALC-MCNC: 3.6 G/DL (ref 2–4)
GLUCOSE SERPL-MCNC: 130 MG/DL (ref 65–100)
GLUCOSE UR STRIP.AUTO-MCNC: NEGATIVE MG/DL
HCT VFR BLD AUTO: 31.6 % (ref 35–47)
HGB BLD-MCNC: 10.1 G/DL (ref 11.5–16)
HGB UR QL STRIP: ABNORMAL
IMM GRANULOCYTES # BLD AUTO: 0 K/UL (ref 0–0.04)
IMM GRANULOCYTES NFR BLD AUTO: 1 % (ref 0–0.5)
KETONES UR QL STRIP.AUTO: NEGATIVE MG/DL
LEUKOCYTE ESTERASE UR QL STRIP.AUTO: ABNORMAL
LYMPHOCYTES # BLD: 0.5 K/UL (ref 0.8–3.5)
LYMPHOCYTES NFR BLD: 6 % (ref 12–49)
MCH RBC QN AUTO: 28.8 PG (ref 26–34)
MCHC RBC AUTO-ENTMCNC: 32 G/DL (ref 30–36.5)
MCV RBC AUTO: 90 FL (ref 80–99)
MONOCYTES # BLD: 0.5 K/UL (ref 0–1)
MONOCYTES NFR BLD: 7 % (ref 5–13)
MUCOUS THREADS URNS QL MICRO: ABNORMAL /LPF
NEUTS SEG # BLD: 6.8 K/UL (ref 1.8–8)
NEUTS SEG NFR BLD: 86 % (ref 32–75)
NITRITE UR QL STRIP.AUTO: POSITIVE
NRBC # BLD: 0 K/UL (ref 0–0.01)
NRBC BLD-RTO: 0 PER 100 WBC
PH UR STRIP: 6 (ref 5–8)
PLATELET # BLD AUTO: 84 K/UL (ref 150–400)
PMV BLD AUTO: 11.4 FL (ref 8.9–12.9)
POTASSIUM SERPL-SCNC: 3 MMOL/L (ref 3.5–5.1)
PROT SERPL-MCNC: 7.1 G/DL (ref 6.4–8.2)
PROT UR STRIP-MCNC: 30 MG/DL
RBC # BLD AUTO: 3.51 M/UL (ref 3.8–5.2)
RBC #/AREA URNS HPF: ABNORMAL /HPF (ref 0–5)
SARS-COV-2 RNA RESP QL NAA+PROBE: NOT DETECTED
SODIUM SERPL-SCNC: 138 MMOL/L (ref 136–145)
SP GR UR REFRACTOMETRY: >1.03 (ref 1–1.03)
URINE CULTURE IF INDICATED: ABNORMAL
UROBILINOGEN UR QL STRIP.AUTO: 0.1 EU/DL (ref 0.1–1)
WBC # BLD AUTO: 8 K/UL (ref 3.6–11)
WBC URNS QL MICRO: ABNORMAL /HPF (ref 0–4)

## 2024-12-29 PROCEDURE — 99285 EMERGENCY DEPT VISIT HI MDM: CPT

## 2024-12-29 PROCEDURE — 6360000002 HC RX W HCPCS: Performed by: EMERGENCY MEDICINE

## 2024-12-29 PROCEDURE — 81001 URINALYSIS AUTO W/SCOPE: CPT

## 2024-12-29 PROCEDURE — 6370000000 HC RX 637 (ALT 250 FOR IP): Performed by: EMERGENCY MEDICINE

## 2024-12-29 PROCEDURE — 71045 X-RAY EXAM CHEST 1 VIEW: CPT

## 2024-12-29 PROCEDURE — 87186 SC STD MICRODIL/AGAR DIL: CPT

## 2024-12-29 PROCEDURE — 87088 URINE BACTERIA CULTURE: CPT

## 2024-12-29 PROCEDURE — 85025 COMPLETE CBC W/AUTO DIFF WBC: CPT

## 2024-12-29 PROCEDURE — 2500000003 HC RX 250 WO HCPCS: Performed by: EMERGENCY MEDICINE

## 2024-12-29 PROCEDURE — 96374 THER/PROPH/DIAG INJ IV PUSH: CPT

## 2024-12-29 PROCEDURE — 87636 SARSCOV2 & INF A&B AMP PRB: CPT

## 2024-12-29 PROCEDURE — 87086 URINE CULTURE/COLONY COUNT: CPT

## 2024-12-29 PROCEDURE — 80053 COMPREHEN METABOLIC PANEL: CPT

## 2024-12-29 PROCEDURE — 74177 CT ABD & PELVIS W/CONTRAST: CPT

## 2024-12-29 PROCEDURE — 73502 X-RAY EXAM HIP UNI 2-3 VIEWS: CPT

## 2024-12-29 PROCEDURE — 6360000004 HC RX CONTRAST MEDICATION: Performed by: EMERGENCY MEDICINE

## 2024-12-29 RX ORDER — ACETAMINOPHEN 325 MG/1
650 TABLET ORAL
Status: COMPLETED | OUTPATIENT
Start: 2024-12-29 | End: 2024-12-29

## 2024-12-29 RX ORDER — POTASSIUM CHLORIDE 750 MG/1
40 TABLET, EXTENDED RELEASE ORAL ONCE
Status: COMPLETED | OUTPATIENT
Start: 2024-12-29 | End: 2024-12-29

## 2024-12-29 RX ORDER — IOPAMIDOL 755 MG/ML
100 INJECTION, SOLUTION INTRAVASCULAR
Status: COMPLETED | OUTPATIENT
Start: 2024-12-29 | End: 2024-12-29

## 2024-12-29 RX ORDER — CEFDINIR 300 MG/1
300 CAPSULE ORAL 2 TIMES DAILY
Qty: 14 CAPSULE | Refills: 0 | Status: SHIPPED | OUTPATIENT
Start: 2024-12-29 | End: 2024-12-31

## 2024-12-29 RX ADMIN — ACETAMINOPHEN 650 MG: 325 TABLET ORAL at 01:54

## 2024-12-29 RX ADMIN — IOPAMIDOL 100 ML: 755 INJECTION, SOLUTION INTRAVENOUS at 03:31

## 2024-12-29 RX ADMIN — CEFTRIAXONE SODIUM 2000 MG: 2 INJECTION, POWDER, FOR SOLUTION INTRAMUSCULAR; INTRAVENOUS at 04:38

## 2024-12-29 RX ADMIN — POTASSIUM CHLORIDE 40 MEQ: 750 TABLET, EXTENDED RELEASE ORAL at 04:29

## 2024-12-29 ASSESSMENT — PAIN - FUNCTIONAL ASSESSMENT: PAIN_FUNCTIONAL_ASSESSMENT: NONE - DENIES PAIN

## 2024-12-29 NOTE — DISCHARGE INSTRUCTIONS
Thank you for choosing our Emergency Department for your care.  It is our privilege to care for you in your time of need.  In the next several days, you may receive a survey via email or mailed to your home about your experience with our team.  We would greatly appreciate you taking a few minutes to complete the survey, as we use this information to learn what we have done well and what we could be doing better. Thank you for trusting us with your care!    Below you will find a list of your tests from today's visit.   Labs and Radiology Studies  Recent Results (from the past 12 hour(s))   COVID-19 & Influenza Combo    Collection Time: 12/29/24  1:57 AM    Specimen: Nasopharyngeal   Result Value Ref Range    SARS-CoV-2, PCR Not Detected Not Detected      Rapid Influenza A By PCR Not Detected Not Detected      Rapid Influenza B By PCR Not Detected Not Detected     Comprehensive Metabolic Panel    Collection Time: 12/29/24  2:32 AM   Result Value Ref Range    Sodium 138 136 - 145 mmol/L    Potassium 3.0 (L) 3.5 - 5.1 mmol/L    Chloride 105 97 - 108 mmol/L    CO2 29 21 - 32 mmol/L    Anion Gap 4 2 - 12 mmol/L    Glucose 130 (H) 65 - 100 mg/dL    BUN 16 6 - 20 mg/dL    Creatinine 1.06 (H) 0.55 - 1.02 mg/dL    BUN/Creatinine Ratio 15 12 - 20      Est, Glom Filt Rate 56 (L) >60 ml/min/1.73m2    Calcium 8.5 8.5 - 10.1 mg/dL    Total Bilirubin 1.8 (H) 0.2 - 1.0 mg/dL    AST 33 15 - 37 U/L    ALT 32 12 - 78 U/L    Alk Phosphatase 212 (H) 45 - 117 U/L    Total Protein 7.1 6.4 - 8.2 g/dL    Albumin 3.5 3.5 - 5.0 g/dL    Globulin 3.6 2.0 - 4.0 g/dL    Albumin/Globulin Ratio 1.0 (L) 1.1 - 2.2     CBC with Auto Differential    Collection Time: 12/29/24  2:32 AM   Result Value Ref Range    WBC 8.0 3.6 - 11.0 K/uL    RBC 3.51 (L) 3.80 - 5.20 M/uL    Hemoglobin 10.1 (L) 11.5 - 16.0 g/dL    Hematocrit 31.6 (L) 35.0 - 47.0 %    MCV 90.0 80.0 - 99.0 FL    MCH 28.8 26.0 - 34.0 PG    MCHC 32.0 30.0 - 36.5 g/dL    RDW 18.2 (H) 11.5 -  wall thickening. APPENDIX: Unremarkable. PERITONEUM: No ascites or pneumoperitoneum. RETROPERITONEUM: No lymphadenopathy or aortic aneurysm. REPRODUCTIVE ORGANS: Uterus and ovaries are within normal limits. URINARY BLADDER: No mass or calculus. BONES: Degenerative spine change. Osteoarthritic change of the hips. No acute fracture or aggressive lesion. ABDOMINAL WALL: No mass or hernia. ADDITIONAL COMMENTS: N/A     Bilateral nonobstructing nephrolithiasis with right ureteropelvic junction calculi and air within the right renal collecting system suggesting upper urinary tract gas-forming infection in the absence of history of recent intervention. Incidentals as above. Electronically signed by Reji Farmer    XR CHEST PORTABLE    Result Date: 12/29/2024  EXAM:  XR CHEST PORTABLE INDICATION: cough COMPARISON: Chest x-ray 11/5/2024 TECHNIQUE: Single portable chest AP view FINDINGS: The cardiac silhouette is within normal limits. The pulmonary vasculature is within normal limits.  The lungs and pleural spaces are clear. The visualized bones and upper abdomen are age-appropriate.     No acute process on portable chest. Electronically signed by Reji Farmer    XR HIP 2-3 VW W PELVIS LEFT    Result Date: 12/29/2024  EXAM: XR HIP 2-3 VW W PELVIS LEFT INDICATION: pain. COMPARISON: None. FINDINGS: AP view of the pelvis and a frogleg lateral view of the left hip demonstrate advanced osteoarthritic change of the left hip with femoral head and acetabular remodeling with no fracture, dislocation or other acute abnormality.     Advanced left hip osteoarthritis with no fracture or other acute findings. Electronically signed by Reji Farmer    ------------------------------------------------------------------------------------------------------------  The evaluation and treatment you received in the Emergency Department were for an urgent problem. It is important that you follow-up with a doctor, nurse practitioner, or physician

## 2024-12-29 NOTE — ED TRIAGE NOTES
Pt c/o flu like symptoms for 3 days with generalized body aches, fever, and cough. [facility] tested pt for flu and covid, which were negative.  for EMS. Pt has chronic wounds on bilateral legs and sees home health for wounds weekly.

## 2024-12-29 NOTE — ED PROVIDER NOTES
[facility] EMERGENCY DEPT  EMERGENCY DEPARTMENT HISTORY AND PHYSICAL EXAM      Date: 12/29/2024  Patient Name: Yahaira Song  MRN: [id number]  Birthdate 1950  Date of evaluation: 12/29/2024  Provider: Mirela Morataya MD   Note Started: 2:12 AM EST 12/29/24    HISTORY OF PRESENT ILLNESS     Chief Complaint   Patient presents with    Generalized Body Aches    Fever    Cough       History Provided By: Patient    HPI: Yahaira Song is a 74 y.o. female with a history of arthritis, hypertension, kidney stones, asthma presenting for generalized bodyaches, fever as well as cough.  According to patient, patient has been having flulike symptoms for the past 3 days with bodyaches all over fever as well as cough.  She states that [facility] did test her for flu and COVID which were both negative.  She has chronic lymphedema with bilateral leg swelling and wounds that have been the baseline.  Denies any abdominal pain    PAST MEDICAL HISTORY   Past Medical History:  Past Medical History:   Diagnosis Date    Arthritis 7/7/2020    Asthma 7/7/2020    Hypertension 7/7/2020    Kidney stones     Liver cyst        Past Surgical History:  Past Surgical History:   Procedure Laterality Date    IR ASP ABSCESS/HEMATOMA/BULLA/CYST  8/7/2024    IR AIR ABS HEMATOMA BULLA CYST 8/7/2024 Petty Kevin, RUDY - NP [facility] RAD ANGIO IR    UROLOGICAL SURGERY      Lithotripsy       Family History:  Family History   Family history unknown: Yes       Social History:  Social History     Tobacco Use    Smoking status: Never    Smokeless tobacco: Never   Vaping Use    Vaping status: Never Used   Substance Use Topics    Alcohol use: Never    Drug use: Never       Allergies:  Allergies   Allergen Reactions    Latex Hives     Other reaction(s): Unknown (comments)    Aspirin Hives     Other reaction(s): Unknown (comments)    Losartan      Other reaction(s): Unknown (comments)    Penicillins Hives     Other reaction(s): Unknown (comments)       PCP: Mauro

## 2024-12-31 ENCOUNTER — HOSPITAL ENCOUNTER (INPATIENT)
Facility: HOSPITAL | Age: 74
LOS: 9 days | Discharge: NURSING, REHAB OR LONG-TERM CARE | DRG: 854 | End: 2025-01-09
Admitting: INTERNAL MEDICINE
Payer: MEDICARE

## 2024-12-31 ENCOUNTER — APPOINTMENT (OUTPATIENT)
Facility: HOSPITAL | Age: 74
DRG: 854 | End: 2024-12-31
Payer: MEDICARE

## 2024-12-31 DIAGNOSIS — N12 PYELONEPHRITIS: Primary | ICD-10-CM

## 2024-12-31 PROBLEM — N39.0 COMPLICATED UTI (URINARY TRACT INFECTION): Status: ACTIVE | Noted: 2024-12-31

## 2024-12-31 LAB
ALBUMIN SERPL-MCNC: 2.7 G/DL (ref 3.5–5)
ALBUMIN/GLOB SERPL: 0.6 (ref 1.1–2.2)
ALP SERPL-CCNC: 178 U/L (ref 45–117)
ALT SERPL-CCNC: 21 U/L (ref 12–78)
ANION GAP SERPL CALC-SCNC: 4 MMOL/L (ref 2–12)
APPEARANCE UR: CLEAR
AST SERPL W P-5'-P-CCNC: 22 U/L (ref 15–37)
BACTERIA URNS QL MICRO: ABNORMAL /HPF
BASOPHILS # BLD: 0 K/UL (ref 0–0.1)
BASOPHILS NFR BLD: 0 % (ref 0–1)
BILIRUB SERPL-MCNC: 1.3 MG/DL (ref 0.2–1)
BILIRUB UR QL: NEGATIVE
BNP SERPL-MCNC: 563 PG/ML
BUN SERPL-MCNC: 9 MG/DL (ref 6–20)
BUN/CREAT SERPL: 8 (ref 12–20)
CA-I BLD-MCNC: 8.8 MG/DL (ref 8.5–10.1)
CHLORIDE SERPL-SCNC: 104 MMOL/L (ref 97–108)
CO2 SERPL-SCNC: 29 MMOL/L (ref 21–32)
COLOR UR: ABNORMAL
CREAT SERPL-MCNC: 1.1 MG/DL (ref 0.55–1.02)
CRP SERPL-MCNC: 31.3 MG/DL (ref 0–0.3)
DIFFERENTIAL METHOD BLD: ABNORMAL
EKG ATRIAL RATE: 106 BPM
EKG DIAGNOSIS: NORMAL
EKG P AXIS: 59 DEGREES
EKG P-R INTERVAL: 182 MS
EKG Q-T INTERVAL: 344 MS
EKG QRS DURATION: 86 MS
EKG QTC CALCULATION (BAZETT): 456 MS
EKG R AXIS: -12 DEGREES
EKG T AXIS: 26 DEGREES
EKG VENTRICULAR RATE: 106 BPM
EOSINOPHIL # BLD: 0 K/UL (ref 0–0.4)
EOSINOPHIL NFR BLD: 0 % (ref 0–7)
EPITH CASTS URNS QL MICRO: ABNORMAL /LPF
ERYTHROCYTE [DISTWIDTH] IN BLOOD BY AUTOMATED COUNT: 18.4 % (ref 11.5–14.5)
FLUAV RNA SPEC QL NAA+PROBE: NOT DETECTED
FLUBV RNA SPEC QL NAA+PROBE: NOT DETECTED
FOLATE SERPL-MCNC: 15.9 NG/ML (ref 5–21)
GLOBULIN SER CALC-MCNC: 4.8 G/DL (ref 2–4)
GLUCOSE SERPL-MCNC: 146 MG/DL (ref 65–100)
GLUCOSE UR STRIP.AUTO-MCNC: NEGATIVE MG/DL
HCT VFR BLD AUTO: 27.8 % (ref 35–47)
HGB BLD-MCNC: 8.8 G/DL (ref 11.5–16)
HGB UR QL STRIP: ABNORMAL
IMM GRANULOCYTES # BLD AUTO: 0.1 K/UL (ref 0–0.04)
IMM GRANULOCYTES NFR BLD AUTO: 1 % (ref 0–0.5)
IRON SATN MFR SERPL: 9 % (ref 20–50)
IRON SERPL-MCNC: 12 UG/DL (ref 35–150)
KETONES UR QL STRIP.AUTO: NEGATIVE MG/DL
LACTATE BLD-SCNC: 1.13 MMOL/L (ref 0.4–2)
LACTATE SERPL-SCNC: 1.2 MMOL/L (ref 0.4–2)
LEUKOCYTE ESTERASE UR QL STRIP.AUTO: ABNORMAL
LYMPHOCYTES # BLD: 0.7 K/UL (ref 0.8–3.5)
LYMPHOCYTES NFR BLD: 9 % (ref 12–49)
MCH RBC QN AUTO: 28.2 PG (ref 26–34)
MCHC RBC AUTO-ENTMCNC: 31.7 G/DL (ref 30–36.5)
MCV RBC AUTO: 89.1 FL (ref 80–99)
MONOCYTES # BLD: 1.2 K/UL (ref 0–1)
MONOCYTES NFR BLD: 15 % (ref 5–13)
MUCOUS THREADS URNS QL MICRO: ABNORMAL /LPF
NEUTS SEG # BLD: 6.1 K/UL (ref 1.8–8)
NEUTS SEG NFR BLD: 75 % (ref 32–75)
NITRITE UR QL STRIP.AUTO: NEGATIVE
NRBC # BLD: 0 K/UL (ref 0–0.01)
NRBC BLD-RTO: 0 PER 100 WBC
PERFORMED BY:: NORMAL
PH UR STRIP: 6 (ref 5–8)
PLATELET # BLD AUTO: 120 K/UL (ref 150–400)
PMV BLD AUTO: 12 FL (ref 8.9–12.9)
POTASSIUM SERPL-SCNC: 2.9 MMOL/L (ref 3.5–5.1)
POTASSIUM SERPL-SCNC: 3.3 MMOL/L (ref 3.5–5.1)
PROT SERPL-MCNC: 7.5 G/DL (ref 6.4–8.2)
PROT UR STRIP-MCNC: 30 MG/DL
RBC # BLD AUTO: 3.12 M/UL (ref 3.8–5.2)
RBC #/AREA URNS HPF: ABNORMAL /HPF (ref 0–5)
SARS-COV-2 RNA RESP QL NAA+PROBE: NOT DETECTED
SODIUM SERPL-SCNC: 137 MMOL/L (ref 136–145)
SP GR UR REFRACTOMETRY: 1.01 (ref 1–1.03)
TIBC SERPL-MCNC: 139 UG/DL (ref 250–450)
TROPONIN I SERPL HS-MCNC: 17 NG/L (ref 0–51)
TROPONIN I SERPL HS-MCNC: 18 NG/L (ref 0–51)
URINE CULTURE IF INDICATED: ABNORMAL
UROBILINOGEN UR QL STRIP.AUTO: 0.1 EU/DL (ref 0.1–1)
VIT B12 SERPL-MCNC: >2000 PG/ML (ref 193–986)
WBC # BLD AUTO: 8.1 K/UL (ref 3.6–11)
WBC URNS QL MICRO: ABNORMAL /HPF (ref 0–4)

## 2024-12-31 PROCEDURE — 36415 COLL VENOUS BLD VENIPUNCTURE: CPT

## 2024-12-31 PROCEDURE — 96366 THER/PROPH/DIAG IV INF ADDON: CPT

## 2024-12-31 PROCEDURE — 87186 SC STD MICRODIL/AGAR DIL: CPT

## 2024-12-31 PROCEDURE — 71045 X-RAY EXAM CHEST 1 VIEW: CPT

## 2024-12-31 PROCEDURE — 84132 ASSAY OF SERUM POTASSIUM: CPT

## 2024-12-31 PROCEDURE — 85025 COMPLETE CBC W/AUTO DIFF WBC: CPT

## 2024-12-31 PROCEDURE — 81001 URINALYSIS AUTO W/SCOPE: CPT

## 2024-12-31 PROCEDURE — 86140 C-REACTIVE PROTEIN: CPT

## 2024-12-31 PROCEDURE — 6370000000 HC RX 637 (ALT 250 FOR IP): Performed by: STUDENT IN AN ORGANIZED HEALTH CARE EDUCATION/TRAINING PROGRAM

## 2024-12-31 PROCEDURE — 2500000003 HC RX 250 WO HCPCS

## 2024-12-31 PROCEDURE — 96375 TX/PRO/DX INJ NEW DRUG ADDON: CPT

## 2024-12-31 PROCEDURE — 94640 AIRWAY INHALATION TREATMENT: CPT

## 2024-12-31 PROCEDURE — 96365 THER/PROPH/DIAG IV INF INIT: CPT

## 2024-12-31 PROCEDURE — 84484 ASSAY OF TROPONIN QUANT: CPT

## 2024-12-31 PROCEDURE — 2580000003 HC RX 258: Performed by: STUDENT IN AN ORGANIZED HEALTH CARE EDUCATION/TRAINING PROGRAM

## 2024-12-31 PROCEDURE — 6360000002 HC RX W HCPCS: Performed by: STUDENT IN AN ORGANIZED HEALTH CARE EDUCATION/TRAINING PROGRAM

## 2024-12-31 PROCEDURE — 87086 URINE CULTURE/COLONY COUNT: CPT

## 2024-12-31 PROCEDURE — 2580000003 HC RX 258

## 2024-12-31 PROCEDURE — 87040 BLOOD CULTURE FOR BACTERIA: CPT

## 2024-12-31 PROCEDURE — 82607 VITAMIN B-12: CPT

## 2024-12-31 PROCEDURE — 82746 ASSAY OF FOLIC ACID SERUM: CPT

## 2024-12-31 PROCEDURE — 93005 ELECTROCARDIOGRAM TRACING: CPT

## 2024-12-31 PROCEDURE — 6370000000 HC RX 637 (ALT 250 FOR IP)

## 2024-12-31 PROCEDURE — 6360000002 HC RX W HCPCS

## 2024-12-31 PROCEDURE — 1100000000 HC RM PRIVATE

## 2024-12-31 PROCEDURE — 83880 ASSAY OF NATRIURETIC PEPTIDE: CPT

## 2024-12-31 PROCEDURE — 94761 N-INVAS EAR/PLS OXIMETRY MLT: CPT

## 2024-12-31 PROCEDURE — 2500000003 HC RX 250 WO HCPCS: Performed by: STUDENT IN AN ORGANIZED HEALTH CARE EDUCATION/TRAINING PROGRAM

## 2024-12-31 PROCEDURE — 87077 CULTURE AEROBIC IDENTIFY: CPT

## 2024-12-31 PROCEDURE — 80053 COMPREHEN METABOLIC PANEL: CPT

## 2024-12-31 PROCEDURE — 99222 1ST HOSP IP/OBS MODERATE 55: CPT | Performed by: STUDENT IN AN ORGANIZED HEALTH CARE EDUCATION/TRAINING PROGRAM

## 2024-12-31 PROCEDURE — 83605 ASSAY OF LACTIC ACID: CPT

## 2024-12-31 PROCEDURE — 87636 SARSCOV2 & INF A&B AMP PRB: CPT

## 2024-12-31 PROCEDURE — 83540 ASSAY OF IRON: CPT

## 2024-12-31 PROCEDURE — 99285 EMERGENCY DEPT VISIT HI MDM: CPT

## 2024-12-31 RX ORDER — IPRATROPIUM BROMIDE AND ALBUTEROL SULFATE 2.5; .5 MG/3ML; MG/3ML
1 SOLUTION RESPIRATORY (INHALATION)
Status: DISCONTINUED | OUTPATIENT
Start: 2024-12-31 | End: 2025-01-03

## 2024-12-31 RX ORDER — ENOXAPARIN SODIUM 100 MG/ML
30 INJECTION SUBCUTANEOUS 2 TIMES DAILY
Status: DISCONTINUED | OUTPATIENT
Start: 2024-12-31 | End: 2025-01-09 | Stop reason: HOSPADM

## 2024-12-31 RX ORDER — AMLODIPINE BESYLATE 10 MG/1
10 TABLET ORAL DAILY
Status: ON HOLD | COMMUNITY
End: 2025-01-09 | Stop reason: HOSPADM

## 2024-12-31 RX ORDER — POTASSIUM CHLORIDE 1500 MG/1
40 TABLET, EXTENDED RELEASE ORAL PRN
Status: DISCONTINUED | OUTPATIENT
Start: 2024-12-31 | End: 2025-01-09 | Stop reason: HOSPADM

## 2024-12-31 RX ORDER — BUMETANIDE 1 MG/1
2 TABLET ORAL DAILY
Status: DISCONTINUED | OUTPATIENT
Start: 2024-12-31 | End: 2025-01-09 | Stop reason: HOSPADM

## 2024-12-31 RX ORDER — GABAPENTIN 300 MG/1
300 CAPSULE ORAL 3 TIMES DAILY
Status: DISCONTINUED | OUTPATIENT
Start: 2024-12-31 | End: 2025-01-09 | Stop reason: HOSPADM

## 2024-12-31 RX ORDER — MAGNESIUM SULFATE IN WATER 40 MG/ML
2000 INJECTION, SOLUTION INTRAVENOUS PRN
Status: DISCONTINUED | OUTPATIENT
Start: 2024-12-31 | End: 2025-01-09 | Stop reason: HOSPADM

## 2024-12-31 RX ORDER — POTASSIUM CHLORIDE 750 MG/1
40 TABLET, EXTENDED RELEASE ORAL ONCE
Status: COMPLETED | OUTPATIENT
Start: 2024-12-31 | End: 2024-12-31

## 2024-12-31 RX ORDER — IPRATROPIUM BROMIDE AND ALBUTEROL SULFATE 2.5; .5 MG/3ML; MG/3ML
1 SOLUTION RESPIRATORY (INHALATION)
Status: DISCONTINUED | OUTPATIENT
Start: 2024-12-31 | End: 2024-12-31

## 2024-12-31 RX ORDER — POLYETHYLENE GLYCOL 3350 17 G/17G
17 POWDER, FOR SOLUTION ORAL DAILY PRN
Status: DISCONTINUED | OUTPATIENT
Start: 2024-12-31 | End: 2025-01-09 | Stop reason: HOSPADM

## 2024-12-31 RX ORDER — ACETAMINOPHEN 325 MG/1
650 TABLET ORAL EVERY 6 HOURS PRN
Status: DISCONTINUED | OUTPATIENT
Start: 2024-12-31 | End: 2025-01-09 | Stop reason: HOSPADM

## 2024-12-31 RX ORDER — ONDANSETRON 2 MG/ML
4 INJECTION INTRAMUSCULAR; INTRAVENOUS EVERY 6 HOURS PRN
Status: DISCONTINUED | OUTPATIENT
Start: 2024-12-31 | End: 2025-01-09 | Stop reason: HOSPADM

## 2024-12-31 RX ORDER — ACETAMINOPHEN 650 MG/1
650 SUPPOSITORY RECTAL EVERY 6 HOURS PRN
Status: DISCONTINUED | OUTPATIENT
Start: 2024-12-31 | End: 2025-01-09 | Stop reason: HOSPADM

## 2024-12-31 RX ORDER — SODIUM CHLORIDE 0.9 % (FLUSH) 0.9 %
5-40 SYRINGE (ML) INJECTION PRN
Status: DISCONTINUED | OUTPATIENT
Start: 2024-12-31 | End: 2025-01-09 | Stop reason: HOSPADM

## 2024-12-31 RX ORDER — CARVEDILOL 12.5 MG/1
25 TABLET ORAL 2 TIMES DAILY
Status: DISCONTINUED | OUTPATIENT
Start: 2024-12-31 | End: 2025-01-09 | Stop reason: HOSPADM

## 2024-12-31 RX ORDER — SODIUM CHLORIDE 0.9 % (FLUSH) 0.9 %
5-40 SYRINGE (ML) INJECTION EVERY 12 HOURS SCHEDULED
Status: DISCONTINUED | OUTPATIENT
Start: 2024-12-31 | End: 2025-01-09 | Stop reason: HOSPADM

## 2024-12-31 RX ORDER — SODIUM CHLORIDE 9 MG/ML
INJECTION, SOLUTION INTRAVENOUS PRN
Status: DISCONTINUED | OUTPATIENT
Start: 2024-12-31 | End: 2025-01-09 | Stop reason: HOSPADM

## 2024-12-31 RX ORDER — POTASSIUM CHLORIDE 7.45 MG/ML
10 INJECTION INTRAVENOUS PRN
Status: DISCONTINUED | OUTPATIENT
Start: 2024-12-31 | End: 2025-01-09 | Stop reason: HOSPADM

## 2024-12-31 RX ORDER — PANTOPRAZOLE SODIUM 40 MG/1
40 TABLET, DELAYED RELEASE ORAL
Status: DISCONTINUED | OUTPATIENT
Start: 2024-12-31 | End: 2025-01-09 | Stop reason: HOSPADM

## 2024-12-31 RX ORDER — IPRATROPIUM BROMIDE AND ALBUTEROL SULFATE 2.5; .5 MG/3ML; MG/3ML
1 SOLUTION RESPIRATORY (INHALATION) EVERY 4 HOURS PRN
Status: DISCONTINUED | OUTPATIENT
Start: 2024-12-31 | End: 2025-01-09 | Stop reason: HOSPADM

## 2024-12-31 RX ORDER — ACETAMINOPHEN 325 MG/1
650 TABLET ORAL
Status: COMPLETED | OUTPATIENT
Start: 2024-12-31 | End: 2024-12-31

## 2024-12-31 RX ORDER — TRAZODONE HYDROCHLORIDE 50 MG/1
50 TABLET, FILM COATED ORAL NIGHTLY PRN
COMMUNITY

## 2024-12-31 RX ORDER — ALBUTEROL SULFATE 90 UG/1
2 INHALANT RESPIRATORY (INHALATION) EVERY 4 HOURS PRN
Status: DISCONTINUED | OUTPATIENT
Start: 2024-12-31 | End: 2025-01-09 | Stop reason: HOSPADM

## 2024-12-31 RX ORDER — ALLOPURINOL 100 MG/1
100 TABLET ORAL DAILY
Status: DISCONTINUED | OUTPATIENT
Start: 2024-12-31 | End: 2025-01-09 | Stop reason: HOSPADM

## 2024-12-31 RX ORDER — SODIUM CHLORIDE 9 MG/ML
INJECTION, SOLUTION INTRAVENOUS CONTINUOUS
Status: DISPENSED | OUTPATIENT
Start: 2024-12-31 | End: 2025-01-01

## 2024-12-31 RX ORDER — ATORVASTATIN CALCIUM 10 MG/1
10 TABLET, FILM COATED ORAL DAILY
Status: DISCONTINUED | OUTPATIENT
Start: 2024-12-31 | End: 2025-01-09 | Stop reason: HOSPADM

## 2024-12-31 RX ORDER — 0.9 % SODIUM CHLORIDE 0.9 %
30 INTRAVENOUS SOLUTION INTRAVENOUS ONCE
Status: COMPLETED | OUTPATIENT
Start: 2024-12-31 | End: 2024-12-31

## 2024-12-31 RX ORDER — MORPHINE SULFATE 2 MG/ML
2 INJECTION, SOLUTION INTRAMUSCULAR; INTRAVENOUS
Status: COMPLETED | OUTPATIENT
Start: 2024-12-31 | End: 2024-12-31

## 2024-12-31 RX ORDER — ONDANSETRON 4 MG/1
4 TABLET, ORALLY DISINTEGRATING ORAL EVERY 8 HOURS PRN
Status: DISCONTINUED | OUTPATIENT
Start: 2024-12-31 | End: 2025-01-09 | Stop reason: HOSPADM

## 2024-12-31 RX ORDER — POTASSIUM CHLORIDE 7.45 MG/ML
10 INJECTION INTRAVENOUS
Status: COMPLETED | OUTPATIENT
Start: 2024-12-31 | End: 2024-12-31

## 2024-12-31 RX ADMIN — GABAPENTIN 300 MG: 300 CAPSULE ORAL at 22:54

## 2024-12-31 RX ADMIN — SODIUM CHLORIDE, PRESERVATIVE FREE 10 ML: 5 INJECTION INTRAVENOUS at 23:00

## 2024-12-31 RX ADMIN — WATER 1000 MG: 1 INJECTION INTRAMUSCULAR; INTRAVENOUS; SUBCUTANEOUS at 04:27

## 2024-12-31 RX ADMIN — POTASSIUM CHLORIDE 40 MEQ: 750 TABLET, EXTENDED RELEASE ORAL at 05:29

## 2024-12-31 RX ADMIN — ACETAMINOPHEN 650 MG: 325 TABLET ORAL at 05:09

## 2024-12-31 RX ADMIN — POTASSIUM CHLORIDE 10 MEQ: 7.45 INJECTION INTRAVENOUS at 05:36

## 2024-12-31 RX ADMIN — BUMETANIDE 2 MG: 1 TABLET ORAL at 10:01

## 2024-12-31 RX ADMIN — ONDANSETRON 4 MG: 2 INJECTION INTRAMUSCULAR; INTRAVENOUS at 18:39

## 2024-12-31 RX ADMIN — ACETAMINOPHEN 650 MG: 325 TABLET, FILM COATED ORAL at 15:33

## 2024-12-31 RX ADMIN — ALLOPURINOL 100 MG: 100 TABLET ORAL at 09:52

## 2024-12-31 RX ADMIN — GABAPENTIN 300 MG: 300 CAPSULE ORAL at 09:52

## 2024-12-31 RX ADMIN — SODIUM CHLORIDE 1917 ML: 9 INJECTION, SOLUTION INTRAVENOUS at 05:28

## 2024-12-31 RX ADMIN — IPRATROPIUM BROMIDE AND ALBUTEROL SULFATE 1 DOSE: 2.5; .5 SOLUTION RESPIRATORY (INHALATION) at 09:41

## 2024-12-31 RX ADMIN — IPRATROPIUM BROMIDE AND ALBUTEROL SULFATE 1 DOSE: 2.5; .5 SOLUTION RESPIRATORY (INHALATION) at 19:48

## 2024-12-31 RX ADMIN — ENOXAPARIN SODIUM 30 MG: 100 INJECTION SUBCUTANEOUS at 22:55

## 2024-12-31 RX ADMIN — MORPHINE SULFATE 2 MG: 2 INJECTION, SOLUTION INTRAMUSCULAR; INTRAVENOUS at 09:52

## 2024-12-31 RX ADMIN — SODIUM CHLORIDE: 9 INJECTION, SOLUTION INTRAVENOUS at 09:58

## 2024-12-31 RX ADMIN — GABAPENTIN 300 MG: 300 CAPSULE ORAL at 15:33

## 2024-12-31 RX ADMIN — CARVEDILOL 25 MG: 12.5 TABLET, FILM COATED ORAL at 09:52

## 2024-12-31 RX ADMIN — SERTRALINE HYDROCHLORIDE 100 MG: 50 TABLET ORAL at 09:57

## 2024-12-31 RX ADMIN — CARVEDILOL 25 MG: 12.5 TABLET, FILM COATED ORAL at 22:54

## 2024-12-31 RX ADMIN — POTASSIUM CHLORIDE 10 MEQ: 7.45 INJECTION INTRAVENOUS at 06:48

## 2024-12-31 RX ADMIN — ENOXAPARIN SODIUM 30 MG: 100 INJECTION SUBCUTANEOUS at 09:52

## 2024-12-31 RX ADMIN — SODIUM CHLORIDE: 9 INJECTION, SOLUTION INTRAVENOUS at 17:13

## 2024-12-31 RX ADMIN — ATORVASTATIN CALCIUM 10 MG: 10 TABLET, FILM COATED ORAL at 09:52

## 2024-12-31 RX ADMIN — PANTOPRAZOLE SODIUM 40 MG: 40 TABLET, DELAYED RELEASE ORAL at 09:58

## 2024-12-31 RX ADMIN — ACETAMINOPHEN 650 MG: 325 TABLET, FILM COATED ORAL at 22:59

## 2024-12-31 ASSESSMENT — PAIN SCALES - GENERAL
PAINLEVEL_OUTOF10: 8
PAINLEVEL_OUTOF10: 7

## 2024-12-31 ASSESSMENT — PAIN DESCRIPTION - LOCATION
LOCATION: CHEST
LOCATION: ABDOMEN;CHEST

## 2024-12-31 NOTE — RT PROTOCOL NOTE
RT Inhaler-Nebulizer Bronchodilator Protocol Note    There is a bronchodilator order in the chart from a provider indicating to follow the RT Bronchodilator Protocol and there is an “Initiate RT Inhaler-Nebulizer Bronchodilator Protocol” order as well (see protocol at bottom of note).    CXR Findings:  No results found.    The findings from the last RT Protocol Assessment were as follows:   History Pulmonary Disease: None or smoker <15 pack years  Respiratory Pattern: Dyspnea on exertion or RR 21-25 bpm  Breath Sounds: Slightly diminished and/or crackles  Cough: Strong, spontaneous, non-productive  Indication for Bronchodilator Therapy: None  Bronchodilator Assessment Score: 4    Aerosolized bronchodilator medication orders have been revised according to the RT Inhaler-Nebulizer Bronchodilator Protocol below.    Respiratory Therapist to perform RT Therapy Protocol Assessment initially then follow the protocol.  Repeat RT Therapy Protocol Assessment PRN for score 0-3 or on second treatment, BID, and PRN for scores above 3.    No Indications - adjust the frequency to every 6 hours PRN wheezing or bronchospasm, if no treatments needed after 48 hours then discontinue using Per Protocol order mode.     If indication present, adjust the RT bronchodilator orders based on the Bronchodilator Assessment Score as indicated below.  Use Inhaler orders unless patient has one or more of the following: on home nebulizer, not able to hold breath for 10 seconds, is not alert and oriented, cannot activate and use MDI correctly, or respiratory rate 25 breaths per minute or more, then use the equivalent nebulizer order(s) with same Frequency and PRN reasons based on the score.  If a patient is on this medication at home then do not decrease Frequency below that used at home.    0-3 - enter or revise RT bronchodilator order(s) to equivalent RT Bronchodilator order with Frequency of every 4 hours PRN for wheezing or increased work of

## 2024-12-31 NOTE — ED PROVIDER NOTES
known as: CLARITIN     sertraline 100 MG tablet  Commonly known as: ZOLOFT     solifenacin 5 MG tablet  Commonly known as: VESICARE                DISCONTINUED MEDICATIONS:  Current Discharge Medication List          I am the Primary Clinician of Record. Christy Cooper MD (electronically signed)    (Please note that parts of this dictation were completed with voice recognition software. Quite often unanticipated grammatical, syntax, homophones, and other interpretive errors are inadvertently transcribed by the computer software. Please disregards these errors. Please excuse any errors that have escaped final proofreading.)     Christy Cooper MD  12/31/24 1110       Christy Cooper MD  12/31/24 1111

## 2024-12-31 NOTE — ED TRIAGE NOTES
Per EMS: pt c/o chest pain, SOB, n/v/d that started 1 week ago    Patient currently being \"treated for kidney infection\"

## 2024-12-31 NOTE — CARE COORDINATION
12/31/24 1004   Service Assessment   Patient Orientation Alert and Oriented   Cognition Alert   History Provided By Patient   Primary Caregiver Self   Accompanied By/Relationship Pt alone.   Support Systems Family Members;Children   Patient's Healthcare Decision Maker is: Legal Next of Kin   PCP Verified by CM Yes  (Mona Wade - seen last Friday.)   Last Visit to PCP Within last 3 months   Prior Functional Level Independent in ADLs/IADLs;Assistance with the following:;Mobility  (Cane)   Current Functional Level Independent in ADLs/IADLs;Assistance with the following:;Mobility  (Cane)   Can patient return to prior living arrangement Yes   Ability to make needs known: Good   Family able to assist with home care needs: Yes   Would you like for me to discuss the discharge plan with any other family members/significant others, and if so, who? Yes  (Ex  ( Kurtis Song).)   Financial Resources Medicare   Community Resources None   CM/SW Referral Other (see comment)  (None)   Social/Functional History   Lives With Other (Comment)  (Roommate)   Type of Home Apartment   Home Layout One level   Home Access Level entry   Bathroom Shower/Tub Tub/Shower unit   Bathroom Toilet Standard   Bathroom Equipment None   Bathroom Accessibility Accessible   Home Equipment Cane   Receives Help From Friend(s);Family   Prior Level of Assist for ADLs Independent   Prior Level of Assist for Homemaking Independent   Homemaking Responsibilities Yes   Ambulation Assistance Needs assistance  (Cane)   Prior Level of Assist for Transfers Independent   Active  No   Occupation On disability   Discharge Planning   Type of Residence Apartment   Living Arrangements Other (Comment)   Current Services Prior To Admission None   Potential Assistance Needed N/A   DME Ordered? No   Potential Assistance Purchasing Medications No   Type of Home Care Services None   Patient expects to be discharged to: Apartment   One/Two Story Residence

## 2024-12-31 NOTE — H&P
Hospitalist Admission Note    NAME: Yahaira Song   :  1950   MRN:  [id number]     Date/Time:  2024 8:26 AM    Patient PCP: Mona Wade APRN - NP    ______________________________________________________________________  Given the patient's current clinical presentation, I have a high level of concern for decompensation if discharged from the emergency department.  Complex decision making was performed, which includes reviewing the patient's available past medical records, laboratory results, and x-ray films.       My assessment of this patient's clinical condition and my plan of care is as follows.    Assessment / Plan:    Sepsis   - Fever and tachycardia  - Check lactic acid, CRP and procal  - Possibly secondary to complicated UTI  - Continue IV ceftriaxone  - Blood and urine cultures  - Continue IV fluids     UTI  Nephrolithiasis  - CT abdomen/pelvis on 2024 showed bilateral nonobstructing nephrolithiasis with right ureteropelvic junction calculi and air within the right renal collecting system suggesting upper urinary tract gas-forming infection in the absence of history of recent intervention  - Continue IV ceftriaxone   - Urology consult     Hypokalemia  - Received PO potassium 40 mEeq and IV potassium 20 mEq in the ED  - Replete as needed    Hypertension  - Continue home Coreg   - IV hydralazine PRN    Chronic lower extremity edema  - Continue PO Bumex 2 mg PO daily     Asthma  - Stable  - PRN albuterol HFA and duodnebs     Liver cyst   - Follows with [facility], recently had MRI abd 2024 which showed 11.7 x 15.9 x 15.5 cm minimally complex cyst with high proteinaceous or hemorrhagic content in the left hepatic lobe, 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

## 2024-12-31 NOTE — CARE COORDINATION
DCP: home self care, lives with roommate  KIANA: 24-48 hours    Pt has now been transferred to room 416.

## 2025-01-01 LAB
ALBUMIN SERPL-MCNC: 2.5 G/DL (ref 3.5–5)
ALBUMIN/GLOB SERPL: 0.5 (ref 1.1–2.2)
ALP SERPL-CCNC: 214 U/L (ref 45–117)
ALT SERPL-CCNC: 34 U/L (ref 12–78)
ANION GAP SERPL CALC-SCNC: 3 MMOL/L (ref 2–12)
AST SERPL W P-5'-P-CCNC: 68 U/L (ref 15–37)
BACTERIA SPEC CULT: NORMAL
BASOPHILS # BLD: 0 K/UL (ref 0–0.1)
BASOPHILS NFR BLD: 0 % (ref 0–1)
BILIRUB SERPL-MCNC: 1.4 MG/DL (ref 0.2–1)
BUN SERPL-MCNC: 8 MG/DL (ref 6–20)
BUN/CREAT SERPL: 8 (ref 12–20)
CA-I BLD-MCNC: 8.6 MG/DL (ref 8.5–10.1)
CHLORIDE SERPL-SCNC: 105 MMOL/L (ref 97–108)
CO2 SERPL-SCNC: 29 MMOL/L (ref 21–32)
COLONY COUNT, CNT: NORMAL
CREAT SERPL-MCNC: 0.97 MG/DL (ref 0.55–1.02)
DIFFERENTIAL METHOD BLD: ABNORMAL
EOSINOPHIL # BLD: 0 K/UL (ref 0–0.4)
EOSINOPHIL NFR BLD: 0 % (ref 0–7)
ERYTHROCYTE [DISTWIDTH] IN BLOOD BY AUTOMATED COUNT: 18.8 % (ref 11.5–14.5)
GLOBULIN SER CALC-MCNC: 4.8 G/DL (ref 2–4)
GLUCOSE SERPL-MCNC: 124 MG/DL (ref 65–100)
HCT VFR BLD AUTO: 28.7 % (ref 35–47)
HGB BLD-MCNC: 9 G/DL (ref 11.5–16)
IMM GRANULOCYTES # BLD AUTO: 0.1 K/UL (ref 0–0.04)
IMM GRANULOCYTES NFR BLD AUTO: 2 % (ref 0–0.5)
LYMPHOCYTES # BLD: 0.7 K/UL (ref 0.8–3.5)
LYMPHOCYTES NFR BLD: 9 % (ref 12–49)
Lab: NORMAL
MCH RBC QN AUTO: 28.7 PG (ref 26–34)
MCHC RBC AUTO-ENTMCNC: 31.4 G/DL (ref 30–36.5)
MCV RBC AUTO: 91.4 FL (ref 80–99)
MONOCYTES # BLD: 0.8 K/UL (ref 0–1)
MONOCYTES NFR BLD: 10 % (ref 5–13)
NEUTS SEG # BLD: 5.8 K/UL (ref 1.8–8)
NEUTS SEG NFR BLD: 79 % (ref 32–75)
NRBC # BLD: 0 K/UL (ref 0–0.01)
NRBC BLD-RTO: 0 PER 100 WBC
PLATELET # BLD AUTO: 134 K/UL (ref 150–400)
PMV BLD AUTO: 12.6 FL (ref 8.9–12.9)
POTASSIUM SERPL-SCNC: 3.3 MMOL/L (ref 3.5–5.1)
PROT SERPL-MCNC: 7.3 G/DL (ref 6.4–8.2)
RBC # BLD AUTO: 3.14 M/UL (ref 3.8–5.2)
SODIUM SERPL-SCNC: 137 MMOL/L (ref 136–145)
WBC # BLD AUTO: 7.4 K/UL (ref 3.6–11)

## 2025-01-01 PROCEDURE — 6370000000 HC RX 637 (ALT 250 FOR IP): Performed by: STUDENT IN AN ORGANIZED HEALTH CARE EDUCATION/TRAINING PROGRAM

## 2025-01-01 PROCEDURE — 36415 COLL VENOUS BLD VENIPUNCTURE: CPT

## 2025-01-01 PROCEDURE — 2580000003 HC RX 258: Performed by: STUDENT IN AN ORGANIZED HEALTH CARE EDUCATION/TRAINING PROGRAM

## 2025-01-01 PROCEDURE — 6370000000 HC RX 637 (ALT 250 FOR IP): Performed by: INTERNAL MEDICINE

## 2025-01-01 PROCEDURE — 1100000000 HC RM PRIVATE

## 2025-01-01 PROCEDURE — 6360000002 HC RX W HCPCS: Performed by: STUDENT IN AN ORGANIZED HEALTH CARE EDUCATION/TRAINING PROGRAM

## 2025-01-01 PROCEDURE — 94640 AIRWAY INHALATION TREATMENT: CPT

## 2025-01-01 PROCEDURE — 6360000002 HC RX W HCPCS: Performed by: INTERNAL MEDICINE

## 2025-01-01 PROCEDURE — 2500000003 HC RX 250 WO HCPCS: Performed by: STUDENT IN AN ORGANIZED HEALTH CARE EDUCATION/TRAINING PROGRAM

## 2025-01-01 PROCEDURE — 80053 COMPREHEN METABOLIC PANEL: CPT

## 2025-01-01 PROCEDURE — 85025 COMPLETE CBC W/AUTO DIFF WBC: CPT

## 2025-01-01 PROCEDURE — 2500000003 HC RX 250 WO HCPCS: Performed by: INTERNAL MEDICINE

## 2025-01-01 PROCEDURE — 94761 N-INVAS EAR/PLS OXIMETRY MLT: CPT

## 2025-01-01 RX ORDER — HYDROCODONE BITARTRATE AND ACETAMINOPHEN 5; 325 MG/1; MG/1
1 TABLET ORAL EVERY 6 HOURS PRN
Status: DISCONTINUED | OUTPATIENT
Start: 2025-01-01 | End: 2025-01-09 | Stop reason: HOSPADM

## 2025-01-01 RX ORDER — DEXTROMETHORPHAN POLISTIREX 30 MG/5ML
60 SUSPENSION ORAL EVERY 12 HOURS SCHEDULED
Status: DISCONTINUED | OUTPATIENT
Start: 2025-01-01 | End: 2025-01-09 | Stop reason: HOSPADM

## 2025-01-01 RX ADMIN — ENOXAPARIN SODIUM 30 MG: 100 INJECTION SUBCUTANEOUS at 08:01

## 2025-01-01 RX ADMIN — MEROPENEM 1000 MG: 1 INJECTION INTRAVENOUS at 20:38

## 2025-01-01 RX ADMIN — IPRATROPIUM BROMIDE AND ALBUTEROL SULFATE 1 DOSE: 2.5; .5 SOLUTION RESPIRATORY (INHALATION) at 19:57

## 2025-01-01 RX ADMIN — ACETAMINOPHEN 650 MG: 325 TABLET, FILM COATED ORAL at 08:18

## 2025-01-01 RX ADMIN — ALLOPURINOL 100 MG: 100 TABLET ORAL at 08:02

## 2025-01-01 RX ADMIN — HYDROCODONE BITARTRATE AND ACETAMINOPHEN 1 TABLET: 5; 325 TABLET ORAL at 12:11

## 2025-01-01 RX ADMIN — CEFTRIAXONE SODIUM 1000 MG: 1 INJECTION, POWDER, FOR SOLUTION INTRAMUSCULAR; INTRAVENOUS at 05:37

## 2025-01-01 RX ADMIN — BUMETANIDE 2 MG: 1 TABLET ORAL at 08:02

## 2025-01-01 RX ADMIN — PANTOPRAZOLE SODIUM 40 MG: 40 TABLET, DELAYED RELEASE ORAL at 08:02

## 2025-01-01 RX ADMIN — ENOXAPARIN SODIUM 30 MG: 100 INJECTION SUBCUTANEOUS at 20:38

## 2025-01-01 RX ADMIN — ATORVASTATIN CALCIUM 10 MG: 10 TABLET, FILM COATED ORAL at 08:02

## 2025-01-01 RX ADMIN — Medication 60 MG: at 20:39

## 2025-01-01 RX ADMIN — SERTRALINE HYDROCHLORIDE 100 MG: 50 TABLET ORAL at 08:02

## 2025-01-01 RX ADMIN — POTASSIUM CHLORIDE 40 MEQ: 1500 TABLET, EXTENDED RELEASE ORAL at 18:19

## 2025-01-01 RX ADMIN — CARVEDILOL 25 MG: 12.5 TABLET, FILM COATED ORAL at 08:02

## 2025-01-01 RX ADMIN — CARVEDILOL 25 MG: 12.5 TABLET, FILM COATED ORAL at 20:27

## 2025-01-01 RX ADMIN — Medication 60 MG: at 12:11

## 2025-01-01 RX ADMIN — SODIUM CHLORIDE, PRESERVATIVE FREE 10 ML: 5 INJECTION INTRAVENOUS at 20:32

## 2025-01-01 RX ADMIN — GABAPENTIN 300 MG: 300 CAPSULE ORAL at 20:38

## 2025-01-01 RX ADMIN — IPRATROPIUM BROMIDE AND ALBUTEROL SULFATE 1 DOSE: 2.5; .5 SOLUTION RESPIRATORY (INHALATION) at 08:24

## 2025-01-01 RX ADMIN — GABAPENTIN 300 MG: 300 CAPSULE ORAL at 08:02

## 2025-01-01 RX ADMIN — SODIUM CHLORIDE: 9 INJECTION, SOLUTION INTRAVENOUS at 05:40

## 2025-01-01 ASSESSMENT — PAIN SCALES - GENERAL: PAINLEVEL_OUTOF10: 8

## 2025-01-02 LAB
GLUCOSE BLD STRIP.AUTO-MCNC: 143 MG/DL (ref 65–100)
GLUCOSE BLD STRIP.AUTO-MCNC: 149 MG/DL (ref 65–100)
PERFORMED BY:: ABNORMAL
PERFORMED BY:: ABNORMAL

## 2025-01-02 PROCEDURE — 6370000000 HC RX 637 (ALT 250 FOR IP): Performed by: STUDENT IN AN ORGANIZED HEALTH CARE EDUCATION/TRAINING PROGRAM

## 2025-01-02 PROCEDURE — 6360000002 HC RX W HCPCS: Performed by: UROLOGY

## 2025-01-02 PROCEDURE — 2500000003 HC RX 250 WO HCPCS: Performed by: STUDENT IN AN ORGANIZED HEALTH CARE EDUCATION/TRAINING PROGRAM

## 2025-01-02 PROCEDURE — 2700000000 HC OXYGEN THERAPY PER DAY

## 2025-01-02 PROCEDURE — 99233 SBSQ HOSP IP/OBS HIGH 50: CPT | Performed by: UROLOGY

## 2025-01-02 PROCEDURE — 2500000003 HC RX 250 WO HCPCS: Performed by: INTERNAL MEDICINE

## 2025-01-02 PROCEDURE — 99223 1ST HOSP IP/OBS HIGH 75: CPT | Performed by: INTERNAL MEDICINE

## 2025-01-02 PROCEDURE — 94640 AIRWAY INHALATION TREATMENT: CPT

## 2025-01-02 PROCEDURE — 97165 OT EVAL LOW COMPLEX 30 MIN: CPT

## 2025-01-02 PROCEDURE — 6370000000 HC RX 637 (ALT 250 FOR IP): Performed by: INTERNAL MEDICINE

## 2025-01-02 PROCEDURE — 1100000000 HC RM PRIVATE

## 2025-01-02 PROCEDURE — 6360000002 HC RX W HCPCS: Performed by: STUDENT IN AN ORGANIZED HEALTH CARE EDUCATION/TRAINING PROGRAM

## 2025-01-02 PROCEDURE — 97530 THERAPEUTIC ACTIVITIES: CPT

## 2025-01-02 PROCEDURE — 6360000002 HC RX W HCPCS: Performed by: INTERNAL MEDICINE

## 2025-01-02 PROCEDURE — 82962 GLUCOSE BLOOD TEST: CPT

## 2025-01-02 PROCEDURE — 94761 N-INVAS EAR/PLS OXIMETRY MLT: CPT

## 2025-01-02 RX ADMIN — SERTRALINE HYDROCHLORIDE 100 MG: 50 TABLET ORAL at 09:35

## 2025-01-02 RX ADMIN — MEROPENEM 1000 MG: 1 INJECTION INTRAVENOUS at 13:45

## 2025-01-02 RX ADMIN — GABAPENTIN 300 MG: 300 CAPSULE ORAL at 13:44

## 2025-01-02 RX ADMIN — IPRATROPIUM BROMIDE AND ALBUTEROL SULFATE 1 DOSE: 2.5; .5 SOLUTION RESPIRATORY (INHALATION) at 21:44

## 2025-01-02 RX ADMIN — HYDROCODONE BITARTRATE AND ACETAMINOPHEN 1 TABLET: 5; 325 TABLET ORAL at 19:52

## 2025-01-02 RX ADMIN — ENOXAPARIN SODIUM 30 MG: 100 INJECTION SUBCUTANEOUS at 09:35

## 2025-01-02 RX ADMIN — Medication 60 MG: at 19:54

## 2025-01-02 RX ADMIN — ALLOPURINOL 100 MG: 100 TABLET ORAL at 09:35

## 2025-01-02 RX ADMIN — IPRATROPIUM BROMIDE AND ALBUTEROL SULFATE 1 DOSE: 2.5; .5 SOLUTION RESPIRATORY (INHALATION) at 09:56

## 2025-01-02 RX ADMIN — Medication 60 MG: at 09:36

## 2025-01-02 RX ADMIN — GABAPENTIN 300 MG: 300 CAPSULE ORAL at 09:35

## 2025-01-02 RX ADMIN — ENOXAPARIN SODIUM 30 MG: 100 INJECTION SUBCUTANEOUS at 20:45

## 2025-01-02 RX ADMIN — CARVEDILOL 25 MG: 12.5 TABLET, FILM COATED ORAL at 19:52

## 2025-01-02 RX ADMIN — ATORVASTATIN CALCIUM 10 MG: 10 TABLET, FILM COATED ORAL at 09:35

## 2025-01-02 RX ADMIN — HYDROCODONE BITARTRATE AND ACETAMINOPHEN 1 TABLET: 5; 325 TABLET ORAL at 11:25

## 2025-01-02 RX ADMIN — PANTOPRAZOLE SODIUM 40 MG: 40 TABLET, DELAYED RELEASE ORAL at 05:05

## 2025-01-02 RX ADMIN — SODIUM CHLORIDE, PRESERVATIVE FREE 10 ML: 5 INJECTION INTRAVENOUS at 20:07

## 2025-01-02 RX ADMIN — MEROPENEM 1000 MG: 1 INJECTION INTRAVENOUS at 20:45

## 2025-01-02 RX ADMIN — MEROPENEM 1000 MG: 1 INJECTION INTRAVENOUS at 05:05

## 2025-01-02 RX ADMIN — GABAPENTIN 300 MG: 300 CAPSULE ORAL at 20:45

## 2025-01-02 RX ADMIN — SODIUM CHLORIDE, PRESERVATIVE FREE 10 ML: 5 INJECTION INTRAVENOUS at 09:44

## 2025-01-02 RX ADMIN — HYDROCODONE BITARTRATE AND ACETAMINOPHEN 1 TABLET: 5; 325 TABLET ORAL at 05:19

## 2025-01-02 ASSESSMENT — PAIN SCALES - GENERAL
PAINLEVEL_OUTOF10: 8
PAINLEVEL_OUTOF10: 10
PAINLEVEL_OUTOF10: 8
PAINLEVEL_OUTOF10: 2
PAINLEVEL_OUTOF10: 2
PAINLEVEL_OUTOF10: 8

## 2025-01-02 ASSESSMENT — PAIN DESCRIPTION - LOCATION
LOCATION: ABDOMEN
LOCATION: CHEST
LOCATION: ABDOMEN

## 2025-01-02 ASSESSMENT — PAIN DESCRIPTION - DESCRIPTORS
DESCRIPTORS: ACHING
DESCRIPTORS: STABBING

## 2025-01-02 ASSESSMENT — PAIN DESCRIPTION - ORIENTATION
ORIENTATION: UPPER
ORIENTATION: MID
ORIENTATION: MID

## 2025-01-02 NOTE — CONSULTS
Urology Consult    Patient: Yahaira Song MRN: [id number]  SSN: [id number]    YOB: 1950  Age: 74 y.o.  Sex: female          Date of Consultation:  December 31, 2024  Requesting Physician: Dale Boo MD  Reason for Consultation: Urinary Tract Infection           Assessment/Plan:  Urinary Tract Infection - Follow up urine culture. Continue broad spectrum antibiotics.     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 Treat with antibiotics.     Bilateral Kidney Stones - will require outpatient treatment. Ureteroscopy vs. PCNL. Follow up at time of discharge to discuss options.      History of Present Illness:  Patient is a 74 y.o. female admitted 12/31/2024 to the hospital for Pyelonephritis [N12]  Complicated UTI (urinary tract infection) [N39.0].  She is a 74 y.o. female who presents with abdominal pain, vomiting, malaise. Pt reports she has been feeling sick for about 1 week now. Was seen at [facility] and tested negative for COVID/flu, and then in the ED here 2 days ago and diagnosed with pyelonephritis. She has been on cefdinir. She presents today due to being unable to tolerate PO.   Past Medical History:  Allergies   Allergen Reactions    Latex Hives     Other reaction(s): Unknown (comments)    Aspirin Hives     Other reaction(s): Unknown (comments)    Losartan      Other reaction(s): Unknown (comments)    Penicillins Hives     Other reaction(s): Unknown (comments)      Prior to Admission medications    Medication Sig Start Date End Date Taking? Authorizing Provider   amLODIPine (NORVASC) 10 MG tablet Take 1 tablet by mouth daily   Yes Provider, MD Ba   traZODone (DESYREL) 50 MG tablet Take 1 tablet by mouth nightly as needed for Sleep   Yes Provider, MD Ba   atorvastatin (LIPITOR) 10 MG tablet Take 1 tablet by mouth daily   Yes ProviderBa MD   loratadine (CLARITIN) 10 MG tablet Take 1 tablet by mouth 
  Abdominal:      General: Bowel sounds are normal. There is no distension.      Palpations: Abdomen is soft.      Tenderness: There is no abdominal tenderness. There is right CVA tenderness. There is no left CVA tenderness or guarding.   Genitourinary:     Comments: No Lomas catheter  Musculoskeletal:      Cervical back: Neck supple.      Right lower leg: Edema present.      Left lower leg: Edema present.   Skin:     Findings: No rash.   Neurological:      General: No focal deficit present.      Mental Status: She is alert and oriented to person, place, and time.   Psychiatric:         Mood and Affect: Mood normal.         Behavior: Behavior normal.         Thought Content: Thought content normal.         Judgment: Judgment normal.       Labs    CBC:  Recent Labs     12/31/24  0421 01/01/25  0450   WBC 8.1 7.4   RBC 3.12* 3.14*   HGB 8.8* 9.0*   HCT 27.8* 28.7*   MCV 89.1 91.4   RDW 18.4* 18.8*   * 134*     CHEMISTRIES:  Recent Labs     12/31/24  0421 12/31/24  1049 01/01/25  0450     --  137   K 2.9* 3.3* 3.3*     --  105   CO2 29  --  29   BUN 9  --  8   CREATININE 1.10*  --  0.97   GLUCOSE 146*  --  124*       Latest Reference Range & Units 12/31/24 05:41   Color, UA -   Yellow/Straw   Glucose, Ur Negative mg/dL Negative   Bilirubin, Urine Negative   Negative   Ketones, Urine Negative mg/dL Negative   Specific Gravity, UA 1.003 - 1.030   1.010   Blood, Urine Negative   Moderate !   Protein, UA Negative mg/dL 30 !   Urobilinogen 0.1 - 1.0 EU/dL 0.1   Nitrite, Urine Negative   Negative   Leukocyte Esterase, Urine Negative   Moderate !   Appearance Clear   Clear   pH, Urine 5.0 - 8.0   6.0   Mucus, UA Negative /lpf Trace !   WBC, UA 0 - 4 /hpf 10-20   RBC, UA 0 - 5 /hpf 5-10   Epithelial Cells, UA Few /lpf Few   Bacteria, UA Negative /hpf 4+ !      LIVER PROFILE:  Recent Labs     12/31/24  0421 01/01/25  0450   AST 22 68*   ALT 21 34   BILITOT 1.3* 1.4*   ALKPHOS 178* 214*     CRP 31.30    Blood

## 2025-01-02 NOTE — PLAN OF CARE
OCCUPATIONAL THERAPY EVALUATION  Patient: Yahaira Song (74 y.o. female)  Date: 1/2/2025  Primary Diagnosis: Pyelonephritis [N12]  Complicated UTI (urinary tract infection) [N39.0]       Precautions: Fall Risk, Contact Precautions                Recommendations for nursing mobility: Encourage HEP in prep for ADLs/mobility; see handout for details, Frequent repositioning to prevent skin breakdown, Use of bed/chair alarm for safety, LE elevation for management of edema, Alexsandra Stedy for bed to chair transfers , and Assist x2    In place during session:External Catheter  ASSESSMENT  Pt is a 74 y.o. female presenting to [facility] with c/o abdominal pain, nausea, vomiting, poor appetite, body aches, fever and cough, admitted 12/31/24 and currently being treated for complicated UTI due to klebsiella and ESBL E. Coli, nephrolithiasis, sepsis with fever and tachycardia, hypokalemia, chronic lower extremity edema, chronic asthma, acute cough and pleuritic chest pain, hepatic cyst. Pt received semi-supine in bed upon arrival, AXO x4, and agreeable to OT evaluation.     Based on current observations, pt presents with decreased  functional mobility, independence in ADLs, high-level IADLs, ROM, strength, activity tolerance, endurance, balance, increased pain levels (see below for objective details and assist levels).     Overall, pt tolerates session poor with decreased activity tolerance and increased O2 needs with mobility. Pt requiring increased time and mod-max A for transfer from semi supine to sitting at EOB. Pt initially requiring min A to maintain sitting balance but progressing to SBA. Once sitting, pt able to scoot laterally with min/CGA. Pt requiring TA to adjust socks while sitting at EOB. Pt washing face with set up A. Pt attempting STS attempt with bed height elevated and use of RW - however pt unable to clear hips from bed with max A from therapist. Pt with increased fatigue -- Spo2 reading between 77-80% on RA. Pt

## 2025-01-02 NOTE — PLAN OF CARE
Problem: Safety - Adult  Goal: Free from fall injury  1/1/2025 2108 by Ivan Reece RN  Outcome: Progressing  1/1/2025 0732 by Asuncion Cutler RN  Outcome: Progressing     Problem: Discharge Planning  Goal: Discharge to home or other facility with appropriate resources  1/1/2025 2108 by Ivan Reece RN  Outcome: Progressing  1/1/2025 0732 by Asuncion Cutler RN  Outcome: Progressing     Problem: Pain  Goal: Verbalizes/displays adequate comfort level or baseline comfort level  1/1/2025 2108 by Ivan Reece RN  Outcome: Progressing  1/1/2025 0732 by Asuncion Cutler RN  Outcome: Progressing     Problem: Skin/Tissue Integrity  Goal: Absence of new skin breakdown  Description: 1.  Monitor for areas of redness and/or skin breakdown  2.  Assess vascular access sites hourly  3.  Every 4-6 hours minimum:  Change oxygen saturation probe site  4.  Every 4-6 hours:  If on nasal continuous positive airway pressure, respiratory therapy assess nares and determine need for appliance change or resting period.  1/1/2025 2108 by Ivan Reece RN  Outcome: Progressing  1/1/2025 0732 by Asuncion Cutler RN  Outcome: Progressing

## 2025-01-02 NOTE — CARE COORDINATION
CM was informed patient will likely need IV abx for 2 weeks. CM met with the patient at the bedside to discuss. Upon entering room, staff were turning her. She has not been out of bed but was independent prior to admission. CM informed patient about IV abx needs and also voiced concerns about her physically being able to return home . PT and OT have been ordered to work with her.    Patient is insisting she go home and can do the IV abx herself. CM will go ahead and send referrals for infusion and HH. Once therapy sees depending on recs CM will follow back up.    normal

## 2025-01-03 ENCOUNTER — ANESTHESIA (OUTPATIENT)
Facility: HOSPITAL | Age: 75
End: 2025-01-03
Payer: MEDICARE

## 2025-01-03 ENCOUNTER — ANESTHESIA EVENT (OUTPATIENT)
Facility: HOSPITAL | Age: 75
End: 2025-01-03
Payer: MEDICARE

## 2025-01-03 ENCOUNTER — APPOINTMENT (OUTPATIENT)
Facility: HOSPITAL | Age: 75
DRG: 854 | End: 2025-01-03
Payer: MEDICARE

## 2025-01-03 LAB
CRP SERPL-MCNC: 29.7 MG/DL (ref 0–0.3)
GLUCOSE BLD STRIP.AUTO-MCNC: 110 MG/DL (ref 65–100)
GLUCOSE BLD STRIP.AUTO-MCNC: 122 MG/DL (ref 65–100)
GLUCOSE BLD STRIP.AUTO-MCNC: 132 MG/DL (ref 65–100)
GLUCOSE BLD STRIP.AUTO-MCNC: 205 MG/DL (ref 65–100)
PERFORMED BY:: ABNORMAL
PROCALCITONIN SERPL-MCNC: 3.43 NG/ML

## 2025-01-03 PROCEDURE — 6370000000 HC RX 637 (ALT 250 FOR IP): Performed by: INTERNAL MEDICINE

## 2025-01-03 PROCEDURE — 36415 COLL VENOUS BLD VENIPUNCTURE: CPT

## 2025-01-03 PROCEDURE — 2580000003 HC RX 258: Performed by: ANESTHESIOLOGY

## 2025-01-03 PROCEDURE — 0T768DZ DILATION OF RIGHT URETER WITH INTRALUMINAL DEVICE, VIA NATURAL OR ARTIFICIAL OPENING ENDOSCOPIC: ICD-10-PCS | Performed by: UROLOGY

## 2025-01-03 PROCEDURE — 3600000013 HC SURGERY LEVEL 3 ADDTL 15MIN: Performed by: UROLOGY

## 2025-01-03 PROCEDURE — 7100000001 HC PACU RECOVERY - ADDTL 15 MIN: Performed by: UROLOGY

## 2025-01-03 PROCEDURE — 2500000003 HC RX 250 WO HCPCS: Performed by: STUDENT IN AN ORGANIZED HEALTH CARE EDUCATION/TRAINING PROGRAM

## 2025-01-03 PROCEDURE — BT1D1ZZ FLUOROSCOPY OF RIGHT KIDNEY, URETER AND BLADDER USING LOW OSMOLAR CONTRAST: ICD-10-PCS | Performed by: UROLOGY

## 2025-01-03 PROCEDURE — 94640 AIRWAY INHALATION TREATMENT: CPT

## 2025-01-03 PROCEDURE — 6370000000 HC RX 637 (ALT 250 FOR IP): Performed by: STUDENT IN AN ORGANIZED HEALTH CARE EDUCATION/TRAINING PROGRAM

## 2025-01-03 PROCEDURE — 2580000003 HC RX 258: Performed by: NURSE ANESTHETIST, CERTIFIED REGISTERED

## 2025-01-03 PROCEDURE — 6360000002 HC RX W HCPCS: Performed by: NURSE ANESTHETIST, CERTIFIED REGISTERED

## 2025-01-03 PROCEDURE — 82962 GLUCOSE BLOOD TEST: CPT

## 2025-01-03 PROCEDURE — 2709999900 HC NON-CHARGEABLE SUPPLY: Performed by: UROLOGY

## 2025-01-03 PROCEDURE — 6360000004 HC RX CONTRAST MEDICATION: Performed by: UROLOGY

## 2025-01-03 PROCEDURE — 05HY33Z INSERTION OF INFUSION DEVICE INTO UPPER VEIN, PERCUTANEOUS APPROACH: ICD-10-PCS | Performed by: UROLOGY

## 2025-01-03 PROCEDURE — 2700000000 HC OXYGEN THERAPY PER DAY

## 2025-01-03 PROCEDURE — 3700000001 HC ADD 15 MINUTES (ANESTHESIA): Performed by: UROLOGY

## 2025-01-03 PROCEDURE — 36569 INSJ PICC 5 YR+ W/O IMAGING: CPT

## 2025-01-03 PROCEDURE — 6360000002 HC RX W HCPCS: Performed by: STUDENT IN AN ORGANIZED HEALTH CARE EDUCATION/TRAINING PROGRAM

## 2025-01-03 PROCEDURE — 76000 FLUOROSCOPY <1 HR PHYS/QHP: CPT

## 2025-01-03 PROCEDURE — 2500000003 HC RX 250 WO HCPCS: Performed by: INTERNAL MEDICINE

## 2025-01-03 PROCEDURE — 6360000002 HC RX W HCPCS: Performed by: INTERNAL MEDICINE

## 2025-01-03 PROCEDURE — 99232 SBSQ HOSP IP/OBS MODERATE 35: CPT | Performed by: INTERNAL MEDICINE

## 2025-01-03 PROCEDURE — C2617 STENT, NON-COR, TEM W/O DEL: HCPCS | Performed by: UROLOGY

## 2025-01-03 PROCEDURE — 94761 N-INVAS EAR/PLS OXIMETRY MLT: CPT

## 2025-01-03 PROCEDURE — 3700000000 HC ANESTHESIA ATTENDED CARE: Performed by: UROLOGY

## 2025-01-03 PROCEDURE — 7100000000 HC PACU RECOVERY - FIRST 15 MIN: Performed by: UROLOGY

## 2025-01-03 PROCEDURE — 74420 UROGRAPHY RTRGR +-KUB: CPT | Performed by: UROLOGY

## 2025-01-03 PROCEDURE — 86140 C-REACTIVE PROTEIN: CPT

## 2025-01-03 PROCEDURE — 3600000003 HC SURGERY LEVEL 3 BASE: Performed by: UROLOGY

## 2025-01-03 PROCEDURE — 2500000003 HC RX 250 WO HCPCS: Performed by: NURSE ANESTHETIST, CERTIFIED REGISTERED

## 2025-01-03 PROCEDURE — 1100000000 HC RM PRIVATE

## 2025-01-03 PROCEDURE — 52332 CYSTOSCOPY AND TREATMENT: CPT | Performed by: UROLOGY

## 2025-01-03 PROCEDURE — 84145 PROCALCITONIN (PCT): CPT

## 2025-01-03 DEVICE — URETERAL STENT
Type: IMPLANTABLE DEVICE | Site: URETER | Status: FUNCTIONAL
Brand: PERCUFLEX™ PLUS

## 2025-01-03 RX ORDER — ROCURONIUM BROMIDE 10 MG/ML
INJECTION, SOLUTION INTRAVENOUS
Status: DISCONTINUED | OUTPATIENT
Start: 2025-01-03 | End: 2025-01-03 | Stop reason: SDUPTHER

## 2025-01-03 RX ORDER — SODIUM CHLORIDE, SODIUM LACTATE, POTASSIUM CHLORIDE, CALCIUM CHLORIDE 600; 310; 30; 20 MG/100ML; MG/100ML; MG/100ML; MG/100ML
INJECTION, SOLUTION INTRAVENOUS
Status: DISCONTINUED | OUTPATIENT
Start: 2025-01-03 | End: 2025-01-03 | Stop reason: SDUPTHER

## 2025-01-03 RX ORDER — PROPOFOL 10 MG/ML
INJECTION, EMULSION INTRAVENOUS
Status: DISCONTINUED | OUTPATIENT
Start: 2025-01-03 | End: 2025-01-03 | Stop reason: SDUPTHER

## 2025-01-03 RX ORDER — MEPERIDINE HYDROCHLORIDE 25 MG/ML
12.5 INJECTION INTRAMUSCULAR; INTRAVENOUS; SUBCUTANEOUS EVERY 5 MIN PRN
Status: DISCONTINUED | OUTPATIENT
Start: 2025-01-03 | End: 2025-01-03 | Stop reason: HOSPADM

## 2025-01-03 RX ORDER — DIPHENHYDRAMINE HYDROCHLORIDE 50 MG/ML
12.5 INJECTION INTRAMUSCULAR; INTRAVENOUS
Status: DISCONTINUED | OUTPATIENT
Start: 2025-01-03 | End: 2025-01-03 | Stop reason: HOSPADM

## 2025-01-03 RX ORDER — SUCCINYLCHOLINE/SOD CL,ISO/PF 200MG/10ML
SYRINGE (ML) INTRAVENOUS
Status: DISCONTINUED | OUTPATIENT
Start: 2025-01-03 | End: 2025-01-03 | Stop reason: SDUPTHER

## 2025-01-03 RX ORDER — LIDOCAINE 4 G/G
1 PATCH TOPICAL AS NEEDED
Status: DISCONTINUED | OUTPATIENT
Start: 2025-01-03 | End: 2025-01-03 | Stop reason: HOSPADM

## 2025-01-03 RX ORDER — SODIUM CHLORIDE 0.9 % (FLUSH) 0.9 %
5-40 SYRINGE (ML) INJECTION PRN
Status: DISCONTINUED | OUTPATIENT
Start: 2025-01-03 | End: 2025-01-03 | Stop reason: HOSPADM

## 2025-01-03 RX ORDER — IPRATROPIUM BROMIDE AND ALBUTEROL SULFATE 2.5; .5 MG/3ML; MG/3ML
1 SOLUTION RESPIRATORY (INHALATION)
Status: DISCONTINUED | OUTPATIENT
Start: 2025-01-03 | End: 2025-01-03 | Stop reason: HOSPADM

## 2025-01-03 RX ORDER — LORAZEPAM 2 MG/ML
0.5 INJECTION INTRAMUSCULAR
Status: DISCONTINUED | OUTPATIENT
Start: 2025-01-03 | End: 2025-01-03 | Stop reason: HOSPADM

## 2025-01-03 RX ORDER — ONDANSETRON 2 MG/ML
4 INJECTION INTRAMUSCULAR; INTRAVENOUS
Status: DISCONTINUED | OUTPATIENT
Start: 2025-01-03 | End: 2025-01-03 | Stop reason: HOSPADM

## 2025-01-03 RX ORDER — SODIUM CHLORIDE, SODIUM LACTATE, POTASSIUM CHLORIDE, CALCIUM CHLORIDE 600; 310; 30; 20 MG/100ML; MG/100ML; MG/100ML; MG/100ML
INJECTION, SOLUTION INTRAVENOUS ONCE
Status: DISCONTINUED | OUTPATIENT
Start: 2025-01-03 | End: 2025-01-03 | Stop reason: HOSPADM

## 2025-01-03 RX ORDER — DEXTROSE MONOHYDRATE 100 MG/ML
INJECTION, SOLUTION INTRAVENOUS CONTINUOUS PRN
Status: DISCONTINUED | OUTPATIENT
Start: 2025-01-03 | End: 2025-01-03 | Stop reason: HOSPADM

## 2025-01-03 RX ORDER — FENTANYL CITRATE 50 UG/ML
INJECTION, SOLUTION INTRAMUSCULAR; INTRAVENOUS
Status: DISCONTINUED | OUTPATIENT
Start: 2025-01-03 | End: 2025-01-03 | Stop reason: SDUPTHER

## 2025-01-03 RX ORDER — GLUCAGON 1 MG/ML
1 KIT INJECTION PRN
Status: DISCONTINUED | OUTPATIENT
Start: 2025-01-03 | End: 2025-01-03 | Stop reason: HOSPADM

## 2025-01-03 RX ORDER — IOPAMIDOL 755 MG/ML
INJECTION, SOLUTION INTRAVASCULAR PRN
Status: DISCONTINUED | OUTPATIENT
Start: 2025-01-03 | End: 2025-01-03 | Stop reason: ALTCHOICE

## 2025-01-03 RX ORDER — HYDRALAZINE HYDROCHLORIDE 20 MG/ML
10 INJECTION INTRAMUSCULAR; INTRAVENOUS
Status: DISCONTINUED | OUTPATIENT
Start: 2025-01-03 | End: 2025-01-03 | Stop reason: HOSPADM

## 2025-01-03 RX ORDER — OXYCODONE HYDROCHLORIDE 5 MG/1
5 TABLET ORAL PRN
Status: DISCONTINUED | OUTPATIENT
Start: 2025-01-03 | End: 2025-01-03 | Stop reason: HOSPADM

## 2025-01-03 RX ORDER — METOCLOPRAMIDE HYDROCHLORIDE 5 MG/ML
10 INJECTION INTRAMUSCULAR; INTRAVENOUS
Status: DISCONTINUED | OUTPATIENT
Start: 2025-01-03 | End: 2025-01-03 | Stop reason: HOSPADM

## 2025-01-03 RX ORDER — SODIUM CHLORIDE 9 MG/ML
INJECTION, SOLUTION INTRAVENOUS PRN
Status: DISCONTINUED | OUTPATIENT
Start: 2025-01-03 | End: 2025-01-03 | Stop reason: HOSPADM

## 2025-01-03 RX ORDER — NALOXONE HYDROCHLORIDE 0.4 MG/ML
INJECTION, SOLUTION INTRAMUSCULAR; INTRAVENOUS; SUBCUTANEOUS PRN
Status: DISCONTINUED | OUTPATIENT
Start: 2025-01-03 | End: 2025-01-03 | Stop reason: HOSPADM

## 2025-01-03 RX ORDER — LABETALOL HYDROCHLORIDE 5 MG/ML
10 INJECTION, SOLUTION INTRAVENOUS
Status: DISCONTINUED | OUTPATIENT
Start: 2025-01-03 | End: 2025-01-03 | Stop reason: HOSPADM

## 2025-01-03 RX ORDER — DEXAMETHASONE SODIUM PHOSPHATE 4 MG/ML
INJECTION, SOLUTION INTRA-ARTICULAR; INTRALESIONAL; INTRAMUSCULAR; INTRAVENOUS; SOFT TISSUE
Status: DISCONTINUED | OUTPATIENT
Start: 2025-01-03 | End: 2025-01-03 | Stop reason: SDUPTHER

## 2025-01-03 RX ORDER — SODIUM CHLORIDE 0.9 % (FLUSH) 0.9 %
5-40 SYRINGE (ML) INJECTION EVERY 12 HOURS SCHEDULED
Status: DISCONTINUED | OUTPATIENT
Start: 2025-01-03 | End: 2025-01-03 | Stop reason: HOSPADM

## 2025-01-03 RX ORDER — OXYCODONE HYDROCHLORIDE 5 MG/1
10 TABLET ORAL PRN
Status: DISCONTINUED | OUTPATIENT
Start: 2025-01-03 | End: 2025-01-03 | Stop reason: HOSPADM

## 2025-01-03 RX ORDER — FENTANYL CITRATE 0.05 MG/ML
50 INJECTION, SOLUTION INTRAMUSCULAR; INTRAVENOUS EVERY 5 MIN PRN
Status: DISCONTINUED | OUTPATIENT
Start: 2025-01-03 | End: 2025-01-03 | Stop reason: HOSPADM

## 2025-01-03 RX ORDER — HYDROMORPHONE HYDROCHLORIDE 1 MG/ML
0.5 INJECTION, SOLUTION INTRAMUSCULAR; INTRAVENOUS; SUBCUTANEOUS EVERY 5 MIN PRN
Status: DISCONTINUED | OUTPATIENT
Start: 2025-01-03 | End: 2025-01-03 | Stop reason: HOSPADM

## 2025-01-03 RX ADMIN — ACETAMINOPHEN 650 MG: 325 TABLET, FILM COATED ORAL at 13:09

## 2025-01-03 RX ADMIN — SODIUM CHLORIDE, PRESERVATIVE FREE 10 ML: 5 INJECTION INTRAVENOUS at 19:59

## 2025-01-03 RX ADMIN — ONDANSETRON 4 MG: 2 INJECTION INTRAMUSCULAR; INTRAVENOUS at 14:20

## 2025-01-03 RX ADMIN — GABAPENTIN 300 MG: 300 CAPSULE ORAL at 18:12

## 2025-01-03 RX ADMIN — DEXAMETHASONE SODIUM PHOSPHATE 4 MG: 4 INJECTION, SOLUTION INTRA-ARTICULAR; INTRALESIONAL; INTRAMUSCULAR; INTRAVENOUS; SOFT TISSUE at 14:20

## 2025-01-03 RX ADMIN — CARVEDILOL 25 MG: 12.5 TABLET, FILM COATED ORAL at 19:57

## 2025-01-03 RX ADMIN — FENTANYL CITRATE 100 MCG: 50 INJECTION INTRAMUSCULAR; INTRAVENOUS at 14:12

## 2025-01-03 RX ADMIN — ROCURONIUM BROMIDE 10 MG: 10 INJECTION, SOLUTION INTRAVENOUS at 14:15

## 2025-01-03 RX ADMIN — SODIUM CHLORIDE, POTASSIUM CHLORIDE, SODIUM LACTATE AND CALCIUM CHLORIDE: 600; 310; 30; 20 INJECTION, SOLUTION INTRAVENOUS at 14:15

## 2025-01-03 RX ADMIN — GABAPENTIN 300 MG: 300 CAPSULE ORAL at 21:00

## 2025-01-03 RX ADMIN — SODIUM CHLORIDE, PRESERVATIVE FREE 10 ML: 5 INJECTION INTRAVENOUS at 09:13

## 2025-01-03 RX ADMIN — Medication 140 MG: at 14:15

## 2025-01-03 RX ADMIN — PROPOFOL 150 MG: 10 INJECTION, EMULSION INTRAVENOUS at 14:15

## 2025-01-03 RX ADMIN — MEROPENEM 1000 MG: 1 INJECTION INTRAVENOUS at 21:00

## 2025-01-03 RX ADMIN — MEROPENEM 1000 MG: 1 INJECTION INTRAVENOUS at 04:31

## 2025-01-03 RX ADMIN — ROCURONIUM BROMIDE 40 MG: 10 INJECTION, SOLUTION INTRAVENOUS at 14:20

## 2025-01-03 RX ADMIN — Medication 60 MG: at 19:56

## 2025-01-03 RX ADMIN — IPRATROPIUM BROMIDE AND ALBUTEROL SULFATE 1 DOSE: 2.5; .5 SOLUTION RESPIRATORY (INHALATION) at 07:40

## 2025-01-03 RX ADMIN — SODIUM CHLORIDE, POTASSIUM CHLORIDE, SODIUM LACTATE AND CALCIUM CHLORIDE: 600; 310; 30; 20 INJECTION, SOLUTION INTRAVENOUS at 15:45

## 2025-01-03 RX ADMIN — MEROPENEM 1000 MG: 1 INJECTION INTRAVENOUS at 13:53

## 2025-01-03 RX ADMIN — SUGAMMADEX 200 MG: 100 INJECTION, SOLUTION INTRAVENOUS at 14:49

## 2025-01-03 ASSESSMENT — PAIN SCALES - GENERAL
PAINLEVEL_OUTOF10: 3
PAINLEVEL_OUTOF10: 0
PAINLEVEL_OUTOF10: 3
PAINLEVEL_OUTOF10: 0

## 2025-01-03 ASSESSMENT — PAIN DESCRIPTION - LOCATION
LOCATION: CHEST
LOCATION: CHEST

## 2025-01-03 ASSESSMENT — PAIN DESCRIPTION - DESCRIPTORS
DESCRIPTORS: ACHING
DESCRIPTORS: ACHING

## 2025-01-03 ASSESSMENT — PAIN DESCRIPTION - ORIENTATION
ORIENTATION: MID
ORIENTATION: MID

## 2025-01-03 NOTE — OP NOTE
Operative Note      Patient: Yahaira Song  YOB: 1950  MRN: [id number]    Date of Procedure: 1/3/2025    Pre-Op Diagnosis Codes:      * Kidney stone [N20.0]    Post-Op Diagnosis: Same       Procedure(s):  CYSTOSCOPY, RIGHT RETROGRADE PYELOGRAM, AND RIGHT URETERAL STENT INSERTION    Surgeon(s):  Kyle Ramsey MD    Assistant:   * No surgical staff found *    Anesthesia: General    Estimated Blood Loss (mL): Minimal    Complications: None    Specimens:   * No specimens in log *    Implants:  Implant Name Type Inv. Item Serial No.  Lot No. LRB No. Used Action   STENT URET 6FR L26CM HYDR+ PGTL TAPR TIP GRAD BLDR MRK LO - [id number]  STENT URET 6FR L26CM HYDR+ PGTL TAPR TIP GRAD BLDR MRK LO  [employer] UROLOGY- [id number] Right 1 Implanted         Drains:   Urinary Catheter 01/03/25 [place] (Active)       [REMOVED] External Urinary Catheter (Removed)   Site Assessment Clean,dry & intact 01/02/25 2059   Placement Repositioned 01/02/25 2059   Catheter Care Catheter/Wick replaced 01/02/25 0549   Perineal Care Yes 01/02/25 2059   Suction 40 mmgHg continuous 01/02/25 2059   Urine Color Yellow 01/02/25 2059   Urine Appearance Clear 01/02/25 2059   Urine Odor Other (Comment) 01/01/25 2031   Output (mL) 400 mL 01/02/25 0549       Findings:  Infection Present At Time Of Surgery (PATOS) (choose all levels that have infection present):  - Organ Space infection (below fascia) present as evidenced by fluid consistent with infection  Other Findings: radioopaque stone in the renal pelvis    Detailed Description of Procedure:     The patient was seen in the pre-operative area. The risks, benefits, complications, alternative treatment options, and expected outcomes were again discussed with the patient. The possibilities of reaction to medication, pain, infection, bleeding, major cardiovascular event, death, damage to surrounding structures were specifically addressed. Informed consent was obtained.

## 2025-01-03 NOTE — CARE COORDINATION
CM reviewed chart. Followed by ID and urology    Per note, plan is for ureteral stenting while inpatient    Will need long term IV abx. Recs pending ID and susceptibility results    Referral sent for  and home infusion    [facility] accepted. [facility] accepted    OT recommended moderate intensity short-term skilled occupational therapy up to 5x/week     DCP: Home vs SNF     1145: Patient now agreeable to moderate intensity short-term skilled occupational therapy up to 5x/week . Gave preference of [facility]. Referral sent via [employer]    Will require auth. Awaiting PT eval at this time.

## 2025-01-03 NOTE — ANESTHESIA POSTPROCEDURE EVALUATION
Department of Anesthesiology  Postprocedure Note    Patient: Yahaira Song  MRN: [id number]  YOB: 1950  Date of evaluation: 1/3/2025    Procedure Summary       Date: 01/03/25 Room / Location: [facility] MAIN OR 06 / [facility] MAIN OR    Anesthesia Start: 1403 Anesthesia Stop: 1505    Procedure: CYSTOSCOPY, RIGHT RETROGRADE PYELOGRAM, AND RIGHT URETERAL STENT INSERTION (Right: Ureter) Diagnosis:       Kidney stone      (Kidney stone [N20.0])    Surgeons: Kyle Ramsey MD Responsible Provider: Prateek Jimenez MD    Anesthesia Type: general ASA Status: 3            Anesthesia Type: No value filed.    Elmo Phase I: Elmo Score: 9    Elmo Phase II:      Anesthesia Post Evaluation    Patient location during evaluation: PACU  Patient participation: complete - patient participated  Level of consciousness: sleepy but conscious  Pain score: 0  Airway patency: patent  Nausea & Vomiting: no nausea and no vomiting  Cardiovascular status: hemodynamically stable  Respiratory status: acceptable  Hydration status: stable  Multimodal analgesia pain management approach    No notable events documented.

## 2025-01-03 NOTE — PLAN OF CARE
Problem: Safety - Adult  Goal: Free from fall injury  1/2/2025 2146 by Tiffany Wade RN  Outcome: Progressing  1/2/2025 1416 by Xenia Roth RN  Outcome: Progressing     Problem: Discharge Planning  Goal: Discharge to home or other facility with appropriate resources  1/2/2025 2146 by Tiffany Wade RN  Outcome: Progressing  1/2/2025 1416 by Xenia Roth RN  Outcome: Progressing     Problem: Pain  Goal: Verbalizes/displays adequate comfort level or baseline comfort level  1/2/2025 2146 by Tiffany Wade RN  Outcome: Progressing  1/2/2025 1416 by Xenia Roth RN  Outcome: Progressing     Problem: Skin/Tissue Integrity  Goal: Absence of new skin breakdown  Description: 1.  Monitor for areas of redness and/or skin breakdown  2.  Assess vascular access sites hourly  3.  Every 4-6 hours minimum:  Change oxygen saturation probe site  4.  Every 4-6 hours:  If on nasal continuous positive airway pressure, respiratory therapy assess nares and determine need for appliance change or resting period.  1/2/2025 2146 by Tiffany Wade RN  Outcome: Progressing  1/2/2025 1416 by Xenia Roth RN  Outcome: Progressing

## 2025-01-03 NOTE — WOUND CARE
WCN attempted to see pt. Venous access team in room placing a line. WCN will follow back up with patient as time allows.

## 2025-01-03 NOTE — PROCEDURES
PICC Line Insertion Procedure Note    Procedure: Insertion of #4 FR/18G PICC    Indications:  Long Term IV therapy    Procedure Details   Informed consent was obtained for the procedure, including sedation.  Risks of lung perforation, hemorrhage, and adverse drug reaction were discussed.     #4 FR/18G PICC inserted to the L Basilic vein per hospital protocol.   Blood return:  yes    Findings:  Catheter inserted to 46 cm, with 0 cm. Exposed. Mid upper arm circumference is 43 cm. Catheter was flushed with 20 cc NS. Patient did tolerate procedure well.    Recommendations:  Tip in distal SVC. Confirmed with 3CG. Okay to use. Handoff given to Brittney.  PICC Brochure given to patient with teaching instruction.PROCEDURE NOTE  Date: 1/3/2025   Name: Yahaira Song  YOB: 1950    Procedures

## 2025-01-03 NOTE — ANESTHESIA PRE PROCEDURE
pharmacological stress test was performed using regadenoson (Lexiscan).       Neuro/Psych:   Negative Neuro/Psych ROS              GI/Hepatic/Renal:   (+) liver disease:, renal disease: kidney stones, morbid obesity          Endo/Other:    (+) blood dyscrasia: anemia:..                 Abdominal:             Vascular: negative vascular ROS.         Other Findings:         Anesthesia Plan      general     ASA 3     (Standard ASA monitors: continuous EKG, BP, HR, pulse oximeter, temperature, and capnography.)  Induction: intravenous.    MIPS: Postoperative opioids intended and Prophylactic antiemetics administered.  Anesthetic plan and risks discussed with patient.    Use of blood products discussed with patient whom consented to blood products.    Plan discussed with CRNA.    Attending anesthesiologist reviewed and agrees with Preprocedure content            Prateek Jimenez MD   1/3/2025

## 2025-01-04 LAB
BACTERIA SPEC CULT: ABNORMAL
BACTERIA SPEC CULT: ABNORMAL
COLONY COUNT, CNT: ABNORMAL
CRP SERPL-MCNC: 32.4 MG/DL (ref 0–0.3)
GLUCOSE BLD STRIP.AUTO-MCNC: 121 MG/DL (ref 65–100)
GLUCOSE BLD STRIP.AUTO-MCNC: 150 MG/DL (ref 65–100)
GLUCOSE BLD STRIP.AUTO-MCNC: 157 MG/DL (ref 65–100)
GLUCOSE BLD STRIP.AUTO-MCNC: 177 MG/DL (ref 65–100)
Lab: ABNORMAL
PERFORMED BY:: ABNORMAL
PROCALCITONIN SERPL-MCNC: 2.77 NG/ML

## 2025-01-04 PROCEDURE — 6370000000 HC RX 637 (ALT 250 FOR IP): Performed by: INTERNAL MEDICINE

## 2025-01-04 PROCEDURE — 36415 COLL VENOUS BLD VENIPUNCTURE: CPT

## 2025-01-04 PROCEDURE — 84145 PROCALCITONIN (PCT): CPT

## 2025-01-04 PROCEDURE — 94761 N-INVAS EAR/PLS OXIMETRY MLT: CPT

## 2025-01-04 PROCEDURE — 82962 GLUCOSE BLOOD TEST: CPT

## 2025-01-04 PROCEDURE — 6360000002 HC RX W HCPCS: Performed by: INTERNAL MEDICINE

## 2025-01-04 PROCEDURE — 6370000000 HC RX 637 (ALT 250 FOR IP): Performed by: STUDENT IN AN ORGANIZED HEALTH CARE EDUCATION/TRAINING PROGRAM

## 2025-01-04 PROCEDURE — 1100000000 HC RM PRIVATE

## 2025-01-04 PROCEDURE — 99232 SBSQ HOSP IP/OBS MODERATE 35: CPT | Performed by: INTERNAL MEDICINE

## 2025-01-04 PROCEDURE — 2580000003 HC RX 258: Performed by: INTERNAL MEDICINE

## 2025-01-04 PROCEDURE — 2500000003 HC RX 250 WO HCPCS: Performed by: STUDENT IN AN ORGANIZED HEALTH CARE EDUCATION/TRAINING PROGRAM

## 2025-01-04 PROCEDURE — 2500000003 HC RX 250 WO HCPCS: Performed by: INTERNAL MEDICINE

## 2025-01-04 PROCEDURE — 86140 C-REACTIVE PROTEIN: CPT

## 2025-01-04 PROCEDURE — 97535 SELF CARE MNGMENT TRAINING: CPT

## 2025-01-04 RX ADMIN — MEROPENEM 1000 MG: 1 INJECTION INTRAVENOUS at 12:58

## 2025-01-04 RX ADMIN — MEROPENEM 1000 MG: 1 INJECTION INTRAVENOUS at 04:50

## 2025-01-04 RX ADMIN — POLYETHYLENE GLYCOL 3350 17 G: 17 POWDER, FOR SOLUTION ORAL at 22:10

## 2025-01-04 RX ADMIN — GABAPENTIN 300 MG: 300 CAPSULE ORAL at 14:29

## 2025-01-04 RX ADMIN — CARVEDILOL 25 MG: 12.5 TABLET, FILM COATED ORAL at 22:01

## 2025-01-04 RX ADMIN — GABAPENTIN 300 MG: 300 CAPSULE ORAL at 09:00

## 2025-01-04 RX ADMIN — HYDROCODONE BITARTRATE AND ACETAMINOPHEN 1 TABLET: 5; 325 TABLET ORAL at 14:38

## 2025-01-04 RX ADMIN — Medication 60 MG: at 10:54

## 2025-01-04 RX ADMIN — Medication 60 MG: at 23:28

## 2025-01-04 RX ADMIN — SERTRALINE HYDROCHLORIDE 100 MG: 50 TABLET ORAL at 09:57

## 2025-01-04 RX ADMIN — ATORVASTATIN CALCIUM 10 MG: 10 TABLET, FILM COATED ORAL at 09:57

## 2025-01-04 RX ADMIN — CEFTAZIDIME, AVIBACTAM 2.5 G: 2; .5 POWDER, FOR SOLUTION INTRAVENOUS at 22:18

## 2025-01-04 RX ADMIN — GABAPENTIN 300 MG: 300 CAPSULE ORAL at 22:01

## 2025-01-04 RX ADMIN — PANTOPRAZOLE SODIUM 40 MG: 40 TABLET, DELAYED RELEASE ORAL at 06:03

## 2025-01-04 RX ADMIN — SODIUM CHLORIDE, PRESERVATIVE FREE 10 ML: 5 INJECTION INTRAVENOUS at 22:05

## 2025-01-04 RX ADMIN — ALLOPURINOL 100 MG: 100 TABLET ORAL at 09:57

## 2025-01-04 RX ADMIN — CARVEDILOL 25 MG: 12.5 TABLET, FILM COATED ORAL at 09:57

## 2025-01-04 RX ADMIN — SODIUM CHLORIDE, PRESERVATIVE FREE 10 ML: 5 INJECTION INTRAVENOUS at 09:57

## 2025-01-04 RX ADMIN — BUMETANIDE 2 MG: 1 TABLET ORAL at 09:56

## 2025-01-04 ASSESSMENT — PAIN DESCRIPTION - LOCATION
LOCATION: CHEST
LOCATION: CHEST

## 2025-01-04 ASSESSMENT — PAIN DESCRIPTION - ORIENTATION
ORIENTATION: MID
ORIENTATION: MID

## 2025-01-04 ASSESSMENT — PAIN DESCRIPTION - DESCRIPTORS
DESCRIPTORS: SHARP
DESCRIPTORS: STABBING

## 2025-01-04 ASSESSMENT — PAIN SCALES - GENERAL
PAINLEVEL_OUTOF10: 8
PAINLEVEL_OUTOF10: 0
PAINLEVEL_OUTOF10: 8
PAINLEVEL_OUTOF10: 0
PAINLEVEL_OUTOF10: 3

## 2025-01-04 NOTE — PLAN OF CARE
Problem: Safety - Adult  Goal: Free from fall injury  1/3/2025 2248 by Tiffany Wade RN  Outcome: Progressing  1/3/2025 1357 by Lubna Kemp LPN  Outcome: Progressing     Problem: Discharge Planning  Goal: Discharge to home or other facility with appropriate resources  1/3/2025 2248 by Tiffany Wade RN  Outcome: Progressing  1/3/2025 1357 by Lubna Kemp LPN  Outcome: Progressing     Problem: Pain  Goal: Verbalizes/displays adequate comfort level or baseline comfort level  1/3/2025 2248 by Tiffany Wade RN  Outcome: Progressing  1/3/2025 1357 by Lubna Kemp LPN  Outcome: Progressing     Problem: Skin/Tissue Integrity  Goal: Absence of new skin breakdown  Description: 1.  Monitor for areas of redness and/or skin breakdown  2.  Assess vascular access sites hourly  3.  Every 4-6 hours minimum:  Change oxygen saturation probe site  4.  Every 4-6 hours:  If on nasal continuous positive airway pressure, respiratory therapy assess nares and determine need for appliance change or resting period.  1/3/2025 2248 by Tiffany Wade RN  Outcome: Progressing  1/3/2025 1357 by Lubna Kemp LPN  Outcome: Progressing

## 2025-01-04 NOTE — PLAN OF CARE
OCCUPATIONAL THERAPY TREATMENT  Patient: Yahaira Song (74 y.o. female)  Date: 1/4/2025  Primary Diagnosis: Pyelonephritis [N12]  Complicated UTI (urinary tract infection) [N39.0]  Procedure(s) (LRB):  CYSTOSCOPY, RIGHT RETROGRADE PYELOGRAM, AND RIGHT URETERAL STENT INSERTION (Right) 1 Day Post-Op   Precautions: Fall Risk, Contact Precautions                Recommendations for nursing mobility: Encourage HEP in prep for ADLs/mobility; see handout for details, Frequent repositioning to prevent skin breakdown, Use of bed/chair alarm for safety, LE elevation for management of edema, Assist x1, and Assist x2    In place during session: External Catheter  Chart, occupational therapy assessment, plan of care, and goals were reviewed.    ASSESSMENT  Patient continues with skilled OT services and is progressing towards goals.  Pt received semi-supine in bed upon arrival, AXO x 4 and agreeable to AGARWAL tx at this time. Pt cooperative and demonstrated fair effort during activities with additional time d/t pain and decreased mobility. Pt noted with  improved bed mobility with decreased assistance with additional time d/t stiffness and pain. Assistance needed for trunk and LE with sup>sit EOB and malinda LE only with sit>sup. Max A x2 for scoot ing HOB with CNA assistance. Pt req'd CGA for grooming task while using bed rail and bedside table for sitting support. Pt sat EOB for ~7 minutes then requiring back to bed d/t fatigue and pain.  (See below for objective details and assist levels)     Overall, pt limited by pain, generalized weakness and fatigue and large body habitus that interferes with performance in ADL's and functional tf's safely.  Will continue to progress. Current OT recommendations for discharge Moderate intensity short-term skilled occupational therapy up to 5x/week.                GOALS:    Problem: Occupational Therapy - Adult  Goal: By Discharge: Performs self-care activities at highest level of function for

## 2025-01-05 ENCOUNTER — APPOINTMENT (OUTPATIENT)
Facility: HOSPITAL | Age: 75
DRG: 854 | End: 2025-01-05
Payer: MEDICARE

## 2025-01-05 LAB
CRP SERPL-MCNC: 21.3 MG/DL (ref 0–0.3)
GLUCOSE BLD STRIP.AUTO-MCNC: 128 MG/DL (ref 65–100)
GLUCOSE BLD STRIP.AUTO-MCNC: 130 MG/DL (ref 65–100)
GLUCOSE BLD STRIP.AUTO-MCNC: 142 MG/DL (ref 65–100)
PERFORMED BY:: ABNORMAL
PROCALCITONIN SERPL-MCNC: 2.63 NG/ML

## 2025-01-05 PROCEDURE — 71275 CT ANGIOGRAPHY CHEST: CPT

## 2025-01-05 PROCEDURE — 2500000003 HC RX 250 WO HCPCS: Performed by: STUDENT IN AN ORGANIZED HEALTH CARE EDUCATION/TRAINING PROGRAM

## 2025-01-05 PROCEDURE — 97530 THERAPEUTIC ACTIVITIES: CPT

## 2025-01-05 PROCEDURE — 6360000002 HC RX W HCPCS: Performed by: INTERNAL MEDICINE

## 2025-01-05 PROCEDURE — 6370000000 HC RX 637 (ALT 250 FOR IP): Performed by: STUDENT IN AN ORGANIZED HEALTH CARE EDUCATION/TRAINING PROGRAM

## 2025-01-05 PROCEDURE — 6370000000 HC RX 637 (ALT 250 FOR IP): Performed by: INTERNAL MEDICINE

## 2025-01-05 PROCEDURE — 82962 GLUCOSE BLOOD TEST: CPT

## 2025-01-05 PROCEDURE — 97162 PT EVAL MOD COMPLEX 30 MIN: CPT

## 2025-01-05 PROCEDURE — 84145 PROCALCITONIN (PCT): CPT

## 2025-01-05 PROCEDURE — 86140 C-REACTIVE PROTEIN: CPT

## 2025-01-05 PROCEDURE — 6360000004 HC RX CONTRAST MEDICATION: Performed by: INTERNAL MEDICINE

## 2025-01-05 PROCEDURE — 99232 SBSQ HOSP IP/OBS MODERATE 35: CPT | Performed by: INTERNAL MEDICINE

## 2025-01-05 PROCEDURE — 2580000003 HC RX 258: Performed by: INTERNAL MEDICINE

## 2025-01-05 PROCEDURE — 36415 COLL VENOUS BLD VENIPUNCTURE: CPT

## 2025-01-05 PROCEDURE — 1100000000 HC RM PRIVATE

## 2025-01-05 RX ORDER — IOPAMIDOL 755 MG/ML
100 INJECTION, SOLUTION INTRAVASCULAR
Status: COMPLETED | OUTPATIENT
Start: 2025-01-05 | End: 2025-01-05

## 2025-01-05 RX ADMIN — CARVEDILOL 25 MG: 12.5 TABLET, FILM COATED ORAL at 11:09

## 2025-01-05 RX ADMIN — BUMETANIDE 2 MG: 1 TABLET ORAL at 11:09

## 2025-01-05 RX ADMIN — ATORVASTATIN CALCIUM 10 MG: 10 TABLET, FILM COATED ORAL at 11:10

## 2025-01-05 RX ADMIN — ACETAMINOPHEN 650 MG: 325 TABLET, FILM COATED ORAL at 02:56

## 2025-01-05 RX ADMIN — ALLOPURINOL 100 MG: 100 TABLET ORAL at 11:11

## 2025-01-05 RX ADMIN — IOPAMIDOL 100 ML: 755 INJECTION, SOLUTION INTRAVENOUS at 18:29

## 2025-01-05 RX ADMIN — CEFTAZIDIME, AVIBACTAM 2.5 G: 2; .5 POWDER, FOR SOLUTION INTRAVENOUS at 17:03

## 2025-01-05 RX ADMIN — CEFTAZIDIME, AVIBACTAM 2.5 G: 2; .5 POWDER, FOR SOLUTION INTRAVENOUS at 04:36

## 2025-01-05 RX ADMIN — SERTRALINE HYDROCHLORIDE 100 MG: 50 TABLET ORAL at 11:09

## 2025-01-05 RX ADMIN — HYDROCODONE BITARTRATE AND ACETAMINOPHEN 1 TABLET: 5; 325 TABLET ORAL at 17:10

## 2025-01-05 RX ADMIN — SODIUM CHLORIDE, PRESERVATIVE FREE 10 ML: 5 INJECTION INTRAVENOUS at 11:10

## 2025-01-05 RX ADMIN — Medication 60 MG: at 21:04

## 2025-01-05 RX ADMIN — GABAPENTIN 300 MG: 300 CAPSULE ORAL at 21:03

## 2025-01-05 RX ADMIN — SODIUM CHLORIDE, PRESERVATIVE FREE 10 ML: 5 INJECTION INTRAVENOUS at 21:08

## 2025-01-05 RX ADMIN — GABAPENTIN 300 MG: 300 CAPSULE ORAL at 11:09

## 2025-01-05 RX ADMIN — ACETAMINOPHEN 650 MG: 325 TABLET, FILM COATED ORAL at 11:29

## 2025-01-05 RX ADMIN — Medication 60 MG: at 11:29

## 2025-01-05 RX ADMIN — GABAPENTIN 300 MG: 300 CAPSULE ORAL at 15:08

## 2025-01-05 RX ADMIN — PANTOPRAZOLE SODIUM 40 MG: 40 TABLET, DELAYED RELEASE ORAL at 06:32

## 2025-01-05 RX ADMIN — HYDROCODONE BITARTRATE AND ACETAMINOPHEN 1 TABLET: 5; 325 TABLET ORAL at 02:55

## 2025-01-05 RX ADMIN — CEFTAZIDIME, AVIBACTAM 2.5 G: 2; .5 POWDER, FOR SOLUTION INTRAVENOUS at 21:44

## 2025-01-05 RX ADMIN — CARVEDILOL 25 MG: 12.5 TABLET, FILM COATED ORAL at 21:03

## 2025-01-05 ASSESSMENT — PAIN SCALES - GENERAL
PAINLEVEL_OUTOF10: 9
PAINLEVEL_OUTOF10: 9
PAINLEVEL_OUTOF10: 7
PAINLEVEL_OUTOF10: 0
PAINLEVEL_OUTOF10: 3
PAINLEVEL_OUTOF10: 0

## 2025-01-05 ASSESSMENT — PAIN - FUNCTIONAL ASSESSMENT: PAIN_FUNCTIONAL_ASSESSMENT: ACTIVITIES ARE NOT PREVENTED

## 2025-01-05 ASSESSMENT — PAIN DESCRIPTION - LOCATION
LOCATION: CHEST;LEG
LOCATION: CHEST

## 2025-01-05 ASSESSMENT — PAIN DESCRIPTION - ORIENTATION: ORIENTATION: MID

## 2025-01-05 ASSESSMENT — PAIN DESCRIPTION - DESCRIPTORS: DESCRIPTORS: ACHING

## 2025-01-05 NOTE — PLAN OF CARE
PHYSICAL THERAPY EVALUATION  Patient: Yahaira Song (74 y.o. female)  Date: 1/5/2025  Primary Diagnosis: Pyelonephritis [N12]  Complicated UTI (urinary tract infection) [N39.0]  Procedure(s) (LRB):  CYSTOSCOPY, RIGHT RETROGRADE PYELOGRAM, AND RIGHT URETERAL STENT INSERTION (Right) 2 Days Post-Op   Precautions: Restrictions/Precautions  Restrictions/Precautions: Fall Risk, Contact Precautions     Recommendations for nursing mobility: AD and gt belt for bed to chair , Assist x1, and Assist x2 (x 1 assist for bed to chair, x 2 assist for chair to bed.)    In place during session: Peripheral IV    ASSESSMENT  Pt is a 74 y.o. female admitted on 12/31/2024 for vomiting, poor appetite, body aches, fever and cough; pt currently being treated for complicated UTI with sepsis . Pt awake in bed upon PT arrival, agreeable to evaluation. Pt A&O x 4.  Reports 8/10 chest pain \"when she coughs\" that has been ongoing for days.     Based on the objective data described below, the patient currently presents with impaired functional mobility, decreased independence in ADLs, impaired ability to perform high-level IADLs, decreased ROM, impaired strength, decreased activity tolerance, and impaired balance. (See below for objective details and assist levels).     Overall pt tolerated session fair today with overall generalized weakness and poor activity tolerance primarily limiting participation. Pt required mod A for bed mobility and transfers. Only able to stand with assist from elevated bed. Pt amb 3 feet with RW, gt belt and mod A side stepping along bed; demonstrates increased reliance on UEs. Pt will benefit from continued skilled PT to address above deficits and return to PLOF.  Current PT DC recommendation Moderate intensity short-term skilled physical therapy up to 5x/week  once medically appropriate.     Start of Session End of Session   SPO2 (%) 90 90   Heart Rate (BPM) 80 99     GOALS:    Problem: Physical Therapy -

## 2025-01-06 LAB
BACTERIA SPEC CULT: NORMAL
BACTERIA SPEC CULT: NORMAL
BASOPHILS # BLD: 0 K/UL (ref 0–0.1)
BASOPHILS NFR BLD: 0 % (ref 0–1)
CRP SERPL-MCNC: 25.6 MG/DL (ref 0–0.3)
DIFFERENTIAL METHOD BLD: ABNORMAL
EOSINOPHIL # BLD: 0 K/UL (ref 0–0.4)
EOSINOPHIL NFR BLD: 0 % (ref 0–7)
ERYTHROCYTE [DISTWIDTH] IN BLOOD BY AUTOMATED COUNT: 18.6 % (ref 11.5–14.5)
HCT VFR BLD AUTO: 25.4 % (ref 35–47)
HGB BLD-MCNC: 8.2 G/DL (ref 11.5–16)
IMM GRANULOCYTES # BLD AUTO: 0.2 K/UL (ref 0–0.04)
IMM GRANULOCYTES NFR BLD AUTO: 2 % (ref 0–0.5)
LYMPHOCYTES # BLD: 0.7 K/UL (ref 0.8–3.5)
LYMPHOCYTES NFR BLD: 8 % (ref 12–49)
Lab: NORMAL
Lab: NORMAL
MCH RBC QN AUTO: 28.7 PG (ref 26–34)
MCHC RBC AUTO-ENTMCNC: 32.3 G/DL (ref 30–36.5)
MCV RBC AUTO: 88.8 FL (ref 80–99)
MONOCYTES # BLD: 0.9 K/UL (ref 0–1)
MONOCYTES NFR BLD: 10 % (ref 5–13)
NEUTS SEG # BLD: 6.8 K/UL (ref 1.8–8)
NEUTS SEG NFR BLD: 80 % (ref 32–75)
NRBC # BLD: 0.03 K/UL (ref 0–0.01)
NRBC BLD-RTO: 0.3 PER 100 WBC
PLATELET # BLD AUTO: 276 K/UL (ref 150–400)
PMV BLD AUTO: 11 FL (ref 8.9–12.9)
PROCALCITONIN SERPL-MCNC: 2.39 NG/ML
RBC # BLD AUTO: 2.86 M/UL (ref 3.8–5.2)
RBC MORPH BLD: ABNORMAL
WBC # BLD AUTO: 8.6 K/UL (ref 3.6–11)

## 2025-01-06 PROCEDURE — 1100000000 HC RM PRIVATE

## 2025-01-06 PROCEDURE — 6360000002 HC RX W HCPCS: Performed by: INTERNAL MEDICINE

## 2025-01-06 PROCEDURE — 6370000000 HC RX 637 (ALT 250 FOR IP): Performed by: INTERNAL MEDICINE

## 2025-01-06 PROCEDURE — 2580000003 HC RX 258: Performed by: INTERNAL MEDICINE

## 2025-01-06 PROCEDURE — 85025 COMPLETE CBC W/AUTO DIFF WBC: CPT

## 2025-01-06 PROCEDURE — 2500000003 HC RX 250 WO HCPCS: Performed by: STUDENT IN AN ORGANIZED HEALTH CARE EDUCATION/TRAINING PROGRAM

## 2025-01-06 PROCEDURE — 99232 SBSQ HOSP IP/OBS MODERATE 35: CPT | Performed by: INTERNAL MEDICINE

## 2025-01-06 PROCEDURE — 6370000000 HC RX 637 (ALT 250 FOR IP): Performed by: STUDENT IN AN ORGANIZED HEALTH CARE EDUCATION/TRAINING PROGRAM

## 2025-01-06 PROCEDURE — 86140 C-REACTIVE PROTEIN: CPT

## 2025-01-06 PROCEDURE — 36415 COLL VENOUS BLD VENIPUNCTURE: CPT

## 2025-01-06 PROCEDURE — 84145 PROCALCITONIN (PCT): CPT

## 2025-01-06 RX ADMIN — ACETAMINOPHEN 650 MG: 325 TABLET, FILM COATED ORAL at 04:44

## 2025-01-06 RX ADMIN — ATORVASTATIN CALCIUM 10 MG: 10 TABLET, FILM COATED ORAL at 09:13

## 2025-01-06 RX ADMIN — CARVEDILOL 25 MG: 12.5 TABLET, FILM COATED ORAL at 09:13

## 2025-01-06 RX ADMIN — Medication 60 MG: at 09:13

## 2025-01-06 RX ADMIN — SODIUM CHLORIDE, PRESERVATIVE FREE 10 ML: 5 INJECTION INTRAVENOUS at 09:14

## 2025-01-06 RX ADMIN — CEFTAZIDIME, AVIBACTAM 2.5 G: 2; .5 POWDER, FOR SOLUTION INTRAVENOUS at 11:27

## 2025-01-06 RX ADMIN — CEFTAZIDIME, AVIBACTAM 2.5 G: 2; .5 POWDER, FOR SOLUTION INTRAVENOUS at 18:42

## 2025-01-06 RX ADMIN — Medication 60 MG: at 20:32

## 2025-01-06 RX ADMIN — BUMETANIDE 2 MG: 1 TABLET ORAL at 09:13

## 2025-01-06 RX ADMIN — GABAPENTIN 300 MG: 300 CAPSULE ORAL at 09:13

## 2025-01-06 RX ADMIN — ALLOPURINOL 100 MG: 100 TABLET ORAL at 09:13

## 2025-01-06 RX ADMIN — PANTOPRAZOLE SODIUM 40 MG: 40 TABLET, DELAYED RELEASE ORAL at 05:50

## 2025-01-06 RX ADMIN — SODIUM CHLORIDE, PRESERVATIVE FREE 10 ML: 5 INJECTION INTRAVENOUS at 20:32

## 2025-01-06 RX ADMIN — GABAPENTIN 300 MG: 300 CAPSULE ORAL at 15:33

## 2025-01-06 RX ADMIN — CEFTAZIDIME, AVIBACTAM 2.5 G: 2; .5 POWDER, FOR SOLUTION INTRAVENOUS at 00:53

## 2025-01-06 RX ADMIN — SERTRALINE HYDROCHLORIDE 100 MG: 50 TABLET ORAL at 09:13

## 2025-01-06 RX ADMIN — GABAPENTIN 300 MG: 300 CAPSULE ORAL at 20:32

## 2025-01-06 RX ADMIN — CARVEDILOL 25 MG: 12.5 TABLET, FILM COATED ORAL at 20:32

## 2025-01-06 ASSESSMENT — PAIN SCALES - GENERAL
PAINLEVEL_OUTOF10: 0
PAINLEVEL_OUTOF10: 0

## 2025-01-06 NOTE — PLAN OF CARE
Problem: Safety - Adult  Goal: Free from fall injury  Outcome: Progressing     Problem: Discharge Planning  Goal: Discharge to home or other facility with appropriate resources  Outcome: Progressing     Problem: Physical Therapy - Adult  Goal: By Discharge: Performs mobility at highest level of function for planned discharge setting.  See evaluation for individualized goals.  Description: FUNCTIONAL STATUS PRIOR TO ADMISSION: Patient was modified independent using a single point cane for functional mobility. Also reports furniture walking at times.     HOME SUPPORT PRIOR TO ADMISSION: The patient lived with ex brother in law and required some assist for tub transfers and dressing at times.    Physical Therapy Goals  Initiated 1/5/2025  Pt stated goal: Get better  Pt will be I with LE HEP in 7 days.  Pt will perform bed mobility with Minimal Assist in 7 days.  Pt will perform transfers with Minimal Assist in 7 days.   Pt will amb 25 feet with LRAD safely with Minimal Assist in 7 days.  Pt will demonstrate improvement in static sitting balance from Minimal Assist to Bennett in 7 days.     1/5/2025 1200 by Tiffany Rivera, PT  Outcome: Progressing

## 2025-01-06 NOTE — CARE COORDINATION
CM met with patient at bedside to confirm discharge disposition.  CM informed patient that she has been accepted at [facility].  Patient is agreeable.  CM informed patient that she will need insurance authorization and we will start this process, patient is agreeable.  CM called [place] [facility] [phone], option 3. Insurance authorization started, Reference number is [id number].     CM faxed clinicals to [phone].

## 2025-01-06 NOTE — WOUND CARE
Wound Care Note:    Wound Care into see patient because of other: BLEs    Skin Care & Pressure Relief Recommendations:  Minimize layers of linen  Pads under patient to optimize support surface and microclimate  Turn/reposition approximately every 2 hours.  Pillow Wedges  Manage incontinence  Promote continence; Skin Protective lotion to buttocks and sacrum daily and as needed with incontinence care    Offload heels with pillows or offloading boots.    Support Surface: Foam    Patient with lymphedema to BLEs.  No open wounds noted.  Scar tissue noted to LLE distal.  Patient reports receiving lymphedema treatment weekly at the [facility].  Patient also reports that she is unable to don prescribed compression stockings at home.  Recommended and received order for modified compression with coban to BLEs from Dr. Green.  I applied kerlix and coban to BLEs and patient reported that compression was comfortable.  Recommend avoiding ACE wraps to BLEs due to patient being ambulatory and ACE wraps may loosen and pose a fall risk.    -For BLEs: apply moisturizing cream/lotion and allow to dry, wrap with kerlix, and wrap with coban every three days and prn for soiling.  To wrap: begin at approximately 1 inch below base of toes and wrap to within 2-3 inches below knee.    Prevention:  Apply Optifoam Border Sacral foam dressing to sacrum every three days to redistribute pressure from bony prominence and prevent pressure and friction injury to skin.  Rn is to gently peel back foam dressing for shift integumentary assessment and re-secure.  Maintain the external  incontinence management system to manage  incontinence.  Use foam wedge to turn q2h at 30 degree angle to offload sacrum.  Float heels with 1-2 pillows lengthwise while in bed for offloading of the heels.  Maintain HOB at 30 degrees or less, if not contraindicated, to reduce pressure to buttocks and sacrum.  Raise foot of bed to help prevent

## 2025-01-07 LAB
CRP SERPL-MCNC: 29.3 MG/DL (ref 0–0.3)
PROCALCITONIN SERPL-MCNC: 1.87 NG/ML

## 2025-01-07 PROCEDURE — 2500000003 HC RX 250 WO HCPCS: Performed by: STUDENT IN AN ORGANIZED HEALTH CARE EDUCATION/TRAINING PROGRAM

## 2025-01-07 PROCEDURE — 6360000002 HC RX W HCPCS: Performed by: INTERNAL MEDICINE

## 2025-01-07 PROCEDURE — 6370000000 HC RX 637 (ALT 250 FOR IP): Performed by: INTERNAL MEDICINE

## 2025-01-07 PROCEDURE — 84145 PROCALCITONIN (PCT): CPT

## 2025-01-07 PROCEDURE — 99232 SBSQ HOSP IP/OBS MODERATE 35: CPT | Performed by: INTERNAL MEDICINE

## 2025-01-07 PROCEDURE — 6370000000 HC RX 637 (ALT 250 FOR IP): Performed by: STUDENT IN AN ORGANIZED HEALTH CARE EDUCATION/TRAINING PROGRAM

## 2025-01-07 PROCEDURE — 1100000000 HC RM PRIVATE

## 2025-01-07 PROCEDURE — 86140 C-REACTIVE PROTEIN: CPT

## 2025-01-07 PROCEDURE — 97530 THERAPEUTIC ACTIVITIES: CPT

## 2025-01-07 PROCEDURE — 2580000003 HC RX 258: Performed by: INTERNAL MEDICINE

## 2025-01-07 PROCEDURE — 36415 COLL VENOUS BLD VENIPUNCTURE: CPT

## 2025-01-07 RX ADMIN — CEFTAZIDIME, AVIBACTAM 2.5 G: 2; .5 POWDER, FOR SOLUTION INTRAVENOUS at 16:52

## 2025-01-07 RX ADMIN — GABAPENTIN 300 MG: 300 CAPSULE ORAL at 09:08

## 2025-01-07 RX ADMIN — CEFTAZIDIME, AVIBACTAM 2.5 G: 2; .5 POWDER, FOR SOLUTION INTRAVENOUS at 01:10

## 2025-01-07 RX ADMIN — Medication 60 MG: at 09:07

## 2025-01-07 RX ADMIN — SODIUM CHLORIDE, PRESERVATIVE FREE 10 ML: 5 INJECTION INTRAVENOUS at 09:08

## 2025-01-07 RX ADMIN — Medication 60 MG: at 20:23

## 2025-01-07 RX ADMIN — GABAPENTIN 300 MG: 300 CAPSULE ORAL at 14:08

## 2025-01-07 RX ADMIN — SODIUM CHLORIDE, PRESERVATIVE FREE 10 ML: 5 INJECTION INTRAVENOUS at 20:18

## 2025-01-07 RX ADMIN — ALLOPURINOL 100 MG: 100 TABLET ORAL at 09:08

## 2025-01-07 RX ADMIN — CEFTAZIDIME, AVIBACTAM 2.5 G: 2; .5 POWDER, FOR SOLUTION INTRAVENOUS at 09:06

## 2025-01-07 RX ADMIN — CARVEDILOL 25 MG: 12.5 TABLET, FILM COATED ORAL at 09:08

## 2025-01-07 RX ADMIN — BUMETANIDE 2 MG: 1 TABLET ORAL at 09:08

## 2025-01-07 RX ADMIN — CARVEDILOL 25 MG: 12.5 TABLET, FILM COATED ORAL at 20:23

## 2025-01-07 RX ADMIN — GABAPENTIN 300 MG: 300 CAPSULE ORAL at 20:33

## 2025-01-07 RX ADMIN — PANTOPRAZOLE SODIUM 40 MG: 40 TABLET, DELAYED RELEASE ORAL at 06:06

## 2025-01-07 RX ADMIN — ATORVASTATIN CALCIUM 10 MG: 10 TABLET, FILM COATED ORAL at 09:08

## 2025-01-07 RX ADMIN — SERTRALINE HYDROCHLORIDE 100 MG: 50 TABLET ORAL at 09:08

## 2025-01-07 ASSESSMENT — PAIN SCALES - GENERAL
PAINLEVEL_OUTOF10: 0
PAINLEVEL_OUTOF10: 0

## 2025-01-07 NOTE — CARE COORDINATION
CM informed [facility] that patient was on IV Avycaz yesterday prior to SNF giving CM approval to start authorization.  CM started insurance authorization on 1.6.25 with reference number [id number].  CM asked SNF this morning if the IV avycaz will pose an issue with patient transitioning to them, SNF will look to see if there is a generic version of medication as this medication is expensive.  CM notified ID, there is currently no generic for IV Avycaz.  ID will look to see if there is an alternative option.

## 2025-01-07 NOTE — PLAN OF CARE
Problem: Safety - Adult  Goal: Free from fall injury  1/6/2025 2309 by Caitlyn Menendez RN  Outcome: Progressing  1/6/2025 1504 by Melva Thurman RN  Outcome: Progressing     Problem: Discharge Planning  Goal: Discharge to home or other facility with appropriate resources  1/6/2025 2309 by Caitlyn Menendez RN  Outcome: Progressing  1/6/2025 1504 by Melva Thurman RN  Outcome: Progressing     Problem: Pain  Goal: Verbalizes/displays adequate comfort level or baseline comfort level  1/6/2025 2309 by Caitlyn Menendez RN  Outcome: Progressing  1/6/2025 1504 by Melva Thurman RN  Outcome: Progressing     Problem: Skin/Tissue Integrity  Goal: Absence of new skin breakdown  Description: 1.  Monitor for areas of redness and/or skin breakdown  2.  Assess vascular access sites hourly  3.  Every 4-6 hours minimum:  Change oxygen saturation probe site  4.  Every 4-6 hours:  If on nasal continuous positive airway pressure, respiratory therapy assess nares and determine need for appliance change or resting period.  1/6/2025 2309 by Caitlyn Menendez RN  Outcome: Progressing  1/6/2025 1504 by Melva Thurman RN  Outcome: Progressing

## 2025-01-07 NOTE — PLAN OF CARE
PHYSICAL THERAPY TREATMENT     Patient: Yahaira Song (74 y.o. 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  ASSESSMENT  Patient continues with skilled PT services and is slowly progressing towards goals. Pt semi supine in bed upon PT arrival, agreeable to session. Pt A&O x 4. (See below for objective details and assist levels).     Overall pt tolerated session poor today with overall max Ax1-2 req for bed mob.  Pt c/o inc LE pain with minimal attempt at movement.  Writer noticed coban dressing applied to B LE and asked pt what the wraps were for and pt stated she usually goes to lymphedema clinic at [facility] for treatment but does not have her daily wraps with her.  Pt stated someone wrapped her legs yesterday.  It appeared coban dressings were too snug and writer notified charge nurse of findings.  It was determined by both writer and charge nurse to take off the coban wrapping.  PT educated pt that lymphedema compression wrapping should be done with a specific short stretch wrap and that it's important to have a trained lymphoedema specialist apply the wraps, as improper application can increase swelling and pain, or make it build up unevenly. The wraps should feel snug but not tight.  Pt with verbal understanding.  PT also suggested pt could potentially have her family/caregivers bring her regular compression garments in if cleared with MD.  PT also notified attending MD of snug wrapping on B LE.  Pt with inc LE pain that limited overall mobility this session and pt was not able to get to

## 2025-01-08 LAB
ANION GAP SERPL CALC-SCNC: 7 MMOL/L (ref 2–12)
BASOPHILS # BLD: 0 K/UL (ref 0–0.1)
BASOPHILS NFR BLD: 0 % (ref 0–1)
BUN SERPL-MCNC: 21 MG/DL (ref 6–20)
BUN/CREAT SERPL: 24 (ref 12–20)
CA-I BLD-MCNC: 8.9 MG/DL (ref 8.5–10.1)
CHLORIDE SERPL-SCNC: 99 MMOL/L (ref 97–108)
CO2 SERPL-SCNC: 32 MMOL/L (ref 21–32)
CREAT SERPL-MCNC: 0.86 MG/DL (ref 0.55–1.02)
DIFFERENTIAL METHOD BLD: ABNORMAL
EOSINOPHIL # BLD: 0 K/UL (ref 0–0.4)
EOSINOPHIL NFR BLD: 0 % (ref 0–7)
ERYTHROCYTE [DISTWIDTH] IN BLOOD BY AUTOMATED COUNT: 18.4 % (ref 11.5–14.5)
GLUCOSE BLD STRIP.AUTO-MCNC: 183 MG/DL (ref 65–100)
GLUCOSE SERPL-MCNC: 157 MG/DL (ref 65–100)
HCT VFR BLD AUTO: 27.5 % (ref 35–47)
HGB BLD-MCNC: 8.7 G/DL (ref 11.5–16)
IMM GRANULOCYTES # BLD AUTO: 0 K/UL
IMM GRANULOCYTES NFR BLD AUTO: 0 %
LYMPHOCYTES # BLD: 0.99 K/UL (ref 0.8–3.5)
LYMPHOCYTES NFR BLD: 14 % (ref 12–49)
MCH RBC QN AUTO: 28.2 PG (ref 26–34)
MCHC RBC AUTO-ENTMCNC: 31.6 G/DL (ref 30–36.5)
MCV RBC AUTO: 89 FL (ref 80–99)
METAMYELOCYTES NFR BLD MANUAL: 1 %
MONOCYTES # BLD: 0.92 K/UL (ref 0–1)
MONOCYTES NFR BLD: 13 % (ref 5–13)
MYELOCYTES NFR BLD MANUAL: 4 %
NEUTS SEG # BLD: 4.83 K/UL (ref 1.8–8)
NEUTS SEG NFR BLD: 68 % (ref 32–75)
NRBC # BLD: 0 K/UL (ref 0–0.01)
NRBC BLD-RTO: 0 PER 100 WBC
PERFORMED BY:: ABNORMAL
PLATELET # BLD AUTO: 301 K/UL (ref 150–400)
PMV BLD AUTO: 11.1 FL (ref 8.9–12.9)
POTASSIUM SERPL-SCNC: 3.7 MMOL/L (ref 3.5–5.1)
RBC # BLD AUTO: 3.09 M/UL (ref 3.8–5.2)
RBC MORPH BLD: ABNORMAL
RBC MORPH BLD: ABNORMAL
SODIUM SERPL-SCNC: 138 MMOL/L (ref 136–145)
WBC # BLD AUTO: 7.1 K/UL (ref 3.6–11)

## 2025-01-08 PROCEDURE — 82962 GLUCOSE BLOOD TEST: CPT

## 2025-01-08 PROCEDURE — 36415 COLL VENOUS BLD VENIPUNCTURE: CPT

## 2025-01-08 PROCEDURE — 1100000000 HC RM PRIVATE

## 2025-01-08 PROCEDURE — 80048 BASIC METABOLIC PNL TOTAL CA: CPT

## 2025-01-08 PROCEDURE — 97530 THERAPEUTIC ACTIVITIES: CPT

## 2025-01-08 PROCEDURE — 2500000003 HC RX 250 WO HCPCS: Performed by: STUDENT IN AN ORGANIZED HEALTH CARE EDUCATION/TRAINING PROGRAM

## 2025-01-08 PROCEDURE — 6370000000 HC RX 637 (ALT 250 FOR IP): Performed by: INTERNAL MEDICINE

## 2025-01-08 PROCEDURE — 99232 SBSQ HOSP IP/OBS MODERATE 35: CPT | Performed by: INTERNAL MEDICINE

## 2025-01-08 PROCEDURE — 6370000000 HC RX 637 (ALT 250 FOR IP): Performed by: STUDENT IN AN ORGANIZED HEALTH CARE EDUCATION/TRAINING PROGRAM

## 2025-01-08 PROCEDURE — 2580000003 HC RX 258: Performed by: INTERNAL MEDICINE

## 2025-01-08 PROCEDURE — 6360000002 HC RX W HCPCS: Performed by: INTERNAL MEDICINE

## 2025-01-08 PROCEDURE — 85025 COMPLETE CBC W/AUTO DIFF WBC: CPT

## 2025-01-08 RX ADMIN — PANTOPRAZOLE SODIUM 40 MG: 40 TABLET, DELAYED RELEASE ORAL at 06:14

## 2025-01-08 RX ADMIN — Medication 60 MG: at 10:10

## 2025-01-08 RX ADMIN — CEFTAZIDIME, AVIBACTAM 2.5 G: 2; .5 POWDER, FOR SOLUTION INTRAVENOUS at 00:30

## 2025-01-08 RX ADMIN — CEFTAZIDIME, AVIBACTAM 2.5 G: 2; .5 POWDER, FOR SOLUTION INTRAVENOUS at 10:15

## 2025-01-08 RX ADMIN — SODIUM CHLORIDE, PRESERVATIVE FREE 10 ML: 5 INJECTION INTRAVENOUS at 10:10

## 2025-01-08 RX ADMIN — SODIUM CHLORIDE, PRESERVATIVE FREE 10 ML: 5 INJECTION INTRAVENOUS at 21:30

## 2025-01-08 RX ADMIN — CARVEDILOL 25 MG: 12.5 TABLET, FILM COATED ORAL at 21:30

## 2025-01-08 RX ADMIN — GABAPENTIN 300 MG: 300 CAPSULE ORAL at 14:33

## 2025-01-08 RX ADMIN — GABAPENTIN 300 MG: 300 CAPSULE ORAL at 21:30

## 2025-01-08 RX ADMIN — CEFTAZIDIME, AVIBACTAM 2.5 G: 2; .5 POWDER, FOR SOLUTION INTRAVENOUS at 17:39

## 2025-01-08 RX ADMIN — CARVEDILOL 25 MG: 12.5 TABLET, FILM COATED ORAL at 10:09

## 2025-01-08 RX ADMIN — ALLOPURINOL 100 MG: 100 TABLET ORAL at 10:09

## 2025-01-08 RX ADMIN — GABAPENTIN 300 MG: 300 CAPSULE ORAL at 10:09

## 2025-01-08 RX ADMIN — ATORVASTATIN CALCIUM 10 MG: 10 TABLET, FILM COATED ORAL at 10:09

## 2025-01-08 RX ADMIN — BUMETANIDE 2 MG: 1 TABLET ORAL at 10:09

## 2025-01-08 RX ADMIN — SERTRALINE HYDROCHLORIDE 100 MG: 50 TABLET ORAL at 10:09

## 2025-01-08 ASSESSMENT — PAIN SCALES - GENERAL: PAINLEVEL_OUTOF10: 0

## 2025-01-08 NOTE — PLAN OF CARE
Problem: Safety - Adult  Goal: Free from fall injury  1/7/2025 1926 by Caitlyn Menendez, RN  Outcome: Progressing  1/7/2025 1030 by Melva Thurman RN  Outcome: Progressing     Problem: Discharge Planning  Goal: Discharge to home or other facility with appropriate resources  1/7/2025 1926 by Caitlyn Menendez, RN  Outcome: Progressing  1/7/2025 1030 by Melva Thurman RN  Outcome: Progressing

## 2025-01-08 NOTE — CARE COORDINATION
0954: CM received telephone call from [facility], Patients insurance authorization has been approved for [facility].  [facility] has submitted a Carve Out for the IV Avycaz antibiotic to the insurance company.  Patient will not be able to admit to [facility] with IV Avycaz, unless carve out is approved through insurance.     0851: Patient needs IV Avycaz for 8 More days.  This is an expensive medication, [facility] team is working on a Carve Out for this medication.  MIAN called [facility] [phone], option 3 this morning to check on the status of the insurance authorization, MIAN spoke with Gladys. She stated that as of 12/31/23 patient is ineligible with her [employer].  She sent the health plan a message to confirm and is awaiting a response back.

## 2025-01-08 NOTE — PLAN OF CARE
OCCUPATIONAL THERAPY TREATMENT  Patient: Yahaira Song (74 y.o. female)  Date: 1/8/2025  Primary Diagnosis: Pyelonephritis [N12]  Complicated UTI (urinary tract infection) [N39.0]  Procedure(s) (LRB):  CYSTOSCOPY, RIGHT RETROGRADE PYELOGRAM, AND RIGHT URETERAL STENT INSERTION (Right) 5 Days Post-Op   Precautions: Fall Risk, Contact Precautions                Recommendations for nursing mobility: Encourage HEP in prep for ADLs/mobility; see handout for details, Frequent repositioning to prevent skin breakdown, Use of bed/chair alarm for safety, and Assist x2    In place during session: External Catheter  Chart, occupational therapy assessment, plan of care, and goals were reviewed.  ASSESSMENT  Patient continues with skilled OT services and is progressing towards goals. Pt semi supine in bed upon AGARWAL arrival, agreeable to session. Pt A&O x 4. Pt completed light grooming and UE therex semi supine in bed. Pt completed UE therex, see grid below for details, to maintain/ increase strength and endurance to aid in adl performance. Education provided on HEP w/ pt verbalizing good understanding.  Pt encouraged to complete HEP 3 times per day on her own.  Pt required extensive assistance x 2 for bed mobility. Used jill pads to support each leg with mobility out/in bed d/t legs being tender to touch. Pt tolerated eob 5 minutes. Pt found soiled w/ bm upon laying down and required TA for cleaning and Max A x 2 for rolling and extra time for cleaning. Pt left semi supine in bed w/ all known needs met. (See below for objective details and assist levels).     Overall pt tolerated session fair today with frequent rest breaks.Pt pleasant and appears very motivated to increase functional level to return to PLOF. Current OT recommendations for discharge Moderate intensity short-term skilled occupational therapy up to 5x/week. Will continue to benefit from skilled OT services, and will continue to progress as tolerated.

## 2025-01-09 VITALS
DIASTOLIC BLOOD PRESSURE: 60 MMHG | TEMPERATURE: 98.4 F | RESPIRATION RATE: 17 BRPM | WEIGHT: 293 LBS | OXYGEN SATURATION: 98 % | BODY MASS INDEX: 44.41 KG/M2 | HEIGHT: 68 IN | HEART RATE: 89 BPM | SYSTOLIC BLOOD PRESSURE: 110 MMHG

## 2025-01-09 LAB
ANION GAP SERPL CALC-SCNC: 3 MMOL/L (ref 2–12)
BASOPHILS # BLD: 0.07 K/UL (ref 0–0.1)
BASOPHILS NFR BLD: 1 % (ref 0–1)
BUN SERPL-MCNC: 18 MG/DL (ref 6–20)
BUN/CREAT SERPL: 21 (ref 12–20)
CA-I BLD-MCNC: 8.7 MG/DL (ref 8.5–10.1)
CHLORIDE SERPL-SCNC: 101 MMOL/L (ref 97–108)
CO2 SERPL-SCNC: 35 MMOL/L (ref 21–32)
CREAT SERPL-MCNC: 0.87 MG/DL (ref 0.55–1.02)
CRP SERPL-MCNC: 20.7 MG/DL (ref 0–0.3)
DIFFERENTIAL METHOD BLD: ABNORMAL
EOSINOPHIL # BLD: 0 K/UL (ref 0–0.4)
EOSINOPHIL NFR BLD: 0 % (ref 0–7)
ERYTHROCYTE [DISTWIDTH] IN BLOOD BY AUTOMATED COUNT: 18.3 % (ref 11.5–14.5)
GLUCOSE BLD STRIP.AUTO-MCNC: 157 MG/DL (ref 65–100)
GLUCOSE SERPL-MCNC: 133 MG/DL (ref 65–100)
HCT VFR BLD AUTO: 25.4 % (ref 35–47)
HGB BLD-MCNC: 8.1 G/DL (ref 11.5–16)
IMM GRANULOCYTES # BLD AUTO: 0 K/UL
IMM GRANULOCYTES NFR BLD AUTO: 0 %
LYMPHOCYTES # BLD: 0.72 K/UL (ref 0.8–3.5)
LYMPHOCYTES NFR BLD: 10 % (ref 12–49)
MCH RBC QN AUTO: 28.1 PG (ref 26–34)
MCHC RBC AUTO-ENTMCNC: 31.9 G/DL (ref 30–36.5)
MCV RBC AUTO: 88.2 FL (ref 80–99)
METAMYELOCYTES NFR BLD MANUAL: 1 %
MONOCYTES # BLD: 0.36 K/UL (ref 0–1)
MONOCYTES NFR BLD: 5 % (ref 5–13)
NEUTS SEG # BLD: 5.98 K/UL (ref 1.8–8)
NEUTS SEG NFR BLD: 83 % (ref 32–75)
NRBC # BLD: 0 K/UL (ref 0–0.01)
NRBC BLD-RTO: 0 PER 100 WBC
PERFORMED BY:: ABNORMAL
PLATELET # BLD AUTO: 326 K/UL (ref 150–400)
PMV BLD AUTO: 11.2 FL (ref 8.9–12.9)
POTASSIUM SERPL-SCNC: 3.5 MMOL/L (ref 3.5–5.1)
PROCALCITONIN SERPL-MCNC: 1.13 NG/ML
RBC # BLD AUTO: 2.88 M/UL (ref 3.8–5.2)
RBC MORPH BLD: ABNORMAL
SODIUM SERPL-SCNC: 139 MMOL/L (ref 136–145)
WBC # BLD AUTO: 7.2 K/UL (ref 3.6–11)

## 2025-01-09 PROCEDURE — 85025 COMPLETE CBC W/AUTO DIFF WBC: CPT

## 2025-01-09 PROCEDURE — 82962 GLUCOSE BLOOD TEST: CPT

## 2025-01-09 PROCEDURE — 36415 COLL VENOUS BLD VENIPUNCTURE: CPT

## 2025-01-09 PROCEDURE — 6370000000 HC RX 637 (ALT 250 FOR IP): Performed by: STUDENT IN AN ORGANIZED HEALTH CARE EDUCATION/TRAINING PROGRAM

## 2025-01-09 PROCEDURE — 86140 C-REACTIVE PROTEIN: CPT

## 2025-01-09 PROCEDURE — 6360000002 HC RX W HCPCS: Performed by: INTERNAL MEDICINE

## 2025-01-09 PROCEDURE — 80048 BASIC METABOLIC PNL TOTAL CA: CPT

## 2025-01-09 PROCEDURE — 2580000003 HC RX 258: Performed by: INTERNAL MEDICINE

## 2025-01-09 PROCEDURE — 84145 PROCALCITONIN (PCT): CPT

## 2025-01-09 PROCEDURE — 6370000000 HC RX 637 (ALT 250 FOR IP): Performed by: INTERNAL MEDICINE

## 2025-01-09 PROCEDURE — 2500000003 HC RX 250 WO HCPCS: Performed by: STUDENT IN AN ORGANIZED HEALTH CARE EDUCATION/TRAINING PROGRAM

## 2025-01-09 RX ORDER — ALBUTEROL SULFATE 90 UG/1
2 INHALANT RESPIRATORY (INHALATION) EVERY 4 HOURS PRN
Qty: 18 G | Refills: 2 | Status: SHIPPED | OUTPATIENT
Start: 2025-01-09

## 2025-01-09 RX ORDER — DEXTROMETHORPHAN POLISTIREX 30 MG/5ML
60 SUSPENSION ORAL 2 TIMES DAILY PRN
Qty: 200 ML | Refills: 0 | Status: SHIPPED | OUTPATIENT
Start: 2025-01-09 | End: 2025-01-19

## 2025-01-09 RX ORDER — BUMETANIDE 1 MG/1
2 TABLET ORAL DAILY
Qty: 30 TABLET | Refills: 3 | Status: SHIPPED | OUTPATIENT
Start: 2025-01-09

## 2025-01-09 RX ORDER — PANTOPRAZOLE SODIUM 40 MG/1
40 TABLET, DELAYED RELEASE ORAL
Qty: 30 TABLET | Refills: 3 | Status: SHIPPED | OUTPATIENT
Start: 2025-01-10

## 2025-01-09 RX ADMIN — SODIUM CHLORIDE, PRESERVATIVE FREE 10 ML: 5 INJECTION INTRAVENOUS at 09:04

## 2025-01-09 RX ADMIN — ALLOPURINOL 100 MG: 100 TABLET ORAL at 09:03

## 2025-01-09 RX ADMIN — GABAPENTIN 300 MG: 300 CAPSULE ORAL at 13:27

## 2025-01-09 RX ADMIN — Medication 60 MG: at 09:03

## 2025-01-09 RX ADMIN — CEFTAZIDIME, AVIBACTAM 2.5 G: 2; .5 POWDER, FOR SOLUTION INTRAVENOUS at 00:33

## 2025-01-09 RX ADMIN — CARVEDILOL 25 MG: 12.5 TABLET, FILM COATED ORAL at 09:03

## 2025-01-09 RX ADMIN — GABAPENTIN 300 MG: 300 CAPSULE ORAL at 09:03

## 2025-01-09 RX ADMIN — BUMETANIDE 2 MG: 1 TABLET ORAL at 09:03

## 2025-01-09 RX ADMIN — CEFTAZIDIME, AVIBACTAM 2.5 G: 2; .5 POWDER, FOR SOLUTION INTRAVENOUS at 09:10

## 2025-01-09 RX ADMIN — HYDROCODONE BITARTRATE AND ACETAMINOPHEN 1 TABLET: 5; 325 TABLET ORAL at 02:36

## 2025-01-09 RX ADMIN — SERTRALINE HYDROCHLORIDE 100 MG: 50 TABLET ORAL at 09:03

## 2025-01-09 RX ADMIN — ATORVASTATIN CALCIUM 10 MG: 10 TABLET, FILM COATED ORAL at 09:03

## 2025-01-09 RX ADMIN — PANTOPRAZOLE SODIUM 40 MG: 40 TABLET, DELAYED RELEASE ORAL at 05:42

## 2025-01-09 ASSESSMENT — PAIN SCALES - GENERAL
PAINLEVEL_OUTOF10: 0
PAINLEVEL_OUTOF10: 4
PAINLEVEL_OUTOF10: 5

## 2025-01-09 ASSESSMENT — PAIN DESCRIPTION - LOCATION: LOCATION: BACK

## 2025-01-09 NOTE — DISCHARGE SUMMARY
Discharge Summary    Name: Yahaira Song  [id number]  YOB: 1950 (Age: 74 y.o.)   Date of Admission: 12/31/2024  Date of Discharge: 1/9/2025  Attending Physician: Ramírez Lee MD    Discharge Diagnosis:   Complicated UTI due to Klebsiella and ESBL E. coli, Carbapenem resistant E. coli  Nephrolithiasis, nonobstructing.  Sepsis with fever and tachycardia, resolved   Hypokalemia, resolved   Hypertension   Chronic lower extremity edema   Chronic asthma   Viral bronchitis, resolved   Hepatic cyst    Consultations:  IP CONSULT TO UROLOGY  IP CONSULT TO INFECTIOUS DISEASES  IP CONSULT TO VASCULAR ACCESS TEAM  IP CONSULT TO CASE MANAGEMENT    Brief Hospital Course by Main Problems:   74 y.o.  female with PMHx significant for kidney stones, asthma, hypertension, hx liver cyst, and arthritis presenting to the ED with a primary complaint of abdominal pain, nausea, vomiting, poor appetite, body aches, fever, and cough.  Patient states she went to [facility] where she was tested for flu/COVID which were both negative.  Reports that over the last 3 days she has been unable to keep any liquids, food, or medication down because she gets sudden abdominal pain and will vomit.  She states that \"it hurts to take a deep breath.\"  Of note, patient also has dressings to bilateral lower extremities for chronic lymphedema.  She was mentioned that she previously had a stent placed for kidney stones in 2016, but it \"fell out\" after a few days. She denies any headache, dizziness, cough, chest pain, palpitations, flank pain, constipation, diarrhea, or other associated complaints.     In the ED, febrile with temp 100.5, mild tachycardia.  Otherwise BP stable, on room air.  No leukocytosis.  Significant initial labs show hemoglobin 8.8, decreased from 10.12 days ago.  Potassium 2.9.  Alk phos 178.  Troponin negative x 2.  Patient received p.o. and IV potassium replacement in the ED.  She

## 2025-01-09 NOTE — CARE COORDINATION
MIAN received telephone call from Gladys with [facility] [phone].  She stated that the patients carve out has been approved and patients authorization has been approved as well.  MIAN has asked [facility] for a bed assignment for today.  CM awaiting response.

## 2025-01-09 NOTE — PROGRESS NOTES
UROLOGY Progress Note         [phone]      Daily Progress Note: 1/4/2025    Subjective:   The patient is seen for UROLOGIC follow up after cystoscopy, right RPG and right ureteral stent insertion on 1/3/25.  Radiopaque stone in the renal pelvis.    Admitted for emphysematous pyelitis.     Doing well today.  No complaints with voiding.  No bother from stent.    Problem List:  Patient Active Problem List   Diagnosis    Kidney stone    Acute pain    Edema    At risk for falls    Hypertension    Obesity, morbid    Menopause present    Obesity    Urinary incontinence    Arthritis    Asthma    Hematuria    Postmenopausal state    Sepsis (HCC)    Ureteral stone with hydronephrosis    Abdominal pain    Hepatic cyst    Acute cystitis    Urinary tract obstruction by kidney stone    Leg edema    Emphysematous pyelitis    Complicated UTI (urinary tract infection)       Medications reviewed  Current Facility-Administered Medications   Medication Dose Route Frequency    dextromethorphan (DELSYM) 30 MG/5ML extended release liquid 60 mg  60 mg Oral 2 times per day    HYDROcodone-acetaminophen (NORCO) 5-325 MG per tablet 1 tablet  1 tablet Oral Q6H PRN    meropenem (MERREM) 1,000 mg in sterile water 20 mL IV syringe  1,000 mg IntraVENous Q8H    sodium chloride flush 0.9 % injection 5-40 mL  5-40 mL IntraVENous 2 times per day    sodium chloride flush 0.9 % injection 5-40 mL  5-40 mL IntraVENous PRN    0.9 % sodium chloride infusion   IntraVENous PRN    potassium chloride (KLOR-CON M) extended release tablet 40 mEq  40 mEq Oral PRN    Or    potassium bicarb-citric acid (EFFER-K) effervescent tablet 40 mEq  40 mEq Oral PRN    Or    potassium chloride 10 mEq/100 mL IVPB (Peripheral Line)  10 mEq IntraVENous PRN    magnesium sulfate 2000 mg in 50 mL IVPB premix  2,000 mg IntraVENous PRN    [Held by provider] enoxaparin Sodium (LOVENOX) injection 30 mg  30 mg SubCUTAneous BID    ondansetron (ZOFRAN-ODT) disintegrating tablet 
    Hospitalist Progress Note               Daily Progress Note: 1/1/2025      Hospital Day: 2     Chief complaint:   Chief Complaint   Patient presents with    Chest Pain        Subjective:   Hospital course to date:    74 y.o.  female with PMHx significant for kidney stones, asthma, hypertension, hx liver cyst, and arthritis presenting to the ED with a primary complaint of abdominal pain, nausea, vomiting, poor appetite, body aches, fever, and cough.  Patient states she went to [facility] where she was tested for flu/COVID which were both negative.  Reports that over the last 3 days she has been unable to keep any liquids, food, or medication down because she gets sudden abdominal pain and will vomit.  She states that \"it hurts to take a deep breath.\"  Of note, patient also has dressings to bilateral lower extremities for chronic lymphedema.  She was mentioned that she previously had a stent placed for kidney stones in 2016, but it \"fell out\" after a few days. She denies any headache, dizziness, cough, chest pain, palpitations, flank pain, constipation, diarrhea, or other associated complaints.    In the ED, febrile with temp 100.5, mild tachycardia.  Otherwise BP stable, on room air.  No leukocytosis.  Significant initial labs show hemoglobin 8.8, decreased from 10.12 days ago.  Potassium 2.9.  Alk phos 178.  Troponin negative x 2.  Patient received p.o. and IV potassium replacement in the ED.  She received IV ceftriaxone 1 dose and 2 L fluid bolus.  Urinalysis with moderate leukocyte esterase and 4+ bacteria.  She had a CT abdomen/pelvis on 12/29 which showed bilateral nonobstructing nephrolithiasis with right ureteropelvic junction calculi and air within the right renal collecting system suggesting upper urinary tract gas-forming infection in the absence of history of recent intervention.  Chest x-ray  negative for acute cardiopulmonary infiltrates.  Flu and COVID were negative.    Patient was admitted, started on 
    Hospitalist Progress Note               Daily Progress Note: 1/2/2025      Hospital Day: 3     Chief complaint:   Chief Complaint   Patient presents with    Chest Pain        Subjective:   Hospital course to date:    74 y.o.  female with PMHx significant for kidney stones, asthma, hypertension, hx liver cyst, and arthritis presenting to the ED with a primary complaint of abdominal pain, nausea, vomiting, poor appetite, body aches, fever, and cough.  Patient states she went to [facility] where she was tested for flu/COVID which were both negative.  Reports that over the last 3 days she has been unable to keep any liquids, food, or medication down because she gets sudden abdominal pain and will vomit.  She states that \"it hurts to take a deep breath.\"  Of note, patient also has dressings to bilateral lower extremities for chronic lymphedema.  She was mentioned that she previously had a stent placed for kidney stones in 2016, but it \"fell out\" after a few days. She denies any headache, dizziness, cough, chest pain, palpitations, flank pain, constipation, diarrhea, or other associated complaints.    In the ED, febrile with temp 100.5, mild tachycardia.  Otherwise BP stable, on room air.  No leukocytosis.  Significant initial labs show hemoglobin 8.8, decreased from 10.12 days ago.  Potassium 2.9.  Alk phos 178.  Troponin negative x 2.  Patient received p.o. and IV potassium replacement in the ED.  She received IV ceftriaxone 1 dose and 2 L fluid bolus.  Urinalysis with moderate leukocyte esterase and 4+ bacteria.  She had a CT abdomen/pelvis on 12/29 which showed bilateral nonobstructing nephrolithiasis with right ureteropelvic junction calculi and air within the right renal collecting system suggesting upper urinary tract gas-forming infection in the absence of history of recent intervention.  Chest x-ray  negative for acute cardiopulmonary infiltrates.  Flu and COVID were negative.    Patient was admitted, started on 
    Hospitalist Progress Note               Daily Progress Note: 1/3/2025      Hospital Day: 4     Chief complaint:   Chief Complaint   Patient presents with    Chest Pain        Subjective:   Hospital course to date:    74 y.o.  female with PMHx significant for kidney stones, asthma, hypertension, hx liver cyst, and arthritis presenting to the ED with a primary complaint of abdominal pain, nausea, vomiting, poor appetite, body aches, fever, and cough.  Patient states she went to [facility] where she was tested for flu/COVID which were both negative.  Reports that over the last 3 days she has been unable to keep any liquids, food, or medication down because she gets sudden abdominal pain and will vomit.  She states that \"it hurts to take a deep breath.\"  Of note, patient also has dressings to bilateral lower extremities for chronic lymphedema.  She was mentioned that she previously had a stent placed for kidney stones in 2016, but it \"fell out\" after a few days. She denies any headache, dizziness, cough, chest pain, palpitations, flank pain, constipation, diarrhea, or other associated complaints.    In the ED, febrile with temp 100.5, mild tachycardia.  Otherwise BP stable, on room air.  No leukocytosis.  Significant initial labs show hemoglobin 8.8, decreased from 10.12 days ago.  Potassium 2.9.  Alk phos 178.  Troponin negative x 2.  Patient received p.o. and IV potassium replacement in the ED.  She received IV ceftriaxone 1 dose and 2 L fluid bolus.  Urinalysis with moderate leukocyte esterase and 4+ bacteria.  She had a CT abdomen/pelvis on 12/29 which showed bilateral nonobstructing nephrolithiasis with right ureteropelvic junction calculi and air within the right renal collecting system suggesting upper urinary tract gas-forming infection in the absence of history of recent intervention.  Chest x-ray  negative for acute cardiopulmonary infiltrates.  Flu and COVID were negative.    Patient was admitted, started on 
    Hospitalist Progress Note               Daily Progress Note: 1/5/2025      Hospital Day: 6     Chief complaint:   Chief Complaint   Patient presents with    Chest Pain        Subjective:   Hospital course to date:    74 y.o.  female with PMHx significant for kidney stones, asthma, hypertension, hx liver cyst, and arthritis presenting to the ED with a primary complaint of abdominal pain, nausea, vomiting, poor appetite, body aches, fever, and cough.  Patient states she went to [facility] where she was tested for flu/COVID which were both negative.  Reports that over the last 3 days she has been unable to keep any liquids, food, or medication down because she gets sudden abdominal pain and will vomit.  She states that \"it hurts to take a deep breath.\"  Of note, patient also has dressings to bilateral lower extremities for chronic lymphedema.  She was mentioned that she previously had a stent placed for kidney stones in 2016, but it \"fell out\" after a few days. She denies any headache, dizziness, cough, chest pain, palpitations, flank pain, constipation, diarrhea, or other associated complaints.    In the ED, febrile with temp 100.5, mild tachycardia.  Otherwise BP stable, on room air.  No leukocytosis.  Significant initial labs show hemoglobin 8.8, decreased from 10.12 days ago.  Potassium 2.9.  Alk phos 178.  Troponin negative x 2.  Patient received p.o. and IV potassium replacement in the ED.  She received IV ceftriaxone 1 dose and 2 L fluid bolus.  Urinalysis with moderate leukocyte esterase and 4+ bacteria.  She had a CT abdomen/pelvis on 12/29 which showed bilateral nonobstructing nephrolithiasis with right ureteropelvic junction calculi and air within the right renal collecting system suggesting upper urinary tract gas-forming infection in the absence of history of recent intervention.  Chest x-ray  negative for acute cardiopulmonary infiltrates.  Flu and COVID were negative.    Patient was admitted, started on 
    Hospitalist Progress Note               Daily Progress Note: 1/6/2025      Hospital Day: 7     Chief complaint:   Chief Complaint   Patient presents with    Chest Pain        Subjective:   Hospital course to date:    74 y.o.  female with PMHx significant for kidney stones, asthma, hypertension, hx liver cyst, and arthritis presenting to the ED with a primary complaint of abdominal pain, nausea, vomiting, poor appetite, body aches, fever, and cough.  Patient states she went to [facility] where she was tested for flu/COVID which were both negative.  Reports that over the last 3 days she has been unable to keep any liquids, food, or medication down because she gets sudden abdominal pain and will vomit.  She states that \"it hurts to take a deep breath.\"  Of note, patient also has dressings to bilateral lower extremities for chronic lymphedema.  She was mentioned that she previously had a stent placed for kidney stones in 2016, but it \"fell out\" after a few days. She denies any headache, dizziness, cough, chest pain, palpitations, flank pain, constipation, diarrhea, or other associated complaints.    In the ED, febrile with temp 100.5, mild tachycardia.  Otherwise BP stable, on room air.  No leukocytosis.  Significant initial labs show hemoglobin 8.8, decreased from 10.12 days ago.  Potassium 2.9.  Alk phos 178.  Troponin negative x 2.  Patient received p.o. and IV potassium replacement in the ED.  She received IV ceftriaxone 1 dose and 2 L fluid bolus.  Urinalysis with moderate leukocyte esterase and 4+ bacteria.  She had a CT abdomen/pelvis on 12/29 which showed bilateral nonobstructing nephrolithiasis with right ureteropelvic junction calculi and air within the right renal collecting system suggesting upper urinary tract gas-forming infection in the absence of history of recent intervention.  Chest x-ray  negative for acute cardiopulmonary infiltrates.  Flu and COVID were negative.    Patient was admitted, started on 
4 Eyes Skin Assessment     NAME:  Yahaira Song  YOB: 1950  MEDICAL RECORD NUMBER:  [id number]    The patient is being assessed for  Other weekly assessment    I agree that at least one RN has performed a thorough Head to Toe Skin Assessment on the patient. ALL assessment sites listed below have been assessed.      Areas assessed by both nurses:    Head, Face, Ears, Shoulders, Back, Chest, Arms, Elbows, Hands, Sacrum. Buttock, Coccyx, Ischium, Legs. Feet and Heels, and Under Medical Devices         Does the Patient have a Wound? No    BLE lymphedema      Tacos Prevention initiated by RN: Yes  Wound Care Orders initiated by RN: Yes    Pressure Injury (Stage 3,4, Unstageable, DTI, NWPT, and Complex wounds) if present, place Wound referral order by RN under : No    New Ostomies, if present place, Ostomy referral order under : No     Nurse 1 eSignature: Electronically signed by JEWELL YOUNG RN on 1/8/25 at 2:27 AM EST    **SHARE this note so that the co-signing nurse can place an eSignature**    Nurse 2 eSignature: Electronically signed by Jose R Henley LPN on 1/8/25 at 2:31 AM EST   
4 Eyes Skin Assessment     NAME:  Yahaira Song  YOB: 1950  MEDICAL RECORD NUMBER:  [id number]    The patient is being assessed for  Other weekly    I agree that at least one RN has performed a thorough Head to Toe Skin Assessment on the patient. ALL assessment sites listed below have been assessed.      Areas assessed by both nurses:    Head, Arms, Legs, Trunk- BLE weeping wounds        Does the Patient have a Wound? Yes wound(s) were present on assessment. LDA wound assessment was Initiated and completed by RN       Tacos Prevention initiated by RN: Yes  Wound Care Orders initiated by RN: Yes    Pressure Injury (Stage 3,4, Unstageable, DTI, NWPT, and Complex wounds) if present, place Wound referral order by RN under : Yes    New Ostomies, if present place, Ostomy referral order under : No     Nurse 1 eSignature: Electronically signed by William Dawson RN on 1/1/25 at 4:18 AM EST    **SHARE this note so that the co-signing nurse can place an eSignature**    Nurse 2 eSignature: Electronically signed by Monserrat Dupont RN on 1/1/25 at 6:19 AM EST      
4 Eyes Skin Assessment     NAME:  Yahaira Song  YOB: 1950  MEDICAL RECORD NUMBER:  [id number]    The patient is being assessed for  Admission    I agree that at least one RN has performed a thorough Head to Toe Skin Assessment on the patient. ALL assessment sites listed below have been assessed.      Areas assessed by both nurses:    Head, Face, Ears, Shoulders, Back, Chest, Arms, Elbows, Hands, Sacrum. Buttock, Coccyx, Ischium, and Legs. Feet and Heels        Does the Patient have a Wound? No noted wound(s)       Tacos Prevention initiated by RN: No  Wound Care Orders initiated by RN: No    Pressure Injury (Stage 3,4, Unstageable, DTI, NWPT, and Complex wounds) if present, place Wound referral order by RN under : No    New Ostomies, if present place, Ostomy referral order under : No     Nurse 1 eSignature: Electronically signed by Star Cutler RN on 12/31/24 at 2:35 PM EST    **SHARE this note so that the co-signing nurse can place an eSignature**    Nurse 2 eSignature: {Esignature:[id number]}   
Comprehensive Nutrition Assessment    Type and Reason for Visit:  Initial, LOS    Nutrition Recommendations/Plan:   Encourage intakes >75%, update dietary preferences as able to improve intakes  Monitor wt, intakes, fluid status, labs, and bowel fxn     Malnutrition Assessment:  Malnutrition Status:  Mild malnutrition (01/08/25 1319)    Context:  Acute Illness     Findings of the 6 clinical characteristics of malnutrition:  Energy Intake:  50% or less of estimated energy requirements for 5 or more days  Weight Loss:  No weight loss     Body Fat Loss:  Unable to assess     Muscle Mass Loss:  Unable to assess    Fluid Accumulation:  Mild (suspect r/t chronic illness > nutr status)     Strength:  Not Performed    Nutrition Assessment:    P/w abdominal pain, nausea, vomiting, poor appetite, body aches, fever, and cough. Admitted and started on abx for UTI. Pt reports improved GI symptoms, but not eating well d/t being a picky eater. 1-25% doc intakes on regular diet. Discussed with pt dietary preferences, will update. Pt reports disliking ONS. Labs and meds reviewed. , h/h8.7/27.5, CRP 29.3, procal 1.87    Nutrition Related Findings:    NFPE deferred. No reported current n/v/d/c. LBM 1/7. No noted hx of dysphagia. +1 pitting BLE edema. Wound Type: None       Current Nutrition Intake & Therapies:    Average Meal Intake: 1-25%  Average Supplements Intake: None Ordered  ADULT DIET; Regular    Anthropometric Measures:  Height: 172.7 cm (5' 8\")  Ideal Body Weight (IBW): 140 lbs (64 kg)    Current Body Weight: 135.6 kg (298 lb 15.1 oz), 213.5 % IBW. Weight Source: Bed scale  Current BMI (kg/m2): 45.5  Usual Body Weight: 130.6 kg (288 lb) (per pt)  % Weight Change (Calculated): 3.8  Weight Adjustment For: No Adjustment  BMI Categories: Obese Class 3 (BMI 40.0 or greater)    Estimated Daily Nutrient Needs:  Energy Requirements Based On: Formula  Weight Used for Energy Requirements: Current  Energy (kcal/day): 2490 
Continue IV ceftriaxone  Await urine culture  Add Delsym for cough  Replete potassium as needed   
Discharge plan of care/case management plan validated with provider discharge order.   
[facility] in facility at this time to transport pt to [facility]. Report endorsed on ems providers at this time. Pt gcs 15. VS updated. All belongings gathered, along with AVS instructions and sent with patient. AVS   
PT eval order received and acknowledged. PT eval attempted at 1550 however pt had just returned to room from OR, RN advised to defer PT at this time. Will continue to follow patient and attempt PT eval at a later time when medically appropriate. Thank you.    
Patient complains of mid chest pain.  Reports that pain is a 7.  Patient reports shortness of breath.  Lungs are clear and diminished.  Checked vital signs.  Temp 100.4 BP  111/59, pulse 95, Resp 18.  O2 sat 95 %..   Called Dr Green to notify.  Orders received.      
Patient seen by Dr. Ramsey. As per him to resume diet for now and put the patient NPO post midnight for ureteral stenting tomorrow.  
Progress Note  Date:2025       Room:[facility]  Patient Name:Yahaira Song     YOB: 1950     Age:74 y.o.        Subjective    Subjective  Patient followed for complicated UTI with nephrolithiasis and ureterolithiasis, secondary K. Pneumoniae and E. Coli, on IV Meropenem, however, a second urine culture has grown CRE E. Coli and ESBL E. coli. She was switched to Avycaz. She underwent cystoscopy, right RTP and right ureteral stent insertion. Patient resting comfortably at this time. CTA showed only atelectasis. Pending insurance authorization to go to [facility].     Vitals Last 24 Hours:  TEMPERATURE:  Temp  Av.8 °F (37.1 °C)  Min: 98.4 °F (36.9 °C)  Max: 99.3 °F (37.4 °C)  RESPIRATIONS RANGE: Resp  Av  Min: 18  Max: 22  PULSE OXIMETRY RANGE: SpO2  Av.8 %  Min: 94 %  Max: 96 %  PULSE RANGE: Pulse  Av.5  Min: 88  Max: 111  BLOOD PRESSURE RANGE: Systolic (24hrs), Av , Min:115 , Max:135   ; Diastolic (24hrs), Av, Min:54, Max:64         Vitals and nursing note reviewed.   Constitutional:       General: She is not in acute distress.     Appearance: She is ill-appearing.   HENT:      Head: Normocephalic and atraumatic.      Right Ear: External ear normal.      Left Ear: External ear normal.      Nose: Nose normal.      Mouth/Throat:      Pharynx: Oropharynx is clear.   Eyes:      Extraocular Movements: Extraocular movements intact.      Pupils: Pupils are equal, round, and reactive to light.   Cardiovascular:      Rate and Rhythm: Normal rate and regular rhythm.   Abdominal:      General: Bowel sounds are normal. There is no distension.      Palpations: Abdomen is soft.      Tenderness: There is no abdominal tenderness. There is right CVA tenderness. There is no left CVA tenderness or guarding.   Genitourinary:     Comments: No Lomas catheter  Musculoskeletal:      Cervical back: Neck supple.      Right lower leg: Edema present.      Left lower leg: Edema present. 
Progress Note  Date:2025       Room:[facility]  Patient Name:Yahaira Song     YOB: 1950     Age:74 y.o.        Subjective    Subjective  Patient followed for complicated UTI with nephrolithiasis and ureterolithiasis, secondary K. Pneumoniae and E. Coli, on IV Meropenem, however, a second urine culture has grown CRE E. Coli and ESBL E. coli. She was switched to Avycaz to which all organisms are sensitive.. She underwent cystoscopy, right RTP and right ureteral stent insertion.  Pending insurance authorization to go to [facility]. Apparently the cost of Avycaz is a barrier to discharge.      Vitals Last 24 Hours:  TEMPERATURE:  Temp  Av.5 °F (36.9 °C)  Min: 97.9 °F (36.6 °C)  Max: 99.1 °F (37.3 °C)  RESPIRATIONS RANGE: Resp  Av.3  Min: 16  Max: 18  PULSE OXIMETRY RANGE: SpO2  Av.8 %  Min: 91 %  Max: 94 %  PULSE RANGE: Pulse  Av  Min: 85  Max: 95  BLOOD PRESSURE RANGE: Systolic (24hrs), Av , Min:130 , Max:137   ; Diastolic (24hrs), Av, Min:64, Max:65          Vitals and nursing note reviewed.   Constitutional:       General: She is not in acute distress.     Appearance: She is ill-appearing.   HENT:      Head: Normocephalic and atraumatic.      Right Ear: External ear normal.      Left Ear: External ear normal.      Nose: Nose normal.      Mouth/Throat:      Pharynx: Oropharynx is clear.   Eyes:      Extraocular Movements: Extraocular movements intact.      Pupils: Pupils are equal, round, and reactive to light.   Cardiovascular:      Rate and Rhythm: Normal rate and regular rhythm.   Abdominal:      General: Bowel sounds are normal. There is no distension.      Palpations: Abdomen is soft.      Tenderness: There is no abdominal tenderness. There is right CVA tenderness. There is no left CVA tenderness or guarding.   Genitourinary:     Comments: No Lomas catheter  Musculoskeletal:      Cervical back: Neck supple.      Right lower leg: Edema present.      Left lower 
Pt ready for discharge at this time. Pt maximum assist with turning, unable to slide self to edge of bed, nor could pt stand with or without assist.. Unit secretary to schedule stretcher transport at this time.  
Pt. Transferred to room via bed in stable condition.  Bedside report given and SBAR reviewed.  
RT Inhaler-Nebulizer Bronchodilator Protocol Note    There is a bronchodilator order in the chart from a provider indicating to follow the RT Bronchodilator Protocol and there is an “Initiate RT Inhaler-Nebulizer Bronchodilator Protocol” order as well (see protocol at bottom of note).    CXR Findings:  No results found.    The findings from the last RT Protocol Assessment were as follows:   History Pulmonary Disease: (P) None or smoker <15 pack years  Respiratory Pattern: (P) Regular pattern and RR 12-20 bpm  Breath Sounds: (P) Slightly diminished and/or crackles  Cough: (P) Strong, spontaneous, non-productive  Indication for Bronchodilator Therapy: None  Bronchodilator Assessment Score: (P) 2    Aerosolized bronchodilator medication orders have been revised according to the RT Inhaler-Nebulizer Bronchodilator Protocol below.    Respiratory Therapist to perform RT Therapy Protocol Assessment initially then follow the protocol.  Repeat RT Therapy Protocol Assessment PRN for score 0-3 or on second treatment, BID, and PRN for scores above 3.    No Indications - adjust the frequency to every 6 hours PRN wheezing or bronchospasm, if no treatments needed after 48 hours then discontinue using Per Protocol order mode.     If indication present, adjust the RT bronchodilator orders based on the Bronchodilator Assessment Score as indicated below.  Use Inhaler orders unless patient has one or more of the following: on home nebulizer, not able to hold breath for 10 seconds, is not alert and oriented, cannot activate and use MDI correctly, or respiratory rate 25 breaths per minute or more, then use the equivalent nebulizer order(s) with same Frequency and PRN reasons based on the score.  If a patient is on this medication at home then do not decrease Frequency below that used at home.    0-3 - enter or revise RT bronchodilator order(s) to equivalent RT Bronchodilator order with Frequency of every 4 hours PRN for wheezing or 
Received Order for Telemetry     [place] GUICHO Nohemi   1950   [id number]   Pyelonephritis [N12]  Complicated UTI (urinary tract infection) [N39.0]   Dale Boo MD     Tele Box # [id number] placed on patient at  1105 am  ER Room # ED13  Admitting to Room 416  Verified with Primary Nurse FRANCISCO at  1105 am     
  Skin:     Findings: No rash.   Neurological:      General: No focal deficit present.      Mental Status: She is alert and oriented to person, place, and time.   Psychiatric:         Mood and Affect: Mood normal.         Behavior: Behavior normal.         Thought Content: Thought content normal.         Judgment: Judgment normal.        Labs/Imaging/Diagnostics    Labs:         Latest Reference Range & Units 12/29/24 02:32 12/31/24 04:21 01/01/25 04:50 01/06/25 06:55   WBC 3.6 - 11.0 K/uL 8.0 8.1 7.4 8.6       Procal 2.39 <2.63 <2.77 <3.43       CRP 25.60 < 21.30 <32.40 < 29.70 <31.30       Blood cultures (12/31) No growth FINAL  Blood cultures (12/31) No growth FINAL     Urine culture (12/29) >100,000 col/ml Klebsiella pneumoniae and Escherichia coli      Klebsiella pneumoniae  Escherichia coli      amikacin <=2 ug/mL Sensitive <=2 ug/mL Sensitive    ampicillin 16 ug/mL Resistant >=32 ug/mL Resistant    ampicillin-sulbactam 4 ug/mL Sensitive 16 ug/mL Intermediate    ceFAZolin <=4 ug/mL Sensitive >=64 ug/mL Resistant    cefepime <=1 ug/mL Sensitive 8 ug/mL Resistant    cefOXitin <=4 ug/mL Sensitive      cefTAZidime <=1 ug/mL Sensitive 4 ug/mL Resistant    cefTRIAXone <=1 ug/mL Sensitive >=64 ug/mL Resistant    ciprofloxacin <=0.25 ug/mL Sensitive >=4 ug/mL Resistant    gentamicin >=16 ug/mL Resistant <=1 ug/mL Sensitive    levofloxacin 1 ug/mL Sensitive >=8 ug/mL Resistant    meropenem <=0.25 ug/mL Sensitive <=0.25 ug/mL Sensitive    nitrofurantoin 64 ug/mL Intermediate <=16 ug/mL Sensitive    piperacillin-tazobactam <=4 ug/mL Sensitive <=4 ug/mL Sensitive    tobramycin <=1 ug/mL Sensitive <=1 ug/mL Sensitive    trimethoprim-sulfamethoxazole >=320 ug/mL Resistant <=20 ug/mL Sensitive       Urine culture (12/31) >100,000 col/ml       CRE Escherichia coli (1) ESBL Escherichia coli (2)     BACTERIAL SUSCEPTIBILITY PANEL SHANNON BACTERIAL SUSCEPTIBILITY PANEL SHANNON    amikacin <=2 ug/mL Sensitive <=2 ug/mL Sensitive    
Mood normal.          Vitals:    01/02/25 1506   BP: 126/61   Pulse: 91   Resp: 20   Temp: 100.2 °F (37.9 °C)   SpO2: 94%         Data Review:       Recent Days:     Lab Results   Component Value Date/Time     01/01/2025 04:50 AM    K 3.3 01/01/2025 04:50 AM     01/01/2025 04:50 AM    CO2 29 01/01/2025 04:50 AM    BUN 8 01/01/2025 04:50 AM    CREATININE 0.97 01/01/2025 04:50 AM    GLUCOSE 124 01/01/2025 04:50 AM    GLUCOSE 82 07/19/2024 10:15 AM    CALCIUM 8.6 01/01/2025 04:50 AM    LABGLOM 62 01/01/2025 04:50 AM    LABGLOM 58 04/07/2024 02:42 PM      Lab Results   Component Value Date    WBC 7.4 01/01/2025    HGB 9.0 (L) 01/01/2025    HCT 28.7 (L) 01/01/2025    MCV 91.4 01/01/2025     (L) 01/01/2025     Lab Results   Component Value Date/Time    APPEARANCE Clear 12/31/2024 05:41 AM    COLORU Yellow/Straw 12/31/2024 05:41 AM    NITRU Negative 12/31/2024 05:41 AM    GLUCOSEU Negative 12/31/2024 05:41 AM    KETUA Negative 12/31/2024 05:41 AM    UROBILINOGEN 0.1 12/31/2024 05:41 AM    BILIRUBINUR Negative 12/31/2024 05:41 AM         Ct 12/29/24    9 and 12mm right UPJ stones and bilateral calyceal stones.  No hydronephrosis but gas bubble in the right upper pole       Assessment/     Patient Active Problem List   Diagnosis    Kidney stone    Acute pain    Edema    At risk for falls    Hypertension    Obesity, morbid    Menopause present    Obesity    Urinary incontinence    Arthritis    Asthma    Hematuria    Postmenopausal state    Sepsis (HCC)    Ureteral stone with hydronephrosis    Abdominal pain    Hepatic cyst    Acute cystitis    Urinary tract obstruction by kidney stone    Leg edema    Pyelonephritis    Complicated UTI (urinary tract infection)   Bilateral nonobstructing kidney stones as well as right UPJ stones.  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  She presented with sepsis.  I do recommend ureteral stenting as an inpatient and definitive stone management after infection is 
nephrolithiasis with right ureteropelvic junction calculi and air within the right renal collecting system suggesting upper urinary tract gas-forming infection in the absence of history of recent intervention. Incidentals as above. Electronically signed by Reji Farmer        Assessment//Plan           Hospital Problems             Last Modified POA    * (Principal) Pyelonephritis 1/3/2025 Yes    Complicated UTI (urinary tract infection) 12/31/2024 Yes      Complicated UTI (nephrolithiasis, ureterolithiasis) with right emphysematous pyelitis, pyuria and bacteriuria, secondary to Klebsiella pneumoniae and Escherichia coli, on Day #3 IV Meropenem  Sepsis with fever, elevated CRP and procal  History of ESBL Klebsiella  Large hepatic cyst with elevated alkaline phosphatase  5.   Allergy to penicillin  6.  Status post cystoscopy, right retrograde pyelogram and right ureteral stent insertion    Comment:   Complicated UTI involving ESBL organisms  generally calls for treatment with Carbapenems (Meropenem, Ertapenem, Imipenem, Doripenem) however, recent clinical guidelines support treatment with the oral quinolones (Ciprofloxacin/Levaquin) but in this case E. Coli is resistant to both.      Plan   1. Continue IV Meropenem for 11 more days  2. In am, repeat CRP and   procal  3. Follow-up blood and urine cultures          Electronically signed by Barry Johnson MD on 1/3/25 at 7:38 PM EST    
IV Avycaz  2. Contacted [facility] regarding requested testing of  CRE and ESBL isolates against Avycaz and Zerbaxa; was told it was set up today  3.  In am, repeat CBC, CRP and   procal  5. Follow-up blood and urine cultures  6.  CT Chest ordered       Electronically signed by Barry Johnson MD      
\"BEART\", \"TQU2ZGJJ\", \"HGBART\", \"UB9VTXDUR\", \"FIO2A\", \"N2TDHSCA\", \"OXYHEM\", \"CARBOXHGBART\", \"METHGBART\", \"U5FKPPBGI\", \"PHCORART\", \"TEMP\" in the last 72 hours.    Invalid input(s): \"[id number]\"    24 Hour Results:  Recent Results (from the past 24 hour(s))   POCT Glucose    Collection Time: 01/03/25 11:15 AM   Result Value Ref Range    POC Glucose 110 (H) 65 - 100 mg/dL    Performed by: Lois Best    POCT Glucose    Collection Time: 01/03/25  4:15 PM   Result Value Ref Range    POC Glucose 122 (H) 65 - 100 mg/dL    Performed by: Justo Calvo    POCT Glucose    Collection Time: 01/03/25  8:35 PM   Result Value Ref Range    POC Glucose 205 (H) 65 - 100 mg/dL    Performed by: Jose Virgen    C-Reactive Protein    Collection Time: 01/04/25  6:39 AM   Result Value Ref Range    CRP 32.40 (H) 0.00 - 0.30 mg/dL   Procalcitonin    Collection Time: 01/04/25  6:39 AM   Result Value Ref Range    Procalcitonin 2.77 (H) 0 ng/mL   POCT Glucose    Collection Time: 01/04/25  8:27 AM   Result Value Ref Range    POC Glucose 157 (H) 65 - 100 mg/dL    Performed by: MARISOL AYALA          FL LESS THAN 1 HOUR   Final Result      XR CHEST 1 VIEW   Final Result   No acute findings.      Electronically signed by Reji Farmer             Discussion/MDM:     [] High (any 2)    A. Problems (any 1)  [] Acute/Chronic Illness/injury posing threat to life or bodily function:    [] Severe exacerbation of chronic illness:    ---------------------------------------------------------------------  B. Risk of Treatment (any 1)   [] Drugs/treatments that require intensive monitoring for toxicity include:    [] IV ABX requiring serial renal monitoring for nephrotoxicity:     [] IV Narcotic analgesia for adverse drug reaction  [] Aggressive IV diuresis requiring serial monitoring for renal impairment and electrolyte derangements  [] Critical electrolyte abnormalities requiring IV replacement and close serial monitoring  [] SQ Insulin SS- 
elevation in  CRP.       Plan   1. Discontinue Meropenem given CRE E. Coli  2. Start IV Avycaz  3. Contacted [facility] to test both CRE and ESBL isolates against Avycaz and Zerbaxa  4.  In am, repeat CRP and   procal  5. Follow-up blood and urine cultures          Electronically signed by Barry Johnson MD      
32 mmol/L    Anion Gap 7 2 - 12 mmol/L    Glucose 157 (H) 65 - 100 mg/dL    BUN 21 (H) 6 - 20 mg/dL    Creatinine 0.86 0.55 - 1.02 mg/dL    BUN/Creatinine Ratio 24 (H) 12 - 20      Est, Gloalysia Filt Rate 72 >60 ml/min/1.73m2    Calcium 8.9 8.5 - 10.1 mg/dL         CTA CHEST W WO CONTRAST   Final Result   No evidence of pulmonary embolism or pneumonia.   Bibasilar atelectasis.         Electronically signed by FERNANDA ORELLANA LESS THAN 1 HOUR   Final Result      XR CHEST 1 VIEW   Final Result   No acute findings.      Electronically signed by Reji Farmer             Discussion/MDM:     [] High (any 2)    A. Problems (any 1)  [] Acute/Chronic Illness/injury posing threat to life or bodily function:    [] Severe exacerbation of chronic illness:    ---------------------------------------------------------------------  B. Risk of Treatment (any 1)   [] Drugs/treatments that require intensive monitoring for toxicity include:    [] IV ABX requiring serial renal monitoring for nephrotoxicity:     [] IV Narcotic analgesia for adverse drug reaction  [] Aggressive IV diuresis requiring serial monitoring for renal impairment and electrolyte derangements  [] Critical electrolyte abnormalities requiring IV replacement and close serial monitoring  [] SQ Insulin SS- monitoring serial FSBS for Hypoglycemic adverse drug reaction  [] Other -   [] Change in code status:    [] Decision to escalate care:    [] Major surgery/procedure with associated risk factors:    ----------------------------------------------------------------------  C. Data (any 2)  [x] Discussed current management and discharge planning options with Case Management.  [] Discussed management of the case with:    [] Telemetry personally reviewed and interpreted as documented above    [] Imaging personally reviewed and interpreted, includes:    [x] Data Review (any 3)  [x] All available Consultant notes from yesterday/today were reviewed  [x] All current labs were 
factors:    ----------------------------------------------------------------------  C. Data (any 2)  [] Discussed current management and discharge planning options with Case Management.  [] Discussed management of the case with:    [] Telemetry personally reviewed and interpreted as documented above    [] Imaging personally reviewed and interpreted, includes:    [] Data Review (any 3)  [] All available Consultant notes from yesterday/today were reviewed  [] All current labs were reviewed and interpreted for clinical significance   [] Appropriate follow-up labs were ordered  [] Collateral history obtained from:               Assessment:  Complicated UTI due to Klebsiella and ESBL E. coli, Carbapenem resistant E. coli  Nephrolithiasis, nonobstructing  -UA showed pyuria and bacteriuria  -CT showed bilateral nonobstructing nephrolithiasis and air in the right renal collecting system  -On Avycaz  -Status post cystoscopy with right ureteral stent placement 1/3    Sepsis with fever and tachycardia, resolved     Hypokalemia  - Repleted    Hypertension  - On carvedilol    Chronic lower extremity edema  - On Bumex    Chronic asthma  - Stable    Bronchitis with Acute cough and pleuritic chest pain  - Flu and COVID-negative, chest x-ray clear, CTA chest negative  -Overall improve    Hepatic cyst  -Followed by [facility]  -Previous MRI showed 11.7 x 15.9 x 15.5 cm complex cyst      ID awaiting sensitivity testing for CRE and ESBL isolates against Avycaz and Zerbaxa.     Code status: Full code    Social determinants of health: none      Estimated discharge date//time frame/disposition:    Barriers to discharge: SNF authorization          Ramírez Negrete Cha, MD

## 2025-01-09 NOTE — PLAN OF CARE
Problem: Safety - Adult  Goal: Free from fall injury  Outcome: Progressing     Problem: Discharge Planning  Goal: Discharge to home or other facility with appropriate resources  Outcome: Progressing     Problem: Pain  Goal: Verbalizes/displays adequate comfort level or baseline comfort level  Outcome: Progressing     Problem: Skin/Tissue Integrity  Goal: Absence of new skin breakdown  Description: 1.  Monitor for areas of redness and/or skin breakdown  2.  Assess vascular access sites hourly  3.  Every 4-6 hours minimum:  Change oxygen saturation probe site  4.  Every 4-6 hours:  If on nasal continuous positive airway pressure, respiratory therapy assess nares and determine need for appliance change or resting period.  Outcome: Progressing     Problem: Occupational Therapy - Adult  Goal: By Discharge: Performs self-care activities at highest level of function for planned discharge setting.  See evaluation for individualized goals.  Description: FUNCTIONAL STATUS PRIOR TO ADMISSION:   Pt reports her friend/roommate occasionally assists her with ADLs, and always assists with donning socks/shoes.    HOME SUPPORT: The patient lived with roommate and required minimal assistance for ADLs.    Occupational Therapy Goals:  Initiated 1/2/2025  Patient/Family stated goal: get stronger and feel better   1.  Patient will perform bathing with Moderate Assist within 7 day(s).  2.  Patient will perform upper body dressing with Set-up within 7 day(s).  3.  Patient will perform lower body dressing with Moderate Assist within 7 day(s).  4.  Patient will perform toilet transfers with Moderate Assist  within 7 day(s).  5.  Patient will perform all aspects of toileting with Moderate Assist within 7 day(s).  6.  Patient will participate in upper extremity therapeutic exercise/activities with [place] within 7 day(s).        1/8/2025 1311 by Sophia Hsu OTA  Outcome: Progressing

## 2025-01-09 NOTE — PLAN OF CARE
Problem: Safety - Adult  Goal: Free from fall injury  1/9/2025 0930 by Evaristo Grubbs RN  Outcome: Progressing  1/9/2025 0201 by Kellen Orozco RN  Outcome: Progressing     Problem: Discharge Planning  Goal: Discharge to home or other facility with appropriate resources  1/9/2025 0930 by Evaristo Grubbs RN  Outcome: Progressing  1/9/2025 0201 by Kellen Orozco RN  Outcome: Progressing     Problem: Pain  Goal: Verbalizes/displays adequate comfort level or baseline comfort level  1/9/2025 0930 by Evaristo Grubbs RN  Outcome: Progressing  1/9/2025 0201 by Kellen Orozco RN  Outcome: Progressing     Problem: Skin/Tissue Integrity  Goal: Absence of new skin breakdown  Description: 1.  Monitor for areas of redness and/or skin breakdown  2.  Assess vascular access sites hourly  3.  Every 4-6 hours minimum:  Change oxygen saturation probe site  4.  Every 4-6 hours:  If on nasal continuous positive airway pressure, respiratory therapy assess nares and determine need for appliance change or resting period.  1/9/2025 0930 by Evaristo Grubbs RN  Outcome: Progressing  1/9/2025 0201 by Kellen Orozco RN  Outcome: Progressing

## 2025-01-09 NOTE — CARE COORDINATION
Transition of Care Plan:    RUR: 20%  Prior Level of Functioning: IND  Disposition: SNF  KIANA: Today  If SNF or IPR: Date FOC offered: 1/3/24  Date FOC received: 1/3/24  Accepting facility: [facility]   Date authorization started with reference number: 1/7/25, [id number]  Date authorization received and expires: 1/8/25  Follow up appointments: SNF to arrange  DME needed: None  Transportation at discharge: Ex Spouse  IM/IMM Medicare/ letter given: Yes   Is patient a  and connected with [facility]? No   If yes, was  transfer form completed and [facility] notified? NA  Caregiver Contact: Kurtis Song [phone]  Discharge Caregiver contacted prior to discharge? Yes  Care Conference needed? No  Barriers to discharge: None    Patient has been accepted to admit to [facility] located at [address] [place] [address]. Patient will go to room 111A. Nurse to call report to [phone].  MIAN met with patient at bedside, she is agreeable to discharge today.  Patient called ex spouse Kurtis Song and placed on speaker phone, MIAN explained discharge today.  Mr. Song is agreeable. He will be at hospital between 2-3 for transport.  CM notified primary nurse and attending provider.

## 2025-01-09 NOTE — CARE COORDINATION
[facility] can accept patient today.  They have given room assignment for 110A.  CM messaged attending and notified of above.  CM asked if patient can discharge today.  Awaiting response.

## (undated) DEVICE — PREP PAD BNS: Brand: CONVERTORS

## (undated) DEVICE — CYSTO/BLADDER IRRIGATION SET, REGULATING CLAMP

## (undated) DEVICE — CATH 5F 100CM JL35 -- DXTERITY

## (undated) DEVICE — OPEN-END URETERAL CATHETER: Brand: COOK

## (undated) DEVICE — SOLUTION IRRIG 500ML STRL H2O NONPYROGENIC

## (undated) DEVICE — PROVE COVER: Brand: UNBRANDED

## (undated) DEVICE — CATH URET 5FRX70CM POLYUR -- CONVERT TO ITEM 106403

## (undated) DEVICE — SOLUTION IRRIGATION H2O 0797305] ICU MEDICAL INC]

## (undated) DEVICE — KIT HND CTRL 3 W STPCOCK ROT END 54IN PREM HI PRSS TBNG AT

## (undated) DEVICE — CYSTO-SMH: Brand: MEDLINE INDUSTRIES, INC.

## (undated) DEVICE — GOWN,SIRUS,NONRNF,SETINSLV,XL,20/CS: Brand: MEDLINE

## (undated) DEVICE — ANGIOGRAPHY KIT

## (undated) DEVICE — BAG,DRAINAGE,ANTI-REFLUX TOWER,2000ML: Brand: MEDLINE

## (undated) DEVICE — SPECIAL PROCEDURE DRAPE 32" X 34": Brand: SPECIAL PROCEDURE DRAPE

## (undated) DEVICE — GUIDEWIRE VASC L260CM DIA0.035IN TIP L3MM STD EXCHG PTFE J

## (undated) DEVICE — KIT MFLD ISOLATN NACL CNTRST PRT TBNG SPIK W/ PRSS TRNSDUC

## (undated) DEVICE — Device

## (undated) DEVICE — KIT MED IMAG CNTRST AGNT W/ IOPAMIDOL REUSE

## (undated) DEVICE — CONNECTOR CATH URET SIDE PRT FOR FRIC CONN [UCA595] [GU TEK]

## (undated) DEVICE — GLOVE SURG SZ 75 L12IN FNGR THK79MIL GRN LTX FREE

## (undated) DEVICE — CATHETER DIAG 5FR L100CM AL AL 1.0 CRV SZ DBL BRAID WIRE

## (undated) DEVICE — CYSTO PACK: Brand: MEDLINE INDUSTRIES, INC.

## (undated) DEVICE — SOLUTION SCRB 4OZ 4% CHG H2O AIDED FOR PREOPERATIVE SKIN

## (undated) DEVICE — GLIDESHEATH SLENDER ACCESS KIT: Brand: GLIDESHEATH SLENDER

## (undated) DEVICE — CATH 5F 100CM JR40 -- DXTERITY

## (undated) DEVICE — BAND RADIAL COMPR ARTERY 24CM -- REG BX/10

## (undated) DEVICE — CATH NTR SITESEER 5FRX100CM -- DXTERITY

## (undated) DEVICE — TUBING, SUCTION, 1/4" X 12', STRAIGHT: Brand: MEDLINE

## (undated) DEVICE — SOLUTION IRRIG 3000ML STRL H2O USP UROMATIC PLAS CONT

## (undated) DEVICE — PACK PROCEDURE SURG HRT CATH

## (undated) DEVICE — ANGIOGRAPHIC CATHETER: Brand: IMPULSE™